# Patient Record
Sex: FEMALE | Race: WHITE | HISPANIC OR LATINO | Employment: OTHER | URBAN - METROPOLITAN AREA
[De-identification: names, ages, dates, MRNs, and addresses within clinical notes are randomized per-mention and may not be internally consistent; named-entity substitution may affect disease eponyms.]

---

## 2017-02-13 ENCOUNTER — OFFICE VISIT (OUTPATIENT)
Dept: PAIN MEDICINE | Facility: CLINIC | Age: 42
End: 2017-02-13
Attending: ANESTHESIOLOGY
Payer: COMMERCIAL

## 2017-02-13 VITALS
WEIGHT: 136.88 LBS | HEIGHT: 66 IN | SYSTOLIC BLOOD PRESSURE: 136 MMHG | TEMPERATURE: 99 F | DIASTOLIC BLOOD PRESSURE: 66 MMHG | BODY MASS INDEX: 22 KG/M2 | HEART RATE: 64 BPM

## 2017-02-13 DIAGNOSIS — M54.16 LEFT LUMBAR RADICULITIS: ICD-10-CM

## 2017-02-13 DIAGNOSIS — M79.18 MYOFASCIAL PAIN ON LEFT SIDE: Primary | ICD-10-CM

## 2017-02-13 DIAGNOSIS — M51.9 LUMBOSACRAL DISC DISEASE: ICD-10-CM

## 2017-02-13 DIAGNOSIS — M51.9 ANNULAR TEAR OF INTERVERTEBRAL DISC: ICD-10-CM

## 2017-02-13 DIAGNOSIS — R10.32 LEFT LOWER QUADRANT PAIN: ICD-10-CM

## 2017-02-13 PROCEDURE — 99214 OFFICE O/P EST MOD 30 MIN: CPT | Mod: S$GLB,,, | Performed by: ANESTHESIOLOGY

## 2017-02-13 PROCEDURE — 99999 PR PBB SHADOW E&M-EST. PATIENT-LVL III: CPT | Mod: PBBFAC,,, | Performed by: ANESTHESIOLOGY

## 2017-02-13 RX ORDER — IBUPROFEN 800 MG/1
800 TABLET ORAL 3 TIMES DAILY
Qty: 90 TABLET | Refills: 5 | Status: SHIPPED | OUTPATIENT
Start: 2017-02-13 | End: 2017-08-12

## 2017-02-13 RX ORDER — METHOCARBAMOL 500 MG/1
500 TABLET, FILM COATED ORAL 3 TIMES DAILY PRN
Qty: 90 TABLET | Refills: 5 | Status: SHIPPED | OUTPATIENT
Start: 2017-02-13 | End: 2017-08-12
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
## 2017-02-20 ENCOUNTER — PROCEDURE VISIT (OUTPATIENT)
Dept: OBSTETRICS AND GYNECOLOGY | Facility: CLINIC | Age: 42
End: 2017-02-20
Payer: COMMERCIAL

## 2017-02-20 ENCOUNTER — OFFICE VISIT (OUTPATIENT)
Dept: OBSTETRICS AND GYNECOLOGY | Facility: CLINIC | Age: 42
End: 2017-02-20
Payer: COMMERCIAL

## 2017-02-20 ENCOUNTER — HOSPITAL ENCOUNTER (OUTPATIENT)
Dept: RADIOLOGY | Facility: OTHER | Age: 42
Discharge: HOME | End: 2017-02-20
Attending: OBSTETRICS & GYNECOLOGY
Payer: COMMERCIAL

## 2017-02-20 VITALS
DIASTOLIC BLOOD PRESSURE: 70 MMHG | HEIGHT: 66 IN | SYSTOLIC BLOOD PRESSURE: 122 MMHG | WEIGHT: 139.75 LBS | BODY MASS INDEX: 22.46 KG/M2

## 2017-02-20 DIAGNOSIS — R10.2 PELVIC PAIN IN FEMALE: ICD-10-CM

## 2017-02-20 DIAGNOSIS — N64.3 GALACTORRHEA: ICD-10-CM

## 2017-02-20 DIAGNOSIS — R10.2 PELVIC PAIN IN FEMALE: Primary | ICD-10-CM

## 2017-02-20 DIAGNOSIS — Z30.431 IUD SURVEILLANCE: ICD-10-CM

## 2017-02-20 DIAGNOSIS — T83.32XA MALPOSITIONED INTRAUTERINE DEVICE (IUD), INITIAL ENCOUNTER: Primary | ICD-10-CM

## 2017-02-20 PROCEDURE — 76830 TRANSVAGINAL US NON-OB: CPT | Mod: 26,,, | Performed by: RADIOLOGY

## 2017-02-20 PROCEDURE — 99999 PR PBB SHADOW E&M-EST. PATIENT-LVL III: CPT | Mod: PBBFAC,,, | Performed by: OBSTETRICS & GYNECOLOGY

## 2017-02-20 PROCEDURE — 76856 US EXAM PELVIC COMPLETE: CPT | Mod: TC

## 2017-02-20 PROCEDURE — 58301 REMOVE INTRAUTERINE DEVICE: CPT | Mod: S$GLB,,, | Performed by: OBSTETRICS & GYNECOLOGY

## 2017-02-20 PROCEDURE — 99213 OFFICE O/P EST LOW 20 MIN: CPT | Mod: S$GLB,,, | Performed by: OBSTETRICS & GYNECOLOGY

## 2017-02-20 PROCEDURE — 76856 US EXAM PELVIC COMPLETE: CPT | Mod: 26,,, | Performed by: RADIOLOGY

## 2017-02-20 RX ORDER — LEVOFLOXACIN 750 MG/1
TABLET ORAL
Refills: 1 | COMMUNITY
Start: 2016-11-25 | End: 2017-02-20

## 2017-02-20 NOTE — PROCEDURES
Procedures     DATE: February 20th, 2017    TIME: 2:00 PM    PROCEDURE:  Latia removal    INDICATION: Mirtha Gary is a 41 y.o. female who presents for IUD removal secondary to ABNORMAL ULTRASOUND DONE EARLIER THIS MORNING SHOWED POSSIBLE ARM EMBEDDED IN MYOMETRIUM.      PRE-IUD REMOVAL COUNSELING:  The patient was advised of minimal risks of bleeding and pain and she agrees to proceed.    PROCEDURE:  TIME OUT PERFORMED.  IUD strings were  visualized at the os. IUD removed with gentle traction. IUD ARM NOT EMBEDDED IN UTERUS. The patient tolerated the procedure well.    COMPLICATIONS: None    PATIENT DISPOSITION: The patient tolerated the procedure well.    ASSESSMENT:  Contraceptive Management / Removal IUD. V25.0.    POST IUD REMOVAL COUNSELING:  Expect period-like flow to occur after Mirena IUD removal and periods to return to pre-IUD pattern.  Manage post IUD removal cramping with NSAIDs, Tylenol or Rx per [facility].    POST IUD REMOVAL CONTRACEPTION:  If planning pregnancy, patient instructed to begin prenatal vitamins.     Counseling lasted approximately 15 minutes and all her questions were answered.    FOLLOW-UP: With me for annual gyn exam or prn.    Katrin Garcia MD 02/20/2017 2:54 PM

## 2017-02-20 NOTE — MR AVS SNAPSHOT
[facility] [address]  [address]  [place] [address]  Phone: [phone]  Fax: [phone]                  Mirtha Gary   2017 8:45 AM   Office Visit    Descripción:  Female : 1975   Personal Médico:  Katrin Garcia MD   Departamento:  [facility] [address]           Razón de la kerry     Pelvic Pain                Lista de tareas           Citas próximas        Personal Médico Departamento Tfno del dpto    2017 2:15 PM Katrin Garcia MD [phone]      Metas (5 Years of Data)     Ninguna      Derricksjuan r en [place]lam[place]     [place] En [place] L[place]nea de Enfermeras - Asistencia   Enfermeras registradas de [facility] pueden ayudarle a reservar maurice kerry, proveer educación para la zechariah, asesoría clínica, y otros servicios de asesoramiento.   Llame para anuja servicio gratuito a [phone].             Medicamentos           Mensaje sobre Medicamentos     Verificar los cambios y / o adiciones a vega régimen de medicación son los mismos que discutir con vega médico. Si cualquiera de estos cambios o adiciones son incorrectos, por favor notifique a vega proveedor de atención médica.        DEJAR de colt estos medicamentos     zolpidem (AMBIEN) 10 mg Tab TK 1 T PO QHS PRN FOR INSOMNIA    levoFLOXacin (LEVAQUIN) 750 MG tablet TK 1 T PO QD           Verifique que la siguiente lista de medicamentos es maurice representación exacta de los medicamentos que está tomando actualmente. Si no hay ningunos reportados, la lista puede estar en berumen. Si no es correcta, por favor póngase en contacto con vega proveedor de atención médica. Lleve esta lista con usted en mitchell de emergencia.           Medicamentos Actuales     butalbital-acetaminophen-caff -40 mg Cap TK 1 C PO BID PRF SEVERE HA    ibuprofen (ADVIL,MOTRIN) 800 MG tablet Take 1 tablet (800 mg total) by mouth 3 (three) times daily.    methocarbamol (ROBAXIN) 500 MG Tab Take 1 tablet (500 mg  "total) by mouth 3 (three) times daily as needed (muscle spasms).    PROAIR HFA 90 mcg/actuation inhaler USE 2 PUFFS PO Q 6 H           Información de referencia clínica           Lashay signos vitales kathya     PS [place] BMI (IMC)          122/70 5' 6" (1.676 m) 63.4 kg (139 lb 12.4 oz) 22.56 kg/m2        Blood Pressure          Most Recent Value    BP  122/70      Alergias     A partir del:  2017        No Known Allergies      Vacunas     Administradas en la fecha de la visita:  2017        None      Language Assistance Services     ATTENTION: Language assistance services are available, free of charge. Please call [phone].      ATENCIÓN: Si habla español, tiene a vega disposición servicios gratuitos de asistencia lingüística. Llame al [phone].     CHÚ Ý: N?u b?n nói Ti?ng Vi?t, có các d?ch v? h? tr? ngôn ng? mi?n phí dành cho b?n. G?i s? [phone].         [facility] [address] cumple con las leyes federales aplicables de derechos civiles y no discrimina por motivos de dmitri, color, origen nacional, edad, discapacidad, o sexo.                 Mirtha Gary   2017 8:45 AM   Office Visit    Description:  Female : 1975   Provider:  Katrin Garcia MD   Department:  [facility] [address]           Reason for Visit     Pelvic Pain                To Do List           Future Appointments        Provider Department Dept Phone    2017 2:15 PM Katrin Garcia MD [phone]      Goals     None      [facility]     [facility] -  Assistance  Registered nurses in the [facility] provide clinical advisement, health education, appointment booking, and other advisory services.  Call for this free service at [phone].             Medications           Message regarding Medications     Verify the changes and/or additions to your medication regime listed below are the same as discussed with your " "clinician today.  If any of these changes or additions are incorrect, please notify your healthcare provider.        STOP taking these medications     zolpidem (AMBIEN) 10 mg Tab TK 1 T PO QHS PRN FOR INSOMNIA    levoFLOXacin (LEVAQUIN) 750 MG tablet TK 1 T PO QD           Verify that the below list of medications is an accurate representation of the medications you are currently taking.  If none reported, the list may be blank. If incorrect, please contact your healthcare provider. Carry this list with you in case of emergency.           Current Medications     butalbital-acetaminophen-caff -40 mg Cap TK 1 C PO BID PRF SEVERE HA    ibuprofen (ADVIL,MOTRIN) 800 MG tablet Take 1 tablet (800 mg total) by mouth 3 (three) times daily.    methocarbamol (ROBAXIN) 500 MG Tab Take 1 tablet (500 mg total) by mouth 3 (three) times daily as needed (muscle spasms).    PROAIR HFA 90 mcg/actuation inhaler USE 2 PUFFS PO Q 6 H           Clinical Reference Information           Your Vitals Were     BP Height Weight BMI          122/70 5' 6" (1.676 m) 63.4 kg (139 lb 12.4 oz) 22.56 kg/m2        Blood Pressure          Most Recent Value    BP  122/70      Allergies as of 2/20/2017     No Known Allergies      Immunizations Administered on Date of Encounter - 2/20/2017     None      Language Assistance Services     ATTENTION: Language assistance services are available, free of charge. Please call [phone].      ATENCIÓN: Si habla español, tiene a vega disposición servicios gratuitos de asistencia lingüística. Llame al [phone].     [facility] Ý: N?u b?n nói Ti?ng Vi?t, có các d?ch v? h? tr? ngôn ng? mi?n phí dành cho b?n. G?i s? [phone].         [facility] [address] complies with applicable Federal civil rights laws and does not discriminate on the basis of race, color, national origin, age, disability, or sex.          "

## 2017-02-20 NOTE — PROGRESS NOTES
HPI: Pt presents today complaining of extreme left sided pelvic pain that happens q 2 weeks. She currently has Latia IUD in place. She does have prior hx of endometriosis and reports that Mirena worked well for her pain. Pain is better now but she reports it comes q 2 weeks like clockwork.     ROS:  GENERAL: Feeling well overall. Denies fever or chills.   SKIN: Denies rash or lesions.   HEAD: Denies head injury or headache.   NODES: Denies enlarged lymph nodes.   CHEST: Denies chest pain or shortness of breath.   CARDIOVASCULAR: Denies palpitations or left sided chest pain.   ABDOMEN: No abdominal pain, constipation, diarrhea, nausea, vomiting or rectal bleeding.   URINARY: No dysuria, hematuria, or burning on urination.  REPRODUCTIVE: See HPI.   BREASTS: Denies pain, lumps, or nipple discharge.   HEMATOLOGIC: No easy bruisability or excessive bleeding.   MUSCULOSKELETAL: Denies joint pain or swelling.   NEUROLOGIC: Denies syncope or weakness.   PSYCHIATRIC: Denies depression, anxiety or mood swings.    PE:   APPEARANCE: Well nourished, well developed,  female in no acute distress.  ABDOMEN: Soft. No tenderness or masses. No distention. No hernias palpated. No CVA tenderness.  VULVA: No lesions. Normal external female genitalia.  URETHRAL MEATUS: Normal size and location, no lesions, no prolapse.  URETHRA: No masses, tenderness, or prolapse.  VAGINA: Moist. No lesions or lacerations noted. No abnormal discharge present. No odor present.   CERVIX: No lesions or discharge. No cervical motion tenderness. IUD strings seen (0.5 cm out - easily seen)  UTERUS: Normal size, regular shape, mobile, non-tender.  ADNEXA: No tenderness. No fullness or masses palpated in the adnexal regions.   ANUS PERINEUM: Normal.      Diagnosis:  1. Pelvic pain in female    2. IUD surveillance        Plan:     Orders Placed This Encounter    US Pelvis Comp with Transvag NON-OB (xpd     - If u/s normal will consider changing out Latia to  Mirena in hopes that higher progesterone dose will help with endometriosis if that is what is causing her pain.     Follow-up with me PRN results.

## 2017-05-08 DIAGNOSIS — M25.561 ACUTE PAIN OF RIGHT KNEE: Primary | ICD-10-CM

## 2017-05-08 DIAGNOSIS — S89.91XA RIGHT KNEE INJURY, INITIAL ENCOUNTER: ICD-10-CM

## 2017-05-10 ENCOUNTER — HOSPITAL ENCOUNTER (OUTPATIENT)
Dept: RADIOLOGY | Facility: OTHER | Age: 42
Discharge: HOME | End: 2017-05-10
Attending: ANESTHESIOLOGY
Payer: COMMERCIAL

## 2017-05-10 ENCOUNTER — OFFICE VISIT (OUTPATIENT)
Dept: SPORTS MEDICINE | Facility: CLINIC | Age: 42
End: 2017-05-10
Payer: COMMERCIAL

## 2017-05-10 ENCOUNTER — HOSPITAL ENCOUNTER (OUTPATIENT)
Dept: RADIOLOGY | Facility: HOSPITAL | Age: 42
Discharge: HOME | End: 2017-05-10
Attending: ORTHOPAEDIC SURGERY
Payer: COMMERCIAL

## 2017-05-10 VITALS
BODY MASS INDEX: 22.34 KG/M2 | HEART RATE: 67 BPM | HEIGHT: 66 IN | WEIGHT: 139 LBS | SYSTOLIC BLOOD PRESSURE: 99 MMHG | DIASTOLIC BLOOD PRESSURE: 70 MMHG

## 2017-05-10 DIAGNOSIS — M25.561 RIGHT KNEE PAIN, UNSPECIFIED CHRONICITY: Primary | ICD-10-CM

## 2017-05-10 DIAGNOSIS — M25.561 ACUTE PAIN OF RIGHT KNEE: ICD-10-CM

## 2017-05-10 DIAGNOSIS — M54.16 LEFT LUMBAR RADICULITIS: ICD-10-CM

## 2017-05-10 DIAGNOSIS — M25.561 RIGHT KNEE PAIN, UNSPECIFIED CHRONICITY: ICD-10-CM

## 2017-05-10 DIAGNOSIS — M51.9 LUMBOSACRAL DISC DISEASE: ICD-10-CM

## 2017-05-10 DIAGNOSIS — M22.41 CHONDROMALACIA OF RIGHT PATELLA: ICD-10-CM

## 2017-05-10 DIAGNOSIS — M54.17 LUMBOSACRAL RADICULOPATHY: ICD-10-CM

## 2017-05-10 DIAGNOSIS — M17.31 POST-TRAUMATIC OSTEOARTHRITIS OF RIGHT KNEE: ICD-10-CM

## 2017-05-10 DIAGNOSIS — S89.91XA RIGHT KNEE INJURY, INITIAL ENCOUNTER: ICD-10-CM

## 2017-05-10 PROCEDURE — 1160F RVW MEDS BY RX/DR IN RCRD: CPT | Mod: S$GLB,,, | Performed by: ORTHOPAEDIC SURGERY

## 2017-05-10 PROCEDURE — 73564 X-RAY EXAM KNEE 4 OR MORE: CPT | Mod: 26,50,, | Performed by: RADIOLOGY

## 2017-05-10 PROCEDURE — 73721 MRI JNT OF LWR EXTRE W/O DYE: CPT | Mod: TC,RT

## 2017-05-10 PROCEDURE — 99999 PR PBB SHADOW E&M-EST. PATIENT-LVL IV: CPT | Mod: PBBFAC,,, | Performed by: ORTHOPAEDIC SURGERY

## 2017-05-10 PROCEDURE — 99204 OFFICE O/P NEW MOD 45 MIN: CPT | Mod: S$GLB,,, | Performed by: ORTHOPAEDIC SURGERY

## 2017-05-10 PROCEDURE — 73721 MRI JNT OF LWR EXTRE W/O DYE: CPT | Mod: 26,RT,, | Performed by: RADIOLOGY

## 2017-05-10 PROCEDURE — 73564 X-RAY EXAM KNEE 4 OR MORE: CPT | Mod: TC,50,PO

## 2017-05-10 NOTE — MR AVS SNAPSHOT
[facility]  [address]  [place] [address]  Phone: [phone]                  Mirtha Gary   5/10/2017 3:00 PM   Office Visit    Descripción:  Female : 1975   Personal Médico:  Kevin Morris MD   Departamento:  [facility]           Razón de la kerry     Right Knee - Pain           Diagnósticos de Esta Visita        Comentarios    Right knee pain, unspecified chronicity    -  Primario     Lumbosacral disc disease         Left lumbar radiculitis         Lumbosacral radiculopathy         Chondromalacia of right patella         Post-traumatic osteoarthritis of right knee                Lista de tarbeckie           Metas (5 Years of Data)     Ninguna      Follow-Up and Disposition     Return RTC in 2 weeks with Mid-level provider Patient will not fill out IKDC, SF-12 and KOOS on return., for RTC in 2 weeks with Mid-level provider Patient will not fill out IKDC, SF-12 and KOOS on return..      [facility]     [facility] L[facility]nea de Enfermeras - Asistencia   Enfermeras registradas de [facility] pueden ayudarle a reservar maurice kerry, proveer educación para la zechariah, asesoría clínica, y otros servicios de asesoramiento.   Llame para anuja servicio gratuito a [phone].             Medicamentos           Mensaje sobre Medicamentos     Verificar los cambios y / o adiciones a vega régimen de medicación son los mismos que discutir con vega médico. Si cualquiera de estos cambios o adiciones son incorrectos, por favor notifique a vega proveedor de atención médica.             Verifique que la siguiente lista de medicamentos es maurice representación exacta de los medicamentos que está tomando actualmente. Si no hay ningunos reportados, la lista puede estar en berumen. Si no es correcta, por favor póngase en contacto con vega proveedor de atención médica. Lleve esta lista con usted en mitchell de emergencia.           Medicamentos Actuales      "butalbital-acetaminophen-caff -40 mg Cap TK 1 C PO BID PRF SEVERE HA    ibuprofen (ADVIL,MOTRIN) 800 MG tablet Take 1 tablet (800 mg total) by mouth 3 (three) times daily.    methocarbamol (ROBAXIN) 500 MG Tab Take 1 tablet (500 mg total) by mouth 3 (three) times daily as needed (muscle spasms).    PROAIR HFA 90 mcg/actuation inhaler USE 2 PUFFS PO Q 6 H           Información de referencia clínica           Lashay signos vitales kathya     PS Pulso [place] Peso Ultima menstruación BMI (IM)    99/70 67 5' 6" (1.676 m) 63 kg (139 lb) 04/13/2017 22.44 kg/m2      Blood Pressure          Most Recent Value    BP  99/70      Alergias     A partir del:  5/10/2017        No Known Allergies      Vacunas     Administradas en la fecha de la visita:  5/10/2017        None      Orders Placed During Today's Visit      Órdenes normales de esta visita    Ambulatory Referral to Orthopedics     Exámenes/Procedimientos futuros Se espera el Vence    X-ray Knee Ortho Bilateral with Flexion  5/10/2017 5/10/2018      Instrucciones        The knee is the most frequently injured joint in the body. Most injuries are due to the extreme stresses during twisting or turning activities or sports. The knee joint is made up of three bones, four major ligaments and two types of cartilage.    FEMUR (Thigh Bone)  The femoral condyles are the two rounded prominences at the end of the femur. The motion of the condyles include rocking, gliding and rotating. Any abnormal surface structure or cartilage damage can lead to cartilage breakdown and arthritis (loss of cartilage padding).    TIBIA (Shin Bone)  The Tibia meets the Femur at the knee in two areas on which the Femur rides. This area is called the Tibial Plateau.    PATELLA (Knee Cap)  The Patella is a bone that lies within the quadriceps tendon. It rides in the shallow groove over the front part if the Femur called the Trochlea. The Patella acts as a lever arm to help the quadriceps muscle extend " "the knee.    Articular Cartilage covers the ends of these bones at the knee joint. This glistening white substance has the consistency of firm rubber but is actually a mixture of collagen and special large sponge-like molecules all maintained by living cartilage cells (chondrocytes). With normal joint fluid for lubrication, the surface is more slippery than ice on ice and allows smooth and easy knee joint motion.      MENISCUS  The other type of knee cartilage is the Meniscal Cartilage (fibrocartilage). These C-shaped pads are found between the thigh bone and shin bone -- one on each side -- thus called the Medial Meniscus (inner thigh aspect) and Lateral Meniscus (outer aspect). The menisci are attached to the Tibial Plateaus, and serve to cushion and transfer joint force more evenly to the tibia. They accomplish this by distributing joint forces over a larger area of the joint -- transferring force from the curved femoral condylar margins to the flatter tibial plateaus. Damage to the meniscal cushion may result in increased stress on the articular cartilage of the tibia and femur and early arthritis.      The smooth, white shiny covering of the bones in the joint is known as Articular Cartilage, and is made of a material called "hyaline cartilage". Damage to this articular cartilage can occur from trauma (such as a fall or car accident), but more often, it is the result of repetitive injuries over a long period of time.    Articular cartilage injuries are common, occurring in 20- 70% of knee injuries. The function of articular cartilage is to optimize joint function by reducing friction and increasing shock absorption. Articular cartilage is similar to the "tread on a car tire".    Injuries to the articular cartilage have a low capacity for healing. Both superficial and full-thickness cartilage injuries may progress to the mechanical wear and breakdown of the cartilage matrix.  The breakdown of articular cartilage " will eventually cause osteoarthritis, which is a very serious painful condition. With a full-thickness cartilage injury, continued activity may cause the articular cartilage injury to progress rapidly to traumatic arthritis. The larger the initial cartilage injury, the faster the potential progression of arthritis. Articular cartilage injury or wear leads to joint pain.      Common symptoms include pain when the joint is moved or loaded (like walking or running). Catching, locking, or creaking (crepitation) may occur with joint motion or weight bearing (like twisting or standing  from a seated position). Articular cartilage damage may be superficial (partial), deep (complete full-thickness), or osteochondral (bone and cartilage).    Superficial cartilage injuries extend into the upper 50% of the depth of the cartilage. These injuries typically do not heal, but may not progress to arthritis unless they are large and located in a weight-bearing area of the knee.  Deep or full-thickness lesions extend down to the bone, but not through it. These injuries have very little healing potential and tend to progress to osteoarthritis if located in a weight-bearing area.    Osteochondral (bone plus cartilage) injuries extend down through the subchondral bone (deep to the cartilage). These injuries may heal by allowing blood vessel ingrowth with fibrous cells to produce a fibrous (scar-like) tissue repair.      Treatment Options For Smaller Defects  Most studies suggest the repair tissue formed from bone marrow stimulation techniques is fibrocartilage (scar tissue -not hyaline cartilage), which is less resistant to wear with the forces in the knee joint and often deteriorates over time Bone marrow stimulation techniques can be successful in eliminating symptoms in many patients, especially young patients with small lesions.   Without early intervention, cartilage degeneration may proceed, and prevent a chance for successful  "pain- free function.    Arthroscopic Debridement  This is an arthroscopic procedure, often known as a "knee scope". The surgeon uses minimally invasive techniques with a small fiberoptic light source attached to a video camera to locate damaged cartilage and trim the loose edges away to smooth the surface. The surgeon may trim meniscus tears and remove loose cartilage that causes catching or locking symptoms and attempts to prevent any further flaking off that can irritate the knee joint lining and cause swelling.  Arthroscopic debridement is most effective for small lesions, less than 1 cm2 (3/8 inch). It is not as effective for larger lesions because it does not fill the lesion with any repair tissue, leaving the edges exposed to high forces in the knee and continued wear. Despite relieving some symptoms from cartilage tears or loss, arthroscopic cleaning does not prevent the progression of arthritis, but may relieve mechanical symptoms.    Bone Marrow Stimulation Techniques  Three different techniques are available to create bleeding and clot formation at the cartilage defect. Blood vessels and fibrous cells migrate to the area to form a fibrous scar-tissue repair tissue to fill the hole in the articular cartilage. Drilling creates multiple small holes through the subchondral bone to allow bleeding into the defect.   Microfracture or "picking" creates small fractures in the subchondral bone to encourage bleeding into the defect.      Abrasion arthroplasty is a superficial shaving of the subchondral bone surface to create bleeding into the defect. Bone marrow stimulation techniques are successful in eliminating symptoms in many patients, especially younger patients with smaller lesions well contained lesions.       For patients with more extensive cartilage damage there are several techniques available    Osteochondral Autograft  This technique is analogous to a hair-plug transfer. The surgeon removes a small " section of the patient's own cartilage along with the underlying bone plug, hence the name osteochondral (bone and cartilage) graft.      Bone and cartilage cylinders are harvested from a minor weight bearing area of the knee joint and transplanted into prepared holes in the damage area. This process works much like a hair transplant. Unfortunately, a limited amount of normal osteochondral tissue is available for harvest. The typical size of defect treatable with this method is between 1 to 2 cm2.      Treatment Options For Larger Lesions    Autologous Chondrocyte Implantation (ACI) Carticel  For cartilage defects greater than 2 square centimeters or if there are multiple defects in the knee, one of the newer techniques for the cartilage regeneration, is ACI. ACI is FDA indicated for full-thickness cartilage or osteochondral lesions located in the knee.    ACI is performed in two stages. The first stage is performed when initially assessing the joint arthroscopically. A small amount of cartilage is harvested and sent to a laboratory for cell culture and growth.    The patient's own cartilage cells (chondrocytes) are grown in culture increasing the number of cells by 10 fold.    Twelve million chondrocytes are then re-implanted into the cartilage defect and covered with a ceiling of native tissue (periosteum).    The cells then grow to fill the defect and resurface areas of cartilage loss with hyaline-like cartilage.    The long-term outcomes (of 14 years) are encouraging for treating medium and large cartilage lesions. ACI is the only procedure with a formal patient outcomes registry.    Osteochondral Allograft     For larger defects that involve both cartilage and bone loss, surgeons may custom fit the defect with a cadaver implant of donated cartilage and bone. The transplanted tissue is matched to the patient based on size of the knee, but tissue typing (similar to organ transplantation) is not necessary.  Osteochondral transplantation may allow restoration of the joint surface. The long-term outcomes with fresh allograft (cadaver) tissue have been very encouraging.      RESTORING THE MENISCUS  Our goal is to maintain normal anatomy, if possible. In the case of meniscal tears, the first line of treatment is attempt at repair. Historically, in the days of open cartilage surgery, the entire meniscus was removed. Unfortunately, long term follow-up studies of these patients after removal of the meniscus have found that many go on to degenerative arthritis. Today, cartilage surgeons recognize the protective value of the meniscal cartilage and make every attempt to conserve this valuable tissue.    To maximally preserve function after a meniscus tear, surgeons may repair the meniscus using a variety of techniques. Options include using special sutures or absorbable implants to staple, destiny, or otherwise fix and secure the torn meniscal cushion.    Replacing The Meniscus  For patients who have had the meniscus removed, an innovative solution called a meniscal transplant may be an option. The indications for transplant need to be assessed by your cartilage surgeon. Unlike some other forms of tissue transplantation, this procedure does not require patients to be on medications to prevent organ rejection. Intermediate term outcome studies are encouraging.        RESTORING KNEE ALIGNMENT    Osteotomy  When the bones of the knee do not align properly, joint forces are not evenly distributed and may overload one side of the knee causing pain and cartilage degeneration. This overload problem is made worse when there has been a traumatic cartilage injury or the meniscus has been removed.    An osteotomy is designed to shift the stress from the damaged part of the knee to a more normal area in the knee. An osteotomy requires the surgeon to cut the bone in order to remove a wedge of bone in order to adjust the angle of the knee. For  individuals with medial compartment narrowing (the most common situation), there are three options for high tibial osteotomy (HTO).      One involves removing a wedge shape piece of bone from the lateral side of the tibia and then closing the remaining surfaces together and holding it with a plate and screws (Fig A).    The second technique involves making one cut partially across the top of the tibia and opening the bone to establish the correct alignment. This is held in place with a plate and screws and a bone graft. (Fig B)    The final technique is called medial hemicallotasis, which means stretching healing bone. This technique requires a single cut partially across the bone. The bone is then gradually opened on the medial side by an external fixation frame. This technique is attractive because it allows adjustments to be made in the angle of correction and the hardware is removed three months after the procedure (Fig C).      Each of these techniques require a period of non-weightbearing or partial weightbearing crutch ambulation. These techniques may be necessary at the same time or prior to other reconstructive procedures such as cartilage replacement or meniscus replacement surgery in order to remove excessive forces off the repair site.      OSTEOARTHRITIS    Partial (Unicompartmental) Joint Replacement  This procedure replaces only the damaged portion of the joint with metal and/or plastic, leaving the remaining portion of the joint intact. It is often known as a partial knee replacement. New techniques allow replacement of a portion of the knee joint through smaller incisions with fewer days of hospitalization required.    Total Joint Replacement  If there is extensive damage with bone-on-bone apposition, many of the above procedures are not indicated. In these cases of end-stage arthritis, the entire joint surface is replaced with artificial metal and plastic components, allowing most patients to  "return to pain-free activities.    Future Trends  Investigators are now examining the potential of using collagen or other biologic tissues to serve as a bridge or scaffold for the body's own healing/repair mechanism to use in reestablishing meniscal form and function. In the future, biological "healing glues" may become available to allow repair without sutures. Even with the newest techniques available, certain tears are not repairable and a portion of the meniscus is removed using the arthroscope (partial meniscectomy).    The term osteoarthritis indicates the degeneration and excessive wear of articular cartilage with gradual changes occurring in the underlying bone.    Initially the articular cartilage softens, the surface becomes uneven and the cartilage frays and develops cracks, which can extend down to bone.  In advanced osteoarthritis the cartilage is worn away to reveal the underlying bone and nerve endings causing pain.  The bone hardens, cysts begin to form, and new cartilage cells laid down around the worn cartilage ossify and form bony projections (bone spurs).  Untreated articular cartilage defects can progress to osteoarthritis with both mechanical and enzymatic breakdown resulting in permanent dysfunction of the joint. Our goal is to diagnose and prevent or halt the progression of arthritis      Many patients suffer with end-stage arthritis that limits even the simplest activities of daily living, significantly altering the patient's quality of fife. For these patient's, current cartilage surgical options DO NOT apply. There are no curative therapies for end-stage arthritis. A wide range of methods may be used to relieve pain and restore function. Conventional treatment methods include exercise, physical therapy and medications, such as anti-inflammatories. Recently, new biological compounds have been shown to be effective and safe for treating arthritis. These compounds include lubricants " (hyaluronic acid) and building blocks of cartilage (Chondroitin Sulfate and Glucosamine).    Medications  Oral medications such as non-steroidal anti-inflammatories (NSAID's) tend to have a beneficial effect on the degenerative conditions described above. Immediately following an injury, these medications help to decrease pain and swelling. On a more long term nature, these medications help to relieve the pain and swelling associated with arthritic joints. Newer specific anti-inflammatories medications have been developed to block the enzymes necessary for production of inflammation (cyclooxygenase-2 or VEGA-2). These medications, such as Ceibrex or Bextra tend to have less adverse effects on the stomach and gastrointestinal tract than older, more traditional NSAID's. These prescription-strength NSAID medications are taken by mouth once or twice per day. Other non-prescription over-the-counter NSAID's are available.    Cartilage Supplements  Other oral medications which can be purchased without a prescription include Glucosamine and Chondroitin Sulfate. These two compounds are normally found in the substance of articular cartilage. Several recent studies using Cosamin®DS have demonstrated a pain relieving effect with the combination of Glucosamine Hydrochloride, highly purified low molecular weight Chondroitin Sulfate and Manganese Ascorbate available only in this product. The National Institutes of Health ([facility]) has selected the exact same Glucosamine and Chondroitin Sulfate used in CosaminDS for a large multi-center clinical trial currently in progress.      Oral administration of these compounds does not have a cartilage building effect, but rather a cartilage sparing effect. Laboratory studies have confirmed a stimulatory action on cartilage cells as well as an inhibition of cartilage degeneration with CosaminDS, which can improve the health of existing cartilage. Few negative side-effects have been demonstrated  "in patients taking these compounds, and many patients with arthritis benefit from the addition of this supplement to their arthritis treatment regimen.    Cortisone (Steroid) Injections  Injection of steroids into joints have been performed for well over 100 years with good results. Typically, steroid injection is utilized in a patient with degenerative arthritic changes to treat acute inflammation.  Steroids are powerful anti-inflammatory substances. When injected into a joint, the steroid has primarily a local effect, not a whole-body or systemic effect. These medications tend to decrease pain and inflammation when used appropriately. If overused, local steroid injection can have a negative effect on the tissues, such as weakening of bone and tendons. These injections typically are not permanent in their beneficial effect.    Injectable Viscosupplementation  The space between the cartilage surfaces in the knee joint contains synovial fluid that acts as a lubricant for the joint. With the progression of arthritis, the synovial fluid becomes thinner and is ineffective as a shock absorber. As a result simple movements become painful. Hyaluronic acid injections supplement the existing synovial fluid and act as a shock absorber and lubricant for the knee.      Synvisc is a hyluronan based, naturally occurring lubricant with similar properties to synovial fluid found in healthy joints. Synvisc or Euflexxa are injected over a 3 week series to provide lubrication to the knee joint. For some patients, Synvisc One may be an option, this is a single-shot injection.    Braces  In some cases of arthritis, only a portion of the knee is degenerative and it may be advantageous to "unload" the affected area and force more weight towards the "good" part of the knee. Special braces called "unloaders" are used for this purpose. Typically the brace is worn for work or activity and tends to decrease the pain caused by single " "compartment arthritis.        Physical Therapy  Exercise is a vital component of the treatment of cartilage injury. If the knee becomes stiff after an injury or over the course of time, it may be difficult to regain the lost motion. In most cases, early range of motion exercises are instituted in order to prevent this problem. In some cases, a loss of strength in certain muscle groups can lead to increased stress on the already degenerative articular surfaces. If muscles are strong and working properly they will take up some of the stress and divert it away from the cartilage surfaces. As symptoms decrease exercise is increased, but always below the pain threshold. Muscle strength is never harmful to a joint. It is therefore common to have a doctor prescribe strengthening exercises as an integral part of the treatment program for arthritis.    TECHNIQUES  Biologic tissue engineering is continuing to bring exciting new approaches from the lab to the clinical arena. It may be possible to induce primitive cells from the bone marrow called pluripotential cells or mesenchymal stem cells to transform into hyaline articular cartilage with the use of a variety of growth factors or hormones. Genetic reprogram¬tangela and tissue engineering may eventually replace surgery - but not at this stage in the new millennium.  Other biological patches may act as a temporary home for chondrocytes or "prechondrocytes." This exciting field will offer many new surgical techniques, which will hopefully lead to opportunities to restore function with less invasive means.      Artroscopia de rodilla  Los problemas de rodilla a menudo pueden diagnosticarse y tratarse con maurice técnica llamada artroscopia. Se trata de un tipo de cirugía en el que se usa un instrumento llamado artroscopio y en el que solamente se necesitan incisiones pequeñas. El procedimiento sirve para diagnosticar un problema de rodilla y, en muchos casos, también permite jeremiah " tratamiento.     A través de pequeñas incisiones (danielle) se inyecta líquido y se inserta el artroscopio y los instrumentos.         La cámara acoplada al artroscopio permite a vega médico luisana la articulación de la rodilla en maurice pantalla.       El artroscopio  El artroscopio permite al médico observar directamente la articulación de la rodilla. El artroscopio contiene un canal para introducir y extraer líquidos, así erik fibras ópticas recubiertas que emiten un haz de brent intensa sobre la articulación de la rodilla, y maurice cámara en vega extremo que envía imágenes de la articulación. El médico observa estas imágenes en un monitor.  Preparativos para el procedimiento  · Hágase los análisis y pruebas que le indiquen.  · No coma ni astrid nada tony 10 horas antes del procedimiento.  · Antes de la cirugía le pondrán maurice sonda intravenosa en un brazo o en maurice mano para administrarle líquidos y medicamentos.  · También le administrarán anestesia para que no sienta dolor tony la operación. Podrían administrarle los siguientes tipos de anestesia:  ¨ Anestesia general. Anuja medicamento le inducirá un estado parecido al del sueño profundo tony la cirugía.  ¨ Anestesia regional para insensibilizar el cuerpo solamente de la cintura para abajo.  ¨ Anestesia local para insensibilizar solamente la rodilla.  Además de la anestesia regional o local, podrían administrarle sedantes para que esté relajado y ligeramente adormecido tony la cirugía.  El procedimiento  · Se hacen algunas incisiones pequeñas (danielle) en la rodilla.  · A continuación se introduce el artroscopio a través de jarad de estos danielle.  · Para facilitar la observación y la operación en la rodilla se introduce un líquido estéril en la articulación.  · A través del artroscopio, el médico confirma el tipo y el nivel de los daños en la rodilla. Si es posible, se da tratamiento en anuja momento utilizando instrumentos quirúrgicos a través de los otros  danielle.  · Maritza vez terminada la operación, se retiran todos los instrumentos y se cierran las incisiones con suturas, grapas, adhesivo quirúrgico, tiras o cinta quirúrgica.  Riesgos y complicaciones posibles de la artroscopia  Todas las cirugías conllevan ciertos riesgos. Algunos de los riesgos de la artroscopia son:  · Sangrado  · Infección  · Formación de coágulos de paty  · Hinchazón y rigidez en la rodilla  · Continuación de los problemas de rodilla   Date Last Reviewed: 9/20/2015  © 2279-8428 [employer]. [address] [place] [id number]. Todos los derechos reservados. Esta información no pretende sustituir la atención médica profesional. Sólo vega médico puede diagnosticar y tratar un problema de zechariah.        Artroscopia de la rodilla: Problemas tratados  La artroscopia puede detectar y tratar muchos tipos de problemas en la rodilla, erik por ejemplo roturas del cartílago meniscal o del ligamento natalie anterior (LCA), y la artritis.     Se extrae el cartílago meniscal dañado.   Roturas del cartílago meniscal  Hay varios tipos de roturas del cartílago meniscal; vega cirugía dependerá del tipo y gravedad de la lesión. El cartílago del menisco se fija en la tibia (espinilla) y actúa erik un amortiguador para la rodilla. El cirujano puede extraer el tejido dañado o reparar la rotura. El tratamiento debe aliviar el dolor y la hinchazón, así erik impedir que se trabe la articulación.     Para reemplazar el tejido LCA dañado, se injerta un tejido matthew y corazon.   Roturas del LCA  La rotura del LCA (ligamento natalie anterior) puede desestabilizar la rodilla, causando dolor, hinchazón y brandin de la articulación. Vega cirujano puede reparar el LCA mediante vega reconstrucción. En la reconstrucción del LCA, el tejido dañado se reemplaza por un injerto de tejido corazon procedente de un área cercana a la rodilla, o de un donante.     Para tratar la artritis, se alisan las áreas de cartílago  desgastado.   Artritis  Con el tiempo, el uso danish de la rodilla puede causar artritis. En esta enfermedad, el cartílago articular se desgasta y se vuelve áspero. El cartílago articular se fija al fémur (hueso del muslo) y ayuda en el suave movimiento de la articulación. También pueden desprenderse fragmentos de hueso o cartílago dentro de la articulación (cuerpos sueltos). Cualquiera de estos problemas puede limitar la movilidad y causar dolor. Vega cirujano usará maurice fresa o afeitadora para alisar la superficie articular y ayudar a sanarla. También extraerá los cuerpos sueltos. El tejido sinovial inflamado también puede eliminarse.  Date Last Reviewed: 2015  © 3535-3295 [employer]. [address] [place] [address]. Todos los derechos reservados. Esta información no pretende sustituir la atención médica profesional. Sólo vega médico puede diagnosticar y tratar un problema de zechariah.             Language Assistance Services     ATTENTION: Language assistance services are available, free of charge. Please call [phone].      ATENCIÓN: Si habla español, tiene a vega disposición servicios gratuitos de asistencia lingüística. Llame al [phone].     [facility] Ý: N?u b?n nói Ti?ng Vi?t, có các d?ch v? h? tr? ngôn ng? mi?n phí dành cho b?n. G?i s? [phone].         [facility] cumple con las leyes federales aplicables de derechos civiles y no discrimina por motivos de dmitri, color, origen nacional, edad, discapacidad, o sexo.                 Mirtha Gary   5/10/2017 3:00 PM   Office Visit    Description:  Female : 1975   Provider:  Kevin Morris MD   Department:  [facility]           Reason for Visit     Right Knee - Pain           Diagnoses this Visit        Comments    Right knee pain, unspecified chronicity    -  Primary     Lumbosacral disc disease         Left lumbar radiculitis         Lumbosacral radiculopathy          "Chondromalacia of right patella         Post-traumatic osteoarthritis of right knee                To Do List           Goals     None      Follow-Up and Disposition     Return RTC in 2 weeks with Mid-level provider Patient will not fill out IKDC, SF-12 and KOOS on return., for RTC in 2 weeks with Mid-level provider Patient will not fill out IKDC, SF-12 and KOOS on return..      [facility]     [facility] Call Nurse Care Line - 24/7 Assistance  Unless otherwise directed by your provider, please contact [facility], our nurse care line that is available for 24/7 assistance.     Registered nurses in the [facility] provide: appointment scheduling, clinical advisement, health education, and other advisory services.  Call: [phone] (toll free)               Medications           Message regarding Medications     Verify the changes and/or additions to your medication regime listed below are the same as discussed with your clinician today.  If any of these changes or additions are incorrect, please notify your healthcare provider.             Verify that the below list of medications is an accurate representation of the medications you are currently taking.  If none reported, the list may be blank. If incorrect, please contact your healthcare provider. Carry this list with you in case of emergency.           Current Medications     butalbital-acetaminophen-caff -40 mg Cap TK 1 C PO BID PRF SEVERE HA    ibuprofen (ADVIL,MOTRIN) 800 MG tablet Take 1 tablet (800 mg total) by mouth 3 (three) times daily.    methocarbamol (ROBAXIN) 500 MG Tab Take 1 tablet (500 mg total) by mouth 3 (three) times daily as needed (muscle spasms).    PROAIR HFA 90 mcg/actuation inhaler USE 2 PUFFS PO Q 6 H           Clinical Reference Information           Your Vitals Were     BP Pulse Height Weight Last Period BMI    99/70 67 5' 6" (1.676 m) 63 kg (139 lb) 04/13/2017 22.44 kg/m2      Blood Pressure          Most " Recent Value    BP  99/70      Allergies as of 5/10/2017     No Known Allergies      Immunizations Administered on Date of Encounter - 5/10/2017     None      Orders Placed During Today's Visit      Normal Orders This Visit    Ambulatory Referral to Orthopedics     Future Labs/Procedures Expected by Expires    X-ray Knee Ortho Bilateral with Flexion  5/10/2017 5/10/2018      Instructions        The knee is the most frequently injured joint in the body. Most injuries are due to the extreme stresses during twisting or turning activities or sports. The knee joint is made up of three bones, four major ligaments and two types of cartilage.    FEMUR (Thigh Bone)  The femoral condyles are the two rounded prominences at the end of the femur. The motion of the condyles include rocking, gliding and rotating. Any abnormal surface structure or cartilage damage can lead to cartilage breakdown and arthritis (loss of cartilage padding).    TIBIA (Shin Bone)  The Tibia meets the Femur at the knee in two areas on which the Femur rides. This area is called the Tibial Plateau.    PATELLA (Knee Cap)  The Patella is a bone that lies within the quadriceps tendon. It rides in the shallow groove over the front part if the Femur called the Trochlea. The Patella acts as a lever arm to help the quadriceps muscle extend the knee.    Articular Cartilage covers the ends of these bones at the knee joint. This glistening white substance has the consistency of firm rubber but is actually a mixture of collagen and special large sponge-like molecules all maintained by living cartilage cells (chondrocytes). With normal joint fluid for lubrication, the surface is more slippery than ice on ice and allows smooth and easy knee joint motion.      MENISCUS  The other type of knee cartilage is the Meniscal Cartilage (fibrocartilage). These C-shaped pads are found between the thigh bone and shin bone -- one on each side -- thus called the Medial Meniscus  "(inner thigh aspect) and Lateral Meniscus (outer aspect). The menisci are attached to the Tibial Plateaus, and serve to cushion and transfer joint force more evenly to the tibia. They accomplish this by distributing joint forces over a larger area of the joint -- transferring force from the curved femoral condylar margins to the flatter tibial plateaus. Damage to the meniscal cushion may result in increased stress on the articular cartilage of the tibia and femur and early arthritis.      The smooth, white shiny covering of the bones in the joint is known as Articular Cartilage, and is made of a material called "hyaline cartilage". Damage to this articular cartilage can occur from trauma (such as a fall or car accident), but more often, it is the result of repetitive injuries over a long period of time.    Articular cartilage injuries are common, occurring in 20- 70% of knee injuries. The function of articular cartilage is to optimize joint function by reducing friction and increasing shock absorption. Articular cartilage is similar to the "tread on a car tire".    Injuries to the articular cartilage have a low capacity for healing. Both superficial and full-thickness cartilage injuries may progress to the mechanical wear and breakdown of the cartilage matrix.  The breakdown of articular cartilage will eventually cause osteoarthritis, which is a very serious painful condition. With a full-thickness cartilage injury, continued activity may cause the articular cartilage injury to progress rapidly to traumatic arthritis. The larger the initial cartilage injury, the faster the potential progression of arthritis. Articular cartilage injury or wear leads to joint pain.      Common symptoms include pain when the joint is moved or loaded (like walking or running). Catching, locking, or creaking (crepitation) may occur with joint motion or weight bearing (like twisting or standing  from a seated position). Articular " "cartilage damage may be superficial (partial), deep (complete full-thickness), or osteochondral (bone and cartilage).    Superficial cartilage injuries extend into the upper 50% of the depth of the cartilage. These injuries typically do not heal, but may not progress to arthritis unless they are large and located in a weight-bearing area of the knee.  Deep or full-thickness lesions extend down to the bone, but not through it. These injuries have very little healing potential and tend to progress to osteoarthritis if located in a weight-bearing area.    Osteochondral (bone plus cartilage) injuries extend down through the subchondral bone (deep to the cartilage). These injuries may heal by allowing blood vessel ingrowth with fibrous cells to produce a fibrous (scar-like) tissue repair.      Treatment Options For Smaller Defects  Most studies suggest the repair tissue formed from bone marrow stimulation techniques is fibrocartilage (scar tissue -not hyaline cartilage), which is less resistant to wear with the forces in the knee joint and often deteriorates over time Bone marrow stimulation techniques can be successful in eliminating symptoms in many patients, especially young patients with small lesions.   Without early intervention, cartilage degeneration may proceed, and prevent a chance for successful pain- free function.    Arthroscopic Debridement  This is an arthroscopic procedure, often known as a "knee scope". The surgeon uses minimally invasive techniques with a small fiberoptic light source attached to a video camera to locate damaged cartilage and trim the loose edges away to smooth the surface. The surgeon may trim meniscus tears and remove loose cartilage that causes catching or locking symptoms and attempts to prevent any further flaking off that can irritate the knee joint lining and cause swelling.  Arthroscopic debridement is most effective for small lesions, less than 1 cm2 (3/8 inch). It is not as " "effective for larger lesions because it does not fill the lesion with any repair tissue, leaving the edges exposed to high forces in the knee and continued wear. Despite relieving some symptoms from cartilage tears or loss, arthroscopic cleaning does not prevent the progression of arthritis, but may relieve mechanical symptoms.    Bone Marrow Stimulation Techniques  Three different techniques are available to create bleeding and clot formation at the cartilage defect. Blood vessels and fibrous cells migrate to the area to form a fibrous scar-tissue repair tissue to fill the hole in the articular cartilage. Drilling creates multiple small holes through the subchondral bone to allow bleeding into the defect.   Microfracture or "picking" creates small fractures in the subchondral bone to encourage bleeding into the defect.      Abrasion arthroplasty is a superficial shaving of the subchondral bone surface to create bleeding into the defect. Bone marrow stimulation techniques are successful in eliminating symptoms in many patients, especially younger patients with smaller lesions well contained lesions.       For patients with more extensive cartilage damage there are several techniques available    Osteochondral Autograft  This technique is analogous to a hair-plug transfer. The surgeon removes a small section of the patient's own cartilage along with the underlying bone plug, hence the name osteochondral (bone and cartilage) graft.      Bone and cartilage cylinders are harvested from a minor weight bearing area of the knee joint and transplanted into prepared holes in the damage area. This process works much like a hair transplant. Unfortunately, a limited amount of normal osteochondral tissue is available for harvest. The typical size of defect treatable with this method is between 1 to 2 cm2.      Treatment Options For Larger Lesions    Autologous Chondrocyte Implantation (ACI) Carticel  For cartilage defects " greater than 2 square centimeters or if there are multiple defects in the knee, one of the newer techniques for the cartilage regeneration, is ACI. ACI is FDA indicated for full-thickness cartilage or osteochondral lesions located in the knee.    ACI is performed in two stages. The first stage is performed when initially assessing the joint arthroscopically. A small amount of cartilage is harvested and sent to a laboratory for cell culture and growth.    The patient's own cartilage cells (chondrocytes) are grown in culture increasing the number of cells by 10 fold.    Twelve million chondrocytes are then re-implanted into the cartilage defect and covered with a ceiling of native tissue (periosteum).    The cells then grow to fill the defect and resurface areas of cartilage loss with hyaline-like cartilage.    The long-term outcomes (of 14 years) are encouraging for treating medium and large cartilage lesions. ACI is the only procedure with a formal patient outcomes registry.    Osteochondral Allograft     For larger defects that involve both cartilage and bone loss, surgeons may custom fit the defect with a cadaver implant of donated cartilage and bone. The transplanted tissue is matched to the patient based on size of the knee, but tissue typing (similar to organ transplantation) is not necessary. Osteochondral transplantation may allow restoration of the joint surface. The long-term outcomes with fresh allograft (cadaver) tissue have been very encouraging.      RESTORING THE MENISCUS  Our goal is to maintain normal anatomy, if possible. In the case of meniscal tears, the first line of treatment is attempt at repair. Historically, in the days of open cartilage surgery, the entire meniscus was removed. Unfortunately, long term follow-up studies of these patients after removal of the meniscus have found that many go on to degenerative arthritis. Today, cartilage surgeons recognize the protective value of the meniscal  cartilage and make every attempt to conserve this valuable tissue.    To maximally preserve function after a meniscus tear, surgeons may repair the meniscus using a variety of techniques. Options include using special sutures or absorbable implants to staple, destiny, or otherwise fix and secure the torn meniscal cushion.    Replacing The Meniscus  For patients who have had the meniscus removed, an innovative solution called a meniscal transplant may be an option. The indications for transplant need to be assessed by your cartilage surgeon. Unlike some other forms of tissue transplantation, this procedure does not require patients to be on medications to prevent organ rejection. Intermediate term outcome studies are encouraging.        RESTORING KNEE ALIGNMENT    Osteotomy  When the bones of the knee do not align properly, joint forces are not evenly distributed and may overload one side of the knee causing pain and cartilage degeneration. This overload problem is made worse when there has been a traumatic cartilage injury or the meniscus has been removed.    An osteotomy is designed to shift the stress from the damaged part of the knee to a more normal area in the knee. An osteotomy requires the surgeon to cut the bone in order to remove a wedge of bone in order to adjust the angle of the knee. For individuals with medial compartment narrowing (the most common situation), there are three options for high tibial osteotomy (HTO).      One involves removing a wedge shape piece of bone from the lateral side of the tibia and then closing the remaining surfaces together and holding it with a plate and screws (Fig A).    The second technique involves making one cut partially across the top of the tibia and opening the bone to establish the correct alignment. This is held in place with a plate and screws and a bone graft. (Fig B)    The final technique is called medial hemicallotasis, which means stretching healing bone.  "This technique requires a single cut partially across the bone. The bone is then gradually opened on the medial side by an external fixation frame. This technique is attractive because it allows adjustments to be made in the angle of correction and the hardware is removed three months after the procedure (Fig C).      Each of these techniques require a period of non-weightbearing or partial weightbearing crutch ambulation. These techniques may be necessary at the same time or prior to other reconstructive procedures such as cartilage replacement or meniscus replacement surgery in order to remove excessive forces off the repair site.      OSTEOARTHRITIS    Partial (Unicompartmental) Joint Replacement  This procedure replaces only the damaged portion of the joint with metal and/or plastic, leaving the remaining portion of the joint intact. It is often known as a partial knee replacement. New techniques allow replacement of a portion of the knee joint through smaller incisions with fewer days of hospitalization required.    Total Joint Replacement  If there is extensive damage with bone-on-bone apposition, many of the above procedures are not indicated. In these cases of end-stage arthritis, the entire joint surface is replaced with artificial metal and plastic components, allowing most patients to return to pain-free activities.    Future Trends  Investigators are now examining the potential of using collagen or other biologic tissues to serve as a bridge or scaffold for the body's own healing/repair mechanism to use in reestablishing meniscal form and function. In the future, biological "healing glues" may become available to allow repair without sutures. Even with the newest techniques available, certain tears are not repairable and a portion of the meniscus is removed using the arthroscope (partial meniscectomy).    The term osteoarthritis indicates the degeneration and excessive wear of articular cartilage with " gradual changes occurring in the underlying bone.    Initially the articular cartilage softens, the surface becomes uneven and the cartilage frays and develops cracks, which can extend down to bone.  In advanced osteoarthritis the cartilage is worn away to reveal the underlying bone and nerve endings causing pain.  The bone hardens, cysts begin to form, and new cartilage cells laid down around the worn cartilage ossify and form bony projections (bone spurs).  Untreated articular cartilage defects can progress to osteoarthritis with both mechanical and enzymatic breakdown resulting in permanent dysfunction of the joint. Our goal is to diagnose and prevent or halt the progression of arthritis      Many patients suffer with end-stage arthritis that limits even the simplest activities of daily living, significantly altering the patient's quality of fife. For these patient's, current cartilage surgical options DO NOT apply. There are no curative therapies for end-stage arthritis. A wide range of methods may be used to relieve pain and restore function. Conventional treatment methods include exercise, physical therapy and medications, such as anti-inflammatories. Recently, new biological compounds have been shown to be effective and safe for treating arthritis. These compounds include lubricants (hyaluronic acid) and building blocks of cartilage (Chondroitin Sulfate and Glucosamine).    Medications  Oral medications such as non-steroidal anti-inflammatories (NSAID's) tend to have a beneficial effect on the degenerative conditions described above. Immediately following an injury, these medications help to decrease pain and swelling. On a more long term nature, these medications help to relieve the pain and swelling associated with arthritic joints. Newer specific anti-inflammatories medications have been developed to block the enzymes necessary for production of inflammation (cyclooxygenase-2 or VEGA-2). These medications,  such as Ceibrex or Bextra tend to have less adverse effects on the stomach and gastrointestinal tract than older, more traditional NSAID's. These prescription-strength NSAID medications are taken by mouth once or twice per day. Other non-prescription over-the-counter NSAID's are available.    Cartilage Supplements  Other oral medications which can be purchased without a prescription include Glucosamine and Chondroitin Sulfate. These two compounds are normally found in the substance of articular cartilage. Several recent studies using Cosamin®DS have demonstrated a pain relieving effect with the combination of Glucosamine Hydrochloride, highly purified low molecular weight Chondroitin Sulfate and Manganese Ascorbate available only in this product. The National Institutes of Health ([facility]) has selected the exact same Glucosamine and Chondroitin Sulfate used in CosaminDS for a large multi-center clinical trial currently in progress.      Oral administration of these compounds does not have a cartilage building effect, but rather a cartilage sparing effect. Laboratory studies have confirmed a stimulatory action on cartilage cells as well as an inhibition of cartilage degeneration with CosaminDS, which can improve the health of existing cartilage. Few negative side-effects have been demonstrated in patients taking these compounds, and many patients with arthritis benefit from the addition of this supplement to their arthritis treatment regimen.    Cortisone (Steroid) Injections  Injection of steroids into joints have been performed for well over 100 years with good results. Typically, steroid injection is utilized in a patient with degenerative arthritic changes to treat acute inflammation.  Steroids are powerful anti-inflammatory substances. When injected into a joint, the steroid has primarily a local effect, not a whole-body or systemic effect. These medications tend to decrease pain and inflammation when used  "appropriately. If overused, local steroid injection can have a negative effect on the tissues, such as weakening of bone and tendons. These injections typically are not permanent in their beneficial effect.    Injectable Viscosupplementation  The space between the cartilage surfaces in the knee joint contains synovial fluid that acts as a lubricant for the joint. With the progression of arthritis, the synovial fluid becomes thinner and is ineffective as a shock absorber. As a result simple movements become painful. Hyaluronic acid injections supplement the existing synovial fluid and act as a shock absorber and lubricant for the knee.      Synvisc is a hyluronan based, naturally occurring lubricant with similar properties to synovial fluid found in healthy joints. Synvisc or Euflexxa are injected over a 3 week series to provide lubrication to the knee joint. For some patients, Synvisc One may be an option, this is a single-shot injection.    Braces  In some cases of arthritis, only a portion of the knee is degenerative and it may be advantageous to "unload" the affected area and force more weight towards the "good" part of the knee. Special braces called "unloaders" are used for this purpose. Typically the brace is worn for work or activity and tends to decrease the pain caused by single compartment arthritis.        Physical Therapy  Exercise is a vital component of the treatment of cartilage injury. If the knee becomes stiff after an injury or over the course of time, it may be difficult to regain the lost motion. In most cases, early range of motion exercises are instituted in order to prevent this problem. In some cases, a loss of strength in certain muscle groups can lead to increased stress on the already degenerative articular surfaces. If muscles are strong and working properly they will take up some of the stress and divert it away from the cartilage surfaces. As symptoms decrease exercise is increased, but " "always below the pain threshold. Muscle strength is never harmful to a joint. It is therefore common to have a doctor prescribe strengthening exercises as an integral part of the treatment program for arthritis.    TECHNIQUES  Biologic tissue engineering is continuing to bring exciting new approaches from the lab to the clinical arena. It may be possible to induce primitive cells from the bone marrow called pluripotential cells or mesenchymal stem cells to transform into hyaline articular cartilage with the use of a variety of growth factors or hormones. Genetic reprogram¬tangela and tissue engineering may eventually replace surgery - but not at this stage in the new millennium.  Other biological patches may act as a temporary home for chondrocytes or "prechondrocytes." This exciting field will offer many new surgical techniques, which will hopefully lead to opportunities to restore function with less invasive means.      Artroscopia de rodilla  Los problemas de rodilla a menudo pueden diagnosticarse y tratarse con maurice técnica llamada artroscopia. Se trata de un tipo de cirugía en el que se usa un instrumento llamado artroscopio y en el que solamente se necesitan incisiones pequeñas. El procedimiento sirve para diagnosticar un problema de rodilla y, en muchos casos, también permite jeremiah tratamiento.     A través de pequeñas incisiones (danielle) se inyecta líquido y se inserta el artroscopio y los instrumentos.         La cámara acoplada al artroscopio permite a vega médico luisana la articulación de la rodilla en maurice pantalla.       El artroscopio  El artroscopio permite al médico observar directamente la articulación de la rodilla. El artroscopio contiene un canal para introducir y extraer líquidos, así erik fibras ópticas recubiertas que emiten un haz de brent intensa sobre la articulación de la rodilla, y maurice cámara en vega extremo que envía imágenes de la articulación. El médico observa estas imágenes en un " monitor.  Preparativos para el procedimiento  · Hágase los análisis y pruebas que le indiquen.  · No coma ni astrid nada tony 10 horas antes del procedimiento.  · Antes de la cirugía le pondrán maurice sonda intravenosa en un brazo o en maurice mano para administrarle líquidos y medicamentos.  · También le administrarán anestesia para que no sienta dolor tony la operación. Podrían administrarle los siguientes tipos de anestesia:  ¨ Anestesia general. Anuja medicamento le inducirá un estado parecido al del sueño profundo tony la cirugía.  ¨ Anestesia regional para insensibilizar el cuerpo solamente de la cintura para abajo.  ¨ Anestesia local para insensibilizar solamente la rodilla.  Además de la anestesia regional o local, podrían administrarle sedantes para que esté relajado y ligeramente adormecido tony la cirugía.  El procedimiento  · Se hacen algunas incisiones pequeñas (danielle) en la rodilla.  · A continuación se introduce el artroscopio a través de jarad de estos danielle.  · Para facilitar la observación y la operación en la rodilla se introduce un líquido estéril en la articulación.  · A través del artroscopio, el médico confirma el tipo y el nivel de los daños en la rodilla. Si es posible, se da tratamiento en anuja momento utilizando instrumentos quirúrgicos a través de los otros danielle.  · Maurice vez terminada la operación, se retiran todos los instrumentos y se cierran las incisiones con suturas, grapas, adhesivo quirúrgico, tiras o cinta quirúrgica.  Riesgos y complicaciones posibles de la artroscopia  Todas las cirugías conllevan ciertos riesgos. Algunos de los riesgos de la artroscopia son:  · Sangrado  · Infección  · Formación de coágulos de paty  · Hinchazón y rigidez en la rodilla  · Continuación de los problemas de rodilla   Date Last Reviewed: 9/20/2015  © 1384-2508 [employer], [employer]. [address] [place] [address], [place] [address]. Todos los derechos reservados. Esta información no pretende  sustituir la atención médica profesional. Sólo vega médico puede diagnosticar y tratar un problema de zechariah.        Artroscopia de la rodilla: Problemas tratados  La artroscopia puede detectar y tratar muchos tipos de problemas en la rodilla, erik por ejemplo roturas del cartílago meniscal o del ligamento natalie anterior (LCA), y la artritis.     Se extrae el cartílago meniscal dañado.   Roturas del cartílago meniscal  Hay varios tipos de roturas del cartílago meniscal; vega cirugía dependerá del tipo y gravedad de la lesión. El cartílago del menisco se fija en la tibia (espinilla) y actúa erik un amortiguador para la rodilla. El cirujano puede extraer el tejido dañado o reparar la rotura. El tratamiento debe aliviar el dolor y la hinchazón, así erik impedir que se trabe la articulación.     Para reemplazar el tejido LCA dañado, se injerta un tejido matthew y corazon.   Roturas del LCA  La rotura del LCA (ligamento natalie anterior) puede desestabilizar la rodilla, causando dolor, hinchazón y brandin de la articulación. Vega cirujano puede reparar el LCA mediante vega reconstrucción. En la reconstrucción del LCA, el tejido dañado se reemplaza por un injerto de tejido corazon procedente de un área cercana a la rodilla, o de un donante.     Para tratar la artritis, se alisan las áreas de cartílago desgastado.   Artritis  Con el tiempo, el uso danish de la rodilla puede causar artritis. En esta enfermedad, el cartílago articular se desgasta y se vuelve áspero. El cartílago articular se fija al fémur (hueso del muslo) y ayuda en el suave movimiento de la articulación. También pueden desprenderse fragmentos de hueso o cartílago dentro de la articulación (cuerpos sueltos). Cualquiera de estos problemas puede limitar la movilidad y causar dolor. Vega cirujano usará maurice fresa o afeitadora para alisar la superficie articular y ayudar a sanarla. También extraerá los cuerpos sueltos. El tejido sinovial inflamado también puede  eliminarse.  Date Last Reviewed: 8/31/2015  © 9059-7204 [employer], [employer]. [address] [place] [address], [place] [address]. Todos los derechos reservados. Esta información no pretende sustituir la atención médica profesional. Sólo vega médico puede diagnosticar y tratar un problema de zechariah.             Language Assistance Services     ATTENTION: Language assistance services are available, free of charge. Please call [phone].      ATENCIÓN: Si habla español, tiene a vega disposición servicios gratuitos de asistencia lingüística. Llame al [phone].     RIVAS Ý: N?u b?n nói Ti?ng Vi?t, có các d?ch v? h? tr? ngôn ng? mi?n phí dành cho b?n. G?i s? [phone].         [facility] complies with applicable Federal civil rights laws and does not discriminate on the basis of race, color, national origin, age, disability, or sex.

## 2017-05-10 NOTE — PATIENT INSTRUCTIONS
The knee is the most frequently injured joint in the body. Most injuries are due to the extreme stresses during twisting or turning activities or sports. The knee joint is made up of three bones, four major ligaments and two types of cartilage.    FEMUR (Thigh Bone)  The femoral condyles are the two rounded prominences at the end of the femur. The motion of the condyles include rocking, gliding and rotating. Any abnormal surface structure or cartilage damage can lead to cartilage breakdown and arthritis (loss of cartilage padding).    TIBIA (Shin Bone)  The Tibia meets the Femur at the knee in two areas on which the Femur rides. This area is called the Tibial Plateau.    PATELLA (Knee Cap)  The Patella is a bone that lies within the quadriceps tendon. It rides in the shallow groove over the front part if the Femur called the Trochlea. The Patella acts as a lever arm to help the quadriceps muscle extend the knee.    Articular Cartilage covers the ends of these bones at the knee joint. This glistening white substance has the consistency of firm rubber but is actually a mixture of collagen and special large sponge-like molecules all maintained by living cartilage cells (chondrocytes). With normal joint fluid for lubrication, the surface is more slippery than ice on ice and allows smooth and easy knee joint motion.      MENISCUS  The other type of knee cartilage is the Meniscal Cartilage (fibrocartilage). These C-shaped pads are found between the thigh bone and shin bone -- one on each side -- thus called the Medial Meniscus (inner thigh aspect) and Lateral Meniscus (outer aspect). The menisci are attached to the Tibial Plateaus, and serve to cushion and transfer joint force more evenly to the tibia. They accomplish this by distributing joint forces over a larger area of the joint -- transferring force from the curved femoral condylar margins to the flatter tibial plateaus. Damage to the meniscal cushion may result  "in increased stress on the articular cartilage of the tibia and femur and early arthritis.      The smooth, white shiny covering of the bones in the joint is known as Articular Cartilage, and is made of a material called "hyaline cartilage". Damage to this articular cartilage can occur from trauma (such as a fall or car accident), but more often, it is the result of repetitive injuries over a long period of time.    Articular cartilage injuries are common, occurring in 20- 70% of knee injuries. The function of articular cartilage is to optimize joint function by reducing friction and increasing shock absorption. Articular cartilage is similar to the "tread on a car tire".    Injuries to the articular cartilage have a low capacity for healing. Both superficial and full-thickness cartilage injuries may progress to the mechanical wear and breakdown of the cartilage matrix.  The breakdown of articular cartilage will eventually cause osteoarthritis, which is a very serious painful condition. With a full-thickness cartilage injury, continued activity may cause the articular cartilage injury to progress rapidly to traumatic arthritis. The larger the initial cartilage injury, the faster the potential progression of arthritis. Articular cartilage injury or wear leads to joint pain.      Common symptoms include pain when the joint is moved or loaded (like walking or running). Catching, locking, or creaking (crepitation) may occur with joint motion or weight bearing (like twisting or standing  from a seated position). Articular cartilage damage may be superficial (partial), deep (complete full-thickness), or osteochondral (bone and cartilage).    Superficial cartilage injuries extend into the upper 50% of the depth of the cartilage. These injuries typically do not heal, but may not progress to arthritis unless they are large and located in a weight-bearing area of the knee.  Deep or full-thickness lesions extend down to the " "bone, but not through it. These injuries have very little healing potential and tend to progress to osteoarthritis if located in a weight-bearing area.    Osteochondral (bone plus cartilage) injuries extend down through the subchondral bone (deep to the cartilage). These injuries may heal by allowing blood vessel ingrowth with fibrous cells to produce a fibrous (scar-like) tissue repair.      Treatment Options For Smaller Defects  Most studies suggest the repair tissue formed from bone marrow stimulation techniques is fibrocartilage (scar tissue -not hyaline cartilage), which is less resistant to wear with the forces in the knee joint and often deteriorates over time Bone marrow stimulation techniques can be successful in eliminating symptoms in many patients, especially young patients with small lesions.   Without early intervention, cartilage degeneration may proceed, and prevent a chance for successful pain- free function.    Arthroscopic Debridement  This is an arthroscopic procedure, often known as a "knee scope". The surgeon uses minimally invasive techniques with a small fiberoptic light source attached to a video camera to locate damaged cartilage and trim the loose edges away to smooth the surface. The surgeon may trim meniscus tears and remove loose cartilage that causes catching or locking symptoms and attempts to prevent any further flaking off that can irritate the knee joint lining and cause swelling.  Arthroscopic debridement is most effective for small lesions, less than 1 cm2 (3/8 inch). It is not as effective for larger lesions because it does not fill the lesion with any repair tissue, leaving the edges exposed to high forces in the knee and continued wear. Despite relieving some symptoms from cartilage tears or loss, arthroscopic cleaning does not prevent the progression of arthritis, but may relieve mechanical symptoms.    Bone Marrow Stimulation Techniques  Three different techniques are " "available to create bleeding and clot formation at the cartilage defect. Blood vessels and fibrous cells migrate to the area to form a fibrous scar-tissue repair tissue to fill the hole in the articular cartilage. Drilling creates multiple small holes through the subchondral bone to allow bleeding into the defect.   Microfracture or "picking" creates small fractures in the subchondral bone to encourage bleeding into the defect.      Abrasion arthroplasty is a superficial shaving of the subchondral bone surface to create bleeding into the defect. Bone marrow stimulation techniques are successful in eliminating symptoms in many patients, especially younger patients with smaller lesions well contained lesions.       For patients with more extensive cartilage damage there are several techniques available    Osteochondral Autograft  This technique is analogous to a hair-plug transfer. The surgeon removes a small section of the patient's own cartilage along with the underlying bone plug, hence the name osteochondral (bone and cartilage) graft.      Bone and cartilage cylinders are harvested from a minor weight bearing area of the knee joint and transplanted into prepared holes in the damage area. This process works much like a hair transplant. Unfortunately, a limited amount of normal osteochondral tissue is available for harvest. The typical size of defect treatable with this method is between 1 to 2 cm2.      Treatment Options For Larger Lesions    Autologous Chondrocyte Implantation (ACI) Carticel  For cartilage defects greater than 2 square centimeters or if there are multiple defects in the knee, one of the newer techniques for the cartilage regeneration, is ACI. ACI is FDA indicated for full-thickness cartilage or osteochondral lesions located in the knee.    ACI is performed in two stages. The first stage is performed when initially assessing the joint arthroscopically. A small amount of cartilage is harvested and " sent to a laboratory for cell culture and growth.    The patient's own cartilage cells (chondrocytes) are grown in culture increasing the number of cells by 10 fold.    Twelve million chondrocytes are then re-implanted into the cartilage defect and covered with a ceiling of native tissue (periosteum).    The cells then grow to fill the defect and resurface areas of cartilage loss with hyaline-like cartilage.    The long-term outcomes (of 14 years) are encouraging for treating medium and large cartilage lesions. ACI is the only procedure with a formal patient outcomes registry.    Osteochondral Allograft     For larger defects that involve both cartilage and bone loss, surgeons may custom fit the defect with a cadaver implant of donated cartilage and bone. The transplanted tissue is matched to the patient based on size of the knee, but tissue typing (similar to organ transplantation) is not necessary. Osteochondral transplantation may allow restoration of the joint surface. The long-term outcomes with fresh allograft (cadaver) tissue have been very encouraging.      RESTORING THE MENISCUS  Our goal is to maintain normal anatomy, if possible. In the case of meniscal tears, the first line of treatment is attempt at repair. Historically, in the days of open cartilage surgery, the entire meniscus was removed. Unfortunately, long term follow-up studies of these patients after removal of the meniscus have found that many go on to degenerative arthritis. Today, cartilage surgeons recognize the protective value of the meniscal cartilage and make every attempt to conserve this valuable tissue.    To maximally preserve function after a meniscus tear, surgeons may repair the meniscus using a variety of techniques. Options include using special sutures or absorbable implants to staple, destiny, or otherwise fix and secure the torn meniscal cushion.    Replacing The Meniscus  For patients who have had the meniscus removed, an  innovative solution called a meniscal transplant may be an option. The indications for transplant need to be assessed by your cartilage surgeon. Unlike some other forms of tissue transplantation, this procedure does not require patients to be on medications to prevent organ rejection. Intermediate term outcome studies are encouraging.        RESTORING KNEE ALIGNMENT    Osteotomy  When the bones of the knee do not align properly, joint forces are not evenly distributed and may overload one side of the knee causing pain and cartilage degeneration. This overload problem is made worse when there has been a traumatic cartilage injury or the meniscus has been removed.    An osteotomy is designed to shift the stress from the damaged part of the knee to a more normal area in the knee. An osteotomy requires the surgeon to cut the bone in order to remove a wedge of bone in order to adjust the angle of the knee. For individuals with medial compartment narrowing (the most common situation), there are three options for high tibial osteotomy (HTO).      One involves removing a wedge shape piece of bone from the lateral side of the tibia and then closing the remaining surfaces together and holding it with a plate and screws (Fig A).    The second technique involves making one cut partially across the top of the tibia and opening the bone to establish the correct alignment. This is held in place with a plate and screws and a bone graft. (Fig B)    The final technique is called medial hemicallotasis, which means stretching healing bone. This technique requires a single cut partially across the bone. The bone is then gradually opened on the medial side by an external fixation frame. This technique is attractive because it allows adjustments to be made in the angle of correction and the hardware is removed three months after the procedure (Fig C).      Each of these techniques require a period of non-weightbearing or partial  "weightbearing crutch ambulation. These techniques may be necessary at the same time or prior to other reconstructive procedures such as cartilage replacement or meniscus replacement surgery in order to remove excessive forces off the repair site.      OSTEOARTHRITIS    Partial (Unicompartmental) Joint Replacement  This procedure replaces only the damaged portion of the joint with metal and/or plastic, leaving the remaining portion of the joint intact. It is often known as a partial knee replacement. New techniques allow replacement of a portion of the knee joint through smaller incisions with fewer days of hospitalization required.    Total Joint Replacement  If there is extensive damage with bone-on-bone apposition, many of the above procedures are not indicated. In these cases of end-stage arthritis, the entire joint surface is replaced with artificial metal and plastic components, allowing most patients to return to pain-free activities.    Future Trends  Investigators are now examining the potential of using collagen or other biologic tissues to serve as a bridge or scaffold for the body's own healing/repair mechanism to use in reestablishing meniscal form and function. In the future, biological "healing glues" may become available to allow repair without sutures. Even with the newest techniques available, certain tears are not repairable and a portion of the meniscus is removed using the arthroscope (partial meniscectomy).    The term osteoarthritis indicates the degeneration and excessive wear of articular cartilage with gradual changes occurring in the underlying bone.    Initially the articular cartilage softens, the surface becomes uneven and the cartilage frays and develops cracks, which can extend down to bone.  In advanced osteoarthritis the cartilage is worn away to reveal the underlying bone and nerve endings causing pain.  The bone hardens, cysts begin to form, and new cartilage cells laid down around " the worn cartilage ossify and form bony projections (bone spurs).  Untreated articular cartilage defects can progress to osteoarthritis with both mechanical and enzymatic breakdown resulting in permanent dysfunction of the joint. Our goal is to diagnose and prevent or halt the progression of arthritis      Many patients suffer with end-stage arthritis that limits even the simplest activities of daily living, significantly altering the patient's quality of fife. For these patient's, current cartilage surgical options DO NOT apply. There are no curative therapies for end-stage arthritis. A wide range of methods may be used to relieve pain and restore function. Conventional treatment methods include exercise, physical therapy and medications, such as anti-inflammatories. Recently, new biological compounds have been shown to be effective and safe for treating arthritis. These compounds include lubricants (hyaluronic acid) and building blocks of cartilage (Chondroitin Sulfate and Glucosamine).    Medications  Oral medications such as non-steroidal anti-inflammatories (NSAID's) tend to have a beneficial effect on the degenerative conditions described above. Immediately following an injury, these medications help to decrease pain and swelling. On a more long term nature, these medications help to relieve the pain and swelling associated with arthritic joints. Newer specific anti-inflammatories medications have been developed to block the enzymes necessary for production of inflammation (cyclooxygenase-2 or VEGA-2). These medications, such as Ceibrex or Bextra tend to have less adverse effects on the stomach and gastrointestinal tract than older, more traditional NSAID's. These prescription-strength NSAID medications are taken by mouth once or twice per day. Other non-prescription over-the-counter NSAID's are available.    Cartilage Supplements  Other oral medications which can be purchased without a prescription include  Glucosamine and Chondroitin Sulfate. These two compounds are normally found in the substance of articular cartilage. Several recent studies using Cosamin®DS have demonstrated a pain relieving effect with the combination of Glucosamine Hydrochloride, highly purified low molecular weight Chondroitin Sulfate and Manganese Ascorbate available only in this product. The National Institutes of [employer] ([facility]) has selected the exact same Glucosamine and Chondroitin Sulfate used in CosaminDS for a large multi-center clinical trial currently in progress.      Oral administration of these compounds does not have a cartilage building effect, but rather a cartilage sparing effect. Laboratory studies have confirmed a stimulatory action on cartilage cells as well as an inhibition of cartilage degeneration with CosaminDS, which can improve the health of existing cartilage. Few negative side-effects have been demonstrated in patients taking these compounds, and many patients with arthritis benefit from the addition of this supplement to their arthritis treatment regimen.    Cortisone (Steroid) Injections  Injection of steroids into joints have been performed for well over 100 years with good results. Typically, steroid injection is utilized in a patient with degenerative arthritic changes to treat acute inflammation.  Steroids are powerful anti-inflammatory substances. When injected into a joint, the steroid has primarily a local effect, not a whole-body or systemic effect. These medications tend to decrease pain and inflammation when used appropriately. If overused, local steroid injection can have a negative effect on the tissues, such as weakening of bone and tendons. These injections typically are not permanent in their beneficial effect.    Injectable Viscosupplementation  The space between the cartilage surfaces in the knee joint contains synovial fluid that acts as a lubricant for the joint. With the progression of arthritis,  "the synovial fluid becomes thinner and is ineffective as a shock absorber. As a result simple movements become painful. Hyaluronic acid injections supplement the existing synovial fluid and act as a shock absorber and lubricant for the knee.      Synvisc is a hyluronan based, naturally occurring lubricant with similar properties to synovial fluid found in healthy joints. Synvisc or Euflexxa are injected over a 3 week series to provide lubrication to the knee joint. For some patients, Synvisc One may be an option, this is a single-shot injection.    Braces  In some cases of arthritis, only a portion of the knee is degenerative and it may be advantageous to "unload" the affected area and force more weight towards the "good" part of the knee. Special braces called "unloaders" are used for this purpose. Typically the brace is worn for work or activity and tends to decrease the pain caused by single compartment arthritis.        Physical Therapy  Exercise is a vital component of the treatment of cartilage injury. If the knee becomes stiff after an injury or over the course of time, it may be difficult to regain the lost motion. In most cases, early range of motion exercises are instituted in order to prevent this problem. In some cases, a loss of strength in certain muscle groups can lead to increased stress on the already degenerative articular surfaces. If muscles are strong and working properly they will take up some of the stress and divert it away from the cartilage surfaces. As symptoms decrease exercise is increased, but always below the pain threshold. Muscle strength is never harmful to a joint. It is therefore common to have a doctor prescribe strengthening exercises as an integral part of the treatment program for arthritis.    TECHNIQUES  Biologic tissue engineering is continuing to bring exciting new approaches from the lab to the clinical arena. It may be possible to induce primitive cells from the bone " "marrow called pluripotential cells or mesenchymal stem cells to transform into hyaline articular cartilage with the use of a variety of growth factors or hormones. Genetic reprogram¬tangela and tissue engineering may eventually replace surgery - but not at this stage in the new millennium.  Other biological patches may act as a temporary home for chondrocytes or "prechondrocytes." This exciting field will offer many new surgical techniques, which will hopefully lead to opportunities to restore function with less invasive means.      Artroscopia de rodilla  Los problemas de rodilla a menudo pueden diagnosticarse y tratarse con maurice técnica llamada artroscopia. Se trata de un tipo de cirugía en el que se usa un instrumento llamado artroscopio y en el que solamente se necesitan incisiones pequeñas. El procedimiento sirve para diagnosticar un problema de rodilla y, en muchos casos, también permite jeremiah tratamiento.     A través de pequeñas incisiones (danielle) se inyecta líquido y se inserta el artroscopio y los instrumentos.         La cámara acoplada al artroscopio permite a vega médico luisana la articulación de la rodilla en maurice pantalla.       El artroscopio  El artroscopio permite al médico observar directamente la articulación de la rodilla. El artroscopio contiene un canal para introducir y extraer líquidos, así erik fibras ópticas recubiertas que emiten un haz de brent intensa sobre la articulación de la rodilla, y maurice cámara en vega extremo que envía imágenes de la articulación. El médico observa estas imágenes en un monitor.  Preparativos para el procedimiento  · Hágase los análisis y pruebas que le indiquen.  · No coma ni astrid nada tony 10 horas antes del procedimiento.  · Antes de la cirugía le pondrán maurice sonda intravenosa en un brazo o en maurice mano para administrarle líquidos y medicamentos.  · También le administrarán anestesia para que no sienta dolor tony la operación. Podrían administrarle los siguientes tipos " de anestesia:  ¨ Anestesia general. Christelle medicamento le inducirá un estado parecido al del sueño profundo tony la cirugía.  ¨ Anestesia regional para insensibilizar el cuerpo solamente de la cintura para abajo.  ¨ Anestesia local para insensibilizar solamente la rodilla.  Además de la anestesia regional o local, podrían administrarle sedantes para que esté relajado y ligeramente adormecido tony la cirugía.  El procedimiento  · Se hacen algunas incisiones pequeñas (danielle) en la rodilla.  · A continuación se introduce el artroscopio a través de jarad de estos danielle.  · Para facilitar la observación y la operación en la rodilla se introduce un líquido estéril en la articulación.  · A través del artroscopio, el médico confirma el tipo y el nivel de los daños en la rodilla. Si es posible, se da tratamiento en christelle momento utilizando instrumentos quirúrgicos a través de los otros danielle.  · Maritza vez terminada la operación, se retiran todos los instrumentos y se cierran las incisiones con suturas, grapas, adhesivo quirúrgico, tiras o cinta quirúrgica.  Riesgos y complicaciones posibles de la artroscopia  Todas las cirugías conllevan ciertos riesgos. Algunos de los riesgos de la artroscopia son:  · Sangrado  · Infección  · Formación de coágulos de paty  · Hinchazón y rigidez en la rodilla  · Continuación de los problemas de rodilla   Date Last Reviewed: 9/20/2015 © 2000-2016 [employer] [address] [place] [id number]. Todos los derechos reservados. Esta información no pretende sustituir la atención médica profesional. Sólo vega médico puede diagnosticar y tratar un problema de zechariah.        Artroscopia de la rodilla: Problemas tratados  La artroscopia puede detectar y tratar muchos tipos de problemas en la rodilla, erik por ejemplo roturas del cartílago meniscal o del ligamento natalie anterior (LCA), y la artritis.     Se extrae el cartílago meniscal dañado.   Roturas del cartílago  meniscal  Hay varios tipos de roturas del cartílago meniscal; vega cirugía dependerá del tipo y gravedad de la lesión. El cartílago del menisco se fija en la tibia (espinilla) y actúa erik un amortiguador para la rodilla. El cirujano puede extraer el tejido dañado o reparar la rotura. El tratamiento debe aliviar el dolor y la hinchazón, así erik impedir que se trabe la articulación.     Para reemplazar el tejido LCA dañado, se injerta un tejido matthew y corazon.   Roturas del LCA  La rotura del LCA (ligamento natalie anterior) puede desestabilizar la rodilla, causando dolor, hinchazón y brandin de la articulación. Vega cirujano puede reparar el LCA mediante vega reconstrucción. En la reconstrucción del LCA, el tejido dañado se reemplaza por un injerto de tejido corazon procedente de un área cercana a la rodilla, o de un donante.     Para tratar la artritis, se alisan las áreas de cartílago desgastado.   Artritis  Con el tiempo, el uso danish de la rodilla puede causar artritis. En esta enfermedad, el cartílago articular se desgasta y se vuelve áspero. El cartílago articular se fija al fémur (hueso del muslo) y ayuda en el suave movimiento de la articulación. También pueden desprenderse fragmentos de hueso o cartílago dentro de la articulación (cuerpos sueltos). Cualquiera de estos problemas puede limitar la movilidad y causar dolor. Vega cirujano usará maurice fresa o afeitadora para alisar la superficie articular y ayudar a sanarla. También extraerá los cuerpos sueltos. El tejido sinovial inflamado también puede eliminarse.  Date Last Reviewed: 8/31/2015  © 4855-1614 [employer]. [address] [place] [address], [place] [address]. Todos los derechos reservados. Esta información no pretende sustituir la atención médica profesional. Sólo vega médico puede diagnosticar y tratar un problema de zechariah.

## 2017-05-10 NOTE — PROGRESS NOTES
Subjective:          Chief Complaint: Mirtha Gary is a 41 y.o. female who had concerns including Pain of the Right Knee.    HPI Comments: Mirtha Gary is a 41 y.o. female with right knee with 3 years of pain.     Dr. Cristina Trujillo pain management specialist    Pain   Pertinent negatives include no chest pain, fever, joint swelling, numbness or rash.       Review of Systems   Constitution: Negative for fever and night sweats.   HENT: Negative for hearing loss.    Eyes: Negative for blurred vision and visual disturbance.   Cardiovascular: Negative for chest pain and leg swelling.   Respiratory: Negative for shortness of breath.    Endocrine: Negative for polyuria.   Hematologic/Lymphatic: Negative for bleeding problem.   Skin: Negative for rash.   Musculoskeletal: Negative for back pain, joint pain, joint swelling, muscle cramps and muscle weakness.   Gastrointestinal: Negative for melena.   Genitourinary: Negative for hematuria.   Neurological: Negative for loss of balance, numbness and paresthesias.   Psychiatric/Behavioral: Negative for altered mental status.       Pain Related Questions  Over the past 3 days, what was your average pain during activity? (I.e. running, jogging, walking, climbing stairs, getting dressed, ect.): 7  Over the past 3 days, what was your highest pain level?: 7  Over the past 3 days, what was your lowest pain level? : 1    Other  How many nights a week are you awakened by your affected body part?: 0  Was the patient's HEIGHT measured or patient reported?: Patient Reported  Was the patient's WEIGHT measured or patient reported?: Patient Reported      Objective:        General: Mirtha is well-developed, well-nourished, appears stated age, in no acute distress, alert and oriented to time, place and person.     General    Vitals reviewed.  Constitutional: She is oriented to person, place, and time. She appears well-developed and well-nourished. No distress.   HENT:    Mouth/Throat: No oropharyngeal exudate.   Eyes: Right eye exhibits no discharge. Left eye exhibits no discharge.   Neck: Normal range of motion.   Pulmonary/Chest: Effort normal and breath sounds normal. No respiratory distress.   Neurological: She is alert and oriented to person, place, and time. She has normal reflexes. No cranial nerve deficit. Coordination normal.   Psychiatric: She has a normal mood and affect. Her behavior is normal. Judgment and thought content normal.     General Musculoskeletal Exam   Gait: normal       Right Knee Exam     Inspection   Erythema: absent  Scars: absent  Swelling: absent  Effusion: effusion  Deformity: deformity  Bruising: absent    Tenderness   The patient is tender to palpation of the patella (medial/lateral facets).    Range of Motion   Extension: 0   Flexion: 150     Tests   Meniscus   Dieudonne:  Medial - negative Lateral - negative  Ligament Examination Lachman: normal (-1 to 2mm) PCL-Posterior Drawer: normal (0 to 2mm)     MCL - Valgus: normal (0 to 2mm)  LCL - Varus: normalPivot Shift: normal (Equal)Reverse Pivot Shift: normal (Equal)Dial Test at 30 degrees: normal (< 5 degrees)Dial Test at 90 degrees: normal (< 5 degrees)  Posterior Sag Test: negative  Posterolateral Corner: unstable (>15 degrees difference)  Patella   Patellar Apprehension: negative  Passive Patellar Tilt: neutral  Patellar Tracking: normal  Patellar Glide (quadrants): Lateral - 2   Medial - 3  Q-Angle at 90 degrees: normal  Patellar Grind: negative  J-Sign: none    Other   Meniscal Cyst: absent  Popliteal (Baker's) Cyst: absent  Sensation: normal    Left Knee Exam     Inspection   Erythema: absent  Scars: absent  Swelling: absent  Effusion: absent  Deformity: deformity  Bruising: absent    Tenderness   The patient is experiencing no tenderness.         Range of Motion   Extension: 0   Flexion: 150     Tests   Meniscus   Dieudonne:  Medial - negative Lateral - negative  Stability Lachman: normal (-1  to 2mm) PCL-Posterior Drawer: normal (0 to 2mm)  MCL - Valgus: normal (0 to 2mm)  LCL - Varus: normal (0 to 2mm)Pivot Shift: normal (Equal)Reverse Pivot Shift: normal (Equal)Dial Test at 30 degrees: normal (< 5 degrees)Dial Test at 90 degrees: normal (< 5 degrees)  Posterior Sag Test: negative  Posterolateral Corner: unstable (>15 degrees difference)  Patella   Patellar Apprehension: negative  Passive Patellar Tilt: neutral  Patellar Tracking: normal  Patellar Glide (Quadrants): Lateral - 2 Medial - 3  Q-Angle at 90 degrees: normal  Patellar Grind: negative  J-Sign: J sign absent    Other   Meniscal Cyst: absent  Popliteal (Baker's) Cyst: absent  Sensation: normal    Right Hip Exam     Tests   Nadira: negative  Left Hip Exam     Tests   Nadira: negative          Muscle Strength   Right Lower Extremity   Hip Abduction: 5/5   Quadriceps:  5/5   Hamstrin/5   Left Lower Extremity   Hip Abduction: 5/5   Quadriceps:  5/5   Hamstrin/5     Reflexes     Left Side  Quadriceps:  2+  Achilles:  2+    Right Side   Quadriceps:  2+  Achilles:  2+    Vascular Exam     Right Pulses  Dorsalis Pedis:      2+  Posterior Tibial:      2+        Left Pulses  Dorsalis Pedis:      2+  Posterior Tibial:      2+          MRI:  HISTORY:  Right lower extremity pain    TECHNIQUE:  MRI of the right  knee without contrast.  The following sequences were obtained: Localizer; coronal PD FS and T2 FS; sagittal T1, PD FS, T2 FS; axial T2 FS.    COMPARISON:  None      FINDINGS:     Menisci:  There is no tear of the medial or lateral meniscus.     Ligaments:  ACL, PCL, MCL, and LCL complex are intact.    Tendons:  Extensor mechanism is maintained.    Cartilage  Patellofemoral: Focal chondral fissuring at the apex of the patella, with associated mild subchondral cyst formation and subchondral edema.  Medial tibiofemoral: Articular cartilage is maintained.  Lateral tibiofemoral: Articular cartilage is maintained.    Bone: No fracture or marrow  replacing process.      Miscellaneous: There is no joint effusion.   Impression       Focal chondral fissuring at the apex of the patella, with associated mild subchondral cyst formation and subchondral edema.                   Assessment:       Encounter Diagnoses   Name Primary?    Right knee pain, unspecified chronicity Yes    Lumbosacral disc disease     Left lumbar radiculitis     Lumbosacral radiculopathy     Chondromalacia of right patella     Post-traumatic osteoarthritis of right knee           Plan:       1. IKDC, SF-12 and KOOS was filled out today in clinic.     RTC in 2 weeks with Mid-level provider Patient will not fill out IKDC, SF-12 and KOOS on return.    2. We reviewed with Mirtha today, the pathology and natural history of her diagnosis. We have discussed a variety of treatment options including medications, physical therapy and other alternative treatments. I also explained the indications, risks and benefits of surgery. After discussion, Mirtha decided to proceed with surgery. The decision was made to go forward with    1. Right knee arthroscopic chondroplasty   2. Right knee limited synovectomy   3. Right knee possible cartilage biopsy    4. Right knee amniox arthrocentesis    The details of the surgical procedure were explained, including the location of probable incisions and a description of likely hardware and/or grafts to be used.  The patient understands the likely convalescence after surgery.  Also, we have thoroughly discussed the risks, benefits and alternatives to surgery, including, but not limited to, the risk of infection, joint stiffness, blood clot (including DVT and/or pulmonary embolus), neurologic and vascular injury.  It was explained that, if tissue has been repaired or reconstructed, there is a chance of failure, which may require further management.      All of the patient's questions were answered and informed consent was obtained. The patient will contact us if they  have any questions or concerns in the interim.      3. Referral to Dr. Anton for back pain                    [facility] patient questionnaires have been collected today.

## 2017-05-11 DIAGNOSIS — M22.41 CHONDROMALACIA OF PATELLA, RIGHT: Primary | ICD-10-CM

## 2017-06-08 ENCOUNTER — TELEPHONE (OUTPATIENT)
Dept: SPORTS MEDICINE | Facility: CLINIC | Age: 42
End: 2017-06-08

## 2017-06-08 NOTE — TELEPHONE ENCOUNTER
----- Message from Geovanny Morris sent at 6/8/2017  9:32 AM CDT -----  Contact: self  Pt is returning call to reschedule her sx per voicemail she received

## 2017-07-17 ENCOUNTER — TELEPHONE (OUTPATIENT)
Dept: SPORTS MEDICINE | Facility: CLINIC | Age: 42
End: 2017-07-17

## 2017-07-17 NOTE — TELEPHONE ENCOUNTER
Called patient to see if she was coming in for pre op appointment she states that she cancelled appointment and needed to wait for her  to reschedule...

## 2017-07-19 ENCOUNTER — TELEPHONE (OUTPATIENT)
Dept: SPORTS MEDICINE | Facility: CLINIC | Age: 42
End: 2017-07-19

## 2017-07-19 NOTE — TELEPHONE ENCOUNTER
----- Message from Alba Varela MA sent at 7/17/2017 12:53 PM CDT -----  Contact:       ----- Message -----  From: Cammy Cast  Sent: 7/17/2017  12:48 PM  To: Arturo MCMANUS Staff    Pt's  stated the pt had a family emergency and was not able to make to today and needs to reschedule the pt's surgery. Please call the pt's  to reschedule the pt's surgery date. [phone]

## 2017-08-17 ENCOUNTER — LAB VISIT (OUTPATIENT)
Dept: LAB | Facility: OTHER | Age: 42
End: 2017-08-17
Attending: PSYCHIATRY & NEUROLOGY
Payer: COMMERCIAL

## 2017-08-17 DIAGNOSIS — Z91.89 AT RISK OF UTI: ICD-10-CM

## 2017-08-17 DIAGNOSIS — Z91.89 AT RISK OF UTI: Primary | ICD-10-CM

## 2017-08-17 LAB
BILIRUB UR QL STRIP: NEGATIVE
CLARITY UR: ABNORMAL
COLOR UR: YELLOW
GLUCOSE UR QL STRIP: NEGATIVE
HGB UR QL STRIP: NEGATIVE
KETONES UR QL STRIP: NEGATIVE
LEUKOCYTE ESTERASE UR QL STRIP: NEGATIVE
NITRITE UR QL STRIP: NEGATIVE
PH UR STRIP: 6 [PH] (ref 5–8)
PROT UR QL STRIP: NEGATIVE
SP GR UR STRIP: 1.02 (ref 1–1.03)
URN SPEC COLLECT METH UR: ABNORMAL
UROBILINOGEN UR STRIP-ACNC: NEGATIVE EU/DL

## 2017-08-17 PROCEDURE — 87086 URINE CULTURE/COLONY COUNT: CPT

## 2017-08-17 PROCEDURE — 81003 URINALYSIS AUTO W/O SCOPE: CPT

## 2017-08-18 LAB — BACTERIA UR CULT: NO GROWTH

## 2017-11-21 ENCOUNTER — HOSPITAL ENCOUNTER (EMERGENCY)
Facility: HOSPITAL | Age: 42
Discharge: HOME | End: 2017-11-21
Attending: EMERGENCY MEDICINE
Payer: COMMERCIAL

## 2017-11-21 VITALS
WEIGHT: 130 LBS | DIASTOLIC BLOOD PRESSURE: 80 MMHG | HEART RATE: 74 BPM | HEIGHT: 66 IN | OXYGEN SATURATION: 100 % | RESPIRATION RATE: 18 BRPM | TEMPERATURE: 98 F | SYSTOLIC BLOOD PRESSURE: 125 MMHG | BODY MASS INDEX: 20.89 KG/M2
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
## 2017-11-21 DIAGNOSIS — M25.529 ELBOW PAIN: ICD-10-CM
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
## 2017-11-21 PROCEDURE — 25000003 PHARM REV CODE 250: Performed by: EMERGENCY MEDICINE

## 2017-11-21 PROCEDURE — 96372 THER/PROPH/DIAG INJ SC/IM: CPT

## 2017-11-21 PROCEDURE — 99284 EMERGENCY DEPT VISIT MOD MDM: CPT | Mod: ,,, | Performed by: EMERGENCY MEDICINE

## 2017-11-21 PROCEDURE — 63600175 PHARM REV CODE 636 W HCPCS: Performed by: EMERGENCY MEDICINE

## 2017-11-21 PROCEDURE — 99284 EMERGENCY DEPT VISIT MOD MDM: CPT | Mod: 25

## 2017-11-21 RX ORDER — IBUPROFEN 600 MG/1
600 TABLET ORAL
Status: COMPLETED | OUTPATIENT
Start: 2017-11-21 | End: 2017-11-21

## 2017-11-21 RX ORDER — DEXAMETHASONE SODIUM PHOSPHATE 4 MG/ML
12 INJECTION, SOLUTION INTRA-ARTICULAR; INTRALESIONAL; INTRAMUSCULAR; INTRAVENOUS; SOFT TISSUE
Status: COMPLETED | OUTPATIENT
Start: 2017-11-21 | End: 2017-11-21

## 2017-11-21 RX ADMIN — DEXAMETHASONE SODIUM PHOSPHATE 12 MG: 4 INJECTION, SOLUTION INTRAMUSCULAR; INTRAVENOUS at 12:11

## 2017-11-21 RX ADMIN — IBUPROFEN 600 MG: 600 TABLET, FILM COATED ORAL at 10:11
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
## 2018-01-24 DIAGNOSIS — M54.50 LUMBAR SPINE PAIN: Primary | ICD-10-CM

## 2018-02-02 DIAGNOSIS — M54.2 NECK PAIN: Primary | ICD-10-CM

## 2018-02-06 ENCOUNTER — HOSPITAL ENCOUNTER (OUTPATIENT)
Dept: RADIOLOGY | Facility: HOSPITAL | Age: 43
Discharge: HOME | End: 2018-02-06
Attending: PHYSICIAN ASSISTANT
Payer: COMMERCIAL

## 2018-02-06 ENCOUNTER — INITIAL CONSULT (OUTPATIENT)
Dept: ORTHOPEDICS | Facility: CLINIC | Age: 43
End: 2018-02-06
Payer: COMMERCIAL

## 2018-02-06 VITALS — BODY MASS INDEX: 22.43 KG/M2 | WEIGHT: 139.56 LBS | HEIGHT: 66 IN

## 2018-02-06 DIAGNOSIS — M54.16 LUMBAR RADICULOPATHY: Primary | ICD-10-CM

## 2018-02-06 DIAGNOSIS — M54.2 NECK PAIN: ICD-10-CM

## 2018-02-06 DIAGNOSIS — M54.50 LUMBAR SPINE PAIN: ICD-10-CM

## 2018-02-06 DIAGNOSIS — M54.12 CERVICAL RADICULOPATHY: ICD-10-CM

## 2018-02-06 PROCEDURE — 72120 X-RAY BEND ONLY L-S SPINE: CPT | Mod: 26,,, | Performed by: RADIOLOGY

## 2018-02-06 PROCEDURE — 72120 X-RAY BEND ONLY L-S SPINE: CPT | Mod: TC

## 2018-02-06 PROCEDURE — 3008F BODY MASS INDEX DOCD: CPT | Mod: S$GLB,,, | Performed by: PHYSICIAN ASSISTANT

## 2018-02-06 PROCEDURE — 99999 PR PBB SHADOW E&M-EST. PATIENT-LVL III: CPT | Mod: PBBFAC,,, | Performed by: PHYSICIAN ASSISTANT

## 2018-02-06 PROCEDURE — 72050 X-RAY EXAM NECK SPINE 4/5VWS: CPT | Mod: 26,,, | Performed by: RADIOLOGY

## 2018-02-06 PROCEDURE — 72050 X-RAY EXAM NECK SPINE 4/5VWS: CPT | Mod: TC

## 2018-02-06 PROCEDURE — 72100 X-RAY EXAM L-S SPINE 2/3 VWS: CPT | Mod: 26,,, | Performed by: RADIOLOGY

## 2018-02-06 PROCEDURE — 99204 OFFICE O/P NEW MOD 45 MIN: CPT | Mod: S$GLB,,, | Performed by: PHYSICIAN ASSISTANT

## 2018-02-06 RX ORDER — MELOXICAM 15 MG/1
15 TABLET ORAL DAILY
Qty: 30 TABLET | Refills: 0 | Status: SHIPPED | OUTPATIENT
Start: 2018-02-06 | End: 2018-02-21 | Stop reason: SDUPTHER

## 2018-02-06 RX ORDER — MELOXICAM 15 MG/1
15 TABLET ORAL DAILY
Qty: 30 TABLET | Refills: 0 | Status: SHIPPED | OUTPATIENT
Start: 2018-02-06 | End: 2018-02-06 | Stop reason: SDUPTHER

## 2018-02-06 NOTE — LETTER
February 6, 2018      Kevin Morris MD  [address]  [facility] [address]           [facility]  [address]  [place] [address]  Phone: [phone]          Patient: Mirtha Gary   MR Number: [id number]   YOB: 1975   Date of Visit: 2/6/2018       Dear Dr. Kevin Morris:    Thank you for referring Mirtha Gary to me for evaluation. Attached you will find relevant portions of my assessment and plan of care.    If you have questions, please do not hesitate to call me. I look forward to following Mirtha Gary along with you.    Sincerely,    Lizzy Early PA-C    Enclosure  CC:  No Recipients    If you would like to receive this communication electronically, please contact [email or web address] or [phone] to request more information on [email or web address] Link access.    For providers and/or their staff who would like to refer a patient to [facility], please contact us through our one-stop-shop provider referral line, [facility], at [phone].    If you feel you have received this communication in error or would no longer like to receive these types of communications, please e-mail [email or web address]
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
## 2018-02-08 ENCOUNTER — HOSPITAL ENCOUNTER (OUTPATIENT)
Dept: RADIOLOGY | Facility: HOSPITAL | Age: 43
Discharge: HOME | End: 2018-02-08
Attending: PHYSICIAN ASSISTANT
Payer: COMMERCIAL

## 2018-02-08 DIAGNOSIS — M54.12 CERVICAL RADICULOPATHY: ICD-10-CM

## 2018-02-08 DIAGNOSIS — M54.16 LUMBAR RADICULOPATHY: ICD-10-CM

## 2018-02-08 PROCEDURE — 72148 MRI LUMBAR SPINE W/O DYE: CPT | Mod: 26,,, | Performed by: RADIOLOGY

## 2018-02-08 PROCEDURE — 72148 MRI LUMBAR SPINE W/O DYE: CPT | Mod: TC

## 2018-02-08 PROCEDURE — 72141 MRI NECK SPINE W/O DYE: CPT | Mod: 26,,, | Performed by: RADIOLOGY

## 2018-02-08 PROCEDURE — 72141 MRI NECK SPINE W/O DYE: CPT | Mod: TC

## 2018-02-21 ENCOUNTER — OFFICE VISIT (OUTPATIENT)
Dept: ORTHOPEDICS | Facility: CLINIC | Age: 43
End: 2018-02-21
Payer: COMMERCIAL

## 2018-02-21 DIAGNOSIS — M54.12 CERVICAL RADICULOPATHY: ICD-10-CM

## 2018-02-21 DIAGNOSIS — M54.16 LUMBAR RADICULOPATHY: Primary | ICD-10-CM

## 2018-02-21 PROCEDURE — 3008F BODY MASS INDEX DOCD: CPT | Mod: S$GLB,,, | Performed by: PHYSICIAN ASSISTANT

## 2018-02-21 PROCEDURE — 99999 PR PBB SHADOW E&M-EST. PATIENT-LVL II: CPT | Mod: PBBFAC,,, | Performed by: PHYSICIAN ASSISTANT

## 2018-02-21 PROCEDURE — 99213 OFFICE O/P EST LOW 20 MIN: CPT | Mod: S$GLB,,, | Performed by: PHYSICIAN ASSISTANT

## 2018-02-21 RX ORDER — MELOXICAM 15 MG/1
15 TABLET ORAL DAILY
Qty: 30 TABLET | Refills: 0 | Status: SHIPPED | OUTPATIENT
Start: 2018-02-21 | End: 2018-03-23

## 2018-02-21 RX ORDER — CYCLOBENZAPRINE HCL 5 MG
5 TABLET ORAL 3 TIMES DAILY PRN
Qty: 30 TABLET | Refills: 0 | Status: SHIPPED | OUTPATIENT
Start: 2018-02-21 | End: 2018-03-03

## 2018-02-22 ENCOUNTER — TELEPHONE (OUTPATIENT)
Dept: PAIN MEDICINE | Facility: CLINIC | Age: 43
End: 2018-02-22

## 2018-02-22 NOTE — TELEPHONE ENCOUNTER
Left voice message asking for a return call to schedule for Left L5-S1 TF CHA with Dr. Biggs .  Referred by Dr. Anton.

## 2018-02-22 NOTE — PROGRESS NOTES
DATE: 2/21/2018  PATIENT: Mirtha Gary     Supervising Physician: Josh Anton M.D.    History:  Mirtha Gary returns today for MRI results.  She was last seen by me 2/6/18.  She is doing well.  She says the pain has improved slightly with meloxicam and flexeril.     The Patient denies myelopathic symptoms such as handwriting changes or difficulty with buttons/coins/keys. Denies perineal paresthesias, bowel/bladder dysfunction.    PMH/PSH/FamHx/SocHx:  Unchanged from prior visit      ROS:  REVIEW OF SYSTEMS:  Constitution: Negative. Negative for chills, fever and night sweats.   HENT: Negative for congestion and headaches.    Eyes: Negative for blurred vision, left vision loss and right vision loss.   Cardiovascular: Negative for chest pain and syncope.   Respiratory: Negative for cough and shortness of breath.    Endocrine: Negative for polydipsia, polyphagia and polyuria.   Hematologic/Lymphatic: Negative for bleeding problem. Does not bruise/bleed easily.   Skin: Negative for dry skin, itching and rash.   Musculoskeletal: Negative for falls and muscle weakness.   Gastrointestinal: Negative for abdominal pain and bowel incontinence.   Allergic/Immunologic: Negative for hives and persistent infections.  Genitourinary: Negative for urinary retention/incontinence and nocturia.   Neurological: Negative for disturbances in coordination, no myelopathic symptoms such as handwriting changes or difficulty with buttons, coins, keys or small objects. No loss of balance and seizures.   Psychiatric/Behavioral: Negative for depression. The patient does not have insomnia.   Denies perineal paresthesias, bowel or bladder incontinence    EXAM:  There were no vitals taken for this visit.    My physical examination was notable for the following findings:     Normal station and gait, no difficulty with toe or heel walk.   Dorsal lumbar skin negative for rashes, lesions, hairy patches and surgical scars. There is mild  cervical and lumbar tenderness to palpation.  Lumbar range of motion is acceptable.  Global saggital and coronal spinal balance acceptable, not significant for scoliosis and kyphosis.  No pain with the range of motion of the bilateral hips. No trochanteric tenderness to palpation.  Bilateral lower extremities warm and well perfused, dorsalis pedis pulses 2+ bilaterally.  Normal strength and tone in all major motor groups in the bilateral lower extremities. Normal sensation to light touch in the L2-S1 dermatomes bilaterally.  Deep tendon reflexes symmetric 2+ in the bilateral lower extremities.  Negative Babinski bilaterally. Straight leg raise negative bilaterally.  Negative Inverted Radial Reflex and Valencia's bilaterally.      IMAGING:  No new imaging today.    Today I personally re- reviewed AP, Lat and Flex/Ex  upright C-spine films that demonstrate grade I anterolisthesis of C3/4 and C4/5.  There is straightening of the normal cervical lordosis.     AP, Lat and Flex/Ex upright lumbar spine films demonstrate normal disc spacing and alignment.  No acute abnormalities.    MRI lumbar spine demonstrates small posterior disc bulge and annular fissure at L5/S1.  No significant spinal stenosis.    MRI cervical spine demonstrates no significant spinal canal or neural foraminal narrowing.      ASSESSMENT/PLAN:    Diagnoses and all orders for this visit:    Lumbar radiculopathy  -     Procedure Order to [facility]; Future  -     Ambulatory Referral to Physical/Occupational Therapy    Cervical radiculopathy  -     Procedure Order to [facility]; Future  -     Ambulatory Referral to Physical/Occupational Therapy    Other orders  -     cyclobenzaprine (FLEXERIL) 5 MG tablet; Take 1 tablet (5 mg total) by mouth 3 (three) times daily as needed for Muscle spasms.  -     meloxicam (MOBIC) 15 MG tablet; Take 1 tablet (15 mg total) by mouth once daily.    The patient had good relief with an CHA for over a  year.  She would like to try another injection.  Orders placed today.  Referral for [facility] at [facility] placed today.  Follow up as needed.       Follow-up if symptoms worsen or fail to improve.

## 2018-03-07 ENCOUNTER — TELEPHONE (OUTPATIENT)
Dept: OBSTETRICS AND GYNECOLOGY | Facility: CLINIC | Age: 43
End: 2018-03-07

## 2018-03-07 NOTE — TELEPHONE ENCOUNTER
Patient's  stopped by the clinic today requesting us to reach out to patient Mirtha Gary to set her up with an appointment for an annual visit with Dr. Garcia    Patient did not answer, left a voicemail to call back to schedule.

## 2018-03-29 ENCOUNTER — PATIENT MESSAGE (OUTPATIENT)
Dept: PAIN MEDICINE | Facility: CLINIC | Age: 43
End: 2018-03-29

## 2018-04-10 ENCOUNTER — OFFICE VISIT (OUTPATIENT)
Dept: OBSTETRICS AND GYNECOLOGY | Facility: CLINIC | Age: 43
End: 2018-04-10
Payer: COMMERCIAL

## 2018-04-10 VITALS
DIASTOLIC BLOOD PRESSURE: 62 MMHG | BODY MASS INDEX: 23.06 KG/M2 | SYSTOLIC BLOOD PRESSURE: 98 MMHG | HEIGHT: 66 IN | WEIGHT: 143.5 LBS

## 2018-04-10 DIAGNOSIS — Z12.4 PAP SMEAR FOR CERVICAL CANCER SCREENING: ICD-10-CM

## 2018-04-10 DIAGNOSIS — Z01.419 ENCOUNTER FOR GYNECOLOGICAL EXAMINATION: Primary | ICD-10-CM

## 2018-04-10 PROCEDURE — 99396 PREV VISIT EST AGE 40-64: CPT | Mod: S$GLB,,, | Performed by: OBSTETRICS & GYNECOLOGY

## 2018-04-10 PROCEDURE — 88175 CYTOPATH C/V AUTO FLUID REDO: CPT

## 2018-04-10 RX ORDER — NAPROXEN 500 MG/1
TABLET ORAL
Status: ON HOLD | COMMUNITY
Start: 2018-02-07 | End: 2018-11-28 | Stop reason: HOSPADM

## 2018-04-10 NOTE — PROGRESS NOTES
HPI: Pt is a 42 y.o.  female who presents for routine annual exam. She does not use contraception. She does not want STD screening. Her and her  are considering having another baby. They have many questions.       ROS:  GENERAL: Feeling well overall. Denies fever or chills.   SKIN: Denies rash or lesions.   HEAD: Denies head injury or headache.   NODES: Denies enlarged lymph nodes.   CHEST: Denies chest pain or shortness of breath.   CARDIOVASCULAR: Denies palpitations or left sided chest pain.   ABDOMEN: No abdominal pain, constipation, diarrhea, nausea, vomiting or rectal bleeding.   URINARY: No dysuria, hematuria, or burning on urination.  REPRODUCTIVE: See HPI.   BREASTS: Denies pain, lumps, or nipple discharge.   HEMATOLOGIC: No easy bruisability or excessive bleeding.   MUSCULOSKELETAL: Denies joint pain or swelling.   NEUROLOGIC: Denies syncope or weakness.   PSYCHIATRIC: Denies depression, anxiety or mood swings.    PE:   APPEARANCE: Well nourished, well developed, White female in no acute distress.  NODES: no cervical, supraclavicular, or inguinal lymphadenopathy  BREASTS: Implants presents. Symmetrical, no skin changes or visible lesions. No palpable masses, nipple discharge or adenopathy bilaterally.  ABDOMEN: Soft. No tenderness or masses. No distention. No hernias palpated. No CVA tenderness.  VULVA: No lesions. Normal external female genitalia.  URETHRAL MEATUS: Normal size and location, no lesions, no prolapse.  URETHRA: No masses, tenderness, or prolapse.  VAGINA: Moist. No lesions or lacerations noted. No abnormal discharge present. No odor present.   CERVIX: No lesions or discharge. No cervical motion tenderness.   UTERUS: Normal size, regular shape, mobile, non-tender.  ADNEXA: No tenderness. No fullness or masses palpated in the adnexal regions.   ANUS PERINEUM: Normal.      Diagnosis:  1. Encounter for gynecological examination    2. Pap smear for cervical cancer screening        Plan:      Orders Placed This Encounter    Liquid-based pap smear, screening     - All questions answered regarding pregnancy.     Patient was counseled today on the new ACS guidelines for cervical cytology screening as well as the current recommendations for breast cancer screening. She was counseled to follow up with her PCP for other routine health maintenance. Counseling session lasted approximately 10 minutes, and all her questions were answered.    Follow-up with me in 1 year for routine exam or sooner if gets pregnant.

## 2018-04-11 ENCOUNTER — CLINICAL SUPPORT (OUTPATIENT)
Dept: REHABILITATION | Facility: OTHER | Age: 43
End: 2018-04-11
Attending: PHYSICIAN ASSISTANT
Payer: COMMERCIAL

## 2018-04-11 DIAGNOSIS — M54.2 NECK PAIN: ICD-10-CM

## 2018-04-11 DIAGNOSIS — G89.29 CHRONIC BILATERAL LOW BACK PAIN WITH LEFT-SIDED SCIATICA: ICD-10-CM

## 2018-04-11 DIAGNOSIS — M54.42 CHRONIC BILATERAL LOW BACK PAIN WITH LEFT-SIDED SCIATICA: ICD-10-CM

## 2018-04-11 PROCEDURE — 97162 PT EVAL MOD COMPLEX 30 MIN: CPT

## 2018-04-11 PROCEDURE — 97110 THERAPEUTIC EXERCISES: CPT

## 2018-04-12 PROBLEM — G89.29 CHRONIC BILATERAL LOW BACK PAIN WITH SCIATICA: Status: ACTIVE | Noted: 2018-04-12

## 2018-04-12 PROBLEM — M54.2 NECK PAIN: Status: ACTIVE | Noted: 2018-04-12

## 2018-04-12 PROBLEM — M54.42 CHRONIC BILATERAL LOW BACK PAIN WITH LEFT-SIDED SCIATICA: Status: ACTIVE | Noted: 2018-04-12

## 2018-04-12 PROBLEM — M54.42 CHRONIC BILATERAL LOW BACK PAIN WITH SCIATICA: Status: ACTIVE | Noted: 2018-04-12

## 2018-04-12 PROBLEM — M54.41 CHRONIC BILATERAL LOW BACK PAIN WITH SCIATICA: Status: ACTIVE | Noted: 2018-04-12

## 2018-04-12 PROBLEM — M54.40 CHRONIC BILATERAL LOW BACK PAIN WITH SCIATICA: Status: ACTIVE | Noted: 2018-04-12
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
## 2018-04-13 ENCOUNTER — HOSPITAL ENCOUNTER (OUTPATIENT)
Facility: OTHER | Age: 43
Discharge: HOME | End: 2018-04-13
Attending: ANESTHESIOLOGY | Admitting: ANESTHESIOLOGY
Payer: COMMERCIAL

## 2018-04-13 ENCOUNTER — SURGERY (OUTPATIENT)
Age: 43
End: 2018-04-13

## 2018-04-13 VITALS
RESPIRATION RATE: 18 BRPM | DIASTOLIC BLOOD PRESSURE: 69 MMHG | BODY MASS INDEX: 22.34 KG/M2 | HEART RATE: 65 BPM | TEMPERATURE: 98 F | HEIGHT: 66 IN | OXYGEN SATURATION: 99 % | WEIGHT: 139 LBS | SYSTOLIC BLOOD PRESSURE: 138 MMHG

## 2018-04-13 DIAGNOSIS — M54.16 LEFT LUMBAR RADICULITIS: Primary | ICD-10-CM

## 2018-04-13 LAB
B-HCG UR QL: NEGATIVE
CTP QC/QA: YES

## 2018-04-13 PROCEDURE — 64484 NJX AA&/STRD TFRM EPI L/S EA: CPT | Mod: LT,,, | Performed by: ANESTHESIOLOGY

## 2018-04-13 PROCEDURE — 81025 URINE PREGNANCY TEST: CPT | Performed by: ANESTHESIOLOGY

## 2018-04-13 PROCEDURE — 25500020 PHARM REV CODE 255: Performed by: ANESTHESIOLOGY

## 2018-04-13 PROCEDURE — 64484 NJX AA&/STRD TFRM EPI L/S EA: CPT | Performed by: ANESTHESIOLOGY

## 2018-04-13 PROCEDURE — 63600175 PHARM REV CODE 636 W HCPCS: Performed by: ANESTHESIOLOGY

## 2018-04-13 PROCEDURE — 64483 NJX AA&/STRD TFRM EPI L/S 1: CPT | Mod: LT,,, | Performed by: ANESTHESIOLOGY

## 2018-04-13 PROCEDURE — 64483 NJX AA&/STRD TFRM EPI L/S 1: CPT | Performed by: ANESTHESIOLOGY

## 2018-04-13 PROCEDURE — 25000003 PHARM REV CODE 250: Performed by: ANESTHESIOLOGY

## 2018-04-13 RX ORDER — BUPIVACAINE HYDROCHLORIDE 2.5 MG/ML
INJECTION, SOLUTION EPIDURAL; INFILTRATION; INTRACAUDAL
Status: DISCONTINUED | OUTPATIENT
Start: 2018-04-13 | End: 2018-04-13 | Stop reason: HOSPADM

## 2018-04-13 RX ORDER — METHYLPREDNISOLONE ACETATE 80 MG/ML
INJECTION, SUSPENSION INTRA-ARTICULAR; INTRALESIONAL; INTRAMUSCULAR; SOFT TISSUE
Status: DISCONTINUED | OUTPATIENT
Start: 2018-04-13 | End: 2018-04-13 | Stop reason: HOSPADM

## 2018-04-13 RX ORDER — LIDOCAINE HYDROCHLORIDE 10 MG/ML
INJECTION, SOLUTION EPIDURAL; INFILTRATION; INTRACAUDAL; PERINEURAL
Status: DISCONTINUED | OUTPATIENT
Start: 2018-04-13 | End: 2018-04-13 | Stop reason: HOSPADM

## 2018-04-13 RX ADMIN — IOHEXOL 10 ML: 300 INJECTION, SOLUTION INTRAVENOUS at 08:04

## 2018-04-13 RX ADMIN — SODIUM BICARBONATE 4 MEQ: 0.2 INJECTION, SOLUTION INTRAVENOUS at 08:04

## 2018-04-13 RX ADMIN — BUPIVACAINE HYDROCHLORIDE 10 ML: 2.5 INJECTION, SOLUTION EPIDURAL; INFILTRATION; INTRACAUDAL; PERINEURAL at 08:04

## 2018-04-13 RX ADMIN — METHYLPREDNISOLONE ACETATE 80 MG: 80 INJECTION, SUSPENSION INTRA-ARTICULAR; INTRALESIONAL; INTRAMUSCULAR; SOFT TISSUE at 08:04

## 2018-04-13 RX ADMIN — LIDOCAINE HYDROCHLORIDE 5 ML: 10 INJECTION, SOLUTION EPIDURAL; INFILTRATION; INTRACAUDAL; PERINEURAL at 08:04

## 2018-04-13 NOTE — H&P
HPI  43 yo female with PMHx lumbar radiculopathy presents for left TFESI L5 + S1.        PMHx, PSHx, Allergies, Medications reviewed in epic    ROS negative except pain complaints in HPI    OBJECTIVE:    LMP 04/11/2018   Breastfeeding? No     PHYSICAL EXAMINATION:    GENERAL: Well appearing, in no acute distress, alert and oriented x3.  PSYCH:  Mood and affect appropriate.  SKIN: Skin color, texture, turgor normal, no rashes or lesions.  CV: RRR with palpation of the radial artery.  PULM: No evidence of respiratory difficulty, symmetric chest rise. Clear to auscultation.  NEURO: Cranial nerves grossly intact.    Plan:    Proceed with procedure as planned    Jamel Browning  04/13/2018

## 2018-04-13 NOTE — OP NOTE
Date: 04/13/2018    Procedure: Left L5 and S1 Transforaminal Epidural Steroid Injection    Referring Provider: Lizzy Early PA-C    Pre-op diagnosis: Left lumbar radiculopathy [M54.16]    Post-op diagnosis: Left lumbar radiculopathy [M54.16]     Physician: Dr. Kiera Biggs     Assistant: Dr. Browning    Anesthestia: local    All medications, allergies, and relevant histories were reviewed. No recent antibiotics or infections.  A time-out was taken to verify the correct patient, procedure, laterality, and appropriate medications/allergies.    Fluoroscopically-Guided, Contrast-Controlled Transforaminal Epidural Steroid Injection:     Following denial of allergy and review of potential side effects and complications including but not necessarily limited to infection, allergic reaction, local tissue breakdown, nerve injury, and paresis, the patient indicated they understood and agreed to proceed.     The patient was positioned prone and prepped and draped in the usual sterile fashion. The left L5 pedicle was identified fluoroscopically via an oblique view. The skin was anesthetized via 25-gauge 1 needle with approximately 3 cc of bicarbonated 1% lidocaine. At this point, a 22-gauge 3.5 spinal needle was atraumatically introduced and advanced under fluoroscopic guidance to the anterosuperior aspect of the L5 neural foramen. Depth was gauged on lateral view. Needle position at approximately the 6 oclock position relative to the pedicle was confirmed in the AP view. Following negative aspiration for blood and CSF and confirming the absence of paresthesias, injection of approximately 1cc of Omnipaque 300 demonstrated excellent spread along the nerve root into the epidural space.  At this point, 1.5 cc of a mixture of 2 cc of 0.25% bupivacaine with 80 mg of Depo-Medrol was injected without complication. The needle was flushed with 1% lidocaine withdrawn.     The procedure was then repeated in an identical manner at the  level of S1 on the left.    The patient was followed post procedure and discharged under their own power in excellent condition in the company of a responsible adult.       Future Management:   If helpful, can repeat as needed.    Follow up with my clinic in 3 weeks or sooner if needed    I certify that I provided the above services.  I was present for the entire procedure, which was performed by myself with the assistance of the resident physician.  There were no parts of the procedure that were performed not by myself or without my direct supervision.

## 2018-04-13 NOTE — DISCHARGE INSTRUCTIONS
Thank you for allowing us to care for you today. You may receive a survey about the care we provided. Your feedback is valuable and helps us provide excellent care throughout the community. Home Care Instructions Pain Management:    1. DIET:   You may resume your normal diet today.   2. BATHING:   You may shower with luke warm water.  3. DRESSING:   You may remove your bandage today.   4. ACTIVITY LEVEL:   You may resume your normal activities 24 hrs after your procedure.  5. MEDICATIONS:   You may resume your normal medications today.   6. SPECIAL INSTRUCTIONS:   No heat to the injection site for 24 hrs including, bath or shower, heating pad, moist heat, or hot tubs.    Use ice pack to injection site for any pain or discomfort.  Apply ice packs for 20 minute intervals as needed.   If you have received any sedatives by mouth today you may not drive for 12 hours.    If you have received any sedation through your IV, you may not drive for 24 hrs.     PLEASE CALL YOUR DOCTOR IF:  1. Redness or swelling around the injection site.  2. Fever of 101 degrees  3. Drainage (pus) from the injection site.  4. For any continuous bleeding (some dried blood over the incision is normal.)    FOR EMERGENCIES:   If any unusual problems or difficulties occur during clinic hours, call [phone] or [phone].

## 2018-05-14 ENCOUNTER — CLINICAL SUPPORT (OUTPATIENT)
Dept: REHABILITATION | Facility: OTHER | Age: 43
End: 2018-05-14
Attending: PHYSICIAN ASSISTANT
Payer: COMMERCIAL

## 2018-05-14 DIAGNOSIS — M54.42 CHRONIC BILATERAL LOW BACK PAIN WITH LEFT-SIDED SCIATICA: ICD-10-CM

## 2018-05-14 DIAGNOSIS — M54.2 NECK PAIN: ICD-10-CM

## 2018-05-14 DIAGNOSIS — G89.29 CHRONIC BILATERAL LOW BACK PAIN WITH LEFT-SIDED SCIATICA: ICD-10-CM

## 2018-05-14 PROCEDURE — 97110 THERAPEUTIC EXERCISES: CPT

## 2018-05-14 NOTE — PROGRESS NOTES
[facility]      Name: Mirtha Gary  Clinic Number: [id number]  Date of Treatment: 2018   Diagnosis:   Encounter Diagnoses   Name Primary?    Neck pain     Chronic bilateral low back pain with left-sided sciatica      Physician: Lizzy Early PA-C    Pain pattern determined:  1 PEP  Plan of care signed: 18   Time in: 9:00  Time Out: 10:00  Total Treatment time: 50  Precautions: Standard, neck pain, left ankle and knee pain, pt at beginning months of pregnancy.    Visit #: 2    POC due: not signes yet  Reassessment due:18 done 18  Next Reassessment: 18    Face to Face discussion of patient was done between PT and PTA.     Subjective   Mirtha reports no LB pain at this time.  She has not been to therapy due to pt has been out of town on vacation. She also learned that she is 6 weeks pregnant last week.     Patient reports their pain to be 0/10 on a 0-10 scale with 0 being no pain and 10 being the worst pain imaginable.    Pain Location:LB    Work:   (Works from home)   Leisure: Walk in park, exercise when she can, Member at [employer], [employer]                     Pts goals:  Reduce neck and back pain pain      Objective   Baseline IM Testing Results:   Date of testin2018  ROM 48-0 deg   Max Peak Torque 122    Min Peak Torque 35    Flex/Ext Ratio 3.4   % below normative data -28         FOTO: Focus on Therapeutic Outcomes   Category: lumbar   % Impaired: 46  Current Score  = CK = at least 40% but < 60% impaired, limited or restricted  Goal at Discharge Score = CJ = at least 20% but < 40% impaired, limited or restricted      Treatment    Pt was instructed in and performed the following:     Mirtha received therapeutic exercises to develop/improved posture, cardiovascular endurance, muscular endurance, lumbar/cervical ROM, strength and muscular endurance for 50   minutes including the following exercises:     [facility] 5/14/2018   Visit Number 2   VAS Pain Rating 0   Extension in Standing 10   Flexion in Lying 10   Lumbar Extension Seat Pad -   Femur Restraint -   Top Dead Center -   Counterweight -   Lumbar Flexion -   Lumbar Extension -   Lumbar Peak Torque -   Min Torque -   Percent From Norm -   Lumbar Weight 30   Repetitions 20   Rating of Perceived Exertion 3   Ice - Z Lie (in min.) 10   PPT 10x    Peripheral muscle strengthening which included 1 set of 15-20 repetitions at a slow, controlled 7 second per rep pace focused on strengthening supporting musculature for improved body mechanics and functional mobility.  Pt and therapist focused on proper form during treatment to ensure optimal strengthening of each targeted muscle group.  Machines were utilized including torso rotation, leg extension, leg curl, chest press, upright row. Tricep extension, bicep curl, leg press, and hip abduction added on third visit.       Mirtha received the following manual therapy techniques: n/a were applied to the: n/a for 0 minutes.       Home Exercise Program as follows:   Flexion in lying with theraball 10x, 3x/daily  Extension in lying 10x, 3x/daily  Supine pelvic tilts 5-10 s/h 5x, 2x daily  Z-lie 10-20 min, 2x daily     Handouts were given to the patient. Pt demo good understanding of the education provided. Mirtha demonstrated good return demonstration of activities.     Lumbar roll use compliance: Unknown  Additional exercises taught this treatment session: none    Assessment     Pt with no LBP with session. Reviewed HEP with mod vc. Pt understood. Pt demo well. Pt tolerated lumbar medx machine with starting weight more than 50% of pts max peak torque cipriano to max peak torque low with no c/o pain. Pt tolerated the medx machines well to the upright row with no c/o increased LB pain or any limb pain.      Patient is making good progress towards established goals.  Pt will continue to benefit from skilled outpatient physical therapy  to address the deficits stated in the impairment chart, provide pt/family education and to maximize pt's level of independence in the home and community environment.       Pt's spiritual, cultural and educational needs considered and pt agreeable to plan of care and goals as stated below:     Medical necessity is demonstrated by the following IMPAIRMENTS/PROBLEMS:    History  Co-morbidities and personal factors that may impact the plan of care Examination  Body Structures and Functions, activity limitations and participation restrictions that may impact the plan of care Clinical Presentation    Decision Making/ Complexity Score   Co-morbidities:   endometriosis           Personal Factors:   no deficits Body Regions:   neck  back  lower extremities  upper extremities     Body Systems:   gross symmetry  ROM  strength  transitions  motor control     Activity limitations:   Learning and applying knowledge  no deficits     General Tasks and Commands  no deficits     Communication  no deficits     Mobility  lifting and carrying objects     Self care  no deficits     Domestic Life  doing house work (cleaning house, washing dishes, laundry)     Interactions/Relationships  no deficits     Life Areas  no deficits     Community and Social Life  community life  recreation and leisure     Participation Restrictions:   None       evolving clinical presentation with changing clinical characteristics   Worsening    Moderate            GOALS: Pt is in agreement with the following goals.     Short term goals:  6 weeks or 10 visits   1.  Pt will demonstrate increased lumbar ROM by at least 3 degrees from the initial ROM value with improvements noted in functional ROM and ability to perform ADLs  2.  Pt will demonstrate increased maximum isometric torque value by 10% when compared to the initial value resulting in improved ability to perform bending, lifting, and carrying activities safely, confidently.  3.  Patient report a reduction  in worst pain score by 1-2 points for improved tolerance during work and recreational activities  4.  Pt able to perform HEP correctly with minimal cueing or supervision for therapist     Long term goals: 13 weeks or 20 visits   1. Pt will demonstrate increased lumbar ROM by at least 6 degrees from initial ROM value, resulting in improved ability to perform functional fwd bending while standing and sitting.   2. Pt will demonstrate increased maximum isometric torque value by 30% when compared to the initial value resulting in improved ability to perform bending, lifting, and carrying activities safely, confidently.  3. Pt to demonstrate ability to independently control and reduce their pain through posture positioning and mechanical movements throughout a typical day.  4.  Patient will demonstrate improved overall function per FOTO Survey to CJ = at least 20% but < 40% impaired, limited or restricted score or less.  5. Pt will report ability to sit on floor and play with son without significant increase in pain.   6. Pt will report ability to lift son without increased symptoms.         Plan   Outpatient physical therapy 2x week for 13 weeks or 20 visits to include the following:   - Patient education  - Therapeutic exercise  - Manual therapy  - Performance testing   - Neuromuscular Re-education  - Therapeutic activity   - Modalities         Plan   Continue with established Plan of Care towards established PT goals.

## 2018-05-16 ENCOUNTER — TELEPHONE (OUTPATIENT)
Dept: OBSTETRICS AND GYNECOLOGY | Facility: CLINIC | Age: 43
End: 2018-05-16

## 2018-05-16 ENCOUNTER — LAB VISIT (OUTPATIENT)
Dept: LAB | Facility: OTHER | Age: 43
End: 2018-05-16
Attending: OBSTETRICS & GYNECOLOGY
Payer: COMMERCIAL

## 2018-05-16 DIAGNOSIS — Z32.00 POSSIBLE PREGNANCY, NOT CONFIRMED: ICD-10-CM

## 2018-05-16 DIAGNOSIS — Z32.00 POSSIBLE PREGNANCY, NOT CONFIRMED: Primary | ICD-10-CM

## 2018-05-16 LAB
ABO + RH BLD: NORMAL
BLD GP AB SCN CELLS X3 SERPL QL: NORMAL
HCG INTACT+B SERPL-ACNC: NORMAL MIU/ML
PROGEST SERPL-MCNC: 12.2 NG/ML

## 2018-05-16 PROCEDURE — 84702 CHORIONIC GONADOTROPIN TEST: CPT

## 2018-05-16 PROCEDURE — 86850 RBC ANTIBODY SCREEN: CPT

## 2018-05-16 PROCEDURE — 36415 COLL VENOUS BLD VENIPUNCTURE: CPT

## 2018-05-16 PROCEDURE — 84144 ASSAY OF PROGESTERONE: CPT

## 2018-05-16 NOTE — TELEPHONE ENCOUNTER
Beta proges type today and Friday Beta            Pt booked today at 9am and Friday at 9am [facility]

## 2018-05-17 ENCOUNTER — PATIENT MESSAGE (OUTPATIENT)
Dept: OBSTETRICS AND GYNECOLOGY | Facility: CLINIC | Age: 43
End: 2018-05-17

## 2018-05-17 DIAGNOSIS — Z36.9 ANTENATAL SCREENING ENCOUNTER: Primary | ICD-10-CM

## 2018-05-18 ENCOUNTER — LAB VISIT (OUTPATIENT)
Dept: LAB | Facility: OTHER | Age: 43
End: 2018-05-18
Attending: OBSTETRICS & GYNECOLOGY
Payer: COMMERCIAL

## 2018-05-18 DIAGNOSIS — Z32.00 POSSIBLE PREGNANCY, NOT CONFIRMED: ICD-10-CM

## 2018-05-18 LAB — HCG INTACT+B SERPL-ACNC: NORMAL MIU/ML

## 2018-05-18 PROCEDURE — 36415 COLL VENOUS BLD VENIPUNCTURE: CPT

## 2018-05-18 PROCEDURE — 84702 CHORIONIC GONADOTROPIN TEST: CPT

## 2018-05-21 ENCOUNTER — CLINICAL SUPPORT (OUTPATIENT)
Dept: REHABILITATION | Facility: OTHER | Age: 43
End: 2018-05-21
Attending: PHYSICIAN ASSISTANT
Payer: COMMERCIAL

## 2018-05-21 DIAGNOSIS — M54.42 CHRONIC BILATERAL LOW BACK PAIN WITH LEFT-SIDED SCIATICA: ICD-10-CM

## 2018-05-21 DIAGNOSIS — M54.2 NECK PAIN: ICD-10-CM

## 2018-05-21 DIAGNOSIS — G89.29 CHRONIC BILATERAL LOW BACK PAIN WITH LEFT-SIDED SCIATICA: ICD-10-CM

## 2018-05-21 PROCEDURE — 97110 THERAPEUTIC EXERCISES: CPT

## 2018-05-21 NOTE — PROGRESS NOTES
[facility]      Name: Mirtha Gary  Clinic Number: [id number]  Date of Treatment: 2018   Diagnosis:   Encounter Diagnoses   Name Primary?    Neck pain     Chronic bilateral low back pain with left-sided sciatica      Physician: Lizzy Early PA-C    Pain pattern determined:  1 PEP  Plan of care signed: 18   Time in: 9:00  Time Out: 10:00  Total Treatment time: 50  Precautions: Standard, neck pain, left ankle and knee pain, pt at beginning months of pregnancy.    Visit #: 3    POC due: not signes yet  Reassessment done: 18  Next Reassessment: 18    Face to Face discussion of patient was done between PT and PTA.     Subjective   Mirtha reports no LB pain at this time.  She has been consistent with HEP. She did not note any pain or soreness after last session. She is 8 weeks pregnant with her 3rd child.     Patient reports their pain to be 0/10 on a 0-10 scale with 0 being no pain and 10 being the worst pain imaginable.    Pain Location:LB    Work:   (Works from home)   Leisure: Walk in park, exercise when she can, Member at [employer], [employer]                     Pts goals:  Reduce neck and back pain pain      Objective   Baseline IM Testing Results:   Date of testin2018  ROM 48-0 deg   Max Peak Torque 122    Min Peak Torque 35    Flex/Ext Ratio 3.4   % below normative data -28         FOTO: Focus on Therapeutic Outcomes   Category: lumbar   % Impaired: 46  Current Score  = CK = at least 40% but < 60% impaired, limited or restricted  Goal at Discharge Score = CJ = at least 20% but < 40% impaired, limited or restricted      Treatment    Pt was instructed in and performed the following:     Mirtha received therapeutic exercises to develop/improved posture, cardiovascular endurance, muscular endurance, lumbar/cervical ROM, strength and muscular endurance for 50   minutes including the following exercises:   [facility]  5/21/2018   Visit Number 3   VAS Pain Rating 0   Extension in Standing 10   Flexion in Lying 10   Lumbar Extension Seat Pad -   Femur Restraint -   Top Dead Center -   Counterweight -   Lumbar Flexion -   Lumbar Extension -   Lumbar Peak Torque -   Min Torque -   Percent From Norm -   Lumbar Weight 32   Repetitions 20   Rating of Perceived Exertion 3   Ice - Z Lie (in min.) 10       PPT 10x  Open book 10x     Peripheral muscle strengthening which included 1 set of 15-20 repetitions at a slow, controlled 7 second per rep pace focused on strengthening supporting musculature for improved body mechanics and functional mobility.  Pt and therapist focused on proper form during treatment to ensure optimal strengthening of each targeted muscle group.  Machines were utilized including torso rotation, leg extension, leg curl, chest press, upright row. Tricep extension, bicep curl, leg press, and hip abduction added on third visit.       Mirtha received the following manual therapy techniques: n/a were applied to the: n/a for 0 minutes.       Home Exercise Program as follows:   Flexion in lying with theraball 10x, 3x/daily  Extension in lying 10x, 3x/daily  Supine pelvic tilts 5-10 s/h 5x, 2x daily  Z-lie 10-20 min, 2x daily     Handouts were given to the patient. Pt demo good understanding of the education provided. Mirtha demonstrated good return demonstration of activities.     Lumbar roll use compliance: Unknown  Additional exercises taught this treatment session: none    Assessment     Pt with no LBP with session. Reviewed HEP with mild vc. Added thoracic stretch with positive response. Pt tolerated lumbar medx machine with 5% increase without reports of increased pain or discomfort. Pt tolerated the full Med X circuit with no c/o increased LB pain or any limb pain.     Patient is making good progress towards established goals.  Pt will continue to benefit from skilled outpatient physical therapy to address the deficits  stated in the impairment chart, provide pt/family education and to maximize pt's level of independence in the home and community environment.       Pt's spiritual, cultural and educational needs considered and pt agreeable to plan of care and goals as stated below:     Medical necessity is demonstrated by the following IMPAIRMENTS/PROBLEMS:    History  Co-morbidities and personal factors that may impact the plan of care Examination  Body Structures and Functions, activity limitations and participation restrictions that may impact the plan of care Clinical Presentation    Decision Making/ Complexity Score   Co-morbidities:   endometriosis           Personal Factors:   no deficits Body Regions:   neck  back  lower extremities  upper extremities     Body Systems:   gross symmetry  ROM  strength  transitions  motor control     Activity limitations:   Learning and applying knowledge  no deficits     General Tasks and Commands  no deficits     Communication  no deficits     Mobility  lifting and carrying objects     Self care  no deficits     Domestic Life  doing house work (cleaning house, washing dishes, laundry)     Interactions/Relationships  no deficits     Life Areas  no deficits     Community and Social Life  community life  recreation and leisure     Participation Restrictions:   None       evolving clinical presentation with changing clinical characteristics   Worsening    Moderate            GOALS: Pt is in agreement with the following goals.     Short term goals:  6 weeks or 10 visits   1.  Pt will demonstrate increased lumbar ROM by at least 3 degrees from the initial ROM value with improvements noted in functional ROM and ability to perform ADLs  2.  Pt will demonstrate increased maximum isometric torque value by 10% when compared to the initial value resulting in improved ability to perform bending, lifting, and carrying activities safely, confidently.  3.  Patient report a reduction in worst pain score by  1-2 points for improved tolerance during work and recreational activities  4.  Pt able to perform HEP correctly with minimal cueing or supervision for therapist     Long term goals: 13 weeks or 20 visits   1. Pt will demonstrate increased lumbar ROM by at least 6 degrees from initial ROM value, resulting in improved ability to perform functional fwd bending while standing and sitting.   2. Pt will demonstrate increased maximum isometric torque value by 30% when compared to the initial value resulting in improved ability to perform bending, lifting, and carrying activities safely, confidently.  3. Pt to demonstrate ability to independently control and reduce their pain through posture positioning and mechanical movements throughout a typical day.  4.  Patient will demonstrate improved overall function per FOTO Survey to CJ = at least 20% but < 40% impaired, limited or restricted score or less.  5. Pt will report ability to sit on floor and play with son without significant increase in pain.   6. Pt will report ability to lift son without increased symptoms.             Plan   Continue with established Plan of Care towards established PT goals.

## 2018-05-24 ENCOUNTER — OFFICE VISIT (OUTPATIENT)
Dept: MATERNAL FETAL MEDICINE | Facility: CLINIC | Age: 43
End: 2018-05-24
Payer: COMMERCIAL

## 2018-05-24 DIAGNOSIS — N92.6 MISSED MENSES: ICD-10-CM

## 2018-05-24 DIAGNOSIS — Z36.9 ANTENATAL SCREENING ENCOUNTER: ICD-10-CM

## 2018-05-24 DIAGNOSIS — O36.80X0 ENCOUNTER TO DETERMINE FETAL VIABILITY OF PREGNANCY, SINGLE OR UNSPECIFIED FETUS: ICD-10-CM

## 2018-05-24 DIAGNOSIS — Z36.89 ESTABLISH GESTATIONAL AGE, ULTRASOUND: ICD-10-CM

## 2018-05-24 PROCEDURE — 76801 OB US < 14 WKS SINGLE FETUS: CPT | Mod: S$GLB,,, | Performed by: OBSTETRICS & GYNECOLOGY

## 2018-05-24 PROCEDURE — 99499 UNLISTED E&M SERVICE: CPT | Mod: S$GLB,,, | Performed by: OBSTETRICS & GYNECOLOGY

## 2018-06-28 ENCOUNTER — ROUTINE PRENATAL (OUTPATIENT)
Dept: OBSTETRICS AND GYNECOLOGY | Facility: CLINIC | Age: 43
End: 2018-06-28
Payer: COMMERCIAL

## 2018-06-28 VITALS — DIASTOLIC BLOOD PRESSURE: 79 MMHG | SYSTOLIC BLOOD PRESSURE: 112 MMHG | WEIGHT: 146.81 LBS | BODY MASS INDEX: 23.7 KG/M2

## 2018-06-28 DIAGNOSIS — O02.1 MISSED ABORTION: Primary | ICD-10-CM

## 2018-06-28 PROCEDURE — 3008F BODY MASS INDEX DOCD: CPT | Mod: CPTII,S$GLB,, | Performed by: OBSTETRICS & GYNECOLOGY

## 2018-06-28 PROCEDURE — 99999 PR PBB SHADOW E&M-EST. PATIENT-LVL III: CPT | Mod: PBBFAC,,, | Performed by: OBSTETRICS & GYNECOLOGY

## 2018-06-28 PROCEDURE — 99214 OFFICE O/P EST MOD 30 MIN: CPT | Mod: S$GLB,,, | Performed by: OBSTETRICS & GYNECOLOGY

## 2018-06-28 RX ORDER — DOXYLAMINE SUCCINATE AND PYRIDOXINE HYDROCHLORIDE 10; 10 MG/1; MG/1
1 TABLET, DELAYED RELEASE ORAL 2 TIMES DAILY
Refills: 3 | COMMUNITY
Start: 2018-06-04 | End: 2018-07-25

## 2018-06-28 RX ORDER — PROMETHAZINE HYDROCHLORIDE 6.25 MG/5ML
SYRUP ORAL
Refills: 3 | COMMUNITY
Start: 2018-05-31 | End: 2019-10-19 | Stop reason: SDUPTHER

## 2018-06-28 RX ORDER — ONDANSETRON 4 MG/1
TABLET, ORALLY DISINTEGRATING ORAL
Refills: 4 | COMMUNITY
Start: 2018-05-31 | End: 2021-06-09 | Stop reason: ALTCHOICE

## 2018-06-28 NOTE — PROGRESS NOTES
HPI: Pt presents today for routine OB visit at 11w 6d. She has no complaints.     ROS:  GENERAL: Feeling well overall. Denies fever or chills.   SKIN: Denies rash or lesions.   HEAD: Denies head injury or headache.   NODES: Denies enlarged lymph nodes.   CHEST: Denies chest pain or shortness of breath.   CARDIOVASCULAR: Denies palpitations or left sided chest pain.   ABDOMEN: No abdominal pain, constipation, diarrhea, nausea, vomiting or rectal bleeding.   URINARY: No dysuria, hematuria, or burning on urination.  REPRODUCTIVE: See HPI.   BREASTS: Denies pain, lumps, or nipple discharge.   HEMATOLOGIC: No easy bruisability or excessive bleeding.   MUSCULOSKELETAL: Denies joint pain or swelling.   NEUROLOGIC: Denies syncope or weakness.   PSYCHIATRIC: Denies depression, anxiety or mood swings.    PE:   APPEARANCE: Well nourished, well developed, White female in no acute distress.  ABDOMEN: Soft. No tenderness or masses. No distention. No hernias palpated. No CVA tenderness.    NO FHT'S SEEN ON HANDHELD U/S. PT TAKEN TO ULTRASOUND ROOM AND BEDSIDE U/S WAS PERFORMED WHICH CONFIRMED NO FHT'S. CRL = 9W4D.    Diagnosis:  1. Supervision of other normal pregnancy, antepartum    2. AMA (advanced maternal age) multigravida 35+, first trimester    3.  screening encounter    4. Missed         Plan:     Discussed with the patient management options for missed , including expectant management, medical management with Cytotec, and surgical management with D+C. The risks and benefits of each option were discussed and all of her questions were answered. She has decided to proceed with Suction D+C      Orders Placed This Encounter    Case Request Operating Room: DILATION AND CURETTAGE, UTERUS, USING SUCTION     I have discussed the risks, benefits, indications, and alternatives of the procedure in detail with the patient.  She verbalizes her understanding.  All questions answered.  Surgical and blood consents  signed to be signed tomorrow prior to surgery.  The patient agrees to proceed with procedure as planned.    Total face to face time spent with the patient was 30 minutes and over half spent in counseling on the above related issues.         Follow-up with me after surgery.

## 2018-06-28 NOTE — PROGRESS NOTES
Doing well. No complaints. Baby ASA 81 mg recommended daily. Desires T21. Single marker AFP next visit. RTC 4 weeks.

## 2018-06-29 ENCOUNTER — ANESTHESIA (OUTPATIENT)
Dept: SURGERY | Facility: OTHER | Age: 43
End: 2018-06-29
Payer: COMMERCIAL

## 2018-06-29 ENCOUNTER — ANESTHESIA EVENT (OUTPATIENT)
Dept: SURGERY | Facility: OTHER | Age: 43
End: 2018-06-29
Payer: COMMERCIAL

## 2018-06-29 ENCOUNTER — HOSPITAL ENCOUNTER (OUTPATIENT)
Facility: OTHER | Age: 43
Discharge: HOME | End: 2018-06-29
Attending: OBSTETRICS & GYNECOLOGY | Admitting: OBSTETRICS & GYNECOLOGY
Payer: COMMERCIAL

## 2018-06-29 ENCOUNTER — SURGERY (OUTPATIENT)
Age: 43
End: 2018-06-29

## 2018-06-29 VITALS
WEIGHT: 145 LBS | TEMPERATURE: 98 F | RESPIRATION RATE: 16 BRPM | HEIGHT: 67 IN | SYSTOLIC BLOOD PRESSURE: 115 MMHG | OXYGEN SATURATION: 98 % | DIASTOLIC BLOOD PRESSURE: 72 MMHG | BODY MASS INDEX: 22.76 KG/M2 | HEART RATE: 77 BPM

## 2018-06-29 DIAGNOSIS — Z98.890 S/P DILATION AND CURETTAGE: Primary | ICD-10-CM

## 2018-06-29 DIAGNOSIS — O02.1 MISSED ABORTION: ICD-10-CM

## 2018-06-29 LAB
ABO + RH BLD: NORMAL
BLD GP AB SCN CELLS X3 SERPL QL: NORMAL

## 2018-06-29 PROCEDURE — 36000704 HC OR TIME LEV I 1ST 15 MIN: Performed by: OBSTETRICS & GYNECOLOGY

## 2018-06-29 PROCEDURE — 71000033 HC RECOVERY, INTIAL HOUR: Performed by: OBSTETRICS & GYNECOLOGY

## 2018-06-29 PROCEDURE — 88305 TISSUE EXAM BY PATHOLOGIST: CPT | Mod: 26,,, | Performed by: PATHOLOGY

## 2018-06-29 PROCEDURE — 25000003 PHARM REV CODE 250: Performed by: ANESTHESIOLOGY

## 2018-06-29 PROCEDURE — 71000016 HC POSTOP RECOV ADDL HR: Performed by: OBSTETRICS & GYNECOLOGY

## 2018-06-29 PROCEDURE — 25000003 PHARM REV CODE 250: Performed by: STUDENT IN AN ORGANIZED HEALTH CARE EDUCATION/TRAINING PROGRAM

## 2018-06-29 PROCEDURE — 37000008 HC ANESTHESIA 1ST 15 MINUTES: Performed by: OBSTETRICS & GYNECOLOGY

## 2018-06-29 PROCEDURE — 37000009 HC ANESTHESIA EA ADD 15 MINS: Performed by: OBSTETRICS & GYNECOLOGY

## 2018-06-29 PROCEDURE — 86901 BLOOD TYPING SEROLOGIC RH(D): CPT

## 2018-06-29 PROCEDURE — 36000705 HC OR TIME LEV I EA ADD 15 MIN: Performed by: OBSTETRICS & GYNECOLOGY

## 2018-06-29 PROCEDURE — 63600175 PHARM REV CODE 636 W HCPCS: Performed by: NURSE ANESTHETIST, CERTIFIED REGISTERED

## 2018-06-29 PROCEDURE — 59820 CARE OF MISCARRIAGE: CPT | Mod: ,,, | Performed by: OBSTETRICS & GYNECOLOGY

## 2018-06-29 PROCEDURE — 36415 COLL VENOUS BLD VENIPUNCTURE: CPT

## 2018-06-29 PROCEDURE — 25000003 PHARM REV CODE 250: Performed by: OBSTETRICS & GYNECOLOGY

## 2018-06-29 PROCEDURE — 88305 TISSUE EXAM BY PATHOLOGIST: CPT | Performed by: PATHOLOGY

## 2018-06-29 PROCEDURE — 71000015 HC POSTOP RECOV 1ST HR: Performed by: OBSTETRICS & GYNECOLOGY

## 2018-06-29 PROCEDURE — 25000003 PHARM REV CODE 250: Performed by: NURSE ANESTHETIST, CERTIFIED REGISTERED

## 2018-06-29 RX ORDER — DIPHENHYDRAMINE HCL 25 MG
25 CAPSULE ORAL EVERY 4 HOURS PRN
Status: DISCONTINUED | OUTPATIENT
Start: 2018-06-29 | End: 2018-06-29 | Stop reason: HOSPADM

## 2018-06-29 RX ORDER — ONDANSETRON 2 MG/ML
4 INJECTION INTRAMUSCULAR; INTRAVENOUS DAILY PRN
Status: DISCONTINUED | OUTPATIENT
Start: 2018-06-29 | End: 2018-06-29 | Stop reason: HOSPADM

## 2018-06-29 RX ORDER — ROCURONIUM BROMIDE 10 MG/ML
INJECTION, SOLUTION INTRAVENOUS
Status: DISCONTINUED | OUTPATIENT
Start: 2018-06-29 | End: 2018-06-29

## 2018-06-29 RX ORDER — KETOROLAC TROMETHAMINE 30 MG/ML
INJECTION, SOLUTION INTRAMUSCULAR; INTRAVENOUS
Status: DISCONTINUED | OUTPATIENT
Start: 2018-06-29 | End: 2018-06-29

## 2018-06-29 RX ORDER — DIPHENHYDRAMINE HYDROCHLORIDE 50 MG/ML
25 INJECTION INTRAMUSCULAR; INTRAVENOUS EVERY 4 HOURS PRN
Status: DISCONTINUED | OUTPATIENT
Start: 2018-06-29 | End: 2018-06-29 | Stop reason: HOSPADM

## 2018-06-29 RX ORDER — GLYCOPYRROLATE 0.2 MG/ML
INJECTION INTRAMUSCULAR; INTRAVENOUS
Status: DISCONTINUED | OUTPATIENT
Start: 2018-06-29 | End: 2018-06-29

## 2018-06-29 RX ORDER — FENTANYL CITRATE 50 UG/ML
25 INJECTION, SOLUTION INTRAMUSCULAR; INTRAVENOUS EVERY 5 MIN PRN
Status: DISCONTINUED | OUTPATIENT
Start: 2018-06-29 | End: 2018-06-29 | Stop reason: HOSPADM

## 2018-06-29 RX ORDER — HYDROCODONE BITARTRATE AND ACETAMINOPHEN 5; 325 MG/1; MG/1
1 TABLET ORAL EVERY 4 HOURS PRN
Status: DISCONTINUED | OUTPATIENT
Start: 2018-06-29 | End: 2018-06-29 | Stop reason: HOSPADM

## 2018-06-29 RX ORDER — ONDANSETRON 8 MG/1
8 TABLET, ORALLY DISINTEGRATING ORAL EVERY 8 HOURS PRN
Status: DISCONTINUED | OUTPATIENT
Start: 2018-06-29 | End: 2018-06-29 | Stop reason: HOSPADM

## 2018-06-29 RX ORDER — NEOSTIGMINE METHYLSULFATE 1 MG/ML
INJECTION, SOLUTION INTRAVENOUS
Status: DISCONTINUED | OUTPATIENT
Start: 2018-06-29 | End: 2018-06-29

## 2018-06-29 RX ORDER — LIDOCAINE HCL/PF 100 MG/5ML
SYRINGE (ML) INTRAVENOUS
Status: DISCONTINUED | OUTPATIENT
Start: 2018-06-29 | End: 2018-06-29

## 2018-06-29 RX ORDER — HYDROCODONE BITARTRATE AND ACETAMINOPHEN 5; 325 MG/1; MG/1
1 TABLET ORAL EVERY 4 HOURS PRN
Qty: 10 TABLET | Refills: 0 | Status: SHIPPED | OUTPATIENT
Start: 2018-06-29 | End: 2018-07-24 | Stop reason: SDUPTHER

## 2018-06-29 RX ORDER — HYDROMORPHONE HYDROCHLORIDE 2 MG/ML
0.4 INJECTION, SOLUTION INTRAMUSCULAR; INTRAVENOUS; SUBCUTANEOUS EVERY 5 MIN PRN
Status: DISCONTINUED | OUTPATIENT
Start: 2018-06-29 | End: 2018-06-29 | Stop reason: HOSPADM

## 2018-06-29 RX ORDER — SODIUM CHLORIDE, SODIUM LACTATE, POTASSIUM CHLORIDE, CALCIUM CHLORIDE 600; 310; 30; 20 MG/100ML; MG/100ML; MG/100ML; MG/100ML
INJECTION, SOLUTION INTRAVENOUS CONTINUOUS PRN
Status: DISCONTINUED | OUTPATIENT
Start: 2018-06-29 | End: 2018-06-29

## 2018-06-29 RX ORDER — FENTANYL CITRATE 50 UG/ML
INJECTION, SOLUTION INTRAMUSCULAR; INTRAVENOUS
Status: DISCONTINUED | OUTPATIENT
Start: 2018-06-29 | End: 2018-06-29

## 2018-06-29 RX ORDER — PROPOFOL 10 MG/ML
VIAL (ML) INTRAVENOUS
Status: DISCONTINUED | OUTPATIENT
Start: 2018-06-29 | End: 2018-06-29

## 2018-06-29 RX ORDER — IBUPROFEN 600 MG/1
600 TABLET ORAL EVERY 6 HOURS PRN
Status: DISCONTINUED | OUTPATIENT
Start: 2018-06-29 | End: 2018-06-29 | Stop reason: HOSPADM

## 2018-06-29 RX ORDER — MISOPROSTOL 200 UG/1
TABLET ORAL
Status: DISCONTINUED | OUTPATIENT
Start: 2018-06-29 | End: 2018-06-29 | Stop reason: HOSPADM

## 2018-06-29 RX ORDER — OXYCODONE HYDROCHLORIDE 5 MG/1
5 TABLET ORAL
Status: DISCONTINUED | OUTPATIENT
Start: 2018-06-29 | End: 2018-06-29 | Stop reason: HOSPADM

## 2018-06-29 RX ORDER — DOXYCYCLINE HYCLATE 100 MG
200 TABLET ORAL ONCE
Status: COMPLETED | OUTPATIENT
Start: 2018-06-29 | End: 2018-06-29

## 2018-06-29 RX ORDER — SODIUM CHLORIDE 0.9 % (FLUSH) 0.9 %
3 SYRINGE (ML) INJECTION
Status: DISCONTINUED | OUTPATIENT
Start: 2018-06-29 | End: 2018-06-29 | Stop reason: HOSPADM

## 2018-06-29 RX ORDER — IBUPROFEN 600 MG/1
600 TABLET ORAL EVERY 6 HOURS PRN
Qty: 30 TABLET | Refills: 1 | Status: ON HOLD | OUTPATIENT
Start: 2018-06-29 | End: 2018-11-28 | Stop reason: HOSPADM

## 2018-06-29 RX ORDER — ACETAMINOPHEN 10 MG/ML
INJECTION, SOLUTION INTRAVENOUS
Status: DISCONTINUED | OUTPATIENT
Start: 2018-06-29 | End: 2018-06-29

## 2018-06-29 RX ORDER — DEXAMETHASONE SODIUM PHOSPHATE 4 MG/ML
INJECTION, SOLUTION INTRA-ARTICULAR; INTRALESIONAL; INTRAMUSCULAR; INTRAVENOUS; SOFT TISSUE
Status: DISCONTINUED | OUTPATIENT
Start: 2018-06-29 | End: 2018-06-29

## 2018-06-29 RX ORDER — MIDAZOLAM HYDROCHLORIDE 1 MG/ML
INJECTION, SOLUTION INTRAMUSCULAR; INTRAVENOUS
Status: DISCONTINUED | OUTPATIENT
Start: 2018-06-29 | End: 2018-06-29

## 2018-06-29 RX ORDER — SUCCINYLCHOLINE CHLORIDE 20 MG/ML
INJECTION INTRAMUSCULAR; INTRAVENOUS
Status: DISCONTINUED | OUTPATIENT
Start: 2018-06-29 | End: 2018-06-29

## 2018-06-29 RX ORDER — ONDANSETRON 2 MG/ML
INJECTION INTRAMUSCULAR; INTRAVENOUS
Status: DISCONTINUED | OUTPATIENT
Start: 2018-06-29 | End: 2018-06-29

## 2018-06-29 RX ORDER — MEPERIDINE HYDROCHLORIDE 50 MG/ML
12.5 INJECTION INTRAMUSCULAR; INTRAVENOUS; SUBCUTANEOUS ONCE AS NEEDED
Status: DISCONTINUED | OUTPATIENT
Start: 2018-06-29 | End: 2018-06-29 | Stop reason: HOSPADM

## 2018-06-29 RX ADMIN — GLYCOPYRROLATE 0.4 MG: 0.2 INJECTION, SOLUTION INTRAMUSCULAR; INTRAVENOUS at 02:06

## 2018-06-29 RX ADMIN — PROPOFOL 150 MG: 10 INJECTION, EMULSION INTRAVENOUS at 01:06

## 2018-06-29 RX ADMIN — MIDAZOLAM 2 MG: 1 INJECTION INTRAMUSCULAR; INTRAVENOUS at 01:06

## 2018-06-29 RX ADMIN — SODIUM CHLORIDE, SODIUM LACTATE, POTASSIUM CHLORIDE, AND CALCIUM CHLORIDE: 600; 310; 30; 20 INJECTION, SOLUTION INTRAVENOUS at 01:06

## 2018-06-29 RX ADMIN — FENTANYL CITRATE 100 MCG: 50 INJECTION, SOLUTION INTRAMUSCULAR; INTRAVENOUS at 01:06

## 2018-06-29 RX ADMIN — DOXYCYCLINE HYCLATE 200 MG: 100 TABLET, COATED ORAL at 04:06

## 2018-06-29 RX ADMIN — LIDOCAINE HYDROCHLORIDE 100 MG: 20 INJECTION, SOLUTION INTRAVENOUS at 01:06

## 2018-06-29 RX ADMIN — KETOROLAC TROMETHAMINE 30 MG: 30 INJECTION, SOLUTION INTRAMUSCULAR; INTRAVENOUS at 02:06

## 2018-06-29 RX ADMIN — CARBOXYMETHYLCELLULOSE SODIUM 2 DROP: 2.5 SOLUTION/ DROPS OPHTHALMIC at 01:06

## 2018-06-29 RX ADMIN — DOXYCYCLINE 100 MG: 100 INJECTION, POWDER, LYOPHILIZED, FOR SOLUTION INTRAVENOUS at 02:06

## 2018-06-29 RX ADMIN — ROCURONIUM BROMIDE 20 MG: 10 INJECTION, SOLUTION INTRAVENOUS at 01:06

## 2018-06-29 RX ADMIN — DEXAMETHASONE SODIUM PHOSPHATE 4 MG: 4 INJECTION, SOLUTION INTRAMUSCULAR; INTRAVENOUS at 02:06

## 2018-06-29 RX ADMIN — ONDANSETRON 4 MG: 2 INJECTION INTRAMUSCULAR; INTRAVENOUS at 02:06

## 2018-06-29 RX ADMIN — NEOSTIGMINE METHYLSULFATE 3 MG: 1 INJECTION INTRAVENOUS at 02:06

## 2018-06-29 RX ADMIN — SUCCINYLCHOLINE CHLORIDE 120 MG: 20 INJECTION, SOLUTION INTRAMUSCULAR; INTRAVENOUS at 01:06

## 2018-06-29 RX ADMIN — ACETAMINOPHEN 1000 MG: 10 INJECTION, SOLUTION INTRAVENOUS at 02:06

## 2018-06-29 RX ADMIN — GLYCOPYRROLATE 0.2 MG: 0.2 INJECTION, SOLUTION INTRAMUSCULAR; INTRAVENOUS at 02:06

## 2018-06-29 RX ADMIN — MISOPROSTOL 800 MCG: 200 TABLET ORAL at 02:06

## 2018-06-29 NOTE — OP NOTE
Operative Report    Procedure: Suction Dilation and Curettage    Date of Surgery 2018    Surgeon: Dr. Katrin Garcia MD    Assistant: Obdulio Longoria MD PGY1    Pre-Op Diagnosis: missed     Post-op Diagnosis:  same    EBL: 650 cc     IVF: 700 cc    Urine Output: 200 cc     Specimen: products of conception    Findings:   1. #9 Suction tip used to remove products of conception  2. Silver nitrate used for hemostasis at tenaculum site  3. 800 mcg rectal cytotec given    Procedure in Detail:  After receiving Doxycycline 100mg PO once in the pre-operative area, the patient was taken to the OR where general anesthesia was administered and found to be adequate.  She was placed in the dorsal lithotomy position and prepped and draped in the normal sterile fashion.  The weighted speculum was inserted into the posterior vagina, and the anterior lip of the cervix was grasped with the single tooth tenaculum.  The cervix was dilated with the Hegar dilators until the 9 mm suction catheter was able to be inserted into the cervix without difficulty.  The uterine cavity was suctioned with multiple passes of the suction catheter, and products of conception were obtained.  Gentle curettage was then performed to remove any remaining products of conception until the four quadrants of the uterine cavity were left with a gritty texture.  One final pass of the suction curette was made to ensure that all products had been removed.  The single tooth tenaculum was removed from the anterior lip of the cervix. Silver nitrate was used at the puncture sites with good resulting hemostasis. The weighted speculum was removed from the vagina, and 800 mcg rectal cytotec was given to the patient.  Sponge, lap, and instrument count were correct times two.  The products of conception were sent to pathology for evaluation. The patient tolerated the procedure well.  She was transferred to the PACU in stable condition.      Obdulio Longoria MD  PGY1,  Obstetrics & Gynecology  [facility]

## 2018-06-29 NOTE — INTERVAL H&P NOTE
The patient has been examined and the H&P has been reviewed:    I concur with the findings and no changes have occurred since H&P was written.    Surgery risks, benefits and alternative options discussed and understood by patient/family.          Active Hospital Problems    Diagnosis  POA    Missed  [O02.1]  Yes      Resolved Hospital Problems    Diagnosis Date Resolved POA   No resolved problems to display.

## 2018-06-29 NOTE — DISCHARGE SUMMARY
[facility]  Brief Op Note/Discharge Note  Short Stay    Admit Date: 2018    Discharge Date: 2018    Attending Physician: Katrin Garcia MD     Surgery Date: 2018     Surgeon(s) and Role:     * Katrin Garcia MD - Primary    Assisting Surgeon: Obdulio Longoria MD PGY1    Pre-op Diagnosis:  Missed  [O02.1]    Post-op Diagnosis:  Post-Op Diagnosis Codes:     * Missed  [O02.1]    Procedure(s) (LRB):  DILATION AND CURETTAGE, UTERUS, USING SUCTION (N/A)    Anesthesia: General    Findings/Key Components:   1. #9 Suction tip used to remove products of conception  2. Silver nitrate used for hemostasis at tenaculum site    Estimated Blood Loss: 650 mL         Specimens:   Specimen (12h ago through future)    Start     Ordered    18 1410  Specimen to Pathology - Surgery  Once     Comments:  1.  Products of conception      18 1422          Discharge Provider: Obdulio Longoria    Diagnoses:  Active Hospital Problems    Diagnosis  POA    *Missed  [O02.1]  Yes    S/P dilation and curettage [Z98.890]  Not Applicable      Resolved Hospital Problems    Diagnosis Date Resolved POA   No resolved problems to display.       Discharged Condition: good    Hospital Course:   Patient was admitted for outpatient procedure as above, and tolerated the procedure well with no complications. Please see operative report for further details. Following the procedure, the patient was awakened from anesthesia and transferred to the recovery area in stable condition. She was discharged to home once ambulating, voiding, tolerating PO intake, and pain was well-controlled. Patient was given routine post-op instructions and prescriptions for pain medication to take as needed. Patient instructed to follow up with Dr. Garcia in 6 weeks.    Final Diagnoses: Same as principal problem.    Disposition: Home or Self Care    Follow up/Patient Instructions:    Medications:  Reconciled Home  Medications:      Medication List      START taking these medications    HYDROcodone-acetaminophen 5-325 mg per tablet  Commonly known as:  NORCO  Take 1 tablet by mouth every 4 (four) hours as needed for Pain.     ibuprofen 600 MG tablet  Commonly known as:  ADVIL,MOTRIN  Take 1 tablet (600 mg total) by mouth every 6 (six) hours as needed for Pain.        CONTINUE taking these medications    butalbital-acetaminophen-caff -40 mg Cap  TK 1 C PO BID PRF SEVERE HA     DICLEGIS 10-10 mg Tbec  Generic drug:  doxylamine-pyridoxine (vit B6)  Take 1 tablet by mouth 2 (two) times daily.     naproxen 500 MG tablet  Commonly known as:  NAPROSYN     ondansetron 4 MG Tbdl  Commonly known as:  ZOFRAN-ODT  DISSOLVE ONE TABLET BY MOUTH EVERY 6 HOURS AS NEEDED FOR NAUSEA AND VOMITTING     PROAIR HFA 90 mcg/actuation inhaler  Generic drug:  albuterol  USE 2 PUFFS PO Q 6 H     promethazine 6.25 mg/5 mL syrup  Commonly known as:  PHENERGAN  TAKE 5 ML BY MOUTH EVERY 6 HOURS AS NEEDED FOR NAUSEA AND VOMITTING            Discharge Procedure Orders  Diet general     Other restrictions (specify):   Order Comments: Nothing in the vagina until your postoperative visit     Call MD for:  temperature >100.4     Call MD for:  persistent nausea and vomiting     Call MD for:  severe uncontrolled pain     Call MD for:  difficulty breathing, headache or visual disturbances     Call MD for:  hives     Call MD for:  persistent dizziness or light-headedness     Call MD for:  extreme fatigue     Call MD for:   Order Comments: Heavy vaginal bleeding (> 1 pad/hr)     Activity as tolerated       Follow-up Information     Katrin Garcia MD. Schedule an appointment as soon as possible for a visit in 6 weeks.    Specialties:  Obstetrics and Gynecology, Obstetrics  Why:  Post Op Check  Contact information:  [address] [id number] [phone]                   Obdulio Longoria MD  PGY1, OBGYN  [facility]

## 2018-06-29 NOTE — ANESTHESIA POSTPROCEDURE EVALUATION
"Anesthesia Post Evaluation    Patient: Mirtha Gary    Procedure(s) Performed: Procedure(s) (LRB):  DILATION AND CURETTAGE, UTERUS, USING SUCTION (N/A)    Final Anesthesia Type: general  Patient location during evaluation: PACU  Patient participation: Yes- Able to Participate  Level of consciousness: awake and alert  Post-procedure vital signs: reviewed and stable  Pain management: adequate  Airway patency: patent  PONV status at discharge: No PONV  Anesthetic complications: no      Cardiovascular status: blood pressure returned to baseline  Respiratory status: unassisted and spontaneous ventilation  Hydration status: euvolemic  Follow-up not needed.        Visit Vitals  BP (!) 101/56   Pulse 79   Temp 36.8 °C (98.3 °F) (Oral)   Resp 16   Ht 5' 7" (1.702 m)   Wt 65.8 kg (145 lb)   LMP 04/06/2018   SpO2 97%   Breastfeeding? No   BMI 22.71 kg/m²       Pain/Nathen Score: Pain Assessment Performed: Yes (6/29/2018  3:10 PM)  Presence of Pain: denies (6/29/2018  3:10 PM)  Nathen Score: 10 (6/29/2018  3:10 PM)      "

## 2018-06-29 NOTE — PLAN OF CARE
Mirtha Gary has met all discharge criteria from Phase II. Vital Signs are stable, ambulating  without difficulty. Discharge instructions given, patient verbalized understanding. Discharged from facility via wheelchair in stable condition.

## 2018-06-29 NOTE — DISCHARGE INSTRUCTIONS
Home Care Instruction D&C Hysteroscopy             ACTIVITY LEVEL:  If you received sedation and/or an anesthetic, you may feel sleepy for several hours. Rest until you feel more  awake. Gradually resume your normal activities.    DIET:  At home, continue with liquids. If there is no nausea, you may eat a soft diet and gradually resume a regular diet.    BATHING:  You may shower  as desired in one day.  You should avoid tub baths, hot tubs and swimming pools until seen by your physician for a post-op follow up.    CARE:  You can expect watery or bloody vaginal discharge for several days. Do not use tampons, please only use pads. Sexual activity is restricted as advised by your doctor.    MEDICATIONS:  You will receive instructions for any pain and/or antibiotic prescriptions. Pain medication should be taken only if needed and as directed. Antibiotics, if ordered, should be taken as directed until the entire prescription is completed.    ADDITIONAL INFORMATION:  __________________________________________________________________________________________  WHEN TO CALL THE DOCTOR:   For any heavy vaginal bleeding   Fever over 101°F (38.4°C)   Any lower abdominal pain not relieved by the pain mediation  RETURN APPOINTMENT:  __________________________________________________________________________________________  FOR EMERGENCIES:  If any unusual problems or difficulties occur, contact Dr. Thakur_______________ or the resident at [phone] (page ) or the Clinic office, [phone].          Anesthesia: After Your Surgery  Youve just had surgery. During surgery, you received medication called anesthesia to keep you comfortable and pain-free. After surgery, you may experience some pain or nausea. This is common. Here are some tips for feeling better and recovering after surgery.    Going home  Your doctor or nurse will show you how to take care of yourself when you go home. He or she will also answer  your questions. Have an adult family member or friend drive you home. For the first 24 hours after your surgery:  · Do not drive or use heavy equipment.  · Do not make important decisions or sign legal documents.  · Avoid alcohol.  · Have someone stay with you, if needed. He or she can watch for problems and help keep you safe.  Be sure to keep all follow-up appointments with your doctor. And rest after your procedure for as long as your doctor tells you to.    Coping with pain  If you have pain after surgery, pain medication will help you feel better. Take it as directed, before pain becomes severe. Also, ask your doctor or pharmacist about other ways to control pain, such as with heat, ice, and relaxation. And follow any other instructions your surgeon or nurse gives you.    Tips for taking pain medication  To get the best relief possible, remember these points:  · Pain medications can upset your stomach. Taking them with a little food may help.  · Most pain relievers taken by mouth need at least 20 to 30 minutes to take effect.  · Taking medication on a schedule can help you remember to take it. Try to time your medication so that you can take it before beginning an activity, such as dressing, walking, or sitting down for dinner.  · Constipation is a common side effect of pain medications. Contact your doctor before taking any medications like laxatives or stool softeners to help relieve constipation. Also ask about any dietary restrictions, because drinking lots of fluids and eating foods like fruits and vegetables that are high in fiber can also help. Remember, dont take laxatives unless your surgeon has prescribed them.  · Mixing alcohol and pain medication can cause dizziness and slow your breathing. It can even be fatal. Dont drink alcohol while taking pain medication.  · Pain medication can slow your reflexes. Dont drive or operate machinery while taking pain medication.  If your health care  provider tells you to take acetaminophen to help relieve your pain, ask him or her how much you are supposed to take each day. (Acetaminophen is the generic name for Tylenol and other brand-name pain relievers.) Acetaminophen or other pain relievers may interact with your prescription medicines or other over-the-counter (OTC) drugs. Some prescription medications contain acetaminophen along with other active ingredients. Using both prescription and OTC acetaminophen for pain can cause you to overdose. The FDA recommends that you read the labels on your OTC medications carefully. This will help you to clearly understand the list of active ingredients, dosing instructions, and any warnings. It may also help you avoid taking too much acetaminophen. If you have questions or don't understand the information, ask your pharmacist or health care provider to explain it to you before you take the OTC medication.    Managing nausea  Some people have an upset stomach after surgery. This is often due to anesthesia, pain, pain medications, or the stress of surgery. The following tips will help you manage nausea and get good nutrition as you recover. If you were on a special diet before surgery, ask your doctor if you should follow it during recovery. These tips may help:  · Dont push yourself to eat. Your body will tell you when to eat and how much.  · Start off with clear liquids and soup. They are easier to digest.  · Progress to semi-solid foods (mashed potatoes, applesauce, and gelatin) as you feel ready.  · Slowly move to solid foods. Dont eat fatty, rich, or spicy foods at first.  · Dont force yourself to have three large meals a day. Instead, eat smaller amounts more often.  · Take pain medications with a small amount of solid food, such as crackers or toast to avoid nausea.      Call your surgeon if  · You still have pain an hour after taking medication (it may not be strong enough).  · You feel too sleepy, dizzy, or  groggy (medication may be too strong).  · You have side effects like nausea, vomiting, or skin changes (rash, itching, or hives).   © 3885-3879 [employer]. [address] [place] [address], [place] [address]. All rights reserved. This information is not intended as a substitute for professional medical care. Always follow your healthcare professional's instructions.                PLEASE FOLLOW ALL INSTRUCTIONS AS PER DR. MATHEW !!

## 2018-06-29 NOTE — ANESTHESIA PREPROCEDURE EVALUATION
06/29/2018  Mirtha Gary is a 42 y.o., female.    Anesthesia Evaluation    I have reviewed the Patient Summary Reports.    I have reviewed the Nursing Notes.   I have reviewed the Medications.     Review of Systems  Anesthesia Hx:  Denies Family Hx of Anesthesia complications.   Denies Personal Hx of Anesthesia complications.   Social:  Non-Smoker    Cardiovascular:  Cardiovascular Normal Exercise tolerance: good     Pulmonary:  Pulmonary Normal    Renal/:  Renal/ Normal     Hepatic/GI:  Hepatic/GI Normal    Neurological:   Headaches    Endocrine:  Endocrine Normal        Physical Exam  General:  Well nourished    Airway/Jaw/Neck:  Airway Findings: Mouth Opening: Normal Tongue: Normal  General Airway Assessment: Adult  Mallampati: II  TM Distance: Normal, at least 6 cm      Dental:  Dental Findings: In tact        Mental Status:  Mental Status Findings:  Alert and Oriented, Cooperative         Anesthesia Plan  Type of Anesthesia, risks & benefits discussed:  Anesthesia Type:  general  Patient's Preference:   Intra-op Monitoring Plan: standard ASA monitors  Intra-op Monitoring Plan Comments:   Post Op Pain Control Plan: multimodal analgesia  Post Op Pain Control Plan Comments:   Induction:   IV  Beta Blocker:         Informed Consent: Patient understands risks and agrees with Anesthesia plan.  Questions answered. Anesthesia consent signed with patient.  ASA Score: 2  emergent   Day of Surgery Review of History & Physical:    H&P update referred to the surgeon.         Ready For Surgery From Anesthesia Perspective.

## 2018-07-13 ENCOUNTER — PATIENT MESSAGE (OUTPATIENT)
Dept: OBSTETRICS AND GYNECOLOGY | Facility: CLINIC | Age: 43
End: 2018-07-13

## 2018-07-24 ENCOUNTER — LAB VISIT (OUTPATIENT)
Dept: LAB | Facility: HOSPITAL | Age: 43
End: 2018-07-24
Attending: OBSTETRICS & GYNECOLOGY
Payer: COMMERCIAL

## 2018-07-24 ENCOUNTER — PATIENT MESSAGE (OUTPATIENT)
Dept: OBSTETRICS AND GYNECOLOGY | Facility: CLINIC | Age: 43
End: 2018-07-24

## 2018-07-24 DIAGNOSIS — N30.01 ACUTE CYSTITIS WITH HEMATURIA: ICD-10-CM

## 2018-07-24 DIAGNOSIS — N30.01 ACUTE CYSTITIS WITH HEMATURIA: Primary | ICD-10-CM

## 2018-07-24 LAB
BASOPHILS # BLD AUTO: 0.03 K/UL
BASOPHILS NFR BLD: 0.2 %
DIFFERENTIAL METHOD: ABNORMAL
EOSINOPHIL # BLD AUTO: 0.1 K/UL
EOSINOPHIL NFR BLD: 0.4 %
ERYTHROCYTE [DISTWIDTH] IN BLOOD BY AUTOMATED COUNT: 12.1 %
HCT VFR BLD AUTO: 35.8 %
HGB BLD-MCNC: 12.2 G/DL
LYMPHOCYTES # BLD AUTO: 2.1 K/UL
LYMPHOCYTES NFR BLD: 15.4 %
MCH RBC QN AUTO: 31.4 PG
MCHC RBC AUTO-ENTMCNC: 34.1 G/DL
MCV RBC AUTO: 92 FL
MONOCYTES # BLD AUTO: 1 K/UL
MONOCYTES NFR BLD: 6.9 %
NEUTROPHILS # BLD AUTO: 10.6 K/UL
NEUTROPHILS NFR BLD: 76.8 %
NRBC BLD-RTO: 0 /100 WBC
PLATELET # BLD AUTO: 244 K/UL
PMV BLD AUTO: 11.4 FL
RBC # BLD AUTO: 3.89 M/UL
WBC # BLD AUTO: 13.83 K/UL

## 2018-07-24 PROCEDURE — 36415 COLL VENOUS BLD VENIPUNCTURE: CPT

## 2018-07-24 PROCEDURE — 85025 COMPLETE CBC W/AUTO DIFF WBC: CPT

## 2018-07-24 RX ORDER — PHENAZOPYRIDINE HYDROCHLORIDE 200 MG/1
200 TABLET, FILM COATED ORAL 3 TIMES DAILY PRN
Qty: 20 TABLET | Refills: 0 | Status: SHIPPED | OUTPATIENT
Start: 2018-07-24 | End: 2019-07-24

## 2018-07-24 RX ORDER — HYDROCODONE BITARTRATE AND ACETAMINOPHEN 5; 325 MG/1; MG/1
1 TABLET ORAL EVERY 6 HOURS PRN
Qty: 10 TABLET | Refills: 0 | Status: SHIPPED | OUTPATIENT
Start: 2018-07-24 | End: 2018-11-19

## 2018-07-25 DIAGNOSIS — N12 PYELONEPHRITIS: Primary | ICD-10-CM

## 2018-09-17 DIAGNOSIS — N12 RECURRENT PYELONEPHRITIS: Primary | ICD-10-CM

## 2018-09-21 ENCOUNTER — HOSPITAL ENCOUNTER (OUTPATIENT)
Dept: RADIOLOGY | Facility: OTHER | Age: 43
Discharge: HOME | End: 2018-09-21
Attending: OBSTETRICS & GYNECOLOGY
Payer: COMMERCIAL

## 2018-09-21 DIAGNOSIS — N12 RECURRENT PYELONEPHRITIS: ICD-10-CM

## 2018-09-21 PROCEDURE — 76770 US EXAM ABDO BACK WALL COMP: CPT | Mod: 26,,, | Performed by: INTERNAL MEDICINE

## 2018-09-21 PROCEDURE — 76770 US EXAM ABDO BACK WALL COMP: CPT | Mod: TC

## 2018-10-29 NOTE — TRANSFER OF CARE
"Anesthesia Transfer of Care Note    Patient: Mirtha Gary    Procedure(s) Performed: Procedure(s) (LRB):  DILATION AND CURETTAGE, UTERUS, USING SUCTION (N/A)    Patient location: PACU    Anesthesia Type: general    Transport from OR: Transported from OR on room air with adequate spontaneous ventilation    Post pain: adequate analgesia    Post assessment: no apparent anesthetic complications    Post vital signs: stable    Level of consciousness: awake    Nausea/Vomiting: no nausea/vomiting    Complications: none    Transfer of care protocol was followed      Last vitals:   Visit Vitals  /71 (BP Location: Left arm, Patient Position: Lying)   Pulse 105   Temp 36.8 °C (98.2 °F) (Oral)   Resp 16   Ht 5' 7" (1.702 m)   Wt 65.8 kg (145 lb)   LMP 04/06/2018   SpO2 100%   Breastfeeding? No   BMI 22.71 kg/m²     " [Nocturia] : nocturia [FreeTextEntry1] : 65 yo with elevated PSA and lower urinary tract symptoms - mostly nocturia\par patient had tried flomax in the past without improvement\par \par renal and bladder US 10/9/2018\par unremarkable renal and bladder ultrasound\par prostate 78 grams\par median lobe hypertrophy\par prevoid 527 with 45 cc\par \par 4k score of 8/10/2018\par PSA 2.45 ng/ml\par \par \par

## 2018-11-15 ENCOUNTER — TELEPHONE (OUTPATIENT)
Dept: ADMINISTRATIVE | Facility: CLINIC | Age: 43
End: 2018-11-15

## 2018-11-15 ENCOUNTER — DOCUMENTATION ONLY (OUTPATIENT)
Dept: ADMINISTRATIVE | Facility: CLINIC | Age: 43
End: 2018-11-15

## 2018-11-15 ENCOUNTER — HOSPITAL ENCOUNTER (OUTPATIENT)
Dept: RADIOLOGY | Facility: OTHER | Age: 43
Discharge: HOME | End: 2018-11-15
Attending: PSYCHIATRY & NEUROLOGY
Payer: COMMERCIAL

## 2018-11-15 DIAGNOSIS — S69.92XA HAND TRAUMA, LEFT, INITIAL ENCOUNTER: ICD-10-CM

## 2018-11-15 DIAGNOSIS — S69.92XA HAND TRAUMA, LEFT, INITIAL ENCOUNTER: Primary | ICD-10-CM

## 2018-11-15 PROCEDURE — 73130 X-RAY EXAM OF HAND: CPT | Mod: 26,LT,, | Performed by: RADIOLOGY

## 2018-11-15 PROCEDURE — 73130 X-RAY EXAM OF HAND: CPT | Mod: TC,FY,LT

## 2018-11-19 ENCOUNTER — OFFICE VISIT (OUTPATIENT)
Dept: ORTHOPEDICS | Facility: CLINIC | Age: 43
End: 2018-11-19
Payer: COMMERCIAL

## 2018-11-19 ENCOUNTER — LAB VISIT (OUTPATIENT)
Dept: LAB | Facility: OTHER | Age: 43
End: 2018-11-19
Payer: COMMERCIAL

## 2018-11-19 VITALS
DIASTOLIC BLOOD PRESSURE: 81 MMHG | SYSTOLIC BLOOD PRESSURE: 122 MMHG | HEIGHT: 67 IN | BODY MASS INDEX: 22.76 KG/M2 | WEIGHT: 145 LBS | HEART RATE: 75 BPM

## 2018-11-19 DIAGNOSIS — M79.642 PAIN OF LEFT HAND: Primary | ICD-10-CM

## 2018-11-19 DIAGNOSIS — M79.642 PAIN OF LEFT HAND: ICD-10-CM

## 2018-11-19 LAB
ALBUMIN SERPL BCP-MCNC: 4 G/DL
ALP SERPL-CCNC: 39 U/L
ALT SERPL W/O P-5'-P-CCNC: 12 U/L
ANION GAP SERPL CALC-SCNC: 10 MMOL/L
AST SERPL-CCNC: 14 U/L
BASOPHILS # BLD AUTO: 0.01 K/UL
BASOPHILS NFR BLD: 0.2 %
BILIRUB SERPL-MCNC: 0.5 MG/DL
BUN SERPL-MCNC: 12 MG/DL
CALCIUM SERPL-MCNC: 9.4 MG/DL
CHLORIDE SERPL-SCNC: 108 MMOL/L
CO2 SERPL-SCNC: 21 MMOL/L
CREAT SERPL-MCNC: 0.7 MG/DL
CRP SERPL-MCNC: 1 MG/L
DIFFERENTIAL METHOD: NORMAL
EOSINOPHIL # BLD AUTO: 0.1 K/UL
EOSINOPHIL NFR BLD: 1.2 %
ERYTHROCYTE [DISTWIDTH] IN BLOOD BY AUTOMATED COUNT: 13.1 %
ERYTHROCYTE [SEDIMENTATION RATE] IN BLOOD: 20 MM/HR
EST. GFR  (AFRICAN AMERICAN): >60 ML/MIN/1.73 M^2
EST. GFR  (NON AFRICAN AMERICAN): >60 ML/MIN/1.73 M^2
GLUCOSE SERPL-MCNC: 91 MG/DL
HCT VFR BLD AUTO: 40.8 %
HGB BLD-MCNC: 13.5 G/DL
LYMPHOCYTES # BLD AUTO: 1.6 K/UL
LYMPHOCYTES NFR BLD: 26.7 %
MCH RBC QN AUTO: 29.3 PG
MCHC RBC AUTO-ENTMCNC: 33.1 G/DL
MCV RBC AUTO: 89 FL
MONOCYTES # BLD AUTO: 0.4 K/UL
MONOCYTES NFR BLD: 6.4 %
NEUTROPHILS # BLD AUTO: 3.8 K/UL
NEUTROPHILS NFR BLD: 65.3 %
PLATELET # BLD AUTO: 251 K/UL
PMV BLD AUTO: 11 FL
POTASSIUM SERPL-SCNC: 3.9 MMOL/L
PROT SERPL-MCNC: 7.4 G/DL
RBC # BLD AUTO: 4.6 M/UL
SODIUM SERPL-SCNC: 139 MMOL/L
WBC # BLD AUTO: 5.8 K/UL

## 2018-11-19 PROCEDURE — 86140 C-REACTIVE PROTEIN: CPT

## 2018-11-19 PROCEDURE — 99999 PR PBB SHADOW E&M-EST. PATIENT-LVL III: CPT | Mod: PBBFAC,,, | Performed by: PHYSICIAN ASSISTANT

## 2018-11-19 PROCEDURE — 36415 COLL VENOUS BLD VENIPUNCTURE: CPT

## 2018-11-19 PROCEDURE — 3008F BODY MASS INDEX DOCD: CPT | Mod: CPTII,S$GLB,, | Performed by: PHYSICIAN ASSISTANT

## 2018-11-19 PROCEDURE — 99204 OFFICE O/P NEW MOD 45 MIN: CPT | Mod: S$GLB,,, | Performed by: PHYSICIAN ASSISTANT

## 2018-11-19 PROCEDURE — 85025 COMPLETE CBC W/AUTO DIFF WBC: CPT

## 2018-11-19 PROCEDURE — 85651 RBC SED RATE NONAUTOMATED: CPT

## 2018-11-19 PROCEDURE — 80053 COMPREHEN METABOLIC PANEL: CPT

## 2018-11-19 NOTE — H&P (VIEW-ONLY)
Subjective:      Patient ID: Mirtha Gary is a 43 y.o. female.    Chief Complaint: Pain of the Left Hand      HPI  Mirtha Gary is a 43 y.o. female presenting today for pain and swelling of the left hand. Symptoms began about four days ago. At onset, she noted a small nodule in the area of the left index A1 pulley. She later noted pain and edema in the area. She has limited full flexion of the index finger secondary to edema. Pt also notes decreased sensation of the finger. Her  is a psychiatrist here at [facility]. He was concerned for infection and placed pt on Clindamycin. Symptoms were not improving so they changed abx, she is currently on Doxycyline and Augmentin x 1-2 days. She states symptoms have remained about the same. She denies locking or triggering of the finger. Denies puncture wound or injury.       Review of patient's allergies indicates:  No Known Allergies      Current Outpatient Medications   Medication Sig Dispense Refill    butalbital-acetaminophen-caff -40 mg Cap TK 1 C PO BID PRF SEVERE HA  0    ibuprofen (ADVIL,MOTRIN) 600 MG tablet Take 1 tablet (600 mg total) by mouth every 6 (six) hours as needed for Pain. 30 tablet 1    naproxen (NAPROSYN) 500 MG tablet       ondansetron (ZOFRAN-ODT) 4 MG TbDL DISSOLVE ONE TABLET BY MOUTH EVERY 6 HOURS AS NEEDED FOR NAUSEA AND VOMITTING  4    phenazopyridine (PYRIDIUM) 200 MG tablet Take 1 tablet (200 mg total) by mouth 3 (three) times daily as needed for Pain. 20 tablet 0    promethazine (PHENERGAN) 6.25 mg/5 mL syrup TAKE 5 ML BY MOUTH EVERY 6 HOURS AS NEEDED FOR NAUSEA AND VOMITTING  3     No current facility-administered medications for this visit.        Past Medical History:   Diagnosis Date    Endometriosis        Past Surgical History:   Procedure Laterality Date    COSMETIC SURGERY      breast implants    DILATION AND CURETTAGE OF UTERUS USING SUCTION N/A 6/29/2018    Procedure: DILATION AND CURETTAGE, UTERUS, USING  "SUCTION;  Surgeon: Katrin Garcia MD;  Location: [facility] OR;  Service: OB/GYN;  Laterality: N/A;    DILATION AND CURETTAGE, UTERUS, USING SUCTION N/A 6/29/2018    Performed by Katrin Garcia MD at [facility] OR    ECTOPIC PREGNANCY SURGERY      INJECTION-STEROID-EPIDURAL-TRANSFORAMINAL Left 4/13/2018    Performed by Kiera Biggs MD at [facility] PAIN MGT    INJECTION-STEROID-EPIDURAL-TRANSFORAMINAL Left 12/6/2016    Performed by Kiera Biggs MD at [facility] PAIN MGT       Review of Systems:  Constitutional: Negative for chills and fever.   Respiratory: Negative for cough and shortness of breath.    Gastrointestinal: Negative for nausea and vomiting.   Skin: Negative for rash.   Neurological: Negative for dizziness and headaches.   Psychiatric/Behavioral: Negative for depression.   MSK as in HPI       OBJECTIVE:     PHYSICAL EXAM:  /81 (BP Location: Left arm, Patient Position: Sitting, BP Method: Small (Automatic))   Pulse 75   Ht 5' 7" (1.702 m)   Wt 65.8 kg (145 lb)   LMP 04/06/2018   BMI 22.71 kg/m²     GEN:  NAD, well-developed, well-groomed.  NEURO: Awake, alert, and oriented. Normal attention and concentration.    PSYCH: Normal mood and affect. Behavior is normal.  HEENT: No cervical lymphadenopathy noted.  CARDIOVASCULAR: Radial pulses 2+ bilaterally. No LE edema noted.  PULMONARY: Breath sounds normal. No respiratory distress.  SKIN: Intact, no rashes.      MSK:   LUE:  Good active ROM of the wrist and fingers, there is limited active flexion at the index MCP, good DIP and PIP motion. Fair passive ROM. She is very ttp over the A1 pulley of the index. There is edema present. There is no significant warmth or erythema noted. No triggering on exam. AIN/PIN/Radial/Median/Ulnar Nerves assessed in isolation without deficit. Radial & Ulnar arteries palpated 2+. Capillary Refill <3s.      RADIOGRAPHS:  Xray left hand 11/15/18  FINDINGS:  The bones appear intact.  There is no evidence for acute fracture " or bone destruction.  There is no evidence for dislocation.  No bony erosions are identified.  Soft tissues are unremarkable.  Comments: I have personally reviewed the imaging and I agree with the above radiologist's report.    ASSESSMENT/PLAN:       ICD-10-CM ICD-9-CM   1. Pain of left hand M79.642 729.5       Orders Placed This Encounter    MRI Hand Fingers Without Contrast Left    Sedimentation rate    C-reactive protein    Comprehensive metabolic panel    CBC auto differential     Discussed with pt and  likely slightly atypical presentation of acute onset trigger finger. However, we will obtain further imaging and labs to rule out infectious etiology. If negative, can discuss injection vs trigger finger release.      Plan:   -labs today   -MRI   -RTC after above          The patient indicates understanding of these issues and agrees to the plan.    Shaista Sweeney PA-C  [facility]   [facility] [place]

## 2018-11-19 NOTE — PROGRESS NOTES
Subjective:      Patient ID: Mirtha Gary is a 43 y.o. female.    Chief Complaint: Pain of the Left Hand      HPI  Mirtha Gary is a 43 y.o. female presenting today for pain and swelling of the left hand. Symptoms began about four days ago. At onset, she noted a small nodule in the area of the left index A1 pulley. She later noted pain and edema in the area. She has limited full flexion of the index finger secondary to edema. Pt also notes decreased sensation of the finger. Her  is a psychiatrist here at [facility]. He was concerned for infection and placed pt on Clindamycin. Symptoms were not improving so they changed abx, she is currently on Doxycyline and Augmentin x 1-2 days. She states symptoms have remained about the same. She denies locking or triggering of the finger. Denies puncture wound or injury.       Review of patient's allergies indicates:  No Known Allergies      Current Outpatient Medications   Medication Sig Dispense Refill    butalbital-acetaminophen-caff -40 mg Cap TK 1 C PO BID PRF SEVERE HA  0    ibuprofen (ADVIL,MOTRIN) 600 MG tablet Take 1 tablet (600 mg total) by mouth every 6 (six) hours as needed for Pain. 30 tablet 1    naproxen (NAPROSYN) 500 MG tablet       ondansetron (ZOFRAN-ODT) 4 MG TbDL DISSOLVE ONE TABLET BY MOUTH EVERY 6 HOURS AS NEEDED FOR NAUSEA AND VOMITTING  4    phenazopyridine (PYRIDIUM) 200 MG tablet Take 1 tablet (200 mg total) by mouth 3 (three) times daily as needed for Pain. 20 tablet 0    promethazine (PHENERGAN) 6.25 mg/5 mL syrup TAKE 5 ML BY MOUTH EVERY 6 HOURS AS NEEDED FOR NAUSEA AND VOMITTING  3     No current facility-administered medications for this visit.        Past Medical History:   Diagnosis Date    Endometriosis        Past Surgical History:   Procedure Laterality Date    COSMETIC SURGERY      breast implants    DILATION AND CURETTAGE OF UTERUS USING SUCTION N/A 6/29/2018    Procedure: DILATION AND CURETTAGE, UTERUS, USING  "SUCTION;  Surgeon: Katrin Garcia MD;  Location: [facility] OR;  Service: OB/GYN;  Laterality: N/A;    DILATION AND CURETTAGE, UTERUS, USING SUCTION N/A 6/29/2018    Performed by Katrin Garcia MD at [facility] OR    ECTOPIC PREGNANCY SURGERY      INJECTION-STEROID-EPIDURAL-TRANSFORAMINAL Left 4/13/2018    Performed by Kiera Biggs MD at [facility] PAIN MGT    INJECTION-STEROID-EPIDURAL-TRANSFORAMINAL Left 12/6/2016    Performed by Kiera Biggs MD at [facility] PAIN MGT       Review of Systems:  Constitutional: Negative for chills and fever.   Respiratory: Negative for cough and shortness of breath.    Gastrointestinal: Negative for nausea and vomiting.   Skin: Negative for rash.   Neurological: Negative for dizziness and headaches.   Psychiatric/Behavioral: Negative for depression.   MSK as in HPI       OBJECTIVE:     PHYSICAL EXAM:  /81 (BP Location: Left arm, Patient Position: Sitting, BP Method: Small (Automatic))   Pulse 75   Ht 5' 7" (1.702 m)   Wt 65.8 kg (145 lb)   LMP 04/06/2018   BMI 22.71 kg/m²     GEN:  NAD, well-developed, well-groomed.  NEURO: Awake, alert, and oriented. Normal attention and concentration.    PSYCH: Normal mood and affect. Behavior is normal.  HEENT: No cervical lymphadenopathy noted.  CARDIOVASCULAR: Radial pulses 2+ bilaterally. No LE edema noted.  PULMONARY: Breath sounds normal. No respiratory distress.  SKIN: Intact, no rashes.      MSK:   LUE:  Good active ROM of the wrist and fingers, there is limited active flexion at the index MCP, good DIP and PIP motion. Fair passive ROM. She is very ttp over the A1 pulley of the index. There is edema present. There is no significant warmth or erythema noted. No triggering on exam. AIN/PIN/Radial/Median/Ulnar Nerves assessed in isolation without deficit. Radial & Ulnar arteries palpated 2+. Capillary Refill <3s.      RADIOGRAPHS:  Xray left hand 11/15/18  FINDINGS:  The bones appear intact.  There is no evidence for acute fracture " or bone destruction.  There is no evidence for dislocation.  No bony erosions are identified.  Soft tissues are unremarkable.  Comments: I have personally reviewed the imaging and I agree with the above radiologist's report.    ASSESSMENT/PLAN:       ICD-10-CM ICD-9-CM   1. Pain of left hand M79.642 729.5       Orders Placed This Encounter    MRI Hand Fingers Without Contrast Left    Sedimentation rate    C-reactive protein    Comprehensive metabolic panel    CBC auto differential     Discussed with pt and  likely slightly atypical presentation of acute onset trigger finger. However, we will obtain further imaging and labs to rule out infectious etiology. If negative, can discuss injection vs trigger finger release.      Plan:   -labs today   -MRI   -RTC after above          The patient indicates understanding of these issues and agrees to the plan.    Shaista Sweeney PA-C  [facility]   [facility] [place]

## 2018-11-21 ENCOUNTER — HOSPITAL ENCOUNTER (OUTPATIENT)
Dept: RADIOLOGY | Facility: HOSPITAL | Age: 43
Discharge: HOME | End: 2018-11-21
Attending: PHYSICIAN ASSISTANT
Payer: COMMERCIAL

## 2018-11-21 DIAGNOSIS — M79.642 PAIN OF LEFT HAND: ICD-10-CM

## 2018-11-21 PROCEDURE — 73218 MRI UPPER EXTREMITY W/O DYE: CPT | Mod: TC,LT

## 2018-11-21 PROCEDURE — 73218 MRI UPPER EXTREMITY W/O DYE: CPT | Mod: 26,LT,, | Performed by: RADIOLOGY

## 2018-11-27 ENCOUNTER — TELEPHONE (OUTPATIENT)
Dept: ORTHOPEDICS | Facility: CLINIC | Age: 43
End: 2018-11-27

## 2018-11-27 ENCOUNTER — OFFICE VISIT (OUTPATIENT)
Dept: ORTHOPEDICS | Facility: CLINIC | Age: 43
End: 2018-11-27
Payer: COMMERCIAL

## 2018-11-27 ENCOUNTER — ANESTHESIA EVENT (OUTPATIENT)
Dept: SURGERY | Facility: HOSPITAL | Age: 43
End: 2018-11-27
Payer: COMMERCIAL

## 2018-11-27 VITALS
BODY MASS INDEX: 22.76 KG/M2 | DIASTOLIC BLOOD PRESSURE: 62 MMHG | HEART RATE: 90 BPM | SYSTOLIC BLOOD PRESSURE: 88 MMHG | HEIGHT: 67 IN | WEIGHT: 145 LBS

## 2018-11-27 DIAGNOSIS — L08.9 FINGER INFECTION: Primary | ICD-10-CM

## 2018-11-27 DIAGNOSIS — R22.32 FINGER MASS, LEFT: Primary | ICD-10-CM

## 2018-11-27 PROCEDURE — 99213 OFFICE O/P EST LOW 20 MIN: CPT | Mod: 57,S$GLB,, | Performed by: ORTHOPAEDIC SURGERY

## 2018-11-27 PROCEDURE — 3008F BODY MASS INDEX DOCD: CPT | Mod: CPTII,S$GLB,, | Performed by: ORTHOPAEDIC SURGERY

## 2018-11-27 PROCEDURE — 99999 PR PBB SHADOW E&M-EST. PATIENT-LVL III: CPT | Mod: PBBFAC,,, | Performed by: ORTHOPAEDIC SURGERY

## 2018-11-27 NOTE — ANESTHESIA PREPROCEDURE EVALUATION
11/27/2018  Mirtha Gary is a 43 y.o., female with a pre-operative diagnosis of Finger infection [L08.9] who is scheduled for Procedure(s) (LRB):  INCISION AND DRAINAGE, left index finger (Left).     Requested anesthesia type: Regional  Surgeon: Cristina Hays MD  Allergies: Review of patient's allergies indicates:  No Known Allergies  Vital Sign Range: Pulse:  [90] 90  BP: (88)/(62) 88/62  Chronic Medications:   No medications prior to admission.     Current Medications:   No current facility-administered medications for this encounter.      Current Outpatient Medications   Medication Sig Dispense Refill    butalbital-acetaminophen-caff -40 mg Cap TK 1 C PO BID PRF SEVERE HA  0    ibuprofen (ADVIL,MOTRIN) 600 MG tablet Take 1 tablet (600 mg total) by mouth every 6 (six) hours as needed for Pain. 30 tablet 1    naproxen (NAPROSYN) 500 MG tablet       ondansetron (ZOFRAN-ODT) 4 MG TbDL DISSOLVE ONE TABLET BY MOUTH EVERY 6 HOURS AS NEEDED FOR NAUSEA AND VOMITTING  4    phenazopyridine (PYRIDIUM) 200 MG tablet Take 1 tablet (200 mg total) by mouth 3 (three) times daily as needed for Pain. 20 tablet 0    promethazine (PHENERGAN) 6.25 mg/5 mL syrup TAKE 5 ML BY MOUTH EVERY 6 HOURS AS NEEDED FOR NAUSEA AND VOMITTING  3     Medical History:   Past Medical History:   Diagnosis Date    Endometriosis      .    Anesthesia Evaluation    I have reviewed the Patient Summary Reports.    I have reviewed the Nursing Notes.   I have reviewed the Medications.     Review of Systems  Anesthesia Hx:  No problems with previous Anesthesia  Denies Family Hx of Anesthesia complications.   Denies Personal Hx of Anesthesia complications.   Social:  Non-Smoker    Cardiovascular:  Cardiovascular Normal Exercise tolerance: good     Pulmonary:  Pulmonary Normal    Renal/:  Renal/ Normal     Hepatic/GI:  Hepatic/GI  Normal    Musculoskeletal:   Arthritis     Neurological:   Neuromuscular Disease, Headaches    Endocrine:  Endocrine Normal        Physical Exam  General:  Well nourished    Airway/Jaw/Neck:  Airway Findings: Mouth Opening: Normal Tongue: Normal  General Airway Assessment: Adult  Mallampati: II  TM Distance: Normal, at least 6 cm  Jaw/Neck Findings:     Neck ROM: Normal ROM      Dental:  Dental Findings: In tact   Chest/Lungs:  Chest/Lungs Findings: Clear to auscultation, Normal Respiratory Rate     Heart/Vascular:  Heart Findings: Rate: Normal  Rhythm: Regular Rhythm  Sounds: Normal     Abdomen:  Abdomen Findings:  Normal, Soft, Nontender       Mental Status:  Mental Status Findings:  Alert and Oriented, Cooperative         Anesthesia Plan  Type of Anesthesia, risks & benefits discussed:  Anesthesia Type:  regional, MAC, general  Patient's Preference: as indicated  Intra-op Monitoring Plan: standard ASA monitors  Intra-op Monitoring Plan Comments:   Post Op Pain Control Plan: peripheral nerve block  Post Op Pain Control Plan Comments:   Induction:   IV  Beta Blocker:  Patient is not currently on a Beta-Blocker (No further documentation required).       Informed Consent: Patient understands risks and agrees with Anesthesia plan.  Questions answered. Anesthesia consent signed with patient.  ASA Score: 1     Day of Surgery Review of History & Physical:  There are no significant changes.  H&P update referred to the provider.     Anesthesia Plan Notes: Risks of dental and eye injury reviewed with patient, agrees to proceed. Reassurance given.        Ready For Surgery From Anesthesia Perspective.

## 2018-11-27 NOTE — PROGRESS NOTES
Subjective:      Patient ID: Mirtha Gary is a 43 y.o. female.    Chief Complaint: Pain of the Left Hand      HPI  Mirtha Gary is a 43 y.o. female presenting today for MRI results, follow up pain and swelling of the left index finger. Symptoms began 11/15/18.     At onset, she noted a small nodule in the area of the left index A1 pulley. She later noted pain and edema in the area. At last visit she had decreased flexion of the finger secondary to edema. Her  is a psychiatrist at [facility] and was concerned for infection- he initially placed her on Clindamycin but after consulting with his infectious disease friend, abx were changed to Doxycycline and Augmentin which she has been taking since 11/17. Pt notes improvement in edema and motion of the finger but does continue to have a nodular area with associated pain. There is no locking or triggering.  Denies known puncture wound or injury however she does work in the yard a lot.       Review of patient's allergies indicates:  No Known Allergies      Current Outpatient Medications   Medication Sig Dispense Refill    butalbital-acetaminophen-caff -40 mg Cap TK 1 C PO BID PRF SEVERE HA  0    ibuprofen (ADVIL,MOTRIN) 600 MG tablet Take 1 tablet (600 mg total) by mouth every 6 (six) hours as needed for Pain. 30 tablet 1    naproxen (NAPROSYN) 500 MG tablet       ondansetron (ZOFRAN-ODT) 4 MG TbDL DISSOLVE ONE TABLET BY MOUTH EVERY 6 HOURS AS NEEDED FOR NAUSEA AND VOMITTING  4    phenazopyridine (PYRIDIUM) 200 MG tablet Take 1 tablet (200 mg total) by mouth 3 (three) times daily as needed for Pain. 20 tablet 0    promethazine (PHENERGAN) 6.25 mg/5 mL syrup TAKE 5 ML BY MOUTH EVERY 6 HOURS AS NEEDED FOR NAUSEA AND VOMITTING  3     No current facility-administered medications for this visit.        Past Medical History:   Diagnosis Date    Endometriosis        Past Surgical History:   Procedure Laterality Date    COSMETIC SURGERY      breast implants  "   DILATION AND CURETTAGE OF UTERUS USING SUCTION N/A 6/29/2018    Procedure: DILATION AND CURETTAGE, UTERUS, USING SUCTION;  Surgeon: Katrin Garcia MD;  Location: [facility], [place], operated by [facility] OR;  Service: OB/GYN;  Laterality: N/A;    DILATION AND CURETTAGE, UTERUS, USING SUCTION N/A 6/29/2018    Performed by Katrin Garcia MD at [facility], [place], operated by [facility] OR    ECTOPIC PREGNANCY SURGERY      INJECTION-STEROID-EPIDURAL-TRANSFORAMINAL Left 4/13/2018    Performed by Kiera Biggs MD at [facility], [place], operated by [facility] PAIN MGT    INJECTION-STEROID-EPIDURAL-TRANSFORAMINAL Left 12/6/2016    Performed by Kiera Biggs MD at [facility], [place], operated by [facility] PAIN MGT       Review of Systems:  Constitutional: Negative for chills and fever.   Respiratory: Negative for cough and shortness of breath.    Gastrointestinal: Negative for nausea and vomiting.   Skin: Negative for rash.   Neurological: Negative for dizziness and headaches.   Psychiatric/Behavioral: Negative for depression.   MSK as in HPI       OBJECTIVE:     PHYSICAL EXAM:  BP (!) 88/62   Pulse 90   Ht 5' 7" (1.702 m)   Wt 65.8 kg (145 lb)   LMP 04/06/2018   BMI 22.71 kg/m²     GEN:  NAD, well-developed, well-groomed.  NEURO: Awake, alert, and oriented. Normal attention and concentration.    PSYCH: Normal mood and affect. Behavior is normal.  HEENT: No cervical lymphadenopathy noted.  CARDIOVASCULAR: Radial pulses 2+ bilaterally. No LE edema noted.  PULMONARY: Breath sounds normal. No respiratory distress.  SKIN: Intact, no rashes.      MSK:   LUE:  Good active ROM of the wrist and fingers. She is ttp over the A1 pulley of the index, there is a small palpable mass over the proximal aspect of the finger. There is mild edema present- improved from prior. There is no significant warmth or erythema noted. No triggering on exam. AIN/PIN/Radial/Median/Ulnar Nerves assessed in isolation without deficit. Radial & Ulnar arteries palpated 2+. Capillary Refill <3s.      RADIOGRAPHS:  Xray left hand 11/15/18  FINDINGS:  The bones appear intact. "  There is no evidence for acute fracture or bone destruction.  There is no evidence for dislocation.  No bony erosions are identified.  Soft tissues are unremarkable.    MRI left index finger 11/21/18  Impression     Index finger flexor tenosynovitis as above.  No abnormal widening between the flexor tendon and proximal phalanx to suggest pulley injury.    Corresponding surrounding edema and soft tissue fullness near the base of the index finger proximal phalanx notably along the radial aspect with suspected nodular focus, overall nonspecific.  Differential to include foreign body reaction versus small collection.  If clinical concern for infection, further assessment with contrast enhanced study may be considered.  No evidence of osteomyelitis.   Comments: I have personally reviewed the imaging and I agree with the above radiologist's report.    ASSESSMENT/PLAN:       ICD-10-CM ICD-9-CM   1. Finger mass, left R22.32 782.2      Plan:   -treatment options discussed with pt. She wishes to proceed with mass excision/exploration of the left index finger. Consents reviewed and signed in clinic. All questions answered.   -RTC post op          The patient indicates understanding of these issues and agrees to the plan.    Shaista Sweeney PA-C  [facility]   [facility]  [place], [place]

## 2018-11-28 ENCOUNTER — HOSPITAL ENCOUNTER (OUTPATIENT)
Facility: HOSPITAL | Age: 43
Discharge: HOME | End: 2018-11-28
Attending: ORTHOPAEDIC SURGERY | Admitting: ORTHOPAEDIC SURGERY
Payer: COMMERCIAL

## 2018-11-28 ENCOUNTER — ANESTHESIA (OUTPATIENT)
Dept: SURGERY | Facility: HOSPITAL | Age: 43
End: 2018-11-28
Payer: COMMERCIAL

## 2018-11-28 VITALS
RESPIRATION RATE: 16 BRPM | WEIGHT: 145 LBS | HEART RATE: 84 BPM | BODY MASS INDEX: 22.76 KG/M2 | TEMPERATURE: 98 F | OXYGEN SATURATION: 100 % | DIASTOLIC BLOOD PRESSURE: 67 MMHG | HEIGHT: 67 IN | SYSTOLIC BLOOD PRESSURE: 99 MMHG

## 2018-11-28 DIAGNOSIS — L02.519 ABSCESS OF FINGER: Primary | ICD-10-CM

## 2018-11-28 LAB
B-HCG UR QL: NEGATIVE
CTP QC/QA: YES
GRAM STN SPEC: NORMAL
GRAM STN SPEC: NORMAL

## 2018-11-28 PROCEDURE — 87205 SMEAR GRAM STAIN: CPT

## 2018-11-28 PROCEDURE — 88305 TISSUE EXAM BY PATHOLOGIST: CPT | Mod: 26,,, | Performed by: PATHOLOGY

## 2018-11-28 PROCEDURE — D9220A PRA ANESTHESIA: Mod: CRNA,,, | Performed by: NURSE ANESTHETIST, CERTIFIED REGISTERED

## 2018-11-28 PROCEDURE — 25000003 PHARM REV CODE 250: Performed by: ORTHOPAEDIC SURGERY

## 2018-11-28 PROCEDURE — 37000009 HC ANESTHESIA EA ADD 15 MINS: Performed by: ORTHOPAEDIC SURGERY

## 2018-11-28 PROCEDURE — 25000003 PHARM REV CODE 250: Performed by: ANESTHESIOLOGY

## 2018-11-28 PROCEDURE — 87116 MYCOBACTERIA CULTURE: CPT

## 2018-11-28 PROCEDURE — 26055 INCISE FINGER TENDON SHEATH: CPT | Mod: F1,,, | Performed by: ORTHOPAEDIC SURGERY

## 2018-11-28 PROCEDURE — 88305 TISSUE EXAM BY PATHOLOGIST: CPT | Performed by: PATHOLOGY

## 2018-11-28 PROCEDURE — 64415 NJX AA&/STRD BRCH PLXS IMG: CPT | Mod: 59,LT,, | Performed by: ANESTHESIOLOGY

## 2018-11-28 PROCEDURE — 87075 CULTR BACTERIA EXCEPT BLOOD: CPT

## 2018-11-28 PROCEDURE — 87102 FUNGUS ISOLATION CULTURE: CPT

## 2018-11-28 PROCEDURE — 36000705 HC OR TIME LEV I EA ADD 15 MIN: Performed by: ORTHOPAEDIC SURGERY

## 2018-11-28 PROCEDURE — 25000003 PHARM REV CODE 250: Performed by: NURSE ANESTHETIST, CERTIFIED REGISTERED

## 2018-11-28 PROCEDURE — 11420 EXC H-F-NK-SP B9+MARG 0.5/<: CPT | Mod: 51,,, | Performed by: ORTHOPAEDIC SURGERY

## 2018-11-28 PROCEDURE — 63600175 PHARM REV CODE 636 W HCPCS: Performed by: ANESTHESIOLOGY

## 2018-11-28 PROCEDURE — 63600175 PHARM REV CODE 636 W HCPCS: Performed by: NURSE ANESTHETIST, CERTIFIED REGISTERED

## 2018-11-28 PROCEDURE — 37000008 HC ANESTHESIA 1ST 15 MINUTES: Performed by: ORTHOPAEDIC SURGERY

## 2018-11-28 PROCEDURE — 87206 SMEAR FLUORESCENT/ACID STAI: CPT

## 2018-11-28 PROCEDURE — 71000016 HC POSTOP RECOV ADDL HR: Performed by: ORTHOPAEDIC SURGERY

## 2018-11-28 PROCEDURE — 87070 CULTURE OTHR SPECIMN AEROBIC: CPT

## 2018-11-28 PROCEDURE — 71000044 HC DOSC ROUTINE RECOVERY FIRST HOUR: Performed by: ORTHOPAEDIC SURGERY

## 2018-11-28 PROCEDURE — D9220A PRA ANESTHESIA: Mod: ANES,,, | Performed by: ANESTHESIOLOGY

## 2018-11-28 PROCEDURE — 76942 ECHO GUIDE FOR BIOPSY: CPT | Performed by: ANESTHESIOLOGY

## 2018-11-28 PROCEDURE — 71000015 HC POSTOP RECOV 1ST HR: Performed by: ORTHOPAEDIC SURGERY

## 2018-11-28 PROCEDURE — 63600175 PHARM REV CODE 636 W HCPCS: Performed by: STUDENT IN AN ORGANIZED HEALTH CARE EDUCATION/TRAINING PROGRAM

## 2018-11-28 PROCEDURE — 81025 URINE PREGNANCY TEST: CPT | Performed by: ANESTHESIOLOGY

## 2018-11-28 PROCEDURE — 36000704 HC OR TIME LEV I 1ST 15 MIN: Performed by: ORTHOPAEDIC SURGERY

## 2018-11-28 RX ORDER — LIDOCAINE HCL/PF 100 MG/5ML
SYRINGE (ML) INTRAVENOUS
Status: DISCONTINUED | OUTPATIENT
Start: 2018-11-28 | End: 2018-11-28

## 2018-11-28 RX ORDER — BUPIVACAINE HYDROCHLORIDE 5 MG/ML
INJECTION, SOLUTION EPIDURAL; INTRACAUDAL
Status: DISCONTINUED
Start: 2018-11-28 | End: 2018-11-28 | Stop reason: HOSPADM

## 2018-11-28 RX ORDER — LIDOCAINE HYDROCHLORIDE 10 MG/ML
1 INJECTION, SOLUTION EPIDURAL; INFILTRATION; INTRACAUDAL; PERINEURAL ONCE
Status: DISCONTINUED | OUTPATIENT
Start: 2018-11-28 | End: 2018-11-28 | Stop reason: HOSPADM

## 2018-11-28 RX ORDER — PROPOFOL 10 MG/ML
VIAL (ML) INTRAVENOUS
Status: DISCONTINUED | OUTPATIENT
Start: 2018-11-28 | End: 2018-11-28

## 2018-11-28 RX ORDER — BACITRACIN ZINC 500 UNIT/G
OINTMENT (GRAM) TOPICAL
Status: DISCONTINUED | OUTPATIENT
Start: 2018-11-28 | End: 2018-11-28 | Stop reason: HOSPADM

## 2018-11-28 RX ORDER — SODIUM CHLORIDE 9 MG/ML
INJECTION, SOLUTION INTRAVENOUS CONTINUOUS PRN
Status: DISCONTINUED | OUTPATIENT
Start: 2018-11-28 | End: 2018-11-28

## 2018-11-28 RX ORDER — LORAZEPAM 2 MG/ML
0.25 INJECTION INTRAMUSCULAR ONCE AS NEEDED
Status: DISCONTINUED | OUTPATIENT
Start: 2018-11-28 | End: 2018-11-28 | Stop reason: HOSPADM

## 2018-11-28 RX ORDER — SODIUM CHLORIDE 9 MG/ML
INJECTION, SOLUTION INTRAVENOUS CONTINUOUS
Status: DISCONTINUED | OUTPATIENT
Start: 2018-11-28 | End: 2018-11-28 | Stop reason: HOSPADM

## 2018-11-28 RX ORDER — METOCLOPRAMIDE HYDROCHLORIDE 5 MG/ML
10 INJECTION INTRAMUSCULAR; INTRAVENOUS EVERY 10 MIN PRN
Status: DISCONTINUED | OUTPATIENT
Start: 2018-11-28 | End: 2018-11-28 | Stop reason: HOSPADM

## 2018-11-28 RX ORDER — MIDAZOLAM HYDROCHLORIDE 1 MG/ML
0.5 INJECTION INTRAMUSCULAR; INTRAVENOUS
Status: DISCONTINUED | OUTPATIENT
Start: 2018-11-28 | End: 2018-11-28 | Stop reason: HOSPADM

## 2018-11-28 RX ORDER — FENTANYL CITRATE 50 UG/ML
25 INJECTION, SOLUTION INTRAMUSCULAR; INTRAVENOUS EVERY 5 MIN PRN
Status: DISCONTINUED | OUTPATIENT
Start: 2018-11-28 | End: 2018-11-28 | Stop reason: HOSPADM

## 2018-11-28 RX ORDER — SUMATRIPTAN SUCCINATE 100 MG/1
100 TABLET ORAL
COMMUNITY
End: 2021-11-05 | Stop reason: SINTOL

## 2018-11-28 RX ORDER — SULFAMETHOXAZOLE AND TRIMETHOPRIM 800; 160 MG/1; MG/1
1 TABLET ORAL 2 TIMES DAILY
Qty: 28 TABLET | Refills: 0 | Status: SHIPPED | OUTPATIENT
Start: 2018-11-28 | End: 2018-12-12

## 2018-11-28 RX ORDER — CEFAZOLIN SODIUM 1 G/3ML
2 INJECTION, POWDER, FOR SOLUTION INTRAMUSCULAR; INTRAVENOUS
Status: COMPLETED | OUTPATIENT
Start: 2018-11-28 | End: 2018-11-28

## 2018-11-28 RX ORDER — ACETAMINOPHEN AND CODEINE PHOSPHATE 300; 30 MG/1; MG/1
1 TABLET ORAL EVERY 6 HOURS PRN
Qty: 20 TABLET | Refills: 0 | Status: SHIPPED | OUTPATIENT
Start: 2018-11-28 | End: 2018-12-08

## 2018-11-28 RX ORDER — ONDANSETRON HYDROCHLORIDE 8 MG/1
8 TABLET, FILM COATED ORAL EVERY 8 HOURS PRN
Qty: 60 TABLET | Refills: 0 | Status: SHIPPED | OUTPATIENT
Start: 2018-11-28 | End: 2021-06-09 | Stop reason: ALTCHOICE

## 2018-11-28 RX ORDER — ONDANSETRON 2 MG/ML
4 INJECTION INTRAMUSCULAR; INTRAVENOUS DAILY PRN
Status: DISCONTINUED | OUTPATIENT
Start: 2018-11-28 | End: 2018-11-28 | Stop reason: HOSPADM

## 2018-11-28 RX ORDER — PHENYLEPHRINE HYDROCHLORIDE 10 MG/ML
INJECTION INTRAVENOUS
Status: DISCONTINUED | OUTPATIENT
Start: 2018-11-28 | End: 2018-11-28

## 2018-11-28 RX ORDER — PROPOFOL 10 MG/ML
VIAL (ML) INTRAVENOUS CONTINUOUS PRN
Status: DISCONTINUED | OUTPATIENT
Start: 2018-11-28 | End: 2018-11-28

## 2018-11-28 RX ORDER — ASPIRIN 81 MG
100 TABLET, DELAYED RELEASE (ENTERIC COATED) ORAL 2 TIMES DAILY
Qty: 60 TABLET | Refills: 0 | Status: ON HOLD | OUTPATIENT
Start: 2018-11-28 | End: 2021-06-22 | Stop reason: CLARIF

## 2018-11-28 RX ORDER — BACITRACIN 500 [USP'U]/G
OINTMENT TOPICAL
Status: DISCONTINUED
Start: 2018-11-28 | End: 2018-11-28 | Stop reason: HOSPADM

## 2018-11-28 RX ADMIN — PHENYLEPHRINE HYDROCHLORIDE 100 MCG: 10 INJECTION INTRAVENOUS at 09:11

## 2018-11-28 RX ADMIN — SODIUM CHLORIDE 1000 ML: 0.9 INJECTION, SOLUTION INTRAVENOUS at 07:11

## 2018-11-28 RX ADMIN — CEFAZOLIN 2 G: 330 INJECTION, POWDER, FOR SOLUTION INTRAMUSCULAR; INTRAVENOUS at 09:11

## 2018-11-28 RX ADMIN — MEPIVACAINE HYDROCHLORIDE 30 ML: 20 INJECTION, SOLUTION EPIDURAL; INFILTRATION at 08:11

## 2018-11-28 RX ADMIN — PROPOFOL 50 MG: 10 INJECTION, EMULSION INTRAVENOUS at 08:11

## 2018-11-28 RX ADMIN — PROPOFOL 150 MCG/KG/MIN: 10 INJECTION, EMULSION INTRAVENOUS at 08:11

## 2018-11-28 RX ADMIN — LIDOCAINE HYDROCHLORIDE 50 MG: 20 INJECTION, SOLUTION INTRAVENOUS at 08:11

## 2018-11-28 RX ADMIN — FENTANYL CITRATE 100 MCG: 50 INJECTION INTRAMUSCULAR; INTRAVENOUS at 08:11

## 2018-11-28 RX ADMIN — MIDAZOLAM HYDROCHLORIDE 2 MG: 1 INJECTION, SOLUTION INTRAMUSCULAR; INTRAVENOUS at 08:11

## 2018-11-28 RX ADMIN — SODIUM CHLORIDE: 9 INJECTION, SOLUTION INTRAVENOUS at 08:11

## 2018-11-28 NOTE — BRIEF OP NOTE
[facility]  Brief Operative Note     SUMMARY     Surgery Date: 11/28/2018     Surgeon(s) and Role:     * Cristina Hays MD - Primary     * Nate Sanz MD - Resident - Assisting        Pre-op Diagnosis:  Finger infection [L08.9]    Post-op Diagnosis:  Post-Op Diagnosis Codes:     * Finger infection [L08.9]    Procedure(s) (LRB):  INCISION AND DRAINAGE, left index finger (Left)    Anesthesia: Regional    Description of the findings of the procedure: as above    Findings/Key Components: as above    Estimated Blood Loss: * No values recorded between 11/28/2018  9:09 AM and 11/28/2018  9:36 AM *         Specimens:   Specimen (12h ago, onward)    Start     Ordered    11/28/18 0920  Specimen to Pathology - Surgery  Once     Comments:  1.) mass left index finger- permanent     Start Status   11/28/18 0920 Collected (11/28/18 0921)       11/28/18 0920          Discharge Note    SUMMARY     Admit Date: 11/28/2018    Discharge Date and Time:  11/28/2018 9:41 AM    Hospital Course (synopsis of major diagnoses, care, treatment, and services provided during the course of the hospital stay): Pt admitted for outpatient procedure, tolerated well.  Recovered in PACU and was discharged home on day of surgery.       Final Diagnosis: Post-Op Diagnosis Codes:     * Finger infection [L08.9]    Disposition: Home or Self Care    Follow Up/Patient Instructions:     Medications:  Reconciled Home Medications:      Medication List      START taking these medications    acetaminophen-codeine 300-30mg 300-30 mg Tab  Commonly known as:  TYLENOL #3  Take 1 tablet by mouth every 6 (six) hours as needed.     docusate sodium 100 mg capsule  Take 100 mg by mouth 2 (two) times daily.     ondansetron 8 MG tablet  Commonly known as:  ZOFRAN  Take 1 tablet (8 mg total) by mouth every 8 (eight) hours as needed for Nausea.     sulfamethoxazole-trimethoprim 800-160mg 800-160 mg Tab  Commonly known as:  BACTRIM DS  Take 1  tablet by mouth 2 (two) times daily. for 14 days        CONTINUE taking these medications    butalbital-acetaminophen-caff -40 mg Cap  TK 1 C PO BID PRF SEVERE HA     ondansetron 4 MG Tbdl  Commonly known as:  ZOFRAN-ODT  DISSOLVE ONE TABLET BY MOUTH EVERY 6 HOURS AS NEEDED FOR NAUSEA AND VOMITTING     phenazopyridine 200 MG tablet  Commonly known as:  PYRIDIUM  Take 1 tablet (200 mg total) by mouth 3 (three) times daily as needed for Pain.     promethazine 6.25 mg/5 mL syrup  Commonly known as:  PHENERGAN  TAKE 5 ML BY MOUTH EVERY 6 HOURS AS NEEDED FOR NAUSEA AND VOMITTING     sumatriptan 100 MG tablet  Commonly known as:  IMITREX  Take 100 mg by mouth every 2 (two) hours as needed for Migraine.        STOP taking these medications    ibuprofen 600 MG tablet  Commonly known as:  ADVIL,MOTRIN     naproxen 500 MG tablet  Commonly known as:  NAPROSYN          Discharge Procedure Orders   Call MD for:  temperature >100.4     Call MD for:  persistent nausea and vomiting or diarrhea     Call MD for:  severe uncontrolled pain     Call MD for:  redness, tenderness, or signs of infection (pain, swelling, redness, odor or green/yellow discharge around incision site)     Call MD for:  difficulty breathing or increased cough     Call MD for:  severe persistent headache     Call MD for:  worsening rash     Call MD for:  persistent dizziness, light-headedness, or visual disturbances     Call MD for:  increased confusion or weakness     Leave dressing on - Keep it clean, dry, and intact until clinic visit

## 2018-11-28 NOTE — ANESTHESIA PROCEDURE NOTES
Infraclavicular Single Injection Nerve Block    Patient location during procedure: pre-op   Block not for primary anesthetic.  Reason for block: at surgeon's request and post-op pain management   Post-op Pain Location: L hand pain  Start time: 11/28/2018 8:32 AM  Timeout: 11/28/2018 8:32 AM   End time: 11/28/2018 8:38 AM  Staffing  Anesthesiologist: Bor Nunn MD  Other anesthesia staff: Fabian Rowan MD  Performed: other anesthesia staff   Preanesthetic Checklist  Completed: patient identified, site marked, surgical consent, pre-op evaluation, timeout performed, IV checked, risks and benefits discussed and monitors and equipment checked  Peripheral Block  Patient position: sitting  Prep: ChloraPrep  Patient monitoring: heart rate, cardiac monitor, continuous pulse ox, continuous capnometry and frequent blood pressure checks  Block type: infraclavicular  Laterality: left  Injection technique: single shot  Needle  Needle type: Stimuplex   Needle gauge: 21 G  Needle length: 4 in  Needle localization: ultrasound guidance and anatomical landmarks   -ultrasound image captured on disc.  Assessment  Injection assessment: negative aspiration and negative parasthesia  Paresthesia pain: none  Heart rate change: no  Slow fractionated injection: yes  Additional Notes  VSS.  [facility] RN monitoring vitals throughout procedure.  Patient tolerated procedure well.

## 2018-11-28 NOTE — ANESTHESIA RELEASE NOTE
"Anesthesia Release from PACU Note    Patient: Mirtha Gary    Procedure(s) Performed: Procedure(s) (LRB):  INCISION AND DRAINAGE, left index finger (Left)    Anesthesia type: GEN    Post pain: Adequate analgesia reported    Post assessment: no apparent anesthetic complications, tolerated procedure well and no evidence of recall    Post vital signs: BP (!) 98/58 (BP Location: Right arm, Patient Position: Lying)   Pulse 78   Temp 36.4 °C (97.5 °F)   Resp 16   Ht 5' 7" (1.702 m)   Wt 65.8 kg (145 lb)   LMP 11/15/2018 (Exact Date)   SpO2 100%   Breastfeeding? Unknown   BMI 22.71 kg/m²     Level of consciousness: awake, alert and oriented    Nausea/Vomiting: no nausea/no vomiting    Complications: none    Airway Patency: patent    Respiratory: unassisted, spontaneous ventilation, room air    Cardiovascular: stable and blood pressure at baseline    Hydration: euvolemic    "

## 2018-11-28 NOTE — ANESTHESIA POSTPROCEDURE EVALUATION
"Anesthesia Post Evaluation    Patient: Mirtha Gary    Procedure(s) Performed: Procedure(s) (LRB):  INCISION AND DRAINAGE, left index finger (Left)    Final Anesthesia Type: general  Patient location during evaluation: PACU  Patient participation: Yes- Able to Participate  Level of consciousness: awake and alert and oriented  Post-procedure vital signs: reviewed and stable  Pain management: adequate  Airway patency: patent  PONV status at discharge: No PONV  Anesthetic complications: no      Cardiovascular status: stable  Respiratory status: unassisted, spontaneous ventilation and room air  Hydration status: euvolemic  Follow-up not needed.        Visit Vitals  BP (!) 98/58 (BP Location: Right arm, Patient Position: Lying)   Pulse 78   Temp 36.4 °C (97.5 °F)   Resp 16   Ht 5' 7" (1.702 m)   Wt 65.8 kg (145 lb)   LMP 11/15/2018 (Exact Date)   SpO2 100%   Breastfeeding? Unknown   BMI 22.71 kg/m²       Pain/Nathen Score: Pain Assessment Performed: Yes (11/28/2018  9:48 AM)  Presence of Pain: denies (11/28/2018  9:48 AM)  Pain Rating Prior to Med Admin: 3 (11/28/2018  8:35 AM)  Nathen Score: 10 (11/28/2018  9:48 AM)        "

## 2018-11-28 NOTE — TRANSFER OF CARE
"Anesthesia Transfer of Care Note    Patient: Mirtha Gary    Procedure(s) Performed: Procedure(s) (LRB):  INCISION AND DRAINAGE, left index finger (Left)    Patient location: PACU    Anesthesia Type: general    Transport from OR: Transported from OR on room air with adequate spontaneous ventilation    Post pain: adequate analgesia    Post assessment: no apparent anesthetic complications and tolerated procedure well    Post vital signs: stable    Level of consciousness: awake, alert and oriented    Nausea/Vomiting: no nausea/vomiting    Complications: none    Transfer of care protocol was followed      Last vitals:   Visit Vitals  /61 (BP Location: Right arm, Patient Position: Lying)   Pulse 66   Temp 36.9 °C (98.5 °F) (Oral)   Resp 16   Ht 5' 7" (1.702 m)   Wt 65.8 kg (145 lb)   LMP 11/15/2018 (Exact Date)   SpO2 100%   Breastfeeding? Unknown   BMI 22.71 kg/m²     "

## 2018-11-29 NOTE — OP NOTE
DATE OF CONSULTATION:  11/28/2018    SERVICE:  Orthopedics.    ATTENDING SURGEON:  Cristina Hays M.D.    RESIDENT SURGEON:  Dr. Nate Sanz.    PREOPERATIVE DIAGNOSIS:  Left index finger, possible infection and mass.    POSTOPERATIVE DIAGNOSIS:  Left index finger mass.    PROCEDURES:  1.  Left index finger mass excision, volar aspect of the hand at MCP joint.  2.  A1 pulley release.  3.  Irrigation and debridement down to tendon, left index finger.  4.  Splint application.    ANESTHESIA:  Regional MAC.    FLUIDS:  Lactated Ringer's.    BLOOD LOSS:  None.  No blood was given.    PACKS AND DRAINS:  None.    IMPLANTS:  None.    SPECIMENS:  Mass from left index finger.    COMPLICATIONS:  None.    INDICATIONS FOR PROCEDURE:  Ms. Gary is a 43-year-old female who has had   increasing swelling and pain in the left index finger close to the A1 pulley.    Her  is a psychiatrist and he treated her with oral antibiotics.  She   states that it has gotten better, but she still has pain on range of motion.  On   examination, she was nontender to palpation along the flexor sheath; however,   she did have a small palpable mass that was painful to touch on exam.    X-rays were negative.  After she had a course of antibiotics provided to the   patient by her  and continued to have pain and swelling, we elected   surgical intervention, specifically since we could feel that mass.  Risks and   benefits were to the patient in the clinic.  Consents were performed in clinic.    PROCEDURE IN DETAIL:  After the correct site was marked with the patient's   participation in the holding area, the patient was brought to the operating   room, placed in supine position, underwent MAC anesthesia.  A well-padded   nonsterile tourniquet was placed on the left upper extremity.  She had undergone   regional anesthesia while in the holding area.  An incision was marked out over   the volar aspect of the index finger directly  over the MCP joint and A1 pulley.    The arm was elevated, not exsanguinated at the area of possible infection.    Once that was completed, the incision was made.  Careful dissection down to this   mass was obtained.  The mass almost appeared to be hemosiderin filled.  It was   close to the MCP joint.  It was completely encapsulated, therefore was dissected   out.  It was passed off to the back table.  It was measured to be less than 5   cm and given to Pathology.  The A1 pulley was then opened.  Normal fluid was   expressed.  This area was cultured and the culture was then passed off to the   back table.  The tendon was taken out of sheath to reexamine and it was in good   condition.  Again, there was not significant fluid that was abnormal in   appearance.  Everything appeared normal and in good condition.  The area was   irrigated with copious amounts of normal saline.  Nylon closed the skin.    Sterile dressing was applied.  Tourniquet was deflated.  Brisk capillary refill   ensued.  The patient was placed in a well-padded splint, tolerated the procedure   well and was brought to the recovery area in stable condition.    POSTOPERATIVE PLAN:  The patient is to keep the dressing clean, dry and intact.    We will see her prasad at 2 weeks' time.  Sutures are to be removed.  Pathology   report to be given to the patient.      LES/HN  dd: 11/29/2018 08:58:40 (CST)  td: 11/29/2018 13:51:24 (CST)  Doc ID   #[id number]  Job ID #[id number]    CC:

## 2018-11-29 NOTE — PROGRESS NOTES
I have personally taken the history and examined the patient. I agree with the Hand Surgery PA's note. The plan will be surgical excision of mass, I and D of tendon sheath possibly.  I have explained the risks, benefits, and alternatives of the procedure to the patient in great detail. The patient voices understanding and all questions have been answered. The patient agrees with to proceed as planned. Consents were performed in clinic.  Pt's  is a psychiatrist and placed pt on different abx- augmentin, clinda, doxycyline. Pt has decreased swelling and pain but still painful to touch.  Mass palpated, NTTP over tendon sheath, no erythema, minimal swelling.   I have explained the risks, benefits, and alternatives of the procedure to the patient in great detail. The patient voices understanding and all questions have been answered. The patient agrees with to proceed as planned. Consents were performed in clinic.

## 2018-12-01 LAB — BACTERIA SPEC AEROBE CULT: NO GROWTH

## 2018-12-05 LAB — BACTERIA SPEC ANAEROBE CULT: NORMAL

## 2018-12-11 ENCOUNTER — OFFICE VISIT (OUTPATIENT)
Dept: ORTHOPEDICS | Facility: CLINIC | Age: 43
End: 2018-12-11
Payer: COMMERCIAL

## 2018-12-11 VITALS
BODY MASS INDEX: 22.76 KG/M2 | DIASTOLIC BLOOD PRESSURE: 76 MMHG | HEIGHT: 67 IN | HEART RATE: 99 BPM | WEIGHT: 145 LBS | SYSTOLIC BLOOD PRESSURE: 107 MMHG

## 2018-12-11 DIAGNOSIS — M79.641 PAIN OF RIGHT HAND: Primary | ICD-10-CM

## 2018-12-11 PROCEDURE — 99999 PR PBB SHADOW E&M-EST. PATIENT-LVL III: CPT | Mod: PBBFAC,,, | Performed by: ORTHOPAEDIC SURGERY

## 2018-12-11 PROCEDURE — 99024 POSTOP FOLLOW-UP VISIT: CPT | Mod: S$GLB,,, | Performed by: ORTHOPAEDIC SURGERY

## 2018-12-11 NOTE — PROGRESS NOTES
"Ms. Gary is here today for a post-operative visit.  She is 13 days status post Left index finger mass excision, volar aspect of the hand at MCP joint, A1 pulley release, and Irrigation and debridement down to tendon, left index finger by Dr. Calderon on 11/28. She reports that she is feeling better overall.  Pain is present.  She is not taking pain medication with exception of PRN PTC medications.  She denies fever, chills, and sweats since the time of the surgery.       Final Pathology: Scar Tissue    Physical exam:    Vitals:    12/11/18 0812   BP: 107/76   Pulse: 99   Weight: 65.8 kg (145 lb)   Height: 5' 7" (1.702 m)   PainSc:   2     Vital signs are stable, patient is afebrile.  Patient is well dressed and well groomed, no acute distress.  Alert and oriented to person, place, and time.  Post op dressing taken down.  Incision is clean, dry and intact.  There is no erythema or exudate.  There is no sign of any infection. She is NVI. Sutures removed without difficulty.      Operative Digit:    MCPJ Flexion 30`  PIPJ Flexion 45`  DIPJ flexion 45`    Assessment: 14 days status post Small finger I&D with A1 pulley release    Plan:  There are no diagnoses linked to this encounter.    - PO instruction reviewed and provided to patient  - Begin Hand therapy    "

## 2018-12-11 NOTE — PROGRESS NOTES
I have personally taken the history and examined this patient. I agree with the resident's note as stated above. Pt does have stiffness in index finger- plan for OT . Path result shows scar tissue. Note sent to pathologist

## 2018-12-14 ENCOUNTER — DOCUMENTATION ONLY (OUTPATIENT)
Dept: REHABILITATION | Facility: OTHER | Age: 43
End: 2018-12-14

## 2018-12-14 DIAGNOSIS — G89.29 CHRONIC BILATERAL LOW BACK PAIN WITH LEFT-SIDED SCIATICA: ICD-10-CM

## 2018-12-14 DIAGNOSIS — M54.42 CHRONIC BILATERAL LOW BACK PAIN WITH LEFT-SIDED SCIATICA: ICD-10-CM

## 2018-12-14 DIAGNOSIS — M54.2 NECK PAIN: ICD-10-CM

## 2018-12-14 NOTE — PROGRESS NOTES
Outpatient Physical Therapy Discharge Summary    Date: 12/14/2018      Date of PT eval: 04/11/18  Number of PT visits: 3  Date of last visit: 05/21/18    Assessment:  Unable to assess if goals met secondary to lack of attendance. Per chart review, pt cancelled remaining sessions secondary to personal and medical complications.     Short term goals met: 0/4  Long term goals met: 0/6    Plan: Discharge from outpatient physical therapy due to personal and medical reasons.

## 2018-12-18 ENCOUNTER — CLINICAL SUPPORT (OUTPATIENT)
Dept: REHABILITATION | Facility: HOSPITAL | Age: 43
End: 2018-12-18
Payer: COMMERCIAL

## 2018-12-18 DIAGNOSIS — M25.60 RANGE OF MOTION DEFICIT: ICD-10-CM

## 2018-12-18 DIAGNOSIS — M79.642 HAND PAIN, LEFT: ICD-10-CM

## 2018-12-18 DIAGNOSIS — L02.512 ABSCESS OF FINGER OF LEFT HAND: Primary | ICD-10-CM

## 2018-12-18 PROCEDURE — 97018 PARAFFIN BATH THERAPY: CPT | Mod: 59

## 2018-12-18 PROCEDURE — 97165 OT EVAL LOW COMPLEX 30 MIN: CPT

## 2018-12-18 NOTE — PROGRESS NOTES
"Occupational Therapy -Hand / Wrist  Evaluation    Patient: Mirtha Gary  Date of Evaluation: 2018  Referring Physician:  Dr. JAH Lambert  Diagnosis: L index finger mass excision and A1 pulley release   1. Abscess of finger of left hand     2. Range of motion deficit     3. Hand pain, left       MRN: [id number]    Referral Orders:   Eval and treat     Start Time: 1025  End Time: 111  Total Time: 45     Hand dominance: Right    Occupation:     Working presently:  yes  Workmen's Compensation:  no    Date of onset: 2018  Involved area:  Left index   Mechanism of Injury:   Trauma and Repetitive strain, trigger finger, mass   Past Medical History/Physical Systems Review: lumbar radiculopathy, OA R knee    Living status: with family     Environmental Concerns/ Fall Risk:  None  Barriers to Learning: None   Cultural/Spiritual : None   Developmental/Education: None   Abuse/ Neglect: none   Nutritional Deficit: None   Language: None   Hearing/Vision Deficit: None   Other:     Subjective:  Pt reports Its sensitive, and numb in the fingertip, I'm going out of country in 2 weeks"     Pain   At rest: 0  out of 10  With work/ Activity: 3-4  out of 10  Sleepin out of 10  Location of Pain : stiff, swollen     Patients goals for therapy are:  more motion , less pain     Objective:   Observation  :Swelling, Redness, Healing scar, hard / callous scar    Sensation: numbness in digital nerve of fingertip left index per report   Scar / Wound: 1.5 cm volar A1 pulley region L index, hard, callous, moderate scar adhesions.   Edema:  17.6  Cm MP's         Range of Motion:     Index     MP 0/77    PIP 0/80     DIP 0/30     Generalized stiffness of other digits.                                               Manual Muscle Test:  FDP, FDS 3-/5   EI 3-/5   DAB/PAD 3/5                                        Strength: (KEYONA Dynamometer in lbs.), Average 3 trials, Position II    NT     Pinch Strength: (Pinch Gauge " in psis), Average 3 trials       2 AND 3PT PINCH TBA     Functional Limitations:   Self Care / ADL: Limited use of left hand  for ADLs, grooming, hygiene  Work/Activities: Limited use of Left hand or gripping, opening things, cooking, cleaning,   Leisure: Limited use of Left hand  For driving, gym workouts      Treatment Included:   OT evaluation and instruction in written HEP including  Blocking FDP, FDS, isolated FDS glide, intrinsic +/-, flat, full fist, digit extension and ab/ad x 10 reps, 3-5 x daily.  Performed paraffin bath x 10 min to increase blood flow, circulation and tissue elasticity prior to therex.  Instructed in modality use for home for pain, stiffness, swelling.  Instructed and performed scar massage with mini vibrator to decrease adhesions and improve tensile glide.  Encouraged scar massage 2 x daily. Patient reported good understanding of same.     Patient demonstrates good understanding of HEP/modality use for pain management.    Assessment:   Problem List : left hand      Decreased ROM   Decreased pinch strength -- TBA   Decreased muscle strength   Decreased functional hand use   Increased pain   Edema   Scar Adhesions       Profile and History Assessment of Occupational Performance Level of Clinical Decision Making Complexity Score   Occupational Profile:   Mirtha Gary is a 43 y.o. female who lives with their family and is currently home-maker. Mirtha Gary has difficulty with  grooming and dressing  driving/transportation management, shopping, phone/computer use, housework/household chores and typing, cooking, cleaning.   affecting his/her daily functional abilities. His/her main goal for therapy is more motion and less pain .     Comorbidities:   Lumbar radiculopathy and OA R knee    Medical and Therapy History Review:   Brief               Performance Deficits    Physical:  Joint Mobility  Muscle Power/Strength  Muscle Endurance  Skin Integrity/Scar Formation  Edema  Pinch  Strength  Fine Motor Coordination  Proprioception Functions  Pain    Cognitive:  No Deficits    Psychosocial:    Habits  Routines     Clinical Decision Making:  low    Assessment Process:  Problem-Focused Assessments    Modification/Need for Assistance:  Not Necessary    Intervention Selection:  Limited Treatment Options       low  Based on PMHX, co morbidities , data from assessments and functional level of assistance required with task and clinical presentation directly impacting function.       Goals: ( 6 weeks)   1)  Patient to be IND with HEP and modalities for pain management  2)  Increase ROM 3-5 degrees to increase functional hand use for ADLs/work/leisure activities  3)   Decrease edema .2-.3 mm to increase joint mobility /flexibility for functional hand use.   4)  Assess 2 and 3 pt pinch and set goals accordingly     Plan:   Patient to be treated by Occupational Therapy    1-2    times per week for   6-8                   weeks  during the certification period from   12/18/2018     to  2/18/2019    to achieve the established goals.  Treatment to include :  paraffin, fluidotherapy, manual therapy/joint mobilizations,  modalities for pain management, US 3mhz, therapeutic exercises/activities,        strengthening,    scar and edema management, as well as any other treatments deemed necessary based on the patient's needs or progress.               I certify the need for these services furnished under this plan of treatment and while under my care    ____________________________________                         __________________  Physician/Referring Practitioner                                               Date of Signature

## 2018-12-18 NOTE — PATIENT INSTRUCTIONS
"[facility] - OCCUPATIONAL THERAPY  HOME EXERCISE PROGRAM     Complete the following massages for 5 minutes each, 2x/day.                       Complete the following exercises with 10 repetitions each, 3-4x/day.     AROM: DIP Flexion / Extension  Pinch middle knuckle to prevent bending. Bend end knuckle until stretch is felt.   Hold 3 seconds. Relax. Straighten finger as far as possible.    AROM: PIP Flexion / Extension  Pinch bottom knuckle  to prevent bending. Actively bend middle knuckle until stretch is felt.   Hold 3 seconds. Relax. Straighten finger as far as possible.    AROM: Isolated PIP Flexion  Bend only middle joint of your finger, keeping other fingers straight with other hand.    AROM: Isolated MCP Flexion / Extension ("Wave")   Bend only your large, bottom knuckles. Hold 3 seconds. Keep the tips of your fingers straight. Straighten fingers.    AROM: Isolated IPJ Flexion / Extension ("Hook")  Bend only your middle and end knuckles. Hold 3 seconds.   Straighten your fingers.       AROM: Composite Flexion / Extension ("Full Fist")  Bend every joint in your hand into a fist. Hold 3 seconds. Straighten your fingers.     AROM: Composte Extension ("Finger Lifts")  Lift your finger off of the table one at a time. Hold 3 seconds. Relax your finger.    AROM: Abduction / Adduction  With hand flat on table, spread all fingers apart, then bring them together as close as possible.    Copyright © I. All rights reserved.     Therapist: DAY Weathers CHT    "

## 2018-12-26 ENCOUNTER — CLINICAL SUPPORT (OUTPATIENT)
Dept: REHABILITATION | Facility: HOSPITAL | Age: 43
End: 2018-12-26
Payer: COMMERCIAL

## 2018-12-26 DIAGNOSIS — M79.642 HAND PAIN, LEFT: ICD-10-CM

## 2018-12-26 DIAGNOSIS — M25.60 RANGE OF MOTION DEFICIT: ICD-10-CM

## 2018-12-26 DIAGNOSIS — L02.512 ABSCESS OF FINGER OF LEFT HAND: ICD-10-CM

## 2018-12-26 LAB — FUNGUS SPEC CULT: NORMAL

## 2018-12-26 PROCEDURE — 97140 MANUAL THERAPY 1/> REGIONS: CPT

## 2018-12-26 PROCEDURE — 97110 THERAPEUTIC EXERCISES: CPT

## 2018-12-26 PROCEDURE — 97035 APP MDLTY 1+ULTRASOUND EA 15: CPT

## 2018-12-26 NOTE — PROGRESS NOTES
"OT Daily Progress Note    Patient:  Mirtha Gary  Clinic #:  [id number]   Date of Note: 12/26/2018   Referring Physician:  Min Michelle MD; Dr. JAH Lambert   Diagnosis:  L index finger mass excision and A1 pulley release   Encounter Diagnoses   Name Primary?    Range of motion deficit     Hand pain, left     Abscess of finger of left hand         Visit 2 of 12, expires 12/31/2018   NO FOTO    Start Time: 1015  End Time: 11  Total Time: 45 min  Group Time: 0      Subjective:   "My  (a doctor) helped debride the scar, its so much better."  Patient to leave to go out of country x 2 weeks, she will call for followup if needed upon return.   Pain:0-3 out of 10     Objective:   Patient seen by OT this session. Treatment  consist of the following: Performed paraffin bath x 10 min to increase blood flow, circulation and tissue elasticity prior to therex.  Performed US 3 mhz 0.5 w/cm2 x 8 min x 100% to A1 pulley region to  increase blood flow, circulation, tissue elasticity and for wound/scar management.   Performed scar massage with mini vibrator  And massage stick to decrease adhesions and improve tensile glide.      Performed Blocking FDP, FDS, isolated FDS glide, intrinsic +/-, flat, full fist, digit extension  With "place and hold" and ab/ad x 10 reps.  Performed ice massage x 5 min for pain/swelling/inflammation. Provided mepitac scar tape for scar management.       Treatment: Paraffin x 10 min, Ultrasound x 8 min, Therapeutic exercises x 12 min and Manual therapy x 10 min, 5 min ice massage      Assessment:  ROM improved, full fist, small scab remains on A1 pulley region, scar adhesions diminishing.  Will progress as tolerated. Pt will continue to benefit from skilled OT intervention.   Patient is making good progress toward established goals.   Patient continues to demonstrate limitation  with  ROM, Joint mobility, Stiffness, Decreased fine motor coordination, Decreased functional use, Decreased " strength and Continued pain     Goals: ( 6 weeks)   1)  Patient to be IND with HEP and modalities for pain management  2)  Increase ROM 3-5 degrees to increase functional hand use for ADLs/work/leisure activities  3)   Decrease edema .2-.3 mm to increase joint mobility /flexibility for functional hand use.   4)  Assess 2 and 3 pt pinch and set goals accordingly      Plan:  Patient to call upon return from 2 week vacation if followup is needed.   Patient to be treated by Occupational Therapy    1-2    times per week for   6-8                   weeks  during the certification period from   12/18/2018     to  2/18/2019    to achieve the established goals.

## 2019-01-23 ENCOUNTER — PES CALL (OUTPATIENT)
Dept: ADMINISTRATIVE | Facility: CLINIC | Age: 44
End: 2019-01-23

## 2019-01-30 LAB
ACID FAST MOD KINY STN SPEC: NORMAL
MYCOBACTERIUM SPEC QL CULT: NORMAL

## 2019-07-23 ENCOUNTER — OFFICE VISIT (OUTPATIENT)
Dept: ORTHOPEDICS | Facility: CLINIC | Age: 44
End: 2019-07-23
Payer: COMMERCIAL

## 2019-07-23 ENCOUNTER — HOSPITAL ENCOUNTER (OUTPATIENT)
Dept: RADIOLOGY | Facility: HOSPITAL | Age: 44
Discharge: HOME | End: 2019-07-23
Attending: PHYSICIAN ASSISTANT
Payer: COMMERCIAL

## 2019-07-23 ENCOUNTER — TELEPHONE (OUTPATIENT)
Dept: ORTHOPEDICS | Facility: CLINIC | Age: 44
End: 2019-07-23

## 2019-07-23 VITALS — BODY MASS INDEX: 22.77 KG/M2 | HEIGHT: 67 IN | WEIGHT: 145.06 LBS

## 2019-07-23 DIAGNOSIS — M54.16 LEFT LUMBAR RADICULITIS: ICD-10-CM

## 2019-07-23 DIAGNOSIS — M51.36 DDD (DEGENERATIVE DISC DISEASE), LUMBAR: ICD-10-CM

## 2019-07-23 DIAGNOSIS — M51.36 DDD (DEGENERATIVE DISC DISEASE), LUMBAR: Primary | ICD-10-CM

## 2019-07-23 DIAGNOSIS — M54.16 LUMBAR RADICULOPATHY: Primary | ICD-10-CM

## 2019-07-23 PROCEDURE — 72100 XR LUMBAR SPINE AP AND LAT WITH FLEX/EXT: ICD-10-PCS | Mod: 26,,, | Performed by: RADIOLOGY

## 2019-07-23 PROCEDURE — 72120 X-RAY BEND ONLY L-S SPINE: CPT | Mod: TC

## 2019-07-23 PROCEDURE — 72120 X-RAY BEND ONLY L-S SPINE: CPT | Mod: 26,,, | Performed by: RADIOLOGY

## 2019-07-23 PROCEDURE — 99999 PR PBB SHADOW E&M-EST. PATIENT-LVL III: ICD-10-PCS | Mod: PBBFAC,,, | Performed by: PHYSICIAN ASSISTANT

## 2019-07-23 PROCEDURE — 72100 X-RAY EXAM L-S SPINE 2/3 VWS: CPT | Mod: 26,,, | Performed by: RADIOLOGY

## 2019-07-23 PROCEDURE — 3008F BODY MASS INDEX DOCD: CPT | Mod: CPTII,S$GLB,, | Performed by: PHYSICIAN ASSISTANT

## 2019-07-23 PROCEDURE — 3008F PR BODY MASS INDEX (BMI) DOCUMENTED: ICD-10-PCS | Mod: CPTII,S$GLB,, | Performed by: PHYSICIAN ASSISTANT

## 2019-07-23 PROCEDURE — 72120 XR LUMBAR SPINE AP AND LAT WITH FLEX/EXT: ICD-10-PCS | Mod: 26,,, | Performed by: RADIOLOGY

## 2019-07-23 PROCEDURE — 99999 PR PBB SHADOW E&M-EST. PATIENT-LVL III: CPT | Mod: PBBFAC,,, | Performed by: PHYSICIAN ASSISTANT

## 2019-07-23 PROCEDURE — 99214 OFFICE O/P EST MOD 30 MIN: CPT | Mod: S$GLB,,, | Performed by: PHYSICIAN ASSISTANT

## 2019-07-23 PROCEDURE — 99214 PR OFFICE/OUTPT VISIT, EST, LEVL IV, 30-39 MIN: ICD-10-PCS | Mod: S$GLB,,, | Performed by: PHYSICIAN ASSISTANT

## 2019-07-23 RX ORDER — METHOCARBAMOL 750 MG/1
750 TABLET, FILM COATED ORAL 3 TIMES DAILY
Qty: 60 TABLET | Refills: 0 | Status: SHIPPED | OUTPATIENT
Start: 2019-07-23 | End: 2019-08-12

## 2019-07-23 RX ORDER — METHYLPREDNISOLONE 4 MG/1
TABLET ORAL
Qty: 1 PACKAGE | Refills: 0 | Status: SHIPPED | OUTPATIENT
Start: 2019-07-23 | End: 2021-02-03 | Stop reason: ALTCHOICE

## 2019-07-23 NOTE — PROGRESS NOTES
DATE: 7/23/2019  PATIENT: Mirtha Gary    Attending Physician: Josh Anton M.D.    HISTORY:  Mirtha Gary is a 44 y.o. female who returns to me today for follow up of low back and left leg pain.  She was last seen by me 2/21/2018.  Today she is doing well but notes she was lifting a desk on Sunday and felt a sharp pain in her low back and left posterior leg to the mid thigh.  She was unable to walk afterwards.  She has been taking mobic, ibuprofen, flexeril, norco and tylenol and can now walk but is limping.  She had to cancel a trip to [place] because of the pain.  She is miserable.  She is scheduled for an MRI 8/1 and has a follow up appointment with Dr. Biggs after the MRI to discuss repeat injections.  There is no numbness or tingling.  There is no weakness. The pain is improved by lying down.      The Patient denies myelopathic symptoms such as handwriting changes or difficulty with buttons/coins/keys. Denies perineal paresthesias, bowel/bladder dysfunction.    PMH/PSH/FamHx/SocHx:  Unchanged from prior visit    ROS:  REVIEW OF SYSTEMS:  Constitution: Negative. Negative for chills, fever and night sweats.   HENT: Negative for congestion and headaches.    Eyes: Negative for blurred vision, left vision loss and right vision loss.   Cardiovascular: Negative for chest pain and syncope.   Respiratory: Negative for cough and shortness of breath.    Endocrine: Negative for polydipsia, polyphagia and polyuria.   Hematologic/Lymphatic: Negative for bleeding problem. Does not bruise/bleed easily.   Skin: Negative for dry skin, itching and rash.   Musculoskeletal: Negative for falls and muscle weakness.   Gastrointestinal: Negative for abdominal pain and bowel incontinence.   Allergic/Immunologic: Negative for hives and persistent infections.  Genitourinary: Negative for urinary retention/incontinence and nocturia.   Neurological: Negative for disturbances in coordination, no myelopathic symptoms such as  "handwriting changes or difficulty with buttons, coins, keys or small objects. No loss of balance and seizures.   Psychiatric/Behavioral: Negative for depression. The patient does not have insomnia.   Denies perineal paresthesias, bowel or bladder incontinence    EXAM:  Ht 5' 7" (1.702 m)   Wt 65.8 kg (145 lb 1 oz)   BMI 22.72 kg/m²     My physical examination was notable for the following findings:     Antalgic station and gait, mild difficulty with toe or heel walk.   Dorsal lumbar skin negative for rashes, lesions, hairy patches and surgical scars. There is mild left sided lumbar tenderness to palpation.  Lumbar range of motion is acceptable.  Global saggital and coronal spinal balance acceptable, not significant for scoliosis and kyphosis.  No pain with the range of motion of the bilateral hips. No trochanteric tenderness to palpation.  Bilateral lower extremities warm and well perfused, dorsalis pedis pulses 2+ bilaterally.  Normal strength and tone in all major motor groups in the bilateral lower extremities. Normal sensation to light touch in the L2-S1 dermatomes bilaterally.  Deep tendon reflexes symmetric 2+ in the bilateral lower extremities.  Negative Babinski bilaterally. Straight leg raise negative bilaterally.      IMAGING:    Today I personally reviewed AP, Lat and Flex/Ex  upright L-spine that demonstrate normal disc spacing and alignment.  No acute abnormalities.      Body mass index is 22.72 kg/m².    No results found for: HGBA1C      ASSESSMENT/PLAN:    Mirtha was seen today for low-back pain.    Diagnoses and all orders for this visit:    Lumbar radiculopathy    Other orders  -     methylPREDNISolone (MEDROL DOSEPACK) 4 mg tablet; use as directed  -     methocarbamol (ROBAXIN) 750 MG Tab; Take 1 tablet (750 mg total) by mouth 3 (three) times daily. As needed for muscle spasms for 60 doses        Medrol dose pack given today.  Follow up after MRI.        Follow up if symptoms worsen or fail to " improve.

## 2019-07-23 NOTE — LETTER
July 23, 2019                   [facility]  [address]  [place] [address]  Phone: [phone]   Patient: Mirtha Gary   MR Number: [id number]   YOB: 1975   Date of Visit: 7/23/2019     To whom it may concern,    Mirtha Gary was seen in the Orthopedic Spine clinic 7/23/2019.  Due to medical reasons, she is unable to travel at this time.    If you have any questions or concerns, please contact the office at [phone].      Sincerely,        Lizzy Early PA-C

## 2019-08-01 ENCOUNTER — HOSPITAL ENCOUNTER (OUTPATIENT)
Dept: RADIOLOGY | Facility: OTHER | Age: 44
Discharge: HOME | End: 2019-08-01
Attending: ANESTHESIOLOGY
Payer: COMMERCIAL

## 2019-08-01 DIAGNOSIS — M51.36 DDD (DEGENERATIVE DISC DISEASE), LUMBAR: ICD-10-CM

## 2019-08-01 DIAGNOSIS — M54.16 LEFT LUMBAR RADICULITIS: ICD-10-CM

## 2019-08-01 PROCEDURE — 72148 MRI LUMBAR SPINE WITHOUT CONTRAST: ICD-10-PCS | Mod: 26,,, | Performed by: RADIOLOGY

## 2019-08-01 PROCEDURE — 72148 MRI LUMBAR SPINE W/O DYE: CPT | Mod: TC

## 2019-08-01 PROCEDURE — 72148 MRI LUMBAR SPINE W/O DYE: CPT | Mod: 26,,, | Performed by: RADIOLOGY

## 2019-08-02 ENCOUNTER — HOSPITAL ENCOUNTER (OUTPATIENT)
Facility: OTHER | Age: 44
Discharge: HOME | End: 2019-08-02
Attending: ANESTHESIOLOGY | Admitting: ANESTHESIOLOGY
Payer: COMMERCIAL

## 2019-08-02 VITALS
WEIGHT: 143 LBS | DIASTOLIC BLOOD PRESSURE: 70 MMHG | OXYGEN SATURATION: 99 % | RESPIRATION RATE: 18 BRPM | SYSTOLIC BLOOD PRESSURE: 106 MMHG | HEIGHT: 66 IN | HEART RATE: 69 BPM | TEMPERATURE: 98 F | BODY MASS INDEX: 22.98 KG/M2

## 2019-08-02 DIAGNOSIS — M51.37 DDD (DEGENERATIVE DISC DISEASE), LUMBOSACRAL: Primary | ICD-10-CM

## 2019-08-02 DIAGNOSIS — M54.10 RADICULOPATHY, UNSPECIFIED SPINAL REGION: ICD-10-CM

## 2019-08-02 DIAGNOSIS — M54.10 RADICULOPATHY: ICD-10-CM

## 2019-08-02 DIAGNOSIS — M54.16 LEFT LUMBAR RADICULITIS: ICD-10-CM

## 2019-08-02 LAB
B-HCG UR QL: NEGATIVE
CTP QC/QA: YES

## 2019-08-02 PROCEDURE — 81025 URINE PREGNANCY TEST: CPT | Performed by: ANESTHESIOLOGY

## 2019-08-02 PROCEDURE — 64484 NJX AA&/STRD TFRM EPI L/S EA: CPT | Performed by: ANESTHESIOLOGY

## 2019-08-02 PROCEDURE — 63600175 PHARM REV CODE 636 W HCPCS: Performed by: ANESTHESIOLOGY

## 2019-08-02 PROCEDURE — 25000003 PHARM REV CODE 250: Performed by: ANESTHESIOLOGY

## 2019-08-02 PROCEDURE — 64483 NJX AA&/STRD TFRM EPI L/S 1: CPT | Mod: LT,,, | Performed by: ANESTHESIOLOGY

## 2019-08-02 PROCEDURE — 64483 PR EPIDURAL INJ, ANES/STEROID, TRANSFORAMINAL, LUMB/SACR, SNGL LEVL: ICD-10-PCS | Mod: LT,,, | Performed by: ANESTHESIOLOGY

## 2019-08-02 PROCEDURE — 64483 NJX AA&/STRD TFRM EPI L/S 1: CPT | Performed by: ANESTHESIOLOGY

## 2019-08-02 PROCEDURE — 64484 NJX AA&/STRD TFRM EPI L/S EA: CPT | Mod: LT,,, | Performed by: ANESTHESIOLOGY

## 2019-08-02 PROCEDURE — 64484 PRA INJECT ANES/STEROID FORAMEN LUMBAR/SACRAL W IMG GUIDE ,EA ADD LEVEL: ICD-10-PCS | Mod: LT,,, | Performed by: ANESTHESIOLOGY

## 2019-08-02 RX ORDER — BUPIVACAINE HYDROCHLORIDE 2.5 MG/ML
INJECTION, SOLUTION EPIDURAL; INFILTRATION; INTRACAUDAL
Status: DISCONTINUED | OUTPATIENT
Start: 2019-08-02 | End: 2019-08-02 | Stop reason: HOSPADM

## 2019-08-02 RX ORDER — ALPRAZOLAM 0.5 MG/1
0.5 TABLET ORAL
Status: DISCONTINUED | OUTPATIENT
Start: 2019-08-02 | End: 2019-08-02 | Stop reason: HOSPADM

## 2019-08-02 RX ORDER — METHYLPREDNISOLONE ACETATE 80 MG/ML
INJECTION, SUSPENSION INTRA-ARTICULAR; INTRALESIONAL; INTRAMUSCULAR; SOFT TISSUE
Status: DISCONTINUED | OUTPATIENT
Start: 2019-08-02 | End: 2019-08-02 | Stop reason: HOSPADM

## 2019-08-02 RX ORDER — LIDOCAINE HYDROCHLORIDE 10 MG/ML
INJECTION INFILTRATION; PERINEURAL
Status: DISCONTINUED | OUTPATIENT
Start: 2019-08-02 | End: 2019-08-02 | Stop reason: HOSPADM

## 2019-08-02 RX ORDER — ALPRAZOLAM 0.5 MG/1
1 TABLET ORAL ONCE
Status: COMPLETED | OUTPATIENT
Start: 2019-08-02 | End: 2019-08-02

## 2019-08-02 RX ADMIN — ALPRAZOLAM 1 MG: 0.5 TABLET ORAL at 11:08

## 2019-08-02 NOTE — OP NOTE
Date: 08/02/2019    Procedure: Left L5 and S1 Transforaminal Epidural Steroid Injection    Referring Provider: None    Pre-op diagnosis: Lumbar radiculopathy [M54.16]    Post-op diagnosis: Lumbar radiculopathy [M54.16]     Physician: Dr. Kiera Biggs     Assistant: Dr. Jett    Anesthestia: local/Xanax    All medications, allergies, and relevant histories were reviewed. No recent antibiotics or infections.  A time-out was taken to verify the correct patient, procedure, laterality, and appropriate medications/allergies.    Fluoroscopically-Guided, Contrast-Controlled Transforaminal Epidural Steroid Injection:     Following denial of allergy and review of potential side effects and complications including but not necessarily limited to infection, allergic reaction, local tissue breakdown, nerve injury, and paresis, the patient indicated they understood and agreed to proceed.     The patient was positioned prone and prepped and draped in the usual sterile fashion. The left L5 pedicle was identified fluoroscopically via an oblique view. The skin was anesthetized via 25-gauge 1 needle with approximately 3 cc of bicarbonated 1% lidocaine. At this point, a 22-gauge 3.5 spinal needle was atraumatically introduced and advanced under fluoroscopic guidance to the anterosuperior aspect of the L5 neural foramen. Depth was gauged on lateral view. Needle position at approximately the 6 oclock position relative to the pedicle was confirmed in the AP view. Following negative aspiration for blood and CSF and confirming the absence of paresthesias, injection of approximately 1cc of Omnipaque 300 demonstrated excellent spread along the nerve root into the epidural space.  At this point, 1.5 cc of a mixture of 2 cc of 0.25% bupivacaine with 80 mg of Depo-Medrol was injected without complication. The needle was flushed with 1% lidocaine withdrawn.     The procedure was then repeated in an identical manner at the level of S1 on the  left.    The patient was followed post procedure and discharged under their own power in excellent condition in the company of a responsible adult.       Future Management:   If helpful, can repeat as needed.    Follow up with my clinic in 3 weeks or sooner if needed    I certify that I provided the above services.  I was present for the entire procedure, which was performed by myself with the assistance of the resident physician.  There were no parts of the procedure that were performed not by myself or without my direct supervision.

## 2019-08-02 NOTE — DISCHARGE INSTRUCTIONS
Thank you for allowing us to care for you today. You may receive a survey about the care we provided. Your feedback is valuable and helps us provide excellent care throughout the community.     Home Care Instructions for Pain Management:    1. DIET:   You may resume your normal diet today.   2. BATHING:   You may shower with luke warm water. No tub baths or anything that will soak injection sites under water for the next 24 hours.  3. DRESSING:   You may remove your bandage today.   4. ACTIVITY LEVEL:   You may resume your normal activities 24 hrs after your procedure. Nothing strenuous today.  5. MEDICATIONS:   You may resume your normal medications today. To restart blood thinners, ask your doctor.  6. DRIVING    If you have received any sedatives by mouth today, you may not drive for 12 hours.    If you have received any sedation through your IV, you may not drive for 24 hrs.   7. SPECIAL INSTRUCTIONS:   No heat to the injection site for 24 hrs including, hot bath or shower, heating pad, moist heat, or hot tubs.    Use ice pack to injection site for any pain or discomfort.  Apply ice packs for 20 minute intervals as needed.    IF you have diabetes, be sure to monitor your blood sugar more closely. IF your injection contained steroids your blood sugar levels may become higher than normal.    If you are still having pain upon discharge:  Your pain may improve over the next 48 hours. The anesthetic (numbing medication) works immediately to 48 hours. IF your injection contained a steroid (anti-inflammatory medication), it takes approximately 3 days to start feeling relief and 7-10 days to see your greatest results from the medication. It is possible you may need subsequent injections. This would be discussed at your follow up appointment with pain management or your referring doctor.      PLEASE CALL YOUR DOCTOR IF:  1. Redness or swelling around the injection site.  2. Fever of 101 degrees or more  3. Drainage  (pus) from the injection site.  4. For any continuous bleeding (some dried blood over the incision is normal.)    FOR EMERGENCIES:   If any unusual problems or difficulties occur during clinic hours, call [phone] or [phone].

## 2019-08-02 NOTE — H&P
"HPI  Patient presenting for Procedure(s) (LRB):  INJECTION, STEROID, EPIDURAL, TRANSFORAMINAL APPROACH (Left)     Patient on Anti-coagulation No    No health changes since previous encounter    Past Medical History:   Diagnosis Date    Endometriosis     Migraine      Past Surgical History:   Procedure Laterality Date    COSMETIC SURGERY      breast implants    DILATION AND CURETTAGE, UTERUS, USING SUCTION N/A 6/29/2018    Per[facility] by [facility]rin [facility] at [facility] OR    ECTOPIC PREGNANCY SURGERY      INCISION AND DRAINAGE, left index finger Left 11/28/2018    Performed by [facility] at [facility] OR 1ST FLR    INJECTION-STEROID-EPIDURAL-TRANSFORAMINAL Left 4/13/2018    Performed by Kiera Biggs MD at [facility] PAIN MGT    INJECTION-STEROID-EPIDURAL-TRANSFORAMINAL Left 12/6/2016    Performed by Kiera Biggs MD at [facility] PAIN MGT     Review of patient's allergies indicates:  No Known Allergies   Current Facility-Administered Medications   Medication    ALPRAZolam tablet 0.5 mg       PMHx, PSHx, Allergies, Medications reviewed in epic    ROS negative except pain complaints in HPI    OBJECTIVE:    /61 (Patient Position: Sitting)   Pulse 74   Temp 97.9 °F (36.6 °C) (Oral)   Ht 5' 6" (1.676 m)   Wt 64.9 kg (143 lb)   SpO2 99%   BMI 23.08 kg/m²     PHYSICAL EXAMINATION:    GENERAL: Well appearing, in no acute distress, alert and oriented x3.  PSYCH:  Mood and affect appropriate.  SKIN: Skin color, texture, turgor normal, no rashes or lesions which will impact the procedure.  CV: RRR with palpation of the radial artery.  PULM: No evidence of respiratory difficulty, symmetric chest rise. Clear to auscultation.  NEURO: Cranial nerves grossly intact.    Plan:    Proceed with procedure as planned Procedure(s) (LRB):  INJECTION, STEROID, EPIDURAL, TRANSFORAMINAL APPROACH (Left)    Magdaleno Jett  08/02/2019      I have seen the patient with the resident physician.  We have come up with the above " plan.  The patient is in agreement with our plan. I agree with the above note which I have edited where appropriate

## 2019-08-21 ENCOUNTER — OFFICE VISIT (OUTPATIENT)
Dept: PAIN MEDICINE | Facility: CLINIC | Age: 44
End: 2019-08-21
Attending: ANESTHESIOLOGY
Payer: COMMERCIAL

## 2019-08-21 VITALS
HEART RATE: 67 BPM | RESPIRATION RATE: 18 BRPM | HEIGHT: 66 IN | BODY MASS INDEX: 22.92 KG/M2 | TEMPERATURE: 99 F | DIASTOLIC BLOOD PRESSURE: 79 MMHG | WEIGHT: 142.63 LBS | SYSTOLIC BLOOD PRESSURE: 113 MMHG

## 2019-08-21 DIAGNOSIS — M54.16 LEFT LUMBAR RADICULITIS: ICD-10-CM

## 2019-08-21 DIAGNOSIS — M51.37 DDD (DEGENERATIVE DISC DISEASE), LUMBOSACRAL: ICD-10-CM

## 2019-08-21 DIAGNOSIS — G89.4 CHRONIC PAIN DISORDER: Primary | ICD-10-CM

## 2019-08-21 PROCEDURE — 99213 OFFICE O/P EST LOW 20 MIN: CPT | Mod: S$GLB,,, | Performed by: ANESTHESIOLOGY

## 2019-08-21 PROCEDURE — 99213 PR OFFICE/OUTPT VISIT, EST, LEVL III, 20-29 MIN: ICD-10-PCS | Mod: S$GLB,,, | Performed by: ANESTHESIOLOGY

## 2019-08-21 PROCEDURE — 99999 PR PBB SHADOW E&M-EST. PATIENT-LVL III: ICD-10-PCS | Mod: PBBFAC,,, | Performed by: ANESTHESIOLOGY

## 2019-08-21 PROCEDURE — 3008F PR BODY MASS INDEX (BMI) DOCUMENTED: ICD-10-PCS | Mod: CPTII,S$GLB,, | Performed by: ANESTHESIOLOGY

## 2019-08-21 PROCEDURE — 99999 PR PBB SHADOW E&M-EST. PATIENT-LVL III: CPT | Mod: PBBFAC,,, | Performed by: ANESTHESIOLOGY

## 2019-08-21 PROCEDURE — 3008F BODY MASS INDEX DOCD: CPT | Mod: CPTII,S$GLB,, | Performed by: ANESTHESIOLOGY

## 2019-08-21 RX ORDER — KETOROLAC TROMETHAMINE 5 MG/ML
SOLUTION OPHTHALMIC
Refills: 0 | Status: ON HOLD | COMMUNITY
Start: 2019-05-29 | End: 2021-06-22 | Stop reason: CLARIF

## 2019-08-21 RX ORDER — ERYTHROMYCIN 5 MG/G
OINTMENT OPHTHALMIC
COMMUNITY
Start: 2019-05-29 | End: 2021-06-09 | Stop reason: ALTCHOICE
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
## 2019-09-14 ENCOUNTER — LAB VISIT (OUTPATIENT)
Dept: LAB | Facility: HOSPITAL | Age: 44
End: 2019-09-14
Payer: COMMERCIAL

## 2019-09-14 DIAGNOSIS — Z34.90: ICD-10-CM

## 2019-09-14 DIAGNOSIS — Z34.90: Primary | ICD-10-CM

## 2019-09-14 LAB — HCG INTACT+B SERPL-ACNC: 60 MIU/ML

## 2019-09-14 PROCEDURE — 84702 CHORIONIC GONADOTROPIN TEST: CPT

## 2019-09-14 PROCEDURE — 36415 COLL VENOUS BLD VENIPUNCTURE: CPT

## 2019-09-16 ENCOUNTER — LAB VISIT (OUTPATIENT)
Dept: LAB | Facility: OTHER | Age: 44
End: 2019-09-16
Attending: OBSTETRICS & GYNECOLOGY
Payer: COMMERCIAL

## 2019-09-16 ENCOUNTER — TELEPHONE (OUTPATIENT)
Dept: OBSTETRICS AND GYNECOLOGY | Facility: CLINIC | Age: 44
End: 2019-09-16

## 2019-09-16 DIAGNOSIS — Z32.00 POSSIBLE PREGNANCY, NOT CONFIRMED: Primary | ICD-10-CM

## 2019-09-16 DIAGNOSIS — N91.2 AMENORRHEA: ICD-10-CM

## 2019-09-16 DIAGNOSIS — N91.2 AMENORRHEA: Primary | ICD-10-CM

## 2019-09-16 LAB
HCG INTACT+B SERPL-ACNC: 135 MIU/ML
PROGEST SERPL-MCNC: 15.5 NG/ML

## 2019-09-16 PROCEDURE — 84144 ASSAY OF PROGESTERONE: CPT

## 2019-09-16 PROCEDURE — 84702 CHORIONIC GONADOTROPIN TEST: CPT

## 2019-09-16 PROCEDURE — 36415 COLL VENOUS BLD VENIPUNCTURE: CPT

## 2019-09-20 ENCOUNTER — HOSPITAL ENCOUNTER (OUTPATIENT)
Dept: RADIOLOGY | Facility: OTHER | Age: 44
Discharge: HOME | End: 2019-09-20
Attending: OBSTETRICS & GYNECOLOGY
Payer: COMMERCIAL

## 2019-09-20 DIAGNOSIS — Z32.00 POSSIBLE PREGNANCY, NOT CONFIRMED: ICD-10-CM

## 2019-09-20 PROCEDURE — 76801 OB US < 14 WKS SINGLE FETUS: CPT | Mod: 26,,, | Performed by: INTERNAL MEDICINE

## 2019-09-20 PROCEDURE — 76817 US OB LESS THAN 14 WKS WITH TRANSVAGINAL (XPD): ICD-10-PCS | Mod: 26,,, | Performed by: INTERNAL MEDICINE

## 2019-09-20 PROCEDURE — 76801 OB US < 14 WKS SINGLE FETUS: CPT | Mod: TC

## 2019-09-20 PROCEDURE — 76817 TRANSVAGINAL US OBSTETRIC: CPT | Mod: 26,,, | Performed by: INTERNAL MEDICINE

## 2019-09-20 PROCEDURE — 76801 US OB LESS THAN 14 WKS WITH TRANSVAGINAL (XPD): ICD-10-PCS | Mod: 26,,, | Performed by: INTERNAL MEDICINE

## 2019-10-03 ENCOUNTER — PATIENT MESSAGE (OUTPATIENT)
Dept: OBSTETRICS AND GYNECOLOGY | Facility: CLINIC | Age: 44
End: 2019-10-03

## 2019-10-04 ENCOUNTER — TELEPHONE (OUTPATIENT)
Dept: OBSTETRICS AND GYNECOLOGY | Facility: CLINIC | Age: 44
End: 2019-10-04

## 2019-10-04 DIAGNOSIS — Z32.00 POSSIBLE PREGNANCY, NOT CONFIRMED: Primary | ICD-10-CM

## 2019-10-15 ENCOUNTER — PROCEDURE VISIT (OUTPATIENT)
Dept: OBSTETRICS AND GYNECOLOGY | Facility: CLINIC | Age: 44
End: 2019-10-15
Payer: COMMERCIAL

## 2019-10-15 ENCOUNTER — OFFICE VISIT (OUTPATIENT)
Dept: OBSTETRICS AND GYNECOLOGY | Facility: CLINIC | Age: 44
End: 2019-10-15
Payer: COMMERCIAL

## 2019-10-15 VITALS
BODY MASS INDEX: 22.57 KG/M2 | WEIGHT: 140.44 LBS | DIASTOLIC BLOOD PRESSURE: 74 MMHG | HEIGHT: 66 IN | SYSTOLIC BLOOD PRESSURE: 108 MMHG

## 2019-10-15 DIAGNOSIS — O99.891 BACK PAIN AFFECTING PREGNANCY, ANTEPARTUM: ICD-10-CM

## 2019-10-15 DIAGNOSIS — Z36.89 ESTABLISH GESTATIONAL AGE, ULTRASOUND: ICD-10-CM

## 2019-10-15 DIAGNOSIS — O09.521 MULTIGRAVIDA OF ADVANCED MATERNAL AGE IN FIRST TRIMESTER: ICD-10-CM

## 2019-10-15 DIAGNOSIS — Z32.00 POSSIBLE PREGNANCY, NOT CONFIRMED: ICD-10-CM

## 2019-10-15 DIAGNOSIS — Z87.59 HISTORY OF SPONTANEOUS ABORTION: ICD-10-CM

## 2019-10-15 DIAGNOSIS — M54.9 BACK PAIN AFFECTING PREGNANCY, ANTEPARTUM: ICD-10-CM

## 2019-10-15 DIAGNOSIS — O21.9 NAUSEA/VOMITING IN PREGNANCY: ICD-10-CM

## 2019-10-15 DIAGNOSIS — Z34.80 SUPERVISION OF OTHER NORMAL PREGNANCY, ANTEPARTUM: Primary | ICD-10-CM

## 2019-10-15 LAB
B-HCG UR QL: POSITIVE
C TRACH DNA SPEC QL NAA+PROBE: NOT DETECTED
CTP QC/QA: YES
N GONORRHOEA DNA SPEC QL NAA+PROBE: NOT DETECTED

## 2019-10-15 PROCEDURE — 81025 URINE PREGNANCY TEST: CPT | Mod: S$GLB,,, | Performed by: NURSE PRACTITIONER

## 2019-10-15 PROCEDURE — 76801 PR US, OB <14WKS, TRANSABD, SINGLE GESTATION: ICD-10-PCS | Mod: S$GLB,,, | Performed by: OBSTETRICS & GYNECOLOGY

## 2019-10-15 PROCEDURE — 99999 PR PBB SHADOW E&M-EST. PATIENT-LVL III: CPT | Mod: PBBFAC,,, | Performed by: NURSE PRACTITIONER

## 2019-10-15 PROCEDURE — 99213 OFFICE O/P EST LOW 20 MIN: CPT | Mod: S$GLB,,, | Performed by: NURSE PRACTITIONER

## 2019-10-15 PROCEDURE — 87491 CHLMYD TRACH DNA AMP PROBE: CPT

## 2019-10-15 PROCEDURE — 99999 PR PBB SHADOW E&M-EST. PATIENT-LVL III: ICD-10-PCS | Mod: PBBFAC,,, | Performed by: NURSE PRACTITIONER

## 2019-10-15 PROCEDURE — 81025 POCT URINE PREGNANCY: ICD-10-PCS | Mod: S$GLB,,, | Performed by: NURSE PRACTITIONER

## 2019-10-15 PROCEDURE — 87086 URINE CULTURE/COLONY COUNT: CPT

## 2019-10-15 PROCEDURE — 99213 PR OFFICE/OUTPT VISIT, EST, LEVL III, 20-29 MIN: ICD-10-PCS | Mod: S$GLB,,, | Performed by: NURSE PRACTITIONER

## 2019-10-15 PROCEDURE — 76801 OB US < 14 WKS SINGLE FETUS: CPT | Mod: S$GLB,,, | Performed by: OBSTETRICS & GYNECOLOGY

## 2019-10-15 RX ORDER — ONDANSETRON 8 MG/1
8 TABLET, ORALLY DISINTEGRATING ORAL EVERY 12 HOURS PRN
Qty: 20 TABLET | Refills: 0 | Status: SHIPPED | OUTPATIENT
Start: 2019-10-15 | End: 2021-09-25

## 2019-10-15 RX ORDER — DOXYLAMINE SUCCINATE AND PYRIDOXINE HYDROCHLORIDE, DELAYED RELEASE TABLETS 10 MG/10 MG 10; 10 MG/1; MG/1
2 TABLET, DELAYED RELEASE ORAL NIGHTLY
Qty: 100 TABLET | Refills: 0 | Status: ON HOLD | OUTPATIENT
Start: 2019-10-15 | End: 2021-06-22 | Stop reason: CLARIF
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
## 2019-10-16 LAB — BACTERIA UR CULT: NO GROWTH

## 2019-10-17 ENCOUNTER — TELEPHONE (OUTPATIENT)
Dept: OBSTETRICS AND GYNECOLOGY | Facility: CLINIC | Age: 44
End: 2019-10-17

## 2019-10-18 ENCOUNTER — PATIENT MESSAGE (OUTPATIENT)
Dept: OBSTETRICS AND GYNECOLOGY | Facility: CLINIC | Age: 44
End: 2019-10-18

## 2019-10-18 DIAGNOSIS — O09.90 SUPERVISION OF HIGH RISK PREGNANCY, ANTEPARTUM: Primary | ICD-10-CM

## 2019-10-18 NOTE — TELEPHONE ENCOUNTER
Pt  told by doctor salinas that she is moving back to 11/14 and that t21 does not require an appointment with us to have labs drawn

## 2019-10-19 RX ORDER — PROMETHAZINE HYDROCHLORIDE 6.25 MG/5ML
SYRUP ORAL
Qty: 240 ML | Refills: 1 | Status: SHIPPED | OUTPATIENT
Start: 2019-10-19 | End: 2021-09-25

## 2019-10-28 ENCOUNTER — PATIENT MESSAGE (OUTPATIENT)
Dept: OBSTETRICS AND GYNECOLOGY | Facility: CLINIC | Age: 44
End: 2019-10-28

## 2019-11-04 ENCOUNTER — HOSPITAL ENCOUNTER (OUTPATIENT)
Dept: RADIOLOGY | Facility: OTHER | Age: 44
Discharge: HOME | End: 2019-11-04
Attending: OBSTETRICS & GYNECOLOGY
Payer: COMMERCIAL

## 2019-11-04 ENCOUNTER — PATIENT MESSAGE (OUTPATIENT)
Dept: OBSTETRICS AND GYNECOLOGY | Facility: CLINIC | Age: 44
End: 2019-11-04

## 2019-11-04 DIAGNOSIS — O20.9 VAGINAL BLEEDING IN PREGNANCY, FIRST TRIMESTER: Primary | ICD-10-CM

## 2019-11-04 DIAGNOSIS — O20.9 VAGINAL BLEEDING IN PREGNANCY, FIRST TRIMESTER: ICD-10-CM

## 2019-11-04 DIAGNOSIS — O02.1 MISSED ABORTION: Primary | ICD-10-CM

## 2019-11-04 PROCEDURE — 76801 US OB LESS THAN 14 WKS WITH TRANSVAGINAL (XPD): ICD-10-PCS | Mod: 26,,, | Performed by: RADIOLOGY

## 2019-11-04 PROCEDURE — 76817 TRANSVAGINAL US OBSTETRIC: CPT | Mod: 26,,, | Performed by: RADIOLOGY

## 2019-11-04 PROCEDURE — 76801 OB US < 14 WKS SINGLE FETUS: CPT | Mod: 26,,, | Performed by: RADIOLOGY

## 2019-11-04 PROCEDURE — 76817 US OB LESS THAN 14 WKS WITH TRANSVAGINAL (XPD): ICD-10-PCS | Mod: 26,,, | Performed by: RADIOLOGY

## 2019-11-04 PROCEDURE — 76801 OB US < 14 WKS SINGLE FETUS: CPT | Mod: TC

## 2019-11-04 RX ORDER — IBUPROFEN 800 MG/1
800 TABLET ORAL 3 TIMES DAILY
Qty: 30 TABLET | Refills: 0 | Status: SHIPPED | OUTPATIENT
Start: 2019-11-04 | End: 2021-09-25

## 2019-11-04 RX ORDER — OXYCODONE AND ACETAMINOPHEN 5; 325 MG/1; MG/1
1 TABLET ORAL EVERY 4 HOURS PRN
Qty: 20 TABLET | Refills: 0 | Status: SHIPPED | OUTPATIENT
Start: 2019-11-04 | End: 2021-06-09

## 2019-11-04 RX ORDER — MISOPROSTOL 200 UG/1
800 TABLET ORAL EVERY 12 HOURS
Qty: 8 TABLET | Refills: 0 | Status: SHIPPED | OUTPATIENT
Start: 2019-11-04 | End: 2019-11-06 | Stop reason: SDUPTHER

## 2019-11-05 ENCOUNTER — TELEPHONE (OUTPATIENT)
Dept: OBSTETRICS AND GYNECOLOGY | Facility: CLINIC | Age: 44
End: 2019-11-05

## 2019-11-05 DIAGNOSIS — O03.9 SAB (SPONTANEOUS ABORTION): Primary | ICD-10-CM

## 2019-11-05 NOTE — PROGRESS NOTES
Called patient and her  regarding ultrasound results of non-viable IUP. I have reviewed images and agree with findings. They would like to proceed with cytotec tonight. They were offered D+C tomorrow but wanted to get the process started tonight. Cytotec 800 mcg sent to pharmacy with Ibuprofen and Percocet for pain.     Bleeding precautions given. They will follow up with me in AM via phone to let me know of progression. If no progression overnight will likely proceed with D+C tomorrow in PM.     Katrin Garcia

## 2019-11-06 ENCOUNTER — LAB VISIT (OUTPATIENT)
Dept: LAB | Facility: OTHER | Age: 44
End: 2019-11-06
Attending: OBSTETRICS & GYNECOLOGY
Payer: COMMERCIAL

## 2019-11-06 ENCOUNTER — PROCEDURE VISIT (OUTPATIENT)
Dept: OBSTETRICS AND GYNECOLOGY | Facility: CLINIC | Age: 44
End: 2019-11-06
Payer: COMMERCIAL

## 2019-11-06 DIAGNOSIS — O03.4 INCOMPLETE ABORTION: ICD-10-CM

## 2019-11-06 DIAGNOSIS — O03.9 SAB (SPONTANEOUS ABORTION): Primary | ICD-10-CM

## 2019-11-06 DIAGNOSIS — O03.9 SAB (SPONTANEOUS ABORTION): ICD-10-CM

## 2019-11-06 DIAGNOSIS — O02.1 MISSED ABORTION: ICD-10-CM

## 2019-11-06 LAB — HCG INTACT+B SERPL-ACNC: 800 MIU/ML

## 2019-11-06 PROCEDURE — 76817 PR US, OB, TRANSVAG APPROACH: ICD-10-PCS | Mod: S$GLB,,, | Performed by: OBSTETRICS & GYNECOLOGY

## 2019-11-06 PROCEDURE — 36415 COLL VENOUS BLD VENIPUNCTURE: CPT

## 2019-11-06 PROCEDURE — 76817 TRANSVAGINAL US OBSTETRIC: CPT | Mod: S$GLB,,, | Performed by: OBSTETRICS & GYNECOLOGY

## 2019-11-06 PROCEDURE — 84702 CHORIONIC GONADOTROPIN TEST: CPT

## 2019-11-07 RX ORDER — MISOPROSTOL 200 UG/1
200 TABLET ORAL EVERY 6 HOURS
Qty: 8 TABLET | Refills: 0 | Status: SHIPPED | OUTPATIENT
Start: 2019-11-07 | End: 2021-06-09

## 2019-11-08 ENCOUNTER — LAB VISIT (OUTPATIENT)
Dept: LAB | Facility: OTHER | Age: 44
End: 2019-11-08
Attending: OBSTETRICS & GYNECOLOGY
Payer: COMMERCIAL

## 2019-11-08 DIAGNOSIS — O03.9 SAB (SPONTANEOUS ABORTION): ICD-10-CM

## 2019-11-08 LAB — HCG INTACT+B SERPL-ACNC: 332 MIU/ML

## 2019-11-08 PROCEDURE — 36415 COLL VENOUS BLD VENIPUNCTURE: CPT

## 2019-11-08 PROCEDURE — 84702 CHORIONIC GONADOTROPIN TEST: CPT

## 2019-11-18 ENCOUNTER — LAB VISIT (OUTPATIENT)
Dept: LAB | Facility: OTHER | Age: 44
End: 2019-11-18
Attending: OBSTETRICS & GYNECOLOGY
Payer: COMMERCIAL

## 2019-11-18 DIAGNOSIS — O03.9 SAB (SPONTANEOUS ABORTION): ICD-10-CM

## 2019-11-18 LAB — HCG INTACT+B SERPL-ACNC: 18 MIU/ML

## 2019-11-18 PROCEDURE — 84702 CHORIONIC GONADOTROPIN TEST: CPT

## 2019-11-18 PROCEDURE — 36415 COLL VENOUS BLD VENIPUNCTURE: CPT

## 2019-11-20 ENCOUNTER — CLINICAL SUPPORT (OUTPATIENT)
Dept: REHABILITATION | Facility: OTHER | Age: 44
End: 2019-11-20
Payer: COMMERCIAL

## 2019-11-20 DIAGNOSIS — M62.81 MUSCLE WEAKNESS: ICD-10-CM

## 2019-11-20 DIAGNOSIS — G89.29 CHRONIC BILATERAL LOW BACK PAIN WITHOUT SCIATICA: Primary | ICD-10-CM

## 2019-11-20 DIAGNOSIS — R27.8 ABNORMAL COORDINATION: ICD-10-CM

## 2019-11-20 DIAGNOSIS — M54.50 CHRONIC BILATERAL LOW BACK PAIN WITHOUT SCIATICA: Primary | ICD-10-CM

## 2019-11-20 PROCEDURE — 97140 MANUAL THERAPY 1/> REGIONS: CPT | Mod: PN

## 2019-11-20 PROCEDURE — 97112 NEUROMUSCULAR REEDUCATION: CPT | Mod: PN

## 2019-11-20 PROCEDURE — 97161 PT EVAL LOW COMPLEX 20 MIN: CPT | Mod: PN

## 2019-11-20 NOTE — PATIENT INSTRUCTIONS
"Diaphragmatic breathing (or "belly breathing") - this is best practiced laying down, place one hand on your stomach and one on your ribs. Think about breathing in deeply so that your stomach and ribs expand, moving your hands outward. Exhaling should be passive, you shouldn't have to force air out. As this gets more comfortable/easy, you can practice without your hands on your stomach/ribs.   Perform approx 5 min, once per day.     ________________________    Isometric Hip ABD/ADD (towel press/pillow squeeze)   - 5" hold, 2x10 each, once per day.   "
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
## 2019-11-26 ENCOUNTER — CLINICAL SUPPORT (OUTPATIENT)
Dept: REHABILITATION | Facility: OTHER | Age: 44
End: 2019-11-26
Payer: COMMERCIAL

## 2019-11-26 DIAGNOSIS — G89.29 CHRONIC BILATERAL LOW BACK PAIN WITHOUT SCIATICA: ICD-10-CM

## 2019-11-26 DIAGNOSIS — M54.50 CHRONIC BILATERAL LOW BACK PAIN WITHOUT SCIATICA: ICD-10-CM

## 2019-11-26 DIAGNOSIS — M62.81 MUSCLE WEAKNESS: ICD-10-CM

## 2019-11-26 DIAGNOSIS — R27.8 ABNORMAL COORDINATION: ICD-10-CM

## 2019-11-26 PROCEDURE — 97112 NEUROMUSCULAR REEDUCATION: CPT | Mod: PN

## 2019-11-26 PROCEDURE — 97140 MANUAL THERAPY 1/> REGIONS: CPT | Mod: PN

## 2019-11-26 NOTE — PROGRESS NOTES
"  Outpatient Pelvic Health Physical Therapy Daily Note       Patient Name: Mirtha Gary  Clinic Number: [id number]    Therapy Diagnosis:   Encounter Diagnoses   Name Primary?    Muscle weakness     Abnormal coordination     Chronic bilateral low back pain without sciatica      Physician: Audrey Thakkar,*    Visit Date: 11/26/2019    Physician Orders: PT Eval and Treat   Medical Diagnosis from Referral: O99.89,M54.9 (ICD-10-CM) - Back pain affecting pregnancy, antepartum  Evaluation Date: 11/20/2019  Authorization Period Expiration: 12/31/2019  Plan of Care Expiration: 1/15/2020  Visit # / Visits authorized: 2/30     Time In: 10:00 am        Time Out: 10:50 am   Total Billable Time: 46 minutes    Precautions: Standard    SUBJECTIVE   Pt reports: "it depends" - today her back feels ok, but 2 days ago it was "terrible" when she was moving some [employer] decorations. She noted specifically on the (R) side of her neck and the (L) side of her lower back.    Pain: 4/10 on VAS scale at rest in (L) lower back; with (R) cervical rotation 6/10   Pain location: (L) lower back; (R) cervical spine.     OBJECTIVE       Neuromuscular Re-education to develop multiple selection list (balance, coordination) for 23 minutes including:  - isometric hip abd/add 5" holds 2x10 against PT resistance   - SL clamshells 2x10 (B) for HEP     Manual Therapy to address joint and soft tissue mobilization for 23 minutes including:  - MET to address anterior (L) innominate rotation  - FDN: (L) glute med/min, (L) lateral HS (no pistoning, needles left 5 min each area) - set up and assessment, not billed treatment.     Patient was educated on benefits/risks/expectations with dry needling treatment. Patient gave informed consent to procedure. No adverse reaction occurred during dry needling session.       Education: Discussed progression of plan of care with patient; educated pt in activity modification; reviewed HEP with pt. Pt demonstrated " and verbalized understanding of all instruction (see Patient Instructions).      ASSESSMENT      Pt tolerated entire treatment well with no visible adverse effects. (L) innominate found to be anteriorly rotated, addressed with MET, and further corrected following FDN to address tissue restriction of (L) glute and HS Mm; pt demo soreness/deep ache with needle application but tolerated needle placement for 5 min each area. Decreased pain and pressure with sitting noted post-treatment. Added SL clamshells to HEP; pt demo good form following VC.   Also instructed patient on log-rolling and proper SL>sit transfer at EOB for decreased LBP with bed mobility with improvement noted.   Patient reported feeling well upon departure.     Will the patient continue to benefit from skilled PT intervention? Yes  Medical necessity demonstrated by: persistent LBP and SI joint dysfunction  Progress towards goals:  good  New/Revised goals: NA    Short Term Goals: 3 weeks   - Pt will demonstrate excellent knowledge and adherence to HEP to facilitate optimal recovery.  - Pt will demonstrate maintenance of neutral pelvic alignment for improved tolerance with load transfer and decreased pain with mobility      Long Term Goals: 8 weeks   - Pt will demonstrate proper breathing pattern and abdominal recruitment for improved pressure management and decreased pain with movement  - Pt will demonstrate (B) hip abduction and ER strength of 5/5 MMT for increased stability needed for functional activity   - Pt will demonstrate full, pain-free lumbar AROM for increased mobility tolerance   - Pt will report ability to resume general exercise program without pain for improved health and wellness.       PLAN     Continue with established Plan of Care, working toward established PT goals.     Radha Lazo, PT, DPT

## 2019-11-26 NOTE — PATIENT INSTRUCTIONS
"NEW addition 11/2/6:   - SL clamshells 2x10 (B) daily     Previous HEP (continue with):     Diaphragmatic breathing (or "belly breathing") - this is best practiced laying down, place one hand on your stomach and one on your ribs. Think about breathing in deeply so that your stomach and ribs expand, moving your hands outward. Exhaling should be passive, you shouldn't have to force air out. As this gets more comfortable/easy, you can practice without your hands on your stomach/ribs.   Perform approx 5 min, once per day.      ________________________     Isometric Hip ABD/ADD (towel press/pillow squeeze)   - 5" hold, 2x10 each, once per day.   "

## 2019-12-03 ENCOUNTER — CLINICAL SUPPORT (OUTPATIENT)
Dept: REHABILITATION | Facility: OTHER | Age: 44
End: 2019-12-03
Payer: COMMERCIAL

## 2019-12-03 DIAGNOSIS — G89.29 CHRONIC BILATERAL LOW BACK PAIN WITHOUT SCIATICA: ICD-10-CM

## 2019-12-03 DIAGNOSIS — M54.50 CHRONIC BILATERAL LOW BACK PAIN WITHOUT SCIATICA: ICD-10-CM

## 2019-12-03 DIAGNOSIS — M62.81 MUSCLE WEAKNESS: ICD-10-CM

## 2019-12-03 DIAGNOSIS — R27.8 ABNORMAL COORDINATION: ICD-10-CM

## 2019-12-03 PROCEDURE — 97112 NEUROMUSCULAR REEDUCATION: CPT | Mod: PN

## 2019-12-03 PROCEDURE — 97140 MANUAL THERAPY 1/> REGIONS: CPT | Mod: PN

## 2019-12-03 NOTE — PROGRESS NOTES
"  Outpatient Pelvic Health Physical Therapy Daily Note       Patient Name: Mirtha Gary  Clinic Number: [id number]    Therapy Diagnosis:   Encounter Diagnoses   Name Primary?    Muscle weakness     Abnormal coordination     Chronic bilateral low back pain without sciatica      Physician: Audrey Thakkar,*    Visit Date: 12/3/2019    Physician Orders: PT Eval and Treat   Medical Diagnosis from Referral: O99.89,M54.9 (ICD-10-CM) - Back pain affecting pregnancy, antepartum  Evaluation Date: 11/20/2019  Authorization Period Expiration: 12/31/2019  Plan of Care Expiration: 1/15/2020  Visit # / Visits authorized: 3/30     Time In: 10:05 am        Time Out: 11:00 am   Total Billable Time: 55 minutes    Precautions: Standard    SUBJECTIVE   Pt reports: this week has been better; lateral L thigh is improving (she can touch it without pain); she also had a massage last week that helped. She's been doing 15 min on bike (2x week) without any pain, she plans on increasing.   Her back feels "the same - it depends" - she has been having increased pain due to decorating for [place] (her  is helping to lift the boxes).   Her neck is "always in pain" - worse in morning and night.   She's been feeling very tired - she's going to ask her OB to check her hormone levels to ensure everything is normal following her miscarriage.       Pain: 1/10 on VAS scale at rest in (R) neck, 0/10 in back (would increase if she sat on a hard surface).   Pain location: (L) lower back; (R) cervical spine.     OBJECTIVE       Neuromuscular Re-education to develop multiple selection list (balance, coordination) for 23 minutes including:  - isometric hip abd/add 5" holds 2x10 against PT resistance   - SL clamshells 3x10 (B) for HEP - added BTB  - supine bridging with BTB and abdominal brace 3x10 for HEP     Manual Therapy to address joint and soft tissue mobilization for 23 minutes including:  - MET to address anterior (L) innominate " rotation  - FDN: (L) multifidi: S1, L5, L4 - set up and assessment, not billed treatment.     Patient was educated on benefits/risks/expectations with dry needling treatment. Patient gave informed consent to procedure. No adverse reaction occurred during dry needling session.       Education: Discussed progression of plan of care with patient; educated pt in activity modification; reviewed HEP with pt. Pt demonstrated and verbalized understanding of all instruction (see Patient Instructions).      ASSESSMENT      Pt tolerated entire treatment well with no visible adverse effects. (L) innominate found to be slightly anteriorly rotated, decreased compared to previous session, addressed with MET. Initiated FDN for lumbosacral multifidus with needles left 5 min and mild ache reported with needle application. Progressed stabilization to include SL clamshells and supine bridging with BTB resistance; good tolerance noted with min VC required for proper form. Added bridges and increased SL clamshells to HEP; pt demo good form following VC.   Patient reported feeling well upon departure.     Will the patient continue to benefit from skilled PT intervention? Yes  Medical necessity demonstrated by: persistent LBP and SI joint dysfunction  Progress towards goals:  good  New/Revised goals: NA    Short Term Goals: 3 weeks   - Pt will demonstrate excellent knowledge and adherence to HEP to facilitate optimal recovery. (PROGRESSING)   - Pt will demonstrate maintenance of neutral pelvic alignment for improved tolerance with load transfer and decreased pain with mobility (PROGRESSING)      Long Term Goals: 8 weeks   - Pt will demonstrate proper breathing pattern and abdominal recruitment for improved pressure management and decreased pain with movement (PROGRESSING)   - Pt will demonstrate (B) hip abduction and ER strength of 5/5 MMT for increased stability needed for functional activity (PROGRESSING)   - Pt will demonstrate full,  pain-free lumbar AROM for increased mobility tolerance (PROGRESSING)   - Pt will report ability to resume general exercise program without pain for improved health and wellness. (PROGRESSING)       PLAN     Continue with established Plan of Care, working toward established PT goals.     Radha Lazo, PT, DPT

## 2019-12-10 ENCOUNTER — CLINICAL SUPPORT (OUTPATIENT)
Dept: REHABILITATION | Facility: OTHER | Age: 44
End: 2019-12-10
Payer: COMMERCIAL

## 2019-12-10 DIAGNOSIS — M62.81 MUSCLE WEAKNESS: ICD-10-CM

## 2019-12-10 DIAGNOSIS — M54.50 CHRONIC BILATERAL LOW BACK PAIN WITHOUT SCIATICA: ICD-10-CM

## 2019-12-10 DIAGNOSIS — R27.8 ABNORMAL COORDINATION: ICD-10-CM

## 2019-12-10 DIAGNOSIS — G89.29 CHRONIC BILATERAL LOW BACK PAIN WITHOUT SCIATICA: ICD-10-CM

## 2019-12-10 PROCEDURE — 97112 NEUROMUSCULAR REEDUCATION: CPT | Mod: PN

## 2019-12-10 PROCEDURE — 97140 MANUAL THERAPY 1/> REGIONS: CPT | Mod: PN

## 2019-12-10 NOTE — PROGRESS NOTES
"  Outpatient Pelvic Health Physical Therapy Daily Note       Patient Name: Mirtha Gary  Clinic Number: [id number]    Therapy Diagnosis:   Encounter Diagnoses   Name Primary?    Muscle weakness     Abnormal coordination     Chronic bilateral low back pain without sciatica      Physician: Audrey Thakkar,*    Visit Date: 12/10/2019    Physician Orders: PT Eval and Treat   Medical Diagnosis from Referral: O99.89,M54.9 (ICD-10-CM) - Back pain affecting pregnancy, antepartum  Evaluation Date: 11/20/2019  Authorization Period Expiration: 12/31/2019  Plan of Care Expiration: 1/15/2020  Visit # / Visits authorized: 4/30     Time In: 10:05 am        Time Out: 11:00 am   Total Billable Time: 55 minutes    Precautions: Standard    SUBJECTIVE   Pt reports: she's had a migraine for the last 2 days; she's taken some medicine that makes her feel a little dizzy but it's the only thing that can get her headache go to away.   Her back is feeling better this week. She's been doing her HEP 1-2x/day. She can touch her lateral L tight without pain (some discomfort with heavy pressure). She has not started the bike this week due to being busy.       Pain: 4/10 on VAS scale at rest in (R) neck, 0/10 in back   Pain location: (L) lower back; (R) cervical spine.     OBJECTIVE       Neuromuscular Re-education to develop multiple selection list (balance, coordination) for 30 minutes including:  - prone scapular retraction 3" hold/2x10   - supine bridging with BTB and abdominal brace 5" hold/3x15 for HEP   - SL clamshells with BTB 5" hold/3x15 (B) for HEP BTB     Next session : add standing retractions/multifidus anti-rotations      Not today:   - isometric hip abd/add 5" holds 2x10 against PT resistance         Manual Therapy to address joint and soft tissue mobilization for 23 minutes including:  - FDN: (R) UT (ant and post approach) - set up and assessment, not billed treatment.     Not today:   - MET to address anterior (L) " innominate rotation  - FDN: (L) multifidi: S1, L5, L4 - set up and assessment, not billed treatment.     Patient was educated on benefits/risks/expectations with dry needling treatment. Patient gave informed consent to procedure. No adverse reaction occurred during dry needling session.       Education: Discussed progression of plan of care with patient; educated pt in activity modification; reviewed HEP with pt. Pt demonstrated and verbalized understanding of all instruction (see Patient Instructions).      ASSESSMENT      Pt tolerated entire treatment well with no visible adverse effects. Pelvis found to be in neutral alignment. Initiated FDN for (R) UT (anterior and posterior fibers) with deep ahce reported in both regions, strong twitch elicited in anterior fibers. Progressed repetitions and hold time with stabilization exercises; min VC required for proper form. Attempted progression to SL bridge but pt reported SI pain following 3 reps. Increased repetitions for ex's with HEP.   Patient reported feeling well upon departure.     Will the patient continue to benefit from skilled PT intervention? Yes  Medical necessity demonstrated by: persistent LBP and SI joint dysfunction  Progress towards goals:  good  New/Revised goals: NA    Short Term Goals: 3 weeks   - Pt will demonstrate excellent knowledge and adherence to HEP to facilitate optimal recovery. (PROGRESSING)   - Pt will demonstrate maintenance of neutral pelvic alignment for improved tolerance with load transfer and decreased pain with mobility (PROGRESSING)      Long Term Goals: 8 weeks   - Pt will demonstrate proper breathing pattern and abdominal recruitment for improved pressure management and decreased pain with movement (PROGRESSING)   - Pt will demonstrate (B) hip abduction and ER strength of 5/5 MMT for increased stability needed for functional activity (PROGRESSING)   - Pt will demonstrate full, pain-free lumbar AROM for increased mobility  tolerance (PROGRESSING)   - Pt will report ability to resume general exercise program without pain for improved health and wellness. (PROGRESSING)       PLAN     Continue with established Plan of Care, working toward established PT goals.     Radha Lazo, PT, DPT

## 2019-12-26 ENCOUNTER — CLINICAL SUPPORT (OUTPATIENT)
Dept: REHABILITATION | Facility: OTHER | Age: 44
End: 2019-12-26
Payer: COMMERCIAL

## 2019-12-26 DIAGNOSIS — R27.8 ABNORMAL COORDINATION: ICD-10-CM

## 2019-12-26 DIAGNOSIS — G89.29 CHRONIC BILATERAL LOW BACK PAIN WITHOUT SCIATICA: ICD-10-CM

## 2019-12-26 DIAGNOSIS — M54.50 CHRONIC BILATERAL LOW BACK PAIN WITHOUT SCIATICA: ICD-10-CM

## 2019-12-26 DIAGNOSIS — M62.81 MUSCLE WEAKNESS: ICD-10-CM

## 2019-12-26 PROCEDURE — 97140 MANUAL THERAPY 1/> REGIONS: CPT | Mod: PN

## 2019-12-26 PROCEDURE — 97112 NEUROMUSCULAR REEDUCATION: CPT | Mod: PN

## 2019-12-26 NOTE — PROGRESS NOTES
"  Outpatient Pelvic Health Physical Therapy Daily Note       Patient Name: Mirtha Gary  Clinic Number: [id number]    Therapy Diagnosis:   Encounter Diagnoses   Name Primary?    Muscle weakness     Abnormal coordination     Chronic bilateral low back pain without sciatica      Physician: Audrey Thakkar,*    Visit Date: 12/26/2019    Physician Orders: PT Eval and Treat   Medical Diagnosis from Referral: O99.89,M54.9 (ICD-10-CM) - Back pain affecting pregnancy, antepartum  Evaluation Date: 11/20/2019  Authorization Period Expiration: 12/31/2019  Plan of Care Expiration: 1/15/2020  Visit # / Visits authorized: 5/30     Time In: 9:03 am        Time Out: 9:50 am   Total Billable Time: 46 minutes    Precautions: Standard    SUBJECTIVE   Pt reports: she had a virus last week that caused her to miss last session. She's been doing her HEP and using her stationary bike for 30 min/day. She's been carrying her son a lot lately but he's getting heavier so it's hard for her to carry him without back pain.   She is able to sit without back/leg pain. She cannot carry heavy objects or her son without pain.   She felt benefit from dry needling to UT after last session.       Pain: 7/10 on VAS scale with movement (SB or rotation to right) in (R) neck, 0/10 in back   Pain location: (L) lower back; (R) cervical spine.     OBJECTIVE       Neuromuscular Re-education to develop improved core stabilization for 23 minutes including:  - GTB multifidus anti-rotation walk-outs and press-outs with VC for exhale (walk-outs: 2x10 B; press-outs: 10x B).     Not today:   - prone scapular retraction 3" hold/2x10   - supine bridging with BTB and abdominal brace 5" hold/3x15 for HEP   - SL clamshells with BTB 5" hold/3x15 (B) for HEP BTB         Manual Therapy to address joint and soft tissue mobilization for 23 minutes including:  - FDN: (R) UT (ant and post approach) - set up and assessment, not billed treatment.     Not today:   - MET to " address anterior (L) innominate rotation  - FDN: (L) multifidi: S1, L5, L4 - set up and assessment, not billed treatment.     Patient was educated on benefits/risks/expectations with dry needling treatment. Patient gave informed consent to procedure. No adverse reaction occurred during dry needling session.       Education: Discussed progression of plan of care with patient; educated pt in activity modification; reviewed HEP with pt. Pt demonstrated and verbalized understanding of all instruction (see Patient Instructions).      ASSESSMENT      Session focused on reducing soft tissue restriction preventing normal cervical movement. Progressed stabilization ex's to include multifidus anti-rotation walk-outs and press-outs for improved global spine stabilization with no increased pain reported, slight tightness in UT that stopped after ex. Added anti-rotations to HEP. Patient reported feeling well upon departure.       Pt tolerated entire treatment well with no visible adverse effects. Pelvis found to be in neutral alignment. Initiated FDN for (R) UT (anterior and posterior fibers) with deep ahce reported in both regions, strong twitch elicited in anterior fibers. Progressed repetitions and hold time with stabilization exercises; min VC required for proper form. Attempted progression to SL bridge but pt reported SI pain following 3 reps. Increased repetitions for ex's with HEP.   Patient reported feeling well upon departure.     Will the patient continue to benefit from skilled PT intervention? Yes  Medical necessity demonstrated by: persistent LBP and SI joint dysfunction  Progress towards goals:  good  New/Revised goals: NA    Short Term Goals: 3 weeks   - Pt will demonstrate excellent knowledge and adherence to HEP to facilitate optimal recovery. (PROGRESSING)   - Pt will demonstrate maintenance of neutral pelvic alignment for improved tolerance with load transfer and decreased pain with mobility (PROGRESSING)       Long Term Goals: 8 weeks   - Pt will demonstrate proper breathing pattern and abdominal recruitment for improved pressure management and decreased pain with movement (PROGRESSING)   - Pt will demonstrate (B) hip abduction and ER strength of 5/5 MMT for increased stability needed for functional activity (PROGRESSING)   - Pt will demonstrate full, pain-free lumbar AROM for increased mobility tolerance (PROGRESSING)   - Pt will report ability to resume general exercise program without pain for improved health and wellness. (PROGRESSING)       PLAN     Continue with established Plan of Care, working toward established PT goals.     Radha Lazo, PT, DPT

## 2019-12-31 ENCOUNTER — CLINICAL SUPPORT (OUTPATIENT)
Dept: REHABILITATION | Facility: OTHER | Age: 44
End: 2019-12-31
Payer: COMMERCIAL

## 2019-12-31 DIAGNOSIS — M62.81 MUSCLE WEAKNESS: ICD-10-CM

## 2019-12-31 DIAGNOSIS — R27.8 ABNORMAL COORDINATION: ICD-10-CM

## 2019-12-31 DIAGNOSIS — M54.50 CHRONIC BILATERAL LOW BACK PAIN WITHOUT SCIATICA: ICD-10-CM

## 2019-12-31 DIAGNOSIS — G89.29 CHRONIC BILATERAL LOW BACK PAIN WITHOUT SCIATICA: ICD-10-CM

## 2019-12-31 PROCEDURE — 97112 NEUROMUSCULAR REEDUCATION: CPT | Mod: PN

## 2019-12-31 PROCEDURE — 97140 MANUAL THERAPY 1/> REGIONS: CPT | Mod: PN

## 2019-12-31 NOTE — PROGRESS NOTES
"  Outpatient Pelvic Health Physical Therapy Daily Note       Patient Name: Mirtha Gary  Clinic Number: [id number]    Therapy Diagnosis:   Encounter Diagnoses   Name Primary?    Muscle weakness     Abnormal coordination     Chronic bilateral low back pain without sciatica      Physician: Audrey Thakkar,*    Visit Date: 12/31/2019    Physician Orders: PT Eval and Treat   Medical Diagnosis from Referral: O99.89,M54.9 (ICD-10-CM) - Back pain affecting pregnancy, antepartum  Evaluation Date: 11/20/2019  Authorization Period Expiration: 12/31/2019  Plan of Care Expiration: 1/15/2020  Visit # / Visits authorized: 6/30     Time In: 10:03 am        Time Out: 10:50 am   Total Billable Time: 46 minutes    Precautions: Standard    SUBJECTIVE   Pt reports: this week has been better, but she is still having "terrible" pain on the (R) side of her neck. Her mom did acupunture but she did not have any relief. No alleviation with dry needling, acupuncture, heat packs. Pain does not radiate into her arm.   She felt fine with new HEP updates from last session, and she is still using her stationary bike for 30min as well as swimming 30min/day at the gym.       Pain: 7/10 on VAS scale with movement (SB or rotation to right) in (R) neck, 0/10 in back   Pain location: (L) lower back; (R) cervical spine.     OBJECTIVE       Neuromuscular Re-education to develop improved core stabilization for 23 minutes including:  - supine chin/tuck 3x10/5" hold     Not today:   - supine bridging with BTB and abdominal brace 5" hold/3x15 for HEP   - SL clamshells with BTB 5" hold/3x15 (B) for HEP BTB   - GTB multifidus anti-rotation walk-outs and press-outs with VC for exhale (walk-outs: 2x10 B; press-outs: 10x B).         Manual Therapy to address joint and soft tissue mobilization for 23 minutes including:  - MET for opening (R) C4/5, 5/6 facet   - STM: cervical PVM and scaleni     Not today:   - MET to address anterior (L) innominate " rotation  - FDN: (L) multifidi: S1, L5, L4 - set up and assessment, not billed treatment.   - FDN: (R) UT (ant and post approach) - set up and assessment, not billed treatment.    Patient was educated on benefits/risks/expectations with dry needling treatment. Patient gave informed consent to procedure. No adverse reaction occurred during dry needling session.       Education: Discussed progression of plan of care with patient; educated pt in activity modification; reviewed HEP with pt. Pt demonstrated and verbalized understanding of all instruction (see Patient Instructions).      ASSESSMENT      Session focused on addressing joint and soft tissue restriction of cervical region leading to persistent pain and limited ROM. Pt presented with decreased mobility of (R) cervical spine, addressed with STM and MET. Pt demo significant improved ROM and reported decreased pain following treatment. Initiated supine chin tucks for improved cervical stabilization to HEP. Patient reported feeling well upon departure.     Will the patient continue to benefit from skilled PT intervention? Yes  Medical necessity demonstrated by: persistent muscle and joint dysfunction  Progress towards goals:  good  New/Revised goals: NA    Short Term Goals: 3 weeks   - Pt will demonstrate excellent knowledge and adherence to HEP to facilitate optimal recovery. (ACHEIVED)   - Pt will demonstrate maintenance of neutral pelvic alignment for improved tolerance with load transfer and decreased pain with mobility (PROGRESSING)      Long Term Goals: 8 weeks   - Pt will demonstrate proper breathing pattern and abdominal recruitment for improved pressure management and decreased pain with movement (PROGRESSING)   - Pt will demonstrate (B) hip abduction and ER strength of 5/5 MMT for increased stability needed for functional activity (PROGRESSING)   - Pt will demonstrate full, pain-free lumbar AROM for increased mobility tolerance (PROGRESSING)   - Pt will  report ability to resume general exercise program without pain for improved health and wellness. (PROGRESSING)       PLAN     Continue with established Plan of Care, working toward established PT goals.     Radha Lazo, PT, DPT

## 2020-01-14 ENCOUNTER — PATIENT MESSAGE (OUTPATIENT)
Dept: REHABILITATION | Facility: OTHER | Age: 45
End: 2020-01-14

## 2020-01-22 ENCOUNTER — CLINICAL SUPPORT (OUTPATIENT)
Dept: REHABILITATION | Facility: OTHER | Age: 45
End: 2020-01-22
Payer: COMMERCIAL

## 2020-01-22 DIAGNOSIS — G89.29 CHRONIC BILATERAL LOW BACK PAIN WITHOUT SCIATICA: ICD-10-CM

## 2020-01-22 DIAGNOSIS — M54.50 CHRONIC BILATERAL LOW BACK PAIN WITHOUT SCIATICA: ICD-10-CM

## 2020-01-22 DIAGNOSIS — M62.81 MUSCLE WEAKNESS: ICD-10-CM

## 2020-01-22 DIAGNOSIS — R27.8 ABNORMAL COORDINATION: ICD-10-CM

## 2020-01-22 PROCEDURE — 97112 NEUROMUSCULAR REEDUCATION: CPT | Mod: PN

## 2020-01-22 PROCEDURE — 97140 MANUAL THERAPY 1/> REGIONS: CPT | Mod: PN

## 2020-01-22 NOTE — PROGRESS NOTES
"  Outpatient Pelvic Health Physical Therapy Daily Note       Patient Name: Mirtha Gary  Clinic Number: [id number]    Therapy Diagnosis:   Encounter Diagnoses   Name Primary?    Muscle weakness     Abnormal coordination     Chronic bilateral low back pain without sciatica      Physician: Audrey Thakkar,*    Visit Date: 1/22/2020    Physician Orders: PT Eval and Treat   Medical Diagnosis from Referral: O99.89,M54.9 (ICD-10-CM) - Back pain affecting pregnancy, antepartum  Evaluation Date: 11/20/2019  Authorization Period Expiration: 12/31/2019  Plan of Care Expiration: 1/15/2020  Visit # / Visits authorized: 6/30     Time In: 10:05 am        Time Out: 10:50 am   Total Billable Time: 46 minutes    Precautions: Standard    SUBJECTIVE   Pt reports: her neck has been hurting for about a month, she's getting worried about it. She has been doing her exercises and she has been doing beginner yoga classes and acupuncture. She has been able to exercise and her back is getting better.       Pain: unable to verbalize number  Pain location: (R) cervical spine.     OBJECTIVE       Neuromuscular Re-education to develop improved core stabilization for 23 minutes including:  - OTB no moneys 3x15/5" hold   - GTB low V's 3x15/5" hold    Not today:   - supine bridging with BTB and abdominal brace 5" hold/3x15 for HEP   - SL clamshells with BTB 5" hold/3x15 (B) for HEP BTB   - GTB multifidus anti-rotation walk-outs and press-outs with VC for exhale (walk-outs: 2x10 B; press-outs: 10x B).   - supine chin/tuck 3x10/5" hold         Manual Therapy to address joint and soft tissue mobilization for 23 minutes including:  - FDN: (R) UT (post approach); (R) threading of UT/LS; (R) multifidus C4, C6, T1; (R) pec major/minor - set up and assessment, not billed treatment.    Not today:   - MET to address anterior (L) innominate rotation  - FDN: (L) multifidi: S1, L5, L4 - set up and assessment, not billed treatment.   - MET for opening " "(R) C4/5, 5/6 facet   - STM: cervical PVM and scaleni     Patient was educated on benefits/risks/expectations with dry needling treatment. Patient gave informed consent to procedure. No adverse reaction occurred during dry needling session.       Education: Discussed progression of plan of care with patient; educated pt in activity modification; reviewed HEP with pt. Pt demonstrated and verbalized understanding of all instruction (see Patient Instructions).      ASSESSMENT      Session focused on addressing joint and soft tissue restriction of cervical region leading to persistent pain and limited ROM. Pt presented with decreased mobility of (R) cervical spine, addressed with FDN producing deep ache and twitch responses. Initiated "no moneys" and low Vs for improved scapular stabilization. Patient reported feeling better upon departure.     Due to pt's lower back pain being mostly resolved and cervical pain becoming main issue limiting function, PT discussed with patient getting a referral specifically for cervical dysfunction so she can be transferred to orthopedic PT, patient agreed with this plan.     Will the patient continue to benefit from skilled PT intervention? Yes  Medical necessity demonstrated by: persistent muscle and joint dysfunction  Progress towards goals:  good  New/Revised goals: NA    Short Term Goals: 3 weeks   - Pt will demonstrate excellent knowledge and adherence to HEP to facilitate optimal recovery. (ACHEIVED)   - Pt will demonstrate maintenance of neutral pelvic alignment for improved tolerance with load transfer and decreased pain with mobility (ACHIEVED)      Long Term Goals: 8 weeks   - Pt will demonstrate proper breathing pattern and abdominal recruitment for improved pressure management and decreased pain with movement (ACHIEVED)   - Pt will demonstrate (B) hip abduction and ER strength of 5/5 MMT for increased stability needed for functional activity (PROGRESSING)   - Pt will " demonstrate full, pain-free lumbar AROM for increased mobility tolerance (ACHIEVED)   - Pt will report ability to resume general exercise program without pain for improved health and wellness. (PROGRESSING)       PLAN     Continue with established Plan of Care, working toward established PT goals.     Radha Lazo, PT, DPT

## 2020-01-24 ENCOUNTER — TELEPHONE (OUTPATIENT)
Dept: OBSTETRICS AND GYNECOLOGY | Facility: CLINIC | Age: 45
End: 2020-01-24

## 2020-01-24 DIAGNOSIS — Z30.430 ENCOUNTER FOR IUD INSERTION: Primary | ICD-10-CM

## 2020-01-29 ENCOUNTER — LAB VISIT (OUTPATIENT)
Dept: LAB | Facility: OTHER | Age: 45
End: 2020-01-29
Attending: PSYCHIATRY & NEUROLOGY
Payer: COMMERCIAL

## 2020-01-29 DIAGNOSIS — R51.9 HEADACHE AROUND THE EYES: ICD-10-CM

## 2020-01-29 DIAGNOSIS — R53.81 MALAISE AND FATIGUE: Primary | ICD-10-CM

## 2020-01-29 DIAGNOSIS — M54.2 NECK ACHE: ICD-10-CM

## 2020-01-29 DIAGNOSIS — R53.83 MALAISE AND FATIGUE: ICD-10-CM

## 2020-01-29 DIAGNOSIS — R53.81 MALAISE AND FATIGUE: ICD-10-CM

## 2020-01-29 DIAGNOSIS — R53.83 MALAISE AND FATIGUE: Primary | ICD-10-CM

## 2020-01-29 LAB
ALBUMIN SERPL BCP-MCNC: 4 G/DL (ref 3.5–5.2)
ALP SERPL-CCNC: 39 U/L (ref 55–135)
ALT SERPL W/O P-5'-P-CCNC: 13 U/L (ref 10–44)
ANION GAP SERPL CALC-SCNC: 8 MMOL/L (ref 8–16)
AST SERPL-CCNC: 13 U/L (ref 10–40)
BASOPHILS # BLD AUTO: 0.02 K/UL (ref 0–0.2)
BASOPHILS NFR BLD: 0.3 % (ref 0–1.9)
BILIRUB SERPL-MCNC: 0.3 MG/DL (ref 0.1–1)
BUN SERPL-MCNC: 15 MG/DL (ref 6–20)
CALCIUM SERPL-MCNC: 9.5 MG/DL (ref 8.7–10.5)
CHLORIDE SERPL-SCNC: 107 MMOL/L (ref 95–110)
CO2 SERPL-SCNC: 27 MMOL/L (ref 23–29)
CREAT SERPL-MCNC: 0.7 MG/DL (ref 0.5–1.4)
CRP SERPL-MCNC: 0.7 MG/L (ref 0–8.2)
DIFFERENTIAL METHOD: NORMAL
EOSINOPHIL # BLD AUTO: 0.1 K/UL (ref 0–0.5)
EOSINOPHIL NFR BLD: 1.3 % (ref 0–8)
ERYTHROCYTE [DISTWIDTH] IN BLOOD BY AUTOMATED COUNT: 12.1 % (ref 11.5–14.5)
ERYTHROCYTE [SEDIMENTATION RATE] IN BLOOD: 10 MM/HR (ref 0–20)
EST. GFR  (AFRICAN AMERICAN): >60 ML/MIN/1.73 M^2
EST. GFR  (NON AFRICAN AMERICAN): >60 ML/MIN/1.73 M^2
GLUCOSE SERPL-MCNC: 85 MG/DL (ref 70–110)
HCT VFR BLD AUTO: 39.4 % (ref 37–48.5)
HGB BLD-MCNC: 12.9 G/DL (ref 12–16)
IMM GRANULOCYTES # BLD AUTO: 0.01 K/UL (ref 0–0.04)
IMM GRANULOCYTES NFR BLD AUTO: 0.2 % (ref 0–0.5)
LYMPHOCYTES # BLD AUTO: 1.8 K/UL (ref 1–4.8)
LYMPHOCYTES NFR BLD: 29.5 % (ref 18–48)
MCH RBC QN AUTO: 29.9 PG (ref 27–31)
MCHC RBC AUTO-ENTMCNC: 32.7 G/DL (ref 32–36)
MCV RBC AUTO: 91 FL (ref 82–98)
MONOCYTES # BLD AUTO: 0.4 K/UL (ref 0.3–1)
MONOCYTES NFR BLD: 6.2 % (ref 4–15)
NEUTROPHILS # BLD AUTO: 3.9 K/UL (ref 1.8–7.7)
NEUTROPHILS NFR BLD: 62.5 % (ref 38–73)
NRBC BLD-RTO: 0 /100 WBC
PLATELET # BLD AUTO: 260 K/UL (ref 150–350)
PMV BLD AUTO: 10.7 FL (ref 9.2–12.9)
POTASSIUM SERPL-SCNC: 4.2 MMOL/L (ref 3.5–5.1)
PROT SERPL-MCNC: 7.2 G/DL (ref 6–8.4)
RBC # BLD AUTO: 4.31 M/UL (ref 4–5.4)
SODIUM SERPL-SCNC: 142 MMOL/L (ref 136–145)
T3FREE SERPL-MCNC: 2.5 PG/ML (ref 2.3–4.2)
T4 FREE SERPL-MCNC: 0.86 NG/DL (ref 0.71–1.51)
T4 SERPL-MCNC: 6.8 UG/DL (ref 4.5–11.5)
TSH SERPL DL<=0.005 MIU/L-ACNC: 1.3 UIU/ML (ref 0.4–4)
WBC # BLD AUTO: 6.17 K/UL (ref 3.9–12.7)

## 2020-01-29 PROCEDURE — 36415 COLL VENOUS BLD VENIPUNCTURE: CPT

## 2020-01-29 PROCEDURE — 85305 CLOT INHIBIT PROT S TOTAL: CPT

## 2020-01-29 PROCEDURE — 84439 ASSAY OF FREE THYROXINE: CPT

## 2020-01-29 PROCEDURE — 85303 CLOT INHIBIT PROT C ACTIVITY: CPT

## 2020-01-29 PROCEDURE — 84481 FREE ASSAY (FT-3): CPT

## 2020-01-29 PROCEDURE — 85025 COMPLETE CBC W/AUTO DIFF WBC: CPT

## 2020-01-29 PROCEDURE — 84443 ASSAY THYROID STIM HORMONE: CPT

## 2020-01-29 PROCEDURE — 84436 ASSAY OF TOTAL THYROXINE: CPT

## 2020-01-29 PROCEDURE — 85651 RBC SED RATE NONAUTOMATED: CPT

## 2020-01-29 PROCEDURE — 86140 C-REACTIVE PROTEIN: CPT

## 2020-01-29 PROCEDURE — 80053 COMPREHEN METABOLIC PANEL: CPT

## 2020-01-29 PROCEDURE — 84480 ASSAY TRIIODOTHYRONINE (T3): CPT

## 2020-01-29 PROCEDURE — 84479 ASSAY OF THYROID (T3 OR T4): CPT

## 2020-01-30 ENCOUNTER — OFFICE VISIT (OUTPATIENT)
Dept: PAIN MEDICINE | Facility: CLINIC | Age: 45
End: 2020-01-30
Attending: ANESTHESIOLOGY
Payer: COMMERCIAL

## 2020-01-30 ENCOUNTER — PROCEDURE VISIT (OUTPATIENT)
Dept: OBSTETRICS AND GYNECOLOGY | Facility: CLINIC | Age: 45
End: 2020-01-30
Payer: COMMERCIAL

## 2020-01-30 VITALS
HEART RATE: 76 BPM | RESPIRATION RATE: 18 BRPM | OXYGEN SATURATION: 100 % | BODY MASS INDEX: 22.99 KG/M2 | WEIGHT: 143.06 LBS | HEIGHT: 66 IN | DIASTOLIC BLOOD PRESSURE: 74 MMHG | TEMPERATURE: 98 F | SYSTOLIC BLOOD PRESSURE: 105 MMHG

## 2020-01-30 VITALS
BODY MASS INDEX: 23.03 KG/M2 | HEIGHT: 66 IN | SYSTOLIC BLOOD PRESSURE: 104 MMHG | DIASTOLIC BLOOD PRESSURE: 60 MMHG | WEIGHT: 143.31 LBS

## 2020-01-30 DIAGNOSIS — Z12.31 VISIT FOR SCREENING MAMMOGRAM: ICD-10-CM

## 2020-01-30 DIAGNOSIS — G89.4 CHRONIC PAIN DISORDER: Primary | ICD-10-CM

## 2020-01-30 DIAGNOSIS — G43.909 MIGRAINE WITHOUT STATUS MIGRAINOSUS, NOT INTRACTABLE, UNSPECIFIED MIGRAINE TYPE: ICD-10-CM

## 2020-01-30 DIAGNOSIS — M79.18 MYOFASCIAL PAIN: ICD-10-CM

## 2020-01-30 DIAGNOSIS — Z30.430 ENCOUNTER FOR INSERTION OF INTRAUTERINE CONTRACEPTIVE DEVICE: Primary | ICD-10-CM

## 2020-01-30 DIAGNOSIS — M47.812 CERVICAL SPONDYLOSIS: ICD-10-CM

## 2020-01-30 LAB — T3 SERPL-MCNC: 80 NG/DL (ref 60–180)

## 2020-01-30 PROCEDURE — 58300 INSERTION OF IUD: ICD-10-PCS | Mod: S$GLB,,, | Performed by: OBSTETRICS & GYNECOLOGY

## 2020-01-30 PROCEDURE — 3008F PR BODY MASS INDEX (BMI) DOCUMENTED: ICD-10-PCS | Mod: CPTII,S$GLB,, | Performed by: ANESTHESIOLOGY

## 2020-01-30 PROCEDURE — 99214 PR OFFICE/OUTPT VISIT, EST, LEVL IV, 30-39 MIN: ICD-10-PCS | Mod: 25,S$GLB,, | Performed by: ANESTHESIOLOGY

## 2020-01-30 PROCEDURE — 20553 PR INJECT TRIGGER POINTS, > 3: ICD-10-PCS | Mod: S$GLB,,, | Performed by: ANESTHESIOLOGY

## 2020-01-30 PROCEDURE — 99999 PR PBB SHADOW E&M-EST. PATIENT-LVL IV: ICD-10-PCS | Mod: PBBFAC,,, | Performed by: ANESTHESIOLOGY

## 2020-01-30 PROCEDURE — 99999 PR PBB SHADOW E&M-EST. PATIENT-LVL IV: CPT | Mod: PBBFAC,,, | Performed by: ANESTHESIOLOGY

## 2020-01-30 PROCEDURE — 20553 NJX 1/MLT TRIGGER POINTS 3/>: CPT | Mod: S$GLB,,, | Performed by: ANESTHESIOLOGY

## 2020-01-30 PROCEDURE — 3008F BODY MASS INDEX DOCD: CPT | Mod: CPTII,S$GLB,, | Performed by: ANESTHESIOLOGY

## 2020-01-30 PROCEDURE — 58300 INSERT INTRAUTERINE DEVICE: CPT | Mod: S$GLB,,, | Performed by: OBSTETRICS & GYNECOLOGY

## 2020-01-30 PROCEDURE — 99214 OFFICE O/P EST MOD 30 MIN: CPT | Mod: 25,S$GLB,, | Performed by: ANESTHESIOLOGY

## 2020-01-30 RX ORDER — BETAMETHASONE SODIUM PHOSPHATE AND BETAMETHASONE ACETATE 3; 3 MG/ML; MG/ML
6 INJECTION, SUSPENSION INTRA-ARTICULAR; INTRALESIONAL; INTRAMUSCULAR; SOFT TISSUE
Status: COMPLETED | OUTPATIENT
Start: 2020-01-30 | End: 2020-01-30

## 2020-01-30 RX ORDER — DICLOFENAC SODIUM 50 MG/1
50 TABLET, DELAYED RELEASE ORAL 2 TIMES DAILY PRN
Qty: 30 TABLET | Refills: 3 | Status: SHIPPED | OUTPATIENT
Start: 2020-01-30 | End: 2020-03-30

## 2020-01-30 RX ORDER — BUPIVACAINE HYDROCHLORIDE 5 MG/ML
9 INJECTION, SOLUTION EPIDURAL; INTRACAUDAL
Status: COMPLETED | OUTPATIENT
Start: 2020-01-30 | End: 2020-01-30

## 2020-01-30 RX ADMIN — BUPIVACAINE HYDROCHLORIDE 45 MG: 5 INJECTION, SOLUTION EPIDURAL; INTRACAUDAL at 12:01

## 2020-01-30 RX ADMIN — BETAMETHASONE SODIUM PHOSPHATE AND BETAMETHASONE ACETATE 6 MG: 3; 3 INJECTION, SUSPENSION INTRA-ARTICULAR; INTRALESIONAL; INTRAMUSCULAR; SOFT TISSUE at 12:01

## 2020-01-30 NOTE — PATIENT INSTRUCTIONS
Recommend starting diclofenac 50 mg up to twice daily as needed for pain.  Stay at most comfortable dose.  Take medication with meals.  If you experience any upset stomach, nausea, or vomiting, stop taking this medication. Do not take this with any other medications in the NSAID family, such as ibuprofen, aspirin, and naproxen.
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
## 2020-01-30 NOTE — PROCEDURES
Insertion of IUD  Date/Time: 1/30/2020 1:00 PM  Performed by: Katrin Garcia MD  Authorized by: Katrin Garcia MD     Consent:     Consent obtained:  Verbal    Consent given by:  Patient    Procedure risks and benefits discussed: yes      Patient questions answered: yes      Patient agrees, verbalizes understanding, and wants to proceed: yes      Educational handouts given: yes      Instructions and paperwork completed: yes    Procedure:     Pelvic exam performed: yes      Negative urine pregnancy test: yes      Cervix cleaned and prepped: yes      Speculum placed in vagina: yes      Tenaculum applied to cervix: yes      Uterus sounded: yes      Uterus sound depth (cm):  8    IUD inserted with no complications: yes      IUD type:  Kyleena    Strings trimmed: yes    Post-procedure:     Patient tolerated procedure well: yes      Patient will follow up after next period: yes

## 2020-01-31 LAB
APTT PROTEIN C ACTIVATOR+FV DP/APTT PPP: 127 % (ref 70–140)
PROT S ACT/NOR PPP: 80 % (ref 70–140)

## 2020-02-01 LAB — T3RU NFR SERPL: 33 % (ref 28–41)

## 2020-02-05 ENCOUNTER — CLINICAL SUPPORT (OUTPATIENT)
Dept: REHABILITATION | Facility: OTHER | Age: 45
End: 2020-02-05
Payer: COMMERCIAL

## 2020-02-05 DIAGNOSIS — G89.29 CHRONIC BILATERAL LOW BACK PAIN WITHOUT SCIATICA: ICD-10-CM

## 2020-02-05 DIAGNOSIS — R27.8 ABNORMAL COORDINATION: ICD-10-CM

## 2020-02-05 DIAGNOSIS — M54.50 CHRONIC BILATERAL LOW BACK PAIN WITHOUT SCIATICA: ICD-10-CM

## 2020-02-05 DIAGNOSIS — M62.81 MUSCLE WEAKNESS: ICD-10-CM

## 2020-02-05 PROCEDURE — 97140 MANUAL THERAPY 1/> REGIONS: CPT | Mod: PN

## 2020-02-05 NOTE — PROGRESS NOTES
Outpatient Pelvic Health Physical Therapy Daily Note       Patient Name: Mirtha Gary  Clinic Number: [id number]    Therapy Diagnosis:   Encounter Diagnoses   Name Primary?    Muscle weakness     Abnormal coordination     Chronic bilateral low back pain without sciatica      Physician: Audrey Thakkar,*    Visit Date: 2/5/2020    Physician Orders: PT Eval and Treat   Medical Diagnosis from Referral: O99.89,M54.9 (ICD-10-CM) - Back pain affecting pregnancy, antepartum  Evaluation Date: 11/20/2019  Authorization Period Expiration: 12/31/2019  Plan of Care Expiration: 1/15/2020  Visit # / Visits authorized: 8/30     Time In: 9:05 am        Time Out: 10:00 am   Total Billable Time: 55 minutes    Precautions: Standard    SUBJECTIVE   Pt reports: she had to cancel last week because she had a migraine for 7 days. She went to her pain management doctor and got multiple injections. She has an MRI scheduled for next week. She has a family hx of aneurism so her doctor wants to rule that out. She forgot to ask for a referral to ortho PT for her neck.   She is feeling better this week due to the injections. She has been able to do her exercises this week and hasn't had any issues.   She doesn't really remember how she felt after last session because she's been focused on the migraine.       Pain: unable to verbalize number  Pain location: (R) cervical spine.     OBJECTIVE       Manual Therapy to address joint and soft tissue mobilization for 55 minutes including:  - STM: (R) pec major/minor, UT, infraspinatus       Education: Discussed progression of plan of care with patient; educated pt in activity modification; reviewed HEP with pt. Pt demonstrated and verbalized understanding of all instruction (see Patient Instructions).      ASSESSMENT      Session focused on addressing joint and soft tissue restriction of (R) shoulder/cervical region leading to persistent pain and limited ROM. No manual intervention for neck  or dry needling performed due to recent injections and migraine. Patient reported feeling improvement after treatment. Reviewed postural exercises and rationale.     Patient will continue with independent HEP at this time and complete MRI and any other diagnostic tests ordered by her physician. If appropriate, pt will obtain referral for orthopedic PT to continue working on cervical dysfunction; pt agrees with this plan.     Will the patient continue to benefit from skilled PT intervention? Yes  Medical necessity demonstrated by: persistent muscle and joint dysfunction  Progress towards goals:  good  New/Revised goals: NA    Short Term Goals: 3 weeks   - Pt will demonstrate excellent knowledge and adherence to HEP to facilitate optimal recovery. (ACHEIVED)   - Pt will demonstrate maintenance of neutral pelvic alignment for improved tolerance with load transfer and decreased pain with mobility (ACHIEVED)      Long Term Goals: 8 weeks   - Pt will demonstrate proper breathing pattern and abdominal recruitment for improved pressure management and decreased pain with movement (ACHIEVED)   - Pt will demonstrate (B) hip abduction and ER strength of 5/5 MMT for increased stability needed for functional activity (PROGRESSING)   - Pt will demonstrate full, pain-free lumbar AROM for increased mobility tolerance (ACHIEVED)   - Pt will report ability to resume general exercise program without pain for improved health and wellness. (PROGRESSING)       PLAN     Patient is appropriate to be discharged and continue with independent HEP at this time. Patient instructed to contact PT with any questions or concerns following discharge.      Radha Lazo, PT, DPT

## 2020-02-10 ENCOUNTER — HOSPITAL ENCOUNTER (OUTPATIENT)
Dept: RADIOLOGY | Facility: OTHER | Age: 45
Discharge: HOME | End: 2020-02-10
Attending: ANESTHESIOLOGY
Payer: COMMERCIAL

## 2020-02-10 DIAGNOSIS — M47.812 CERVICAL SPONDYLOSIS: ICD-10-CM

## 2020-02-10 DIAGNOSIS — G43.909 MIGRAINE WITHOUT STATUS MIGRAINOSUS, NOT INTRACTABLE, UNSPECIFIED MIGRAINE TYPE: ICD-10-CM

## 2020-02-10 PROCEDURE — 72050 X-RAY EXAM NECK SPINE 4/5VWS: CPT | Mod: TC,FY

## 2020-02-10 PROCEDURE — 70553 MRI BRAIN STEM W/O & W/DYE: CPT | Mod: 26,,, | Performed by: RADIOLOGY

## 2020-02-10 PROCEDURE — 70553 MRI BRAIN W WO CONTRAST: ICD-10-PCS | Mod: 26,,, | Performed by: RADIOLOGY

## 2020-02-10 PROCEDURE — 72050 X-RAY EXAM NECK SPINE 4/5VWS: CPT | Mod: 26,,, | Performed by: RADIOLOGY

## 2020-02-10 PROCEDURE — A9585 GADOBUTROL INJECTION: HCPCS | Performed by: ANESTHESIOLOGY

## 2020-02-10 PROCEDURE — 70553 MRI BRAIN STEM W/O & W/DYE: CPT | Mod: TC

## 2020-02-10 PROCEDURE — 72050 XR CERVICAL SPINE COMPLETE 5 VIEW: ICD-10-PCS | Mod: 26,,, | Performed by: RADIOLOGY

## 2020-02-10 PROCEDURE — 25500020 PHARM REV CODE 255: Performed by: ANESTHESIOLOGY

## 2020-02-10 RX ORDER — GADOBUTROL 604.72 MG/ML
6.5 INJECTION INTRAVENOUS
Status: COMPLETED | OUTPATIENT
Start: 2020-02-10 | End: 2020-02-10

## 2020-02-10 RX ADMIN — GADOBUTROL 6.5 ML: 604.72 INJECTION INTRAVENOUS at 12:02

## 2020-02-20 ENCOUNTER — OFFICE VISIT (OUTPATIENT)
Dept: PAIN MEDICINE | Facility: CLINIC | Age: 45
End: 2020-02-20
Attending: ANESTHESIOLOGY
Payer: COMMERCIAL

## 2020-02-20 VITALS
WEIGHT: 145.06 LBS | DIASTOLIC BLOOD PRESSURE: 77 MMHG | HEART RATE: 62 BPM | SYSTOLIC BLOOD PRESSURE: 109 MMHG | TEMPERATURE: 97 F | BODY MASS INDEX: 23.31 KG/M2 | OXYGEN SATURATION: 100 % | HEIGHT: 66 IN | RESPIRATION RATE: 18 BRPM

## 2020-02-20 DIAGNOSIS — G43.909 MIGRAINE WITHOUT STATUS MIGRAINOSUS, NOT INTRACTABLE, UNSPECIFIED MIGRAINE TYPE: ICD-10-CM

## 2020-02-20 DIAGNOSIS — M79.18 MYOFASCIAL PAIN: ICD-10-CM

## 2020-02-20 DIAGNOSIS — M47.812 CERVICAL SPONDYLOSIS: ICD-10-CM

## 2020-02-20 DIAGNOSIS — G89.4 CHRONIC PAIN DISORDER: Primary | ICD-10-CM

## 2020-02-20 PROCEDURE — 99214 PR OFFICE/OUTPT VISIT, EST, LEVL IV, 30-39 MIN: ICD-10-PCS | Mod: S$GLB,,, | Performed by: ANESTHESIOLOGY

## 2020-02-20 PROCEDURE — 99999 PR PBB SHADOW E&M-EST. PATIENT-LVL IV: CPT | Mod: PBBFAC,,, | Performed by: ANESTHESIOLOGY

## 2020-02-20 PROCEDURE — 99999 PR PBB SHADOW E&M-EST. PATIENT-LVL IV: ICD-10-PCS | Mod: PBBFAC,,, | Performed by: ANESTHESIOLOGY

## 2020-02-20 PROCEDURE — 3008F BODY MASS INDEX DOCD: CPT | Mod: CPTII,S$GLB,, | Performed by: ANESTHESIOLOGY

## 2020-02-20 PROCEDURE — 99214 OFFICE O/P EST MOD 30 MIN: CPT | Mod: S$GLB,,, | Performed by: ANESTHESIOLOGY

## 2020-02-20 PROCEDURE — 3008F PR BODY MASS INDEX (BMI) DOCUMENTED: ICD-10-PCS | Mod: CPTII,S$GLB,, | Performed by: ANESTHESIOLOGY
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
## 2020-02-20 NOTE — PATIENT INSTRUCTIONS
Recommend trying turmeric 500-1500 mg to see if this can be more helpful than the Arnica.  Recommend getting a formulation that is 95% curcuminoids.  Stay at most comfortable dose.  Take medication with meals.  If you experience any upset stomach, nausea, or vomiting, stop taking this medication.

## 2020-03-02 ENCOUNTER — HOSPITAL ENCOUNTER (OUTPATIENT)
Dept: RADIOLOGY | Facility: OTHER | Age: 45
Discharge: HOME | End: 2020-03-02
Attending: OBSTETRICS & GYNECOLOGY
Payer: COMMERCIAL

## 2020-03-02 DIAGNOSIS — Z12.31 VISIT FOR SCREENING MAMMOGRAM: ICD-10-CM

## 2020-03-02 PROCEDURE — 77067 MAMMO DIGITAL SCREENING BILAT WITH TOMOSYNTHESIS_CAD: ICD-10-PCS | Mod: 26,,, | Performed by: RADIOLOGY

## 2020-03-02 PROCEDURE — 77063 MAMMO DIGITAL SCREENING BILAT WITH TOMOSYNTHESIS_CAD: ICD-10-PCS | Mod: 26,,, | Performed by: RADIOLOGY

## 2020-03-02 PROCEDURE — 77067 SCR MAMMO BI INCL CAD: CPT | Mod: 26,,, | Performed by: RADIOLOGY

## 2020-03-02 PROCEDURE — 77067 SCR MAMMO BI INCL CAD: CPT | Mod: TC

## 2020-03-02 PROCEDURE — 77063 BREAST TOMOSYNTHESIS BI: CPT | Mod: 26,,, | Performed by: RADIOLOGY

## 2020-03-10 DIAGNOSIS — M47.812 CERVICAL SPONDYLOSIS: ICD-10-CM

## 2020-03-10 DIAGNOSIS — M79.18 MYOFASCIAL PAIN: ICD-10-CM

## 2020-03-10 DIAGNOSIS — G89.4 CHRONIC PAIN DISORDER: Primary | ICD-10-CM

## 2020-03-10 DIAGNOSIS — G43.909 MIGRAINE WITHOUT STATUS MIGRAINOSUS, NOT INTRACTABLE, UNSPECIFIED MIGRAINE TYPE: ICD-10-CM

## 2020-03-18 ENCOUNTER — OFFICE VISIT (OUTPATIENT)
Dept: NEUROLOGY | Facility: CLINIC | Age: 45
End: 2020-03-18
Attending: ANESTHESIOLOGY
Payer: COMMERCIAL

## 2020-03-18 VITALS
BODY MASS INDEX: 23.57 KG/M2 | HEART RATE: 76 BPM | SYSTOLIC BLOOD PRESSURE: 98 MMHG | DIASTOLIC BLOOD PRESSURE: 73 MMHG | HEIGHT: 66 IN | WEIGHT: 146.63 LBS

## 2020-03-18 DIAGNOSIS — G43.009 MIGRAINE WITHOUT AURA AND WITHOUT STATUS MIGRAINOSUS, NOT INTRACTABLE: Primary | ICD-10-CM

## 2020-03-18 PROCEDURE — 99203 PR OFFICE/OUTPT VISIT, NEW, LEVL III, 30-44 MIN: ICD-10-PCS | Mod: S$GLB,,, | Performed by: NURSE PRACTITIONER

## 2020-03-18 PROCEDURE — 3008F PR BODY MASS INDEX (BMI) DOCUMENTED: ICD-10-PCS | Mod: CPTII,S$GLB,, | Performed by: NURSE PRACTITIONER

## 2020-03-18 PROCEDURE — 99999 PR PBB SHADOW E&M-EST. PATIENT-LVL IV: ICD-10-PCS | Mod: PBBFAC,,, | Performed by: NURSE PRACTITIONER

## 2020-03-18 PROCEDURE — 3008F BODY MASS INDEX DOCD: CPT | Mod: CPTII,S$GLB,, | Performed by: NURSE PRACTITIONER

## 2020-03-18 PROCEDURE — 99999 PR PBB SHADOW E&M-EST. PATIENT-LVL IV: CPT | Mod: PBBFAC,,, | Performed by: NURSE PRACTITIONER

## 2020-03-18 PROCEDURE — 99203 OFFICE O/P NEW LOW 30 MIN: CPT | Mod: S$GLB,,, | Performed by: NURSE PRACTITIONER

## 2020-03-18 RX ORDER — NARATRIPTAN 2.5 MG/1
TABLET ORAL
Qty: 9 TABLET | Refills: 11 | Status: SHIPPED | OUTPATIENT
Start: 2020-03-18 | End: 2021-10-11 | Stop reason: SDUPTHER

## 2020-03-18 NOTE — LETTER
March 18, 2020      Kiera Biggs MD  [address]  [address]  [place] [address]           [facility] [address]  [address] [facility] [address]  Phone: [phone]  Fax: [phone]          Patient: Mirtha Gary   MR Number: [id number]   YOB: 1975   Date of Visit: 3/18/2020       Dear Dr. Kiera Biggs:    Thank you for referring Mirtha Gary to me for evaluation. Attached you will find relevant portions of my assessment and plan of care.    If you have questions, please do not hesitate to call me. I look forward to following Mirtha Gary along with you.    Sincerely,    Susy Marti, NP    Enclosure  CC:  No Recipients    If you would like to receive this communication electronically, please contact [email or web address] or [phone] to request more information on [facility] Link access.    For providers and/or their staff who would like to refer a patient to [facility], please contact us through our one-stop-shop provider referral line, [facility], at [phone].    If you feel you have received this communication in error or would no longer like to receive these types of communications, please e-mail [email or web address]

## 2020-03-18 NOTE — PROGRESS NOTES
"REFERRING PROVIDER: Dr. Kiera Biggs    REASON FOR CONSULT: Headaches     44/F with headaches since ~ 2-3 yrs ago. Not sure if from accident or due to 2 subsequent miscarriages 2018 and 2019. Sometimes they last up to week. They occur close to her period. Occurring 3-7days /month having headache.  Headache describes as R temporal or retro-orbital (most often R , occasionally left) (rates 8-9/10 on VAS), constant pain,  with associated perio-oral or R facial numbness, dizziness,  nausea, vomiting, blurred vision, neck tightness,  photo/phonophobia. She denies acute neurological or unilateral autonomic deficit, or prodromal symptoms. Infrequent visual aura.  Prefers to lie down dark room. 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   Having more frequent menses/IUD placed recently    Tried: Imitrex (often may have to repeat dose 4h, feels heavy or down after taking med),ibuprofen, diclofenac na  PT neck pain  MRI brain w and w/o normal 2/10/2020    FH: daughter cerebral aneurysm   SH:  ( [facility]), 5yo and 17 yo.    HIT-6 score=65    ROS: Denies double vision. Otherwise a balance review of 10-systems is negative.       PHYSICAL EXAM:   General: W/D, W/N well groomed  BP 98/73 (BP Location: Left arm, Patient Position: Sitting, BP Method: Medium (Automatic))   Pulse 76   Ht 5' 6" (1.676 m)   Wt 66.5 kg (146 lb 9.7 oz)   LMP 02/23/2020   BMI 23.66 kg/m²   Neurological: Awake, alert, oriented x 3. Speech fluent, no dysarthria. Good attention span. Good fund of knowledge.   No ptosis, nystagmus, EOM palsy.  No facial asymmetry. Good hearing bilaterally. Good shoulder shrug  Motor: 5/5 strength throughout  Gait: Normal     IMPRESSION:   Chronic migraines with and w/o aura  Chronic pain disorder  Cervical spondylosis    PLAN   ajovy 225mg SC q28d, discussed purpose and s/e (inject to abd)  Stop imitrex  Trial amerge 2.5mg q2hr +- 2 200mg Ibuprofen (treat early) and/or mini-menses prophylaxis take 1/2 tab bid " 2d prior and 1d menses (consider cambia)  Ubrlevy 50mg PO q2hprn (savings card)  zofran ODT 8mg prn      RTC 3- month, sooner if needed. Instructed how to continue HA diaries for accurate monitoring

## 2020-03-23 ENCOUNTER — TELEPHONE (OUTPATIENT)
Dept: PHARMACY | Facility: CLINIC | Age: 45
End: 2020-03-23

## 2020-03-23 ENCOUNTER — TELEPHONE (OUTPATIENT)
Dept: PAIN MEDICINE | Facility: CLINIC | Age: 45
End: 2020-03-23

## 2020-03-23 NOTE — TELEPHONE ENCOUNTER
Staff contacted the patient regarding her 04/02/20 9:15 am appointment. Staff offered the patient either a video visit through the [employer] chart to or telephone call visit.     Patient choose to have a telephone call and declined the video visit for her upcoming appointment on 4/1/20 9:15 am- 10:15 am.    Patient verbalized understanding and expressed thanks.

## 2020-03-23 NOTE — TELEPHONE ENCOUNTER
LVM for callback to inform patient that [facility] received prescription for Ajovy and prior authorization is required.  [facility] will be back in touch once insurance determination is received.

## 2020-03-25 NOTE — TELEPHONE ENCOUNTER
DOCUMENTATION ONLY:  Prior authorization for Ajovy approved from 2/24/2020 to 3/25/2021.    Case ID# [id number]    Co-pay: $19.97 with Mike simental    Patient Assistance IS NOT required.    Forward to clinical pharmacist for consult & shipment.-HBR

## 2020-03-26 ENCOUNTER — TELEPHONE (OUTPATIENT)
Dept: PHARMACY | Facility: CLINIC | Age: 45
End: 2020-03-26

## 2020-03-26 NOTE — TELEPHONE ENCOUNTER
Initial Ajovy  Shipment set up on 3/26. Ajovy 225 mg  will be shipped on 3/30  to arrive at patient's home on 3/31 via [employer]. $ 19.97 copay and permission to charge CC on file  Address confirmed. Confirmed 2 patient identifiers - name and . Therapy Appropriate.Patient states her  is a Dr. No questions or concerns at this time. Name and  verified.

## 2020-04-02 ENCOUNTER — TELEPHONE (OUTPATIENT)
Dept: PAIN MEDICINE | Facility: CLINIC | Age: 45
End: 2020-04-02

## 2020-04-22 ENCOUNTER — TELEPHONE (OUTPATIENT)
Dept: PHARMACY | Facility: CLINIC | Age: 45
End: 2020-04-22

## 2020-04-22 NOTE — TELEPHONE ENCOUNTER
Patient confirms that she had an Ajovy injection last month and will inject today with her on hand supply.  [facility] will be back in touch in 3 weeks for refill.

## 2020-04-22 NOTE — TELEPHONE ENCOUNTER
refill attempt for ajovy, pt states not ready at this time. pt has 1 dose on hand from last month. pt states that she was on another medicine at the time, and request a call back on next month. [facility] will follow up on 5/13 to schedule. have pend follow up with RP, RPH was in meeting.

## 2020-05-08 ENCOUNTER — TELEPHONE (OUTPATIENT)
Dept: SLEEP MEDICINE | Facility: CLINIC | Age: 45
End: 2020-05-08

## 2020-05-13 ENCOUNTER — TELEPHONE (OUTPATIENT)
Dept: PHARMACY | Facility: CLINIC | Age: 45
End: 2020-05-13

## 2020-05-19 ENCOUNTER — TELEPHONE (OUTPATIENT)
Dept: PHARMACY | Facility: CLINIC | Age: 45
End: 2020-05-19

## 2020-05-20 ENCOUNTER — TELEPHONE (OUTPATIENT)
Dept: SLEEP MEDICINE | Facility: CLINIC | Age: 45
End: 2020-05-20

## 2020-05-20 ENCOUNTER — PATIENT MESSAGE (OUTPATIENT)
Dept: SLEEP MEDICINE | Facility: CLINIC | Age: 45
End: 2020-05-20

## 2020-06-09 ENCOUNTER — OFFICE VISIT (OUTPATIENT)
Dept: SLEEP MEDICINE | Facility: CLINIC | Age: 45
End: 2020-06-09
Payer: COMMERCIAL

## 2020-06-09 DIAGNOSIS — R10.32 LEFT LOWER QUADRANT ABDOMINAL PAIN: ICD-10-CM

## 2020-06-09 DIAGNOSIS — G43.009 MIGRAINE WITHOUT AURA AND WITHOUT STATUS MIGRAINOSUS, NOT INTRACTABLE: Primary | ICD-10-CM

## 2020-06-09 PROCEDURE — 99213 PR OFFICE/OUTPT VISIT, EST, LEVL III, 20-29 MIN: ICD-10-PCS | Mod: 95,CR,, | Performed by: NURSE PRACTITIONER

## 2020-06-09 PROCEDURE — 99213 OFFICE O/P EST LOW 20 MIN: CPT | Mod: 95,CR,, | Performed by: NURSE PRACTITIONER

## 2020-06-09 NOTE — PROGRESS NOTES
The patient location is: home  The chief complaint leading to consultation is: Headache mt  Visit type: TELE AUDIOVISUAL:10739      Each patient to whom he or she provides medical services by telemedicine is:  (1) informed of the relationship between the physician and patient and the respective role of any other health care provider with respect to management of the patient; and (2) notified that he or she may decline to receive medical services by telemedicine and may withdraw from such care at any time.    Notes: since seen she has taken 1 aJOVY adm to R abd and reports having left side abd pain, almost seen in ER. Wants GI referral. Some mildheadaches but less frequent migraines since injecting. Had episode 5/3rd lasting up to 3d despite amerge/ubrlevy prn. Has nausea and photophobia, may need to take her prn med earlier. She is happy overall about  Less headaches. HA same location/nature as below.      HX VB  3/2020  44/F with headaches since ~ 2-3 yrs ago. Not sure if from accident or due to 2 subsequent miscarriages 2018 and 2019. Sometimes they last up to week. They occur close to her period. Occurring 3-7days /month having headache.  Headache describes as R temporal or retro-orbital (most often R , occasionally left) (rates 8-9/10 on VAS), constant pain,  with associated perio-oral or R facial numbness, dizziness,  nausea, vomiting, blurred vision, neck tightness,  photo/phonophobia. She denies acute neurological or unilateral autonomic deficit, or prodromal symptoms. Infrequent visual aura.  Prefers to lie down dark room. 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   Having more frequent menses/IUD placed recently. HIT-6= 65    Tried: Imitrex (often may have to repeat dose 4h, feels heavy or down after taking med),ibuprofen, diclofenac na  PT neck pain  MRI brain w and w/o normal 2/10/2020    FH: daughter cerebral aneurysm   SH:  ( [facility]), 3yo and 19 yo.      ROS: In addition to above, GI  symptoms, abd pain. Otherwise a balance review of 10-systems is negative.       PHYSICAL EXAM:   General: W/D, W/N well groomed  There were no vitals taken for this visit.  Neurological: Awake, alert, oriented x 3. Speech fluent, no dysarthria. Good attention span. Good fund of knowledge.   IMPRESSION:   Chronic migraines with and w/o aura  Chronic pain disorder  Cervical spondylosis    PLAN   ajovy 225mg SC q28d continue (notify if want quarterly dosing future)    Continue amerge 2.5mg q2hr +- 2 200mg Ibuprofen (treat early)   Continue Ubrlevy 50mg PO q2hprn +- diclofenac Na  Continue zofran ODT 8mg prn      RTC 6- month, sooner if needed  REFER to GI (prefer Dr. Marie)

## 2020-06-10 ENCOUNTER — TELEPHONE (OUTPATIENT)
Dept: SLEEP MEDICINE | Facility: CLINIC | Age: 45
End: 2020-06-10

## 2020-06-12 ENCOUNTER — TELEPHONE (OUTPATIENT)
Dept: PHARMACY | Facility: CLINIC | Age: 45
End: 2020-06-12

## 2020-06-12 NOTE — TELEPHONE ENCOUNTER
RX call attempt 1 regarding Ajovy refill from [facility]. Patient was not reached, left voicemail. Copay $4.98.

## 2020-06-16 ENCOUNTER — TELEPHONE (OUTPATIENT)
Dept: PHARMACY | Facility: CLINIC | Age: 45
End: 2020-06-16

## 2020-06-16 ENCOUNTER — OFFICE VISIT (OUTPATIENT)
Dept: GASTROENTEROLOGY | Facility: CLINIC | Age: 45
End: 2020-06-16
Payer: COMMERCIAL

## 2020-06-16 VITALS
BODY MASS INDEX: 22.66 KG/M2 | SYSTOLIC BLOOD PRESSURE: 100 MMHG | WEIGHT: 144.38 LBS | HEIGHT: 67 IN | DIASTOLIC BLOOD PRESSURE: 78 MMHG

## 2020-06-16 DIAGNOSIS — K52.9 GASTROENTERITIS: ICD-10-CM

## 2020-06-16 DIAGNOSIS — R10.9 LEFT SIDED ABDOMINAL PAIN: Primary | ICD-10-CM

## 2020-06-16 PROCEDURE — 3008F BODY MASS INDEX DOCD: CPT | Mod: CPTII,S$GLB,, | Performed by: INTERNAL MEDICINE

## 2020-06-16 PROCEDURE — 99203 OFFICE O/P NEW LOW 30 MIN: CPT | Mod: S$GLB,,, | Performed by: INTERNAL MEDICINE

## 2020-06-16 PROCEDURE — 3008F PR BODY MASS INDEX (BMI) DOCUMENTED: ICD-10-PCS | Mod: CPTII,S$GLB,, | Performed by: INTERNAL MEDICINE

## 2020-06-16 PROCEDURE — 99999 PR PBB SHADOW E&M-EST. PATIENT-LVL V: ICD-10-PCS | Mod: PBBFAC,,, | Performed by: INTERNAL MEDICINE

## 2020-06-16 PROCEDURE — 99203 PR OFFICE/OUTPT VISIT, NEW, LEVL III, 30-44 MIN: ICD-10-PCS | Mod: S$GLB,,, | Performed by: INTERNAL MEDICINE

## 2020-06-16 PROCEDURE — 99999 PR PBB SHADOW E&M-EST. PATIENT-LVL V: CPT | Mod: PBBFAC,,, | Performed by: INTERNAL MEDICINE

## 2020-06-16 NOTE — LETTER
June 25, 2020      Susy Marti, NP  [address]  [facility] [address]  [facility] [address]           [place] [facility]  [address] [facility]  [place] [address]  Phone: [phone]          Patient: Mirtha Gary   MR Number: [id number]   YOB: 1975   Date of Visit: 6/16/2020       Dear Susy Marti:    Thank you for referring Mirtha Gary to me for evaluation. Attached you will find relevant portions of my assessment and plan of care.    If you have questions, please do not hesitate to call me. I look forward to following Mirtha Gary along with you.    Sincerely,    Mich Painting MD    Enclosure  CC:  No Recipients    If you would like to receive this communication electronically, please contact [email or web address] or [phone] to request more information on [employer] access.    For providers and/or their staff who would like to refer a patient to [facility], please contact us through our one-stop-shop provider referral line, [facility] , at [phone].    If you feel you have received this communication in error or would no longer like to receive these types of communications, please e-mail [email or web address]

## 2020-06-16 NOTE — PATIENT INSTRUCTIONS
Continue taking your probiotic for a total of one month.    You can try an over-the-counter peppermint oil supplement such as IBgard or Justina's Tummy Tamers to help with the discomfort.    Call or send me a message if symptoms return or worsen as I expect that you will slowly continue to improve.

## 2020-06-16 NOTE — PROGRESS NOTES
[facility] Consultation Note    Reason for Consult:    Chief Complaint   Patient presents with    Abdominal Pain       PCP:   Sophia Johnson    Referring MD:  Susy Marti, Np  [address] [facility] [address]  [place] [address]    HPI:  Mirtha Gary is a 44 y.o. female here for evaluation of left lower quadrant abdominal pain.  She is new to our clinic.  She had a severe headache about 3 weeks ago related to history of migraines.  She took naproxen to treat the headache following which she noticed left-sided and lower abdominal pain in the middle to lower part of her abdomen.  This was severe in nature and lasts about 5 days.  One day after the abdominal pain started, she had diarrhea and vomiting that lasted for 5 days.  She cannot eat anything during those days.  Eating caused pain.  She felt feverish but did not measure her temperature.  Her entire abdomen felt inflamed.  Since then, she has had a mild, intermittent cramping sensation that occurs 1-2 days out of the week.  Anderson in and acidic foods bother her.  She received a course of ciprofloxacin during the episode.  No else in her family was sick at the time.  She had eaten fish from a restaurant prior to symptoms occurring.  No else had eaten that fish with her.  Her bowel movements are now back to normal.  She is able to eat now.  She denies blood in the stool.  She has been on a probiotic for 2 weeks.  She does not take any regular stomach acid medications.  She denies heartburn.          ROS:  Constitutional: No fevers, chills, No weight loss, normal appetite  ENT: No congestion, rhinorrhea, or chronic sinus problems  CV: No chest pain or palpitations  Pulm: No cough, No shortness of breath  Ophtho: No vision changes or pain  GI: see HPI  Derm: No rash or lesions currently, but she did have a rash on her arms and face that looked like acne and has resolved  Heme: No lymphadenopathy, No bruising  MSK: No arthritis or  joint swelling  : No dysuria, No frequent urination        Medical History:  has a past medical history of Endometriosis and Migraine.    Surgical History:  has a past surgical history that includes Ectopic pregnancy surgery; Cosmetic surgery; Dilation and curettage of uterus using suction (N/A, 6/29/2018); Transforaminal epidural injection of steroid (Left, 8/2/2019); and Augmentation of breast.    Family History: family history is not on file.    Social History:  reports that she has never smoked. She does not have any smokeless tobacco history on file. She reports that she does not drink alcohol.    Review of patient's allergies indicates:  No Known Allergies    Prior to Admission medications    Medication Sig Start Date End Date Taking? Authorizing Provider   butalbital-acetaminophen-caff -40 mg Cap TK 1 C PO BID PRF SEVERE HA 6/8/16  Yes Historical Provider, MD   erythromycin (ROMYCIN) ophthalmic ointment  5/29/19  Yes Historical Provider, MD   fremanezumab-vfrm (AJOVY) 225 mg/1.5 mL injection Inject 1.5 mLs (225 mg total) into the skin every 28 days. 3/18/20  Yes Susy Marti NP   ibuprofen (ADVIL,MOTRIN) 800 MG tablet Take 1 tablet (800 mg total) by mouth 3 (three) times daily. 11/4/19  Yes Katrin Garcia MD   ketorolac 0.5% (ACULAR) 0.5 % Drop INSTILL 1 DROP INTO AFFECTED EYE(S) FOUR TIMES DAILY AS NEEDED 5/29/19  Yes Historical Provider, MD   ondansetron (ZOFRAN) 8 MG tablet Take 1 tablet (8 mg total) by mouth every 8 (eight) hours as needed for Nausea. 11/28/18  Yes Nate Sanz MD   ondansetron (ZOFRAN-ODT) 4 MG TbDL DISSOLVE ONE TABLET BY MOUTH EVERY 6 HOURS AS NEEDED FOR NAUSEA AND VOMITTING 5/31/18  Yes Historical Provider, MD   sumatriptan (IMITREX) 100 MG tablet Take 100 mg by mouth every 2 (two) hours as needed for Migraine.   Yes Historical Provider, MD   ubrogepant (UBRELVY)tablet 50 mg Take 1 tablet (50 mg total) by mouth every 2 (two) hours as needed for Migraine.  "3/18/20  Yes Susy Marti NP   docusate sodium 100 mg capsule Take 100 mg by mouth 2 (two) times daily.  Patient not taking: Reported on 6/16/2020 11/28/18   Nate Sanz MD   doxylamine-pyridoxine, vit B6, (DICLEGIS) 10-10 mg TbEC Take 2 tablets by mouth every evening.  Patient not taking: Reported on 6/16/2020 10/15/19   Audrey Thakkar NP   methylPREDNISolone (MEDROL DOSEPACK) 4 mg tablet use as directed  Patient not taking: Reported on 6/16/2020 7/23/19   Lizzy Early PA-C   miSOPROStol (CYTOTEC) 200 MCG Tab Take 1 tablet (200 mcg total) by mouth every 6 (six) hours. for 2 days  Patient not taking: Reported on 3/18/2020 11/7/19 11/9/19  Katrin Garcia MD   naratriptan (AMERGE) 2.5 MG tablet 2.5 mg at onset of headache, may repeat in 4 hours if needed 3/18/20 4/17/20  Susy Marti NP   ondansetron (ZOFRAN-ODT) 8 MG TbDL Take 1 tablet (8 mg total) by mouth every 12 (twelve) hours as needed.  Patient not taking: Reported on 6/16/2020 10/15/19   Audrey Thakkar NP   oxyCODONE-acetaminophen (PERCOCET) 5-325 mg per tablet Take 1 tablet by mouth every 4 (four) hours as needed for Pain.  Patient not taking: Reported on 6/16/2020 11/4/19   Katrin Garcia MD   promethazine (PHENERGAN) 6.25 mg/5 mL syrup TAKE 5 ML BY MOUTH EVERY 6 HOURS AS NEEDED FOR NAUSEA AND VOMITTING 10/19/19   Katrin Garcia MD       Objective Findings:  Vital Signs:  /78   Ht 5' 7" (1.702 m)   Wt 65.5 kg (144 lb 6.4 oz)   BMI 22.62 kg/m²   Body mass index is 22.62 kg/m².    Physical Exam:  General Appearance:  Well appearing in no acute distress, appears stated age  Head:  Normocephalic, atraumatic  Eyes:  No scleral icterus or pallor, EOMI  ENT:  Neck supple, moist mucosa; OP clear  Lungs:  CTA bilaterally in anterior and posterior fields, no wheezes, no crackles; no dullness  Heart:  Regular rate and rhythm, S1, S2 normal, no murmurs heard  Abdomen:  Soft, non tender, non distended " with positive bowel sounds in all four quadrants. No hepatosplenomegaly, ascites, or mass  Extremities:  No clubbing, cyanosis, or edema        Labs:  Lab Results   Component Value Date    WBC 6.17 01/29/2020    HGB 12.9 01/29/2020    HCT 39.4 01/29/2020    MCV 91 01/29/2020    RDW 12.1 01/29/2020     01/29/2020    GRAN 3.9 01/29/2020    GRAN 62.5 01/29/2020    LYMPH 1.8 01/29/2020    LYMPH 29.5 01/29/2020    MONO 0.4 01/29/2020    MONO 6.2 01/29/2020    EOS 0.1 01/29/2020    BASO 0.02 01/29/2020     Lab Results   Component Value Date     01/29/2020    K 4.2 01/29/2020     01/29/2020    CO2 27 01/29/2020    GLU 85 01/29/2020    BUN 15 01/29/2020    CREATININE 0.7 01/29/2020    CALCIUM 9.5 01/29/2020    PROT 7.2 01/29/2020    ALBUMIN 4.0 01/29/2020    BILITOT 0.3 01/29/2020    ALKPHOS 39 (L) 01/29/2020    AST 13 01/29/2020    ALT 13 01/29/2020                   Assessment:  Mirtha Gary is a 44 y.o. female with:  1. Left sided abdominal pain    2. Gastroenteritis      Recent episode of abdominal pain and what appears to be a gastroenteritis.  Very well may have been a bacterial infection due to food poisoning.  Symptoms have improved for the most part, but she has some lingering symptoms.  These lingering abdominal symptoms are not unexpected after severe infection and may last for several weeks to months.  She is taking an over-the-counter probiotic.        Recommendations/Plan:  1.  She may continue with her current probiotic  2.  She may try peppermint oil supplement to help with lingering symptoms  3.  I will check her for H pylori using a stool antigen test  4.  Avoid NSAIDs for now    Follow-up with me as needed and make all with problems or if symptoms recur          Thank you so much for allowing me to participate in the care of Mirtha Gary    Mich Painting MD

## 2020-07-10 ENCOUNTER — LAB VISIT (OUTPATIENT)
Dept: LAB | Facility: HOSPITAL | Age: 45
End: 2020-07-10
Attending: INTERNAL MEDICINE
Payer: COMMERCIAL

## 2020-07-10 DIAGNOSIS — R10.9 LEFT SIDED ABDOMINAL PAIN: ICD-10-CM

## 2020-07-10 PROCEDURE — 87338 HPYLORI STOOL AG IA: CPT

## 2020-07-16 ENCOUNTER — TELEPHONE (OUTPATIENT)
Dept: PHARMACY | Facility: CLINIC | Age: 45
End: 2020-07-16

## 2020-07-16 LAB — H PYLORI AG STL QL IA: NOT DETECTED

## 2020-08-14 ENCOUNTER — TELEPHONE (OUTPATIENT)
Dept: PHARMACY | Facility: CLINIC | Age: 45
End: 2020-08-14

## 2020-09-10 ENCOUNTER — TELEPHONE (OUTPATIENT)
Dept: PHARMACY | Facility: CLINIC | Age: 45
End: 2020-09-10

## 2020-09-10 NOTE — TELEPHONE ENCOUNTER
Call attempt 1 for Ajovy refill and intervention. No answer; No voicemail. Sent If You Can message. $4.98 copay at 004.    Boyd Gurrola, PharmD  Clinical Pharmacist  [facility]  P: [phone]

## 2020-10-14 ENCOUNTER — TELEPHONE (OUTPATIENT)
Dept: PHARMACY | Facility: CLINIC | Age: 45
End: 2020-10-14

## 2020-10-30 ENCOUNTER — TELEPHONE (OUTPATIENT)
Dept: OBSTETRICS AND GYNECOLOGY | Facility: CLINIC | Age: 45
End: 2020-10-30

## 2020-10-30 DIAGNOSIS — T83.84XA PAIN DUE TO INTRAUTERINE CONTRACEPTIVE DEVICE (IUD), INITIAL ENCOUNTER: Primary | ICD-10-CM

## 2020-10-31 ENCOUNTER — HOSPITAL ENCOUNTER (OUTPATIENT)
Dept: RADIOLOGY | Facility: OTHER | Age: 45
Discharge: HOME | End: 2020-10-31
Attending: OBSTETRICS & GYNECOLOGY
Payer: COMMERCIAL

## 2020-10-31 DIAGNOSIS — T83.84XA PAIN DUE TO INTRAUTERINE CONTRACEPTIVE DEVICE (IUD), INITIAL ENCOUNTER: ICD-10-CM

## 2020-10-31 PROCEDURE — 76856 US PELVIS COMP WITH TRANSVAG NON-OB (XPD): ICD-10-PCS | Mod: 26,,, | Performed by: RADIOLOGY

## 2020-10-31 PROCEDURE — 76856 US EXAM PELVIC COMPLETE: CPT | Mod: 26,,, | Performed by: RADIOLOGY

## 2020-10-31 PROCEDURE — 76830 US PELVIS COMP WITH TRANSVAG NON-OB (XPD): ICD-10-PCS | Mod: 26,,, | Performed by: RADIOLOGY

## 2020-10-31 PROCEDURE — 76830 TRANSVAGINAL US NON-OB: CPT | Mod: 26,,, | Performed by: RADIOLOGY

## 2020-10-31 PROCEDURE — 76830 TRANSVAGINAL US NON-OB: CPT | Mod: TC

## 2020-12-16 ENCOUNTER — SPECIALTY PHARMACY (OUTPATIENT)
Dept: PHARMACY | Facility: CLINIC | Age: 45
End: 2020-12-16

## 2020-12-16 NOTE — TELEPHONE ENCOUNTER
Specialty Pharmacy - Refill Coordination    Specialty Medication Orders Linked to Encounter      Most Recent Value   Medication #1  fremanezumab-vfrm (AJOVY) 225 mg/1.5 mL injection (Order#[id number], Rx#[id number])          Refill Questions - Documented Responses      Most Recent Value   Relationship to patient of person spoken to?  Self   HIPAA/medical authority confirmed?  Yes   How will the patient receive the medication?  Mail   When does the patient need to receive the medication?  12/23/20   Shipping Address  Home   Address in [facility] confirmed and updated if neccessary?  Yes   Expected Copay ($)  4.98   Is the patient able to afford the medication copay?  Yes   Payment Method  CC on file   Days supply of Refill  28   Would patient like to speak to a pharmacist?  No   Do you want to trigger an intervention?  No   Do you want to trigger an additional referral task?  No   Refill activity completed?  Yes   Refill activity plan  Refill scheduled   Shipment/Pickup Date:  12/21/20          Current Outpatient Medications   Medication Sig    butalbital-acetaminophen-caff -40 mg Cap TK 1 C PO BID PRF SEVERE HA    docusate sodium 100 mg capsule Take 100 mg by mouth 2 (two) times daily. (Patient not taking: Reported on 6/16/2020)    doxylamine-pyridoxine, vit B6, (DICLEGIS) 10-10 mg TbEC Take 2 tablets by mouth every evening. (Patient not taking: Reported on 6/16/2020)    erythromycin (ROMYCIN) ophthalmic ointment     fremanezumab-vfrm (AJOVY) 225 mg/1.5 mL injection Inject 1.5 mLs (225 mg total) into the skin every 28 days.    ibuprofen (ADVIL,MOTRIN) 800 MG tablet Take 1 tablet (800 mg total) by mouth 3 (three) times daily.    ketorolac 0.5% (ACULAR) 0.5 % Drop INSTILL 1 DROP INTO AFFECTED EYE(S) FOUR TIMES DAILY AS NEEDED    methylPREDNISolone (MEDROL DOSEPACK) 4 mg tablet use as directed (Patient not taking: Reported on 6/16/2020)    miSOPROStol (CYTOTEC) 200 MCG Tab Take 1 tablet (200 mcg  total) by mouth every 6 (six) hours. for 2 days (Patient not taking: Reported on 3/18/2020)    naratriptan (AMERGE) 2.5 MG tablet 2.5 mg at onset of headache, may repeat in 4 hours if needed    ondansetron (ZOFRAN) 8 MG tablet Take 1 tablet (8 mg total) by mouth every 8 (eight) hours as needed for Nausea.    ondansetron (ZOFRAN-ODT) 4 MG TbDL DISSOLVE ONE TABLET BY MOUTH EVERY 6 HOURS AS NEEDED FOR NAUSEA AND VOMITTING    ondansetron (ZOFRAN-ODT) 8 MG TbDL Take 1 tablet (8 mg total) by mouth every 12 (twelve) hours as needed. (Patient not taking: Reported on 6/16/2020)    oxyCODONE-acetaminophen (PERCOCET) 5-325 mg per tablet Take 1 tablet by mouth every 4 (four) hours as needed for Pain. (Patient not taking: Reported on 6/16/2020)    promethazine (PHENERGAN) 6.25 mg/5 mL syrup TAKE 5 ML BY MOUTH EVERY 6 HOURS AS NEEDED FOR NAUSEA AND VOMITTING    sumatriptan (IMITREX) 100 MG tablet Take 100 mg by mouth every 2 (two) hours as needed for Migraine.    ubrogepant (UBRELVY)tablet 50 mg TAKE 1 TABLET (50 MG TOTAL) BY MOUTH EVERY 2 (TWO) HOURS AS NEEDED FOR MIGRAINE.   Last reviewed on 6/16/2020  9:55 AM by Mich Painting MD    Review of patient's allergies indicates:  No Known Allergies Last reviewed on  6/16/2020 9:55 AM by Mich Painting      Tasks added this encounter   1/13/2021 - Refill Call (Auto Added)   Tasks due within next 3 months   No tasks due.     [facility]  [address] [place] [id number]  Phone: [phone]  Fax: [phone]

## 2020-12-23 ENCOUNTER — LAB VISIT (OUTPATIENT)
Dept: LAB | Facility: HOSPITAL | Age: 45
End: 2020-12-23
Attending: PSYCHIATRY & NEUROLOGY
Payer: COMMERCIAL

## 2020-12-23 ENCOUNTER — OFFICE VISIT (OUTPATIENT)
Dept: PRIMARY CARE CLINIC | Facility: CLINIC | Age: 45
End: 2020-12-23
Payer: COMMERCIAL

## 2020-12-23 DIAGNOSIS — Z87.42 HISTORY OF ENDOMETRIOSIS: ICD-10-CM

## 2020-12-23 DIAGNOSIS — L98.9 SCALP LESION: ICD-10-CM

## 2020-12-23 DIAGNOSIS — Q62.5: ICD-10-CM

## 2020-12-23 DIAGNOSIS — Z86.69 HISTORY OF MIGRAINE HEADACHES: ICD-10-CM

## 2020-12-23 DIAGNOSIS — L98.9 SKIN LESION: ICD-10-CM

## 2020-12-23 DIAGNOSIS — N83.209 CYST OF OVARY, UNSPECIFIED LATERALITY: ICD-10-CM

## 2020-12-23 DIAGNOSIS — Z87.448 HISTORY OF PYELONEPHRITIS: ICD-10-CM

## 2020-12-23 DIAGNOSIS — N30.00 ACUTE CYSTITIS WITHOUT HEMATURIA: Primary | ICD-10-CM

## 2020-12-23 DIAGNOSIS — Z87.59 HISTORY OF ECTOPIC PREGNANCY: ICD-10-CM

## 2020-12-23 DIAGNOSIS — N30.00 ACUTE CYSTITIS WITHOUT HEMATURIA: ICD-10-CM

## 2020-12-23 DIAGNOSIS — N39.0 RECURRENT UTI: ICD-10-CM

## 2020-12-23 LAB
BILIRUB UR QL STRIP: NEGATIVE
CLARITY UR REFRACT.AUTO: CLEAR
COLOR UR AUTO: YELLOW
GLUCOSE UR QL STRIP: NEGATIVE
HGB UR QL STRIP: NEGATIVE
KETONES UR QL STRIP: NEGATIVE
LEUKOCYTE ESTERASE UR QL STRIP: ABNORMAL
MICROSCOPIC COMMENT: NORMAL
NITRITE UR QL STRIP: NEGATIVE
PH UR STRIP: 8 [PH] (ref 5–8)
PROT UR QL STRIP: NEGATIVE
SP GR UR STRIP: 1.01 (ref 1–1.03)
SQUAMOUS #/AREA URNS AUTO: 2 /HPF
URN SPEC COLLECT METH UR: ABNORMAL
WBC #/AREA URNS AUTO: 1 /HPF (ref 0–5)

## 2020-12-23 PROCEDURE — 81001 URINALYSIS AUTO W/SCOPE: CPT

## 2020-12-23 PROCEDURE — 99214 OFFICE O/P EST MOD 30 MIN: CPT | Mod: 95,,, | Performed by: FAMILY MEDICINE

## 2020-12-23 PROCEDURE — 99214 PR OFFICE/OUTPT VISIT, EST, LEVL IV, 30-39 MIN: ICD-10-PCS | Mod: 95,,, | Performed by: FAMILY MEDICINE

## 2020-12-23 PROCEDURE — 87086 URINE CULTURE/COLONY COUNT: CPT

## 2020-12-23 RX ORDER — PHENAZOPYRIDINE HYDROCHLORIDE 200 MG/1
200 TABLET, FILM COATED ORAL 3 TIMES DAILY PRN
Qty: 15 TABLET | Refills: 0 | Status: SHIPPED | OUTPATIENT
Start: 2020-12-23 | End: 2021-01-02

## 2020-12-23 RX ORDER — BETAMETHASONE VALERATE 1.2 MG/G
CREAM TOPICAL 2 TIMES DAILY
Qty: 60 G | Refills: 2 | Status: SHIPPED | OUTPATIENT
Start: 2020-12-23

## 2020-12-23 RX ORDER — NITROFURANTOIN 25; 75 MG/1; MG/1
100 CAPSULE ORAL 2 TIMES DAILY
Qty: 14 CAPSULE | Refills: 0 | Status: SHIPPED | OUTPATIENT
Start: 2020-12-23 | End: 2021-04-29 | Stop reason: SDUPTHER

## 2020-12-24 LAB — BACTERIA UR CULT: NO GROWTH

## 2020-12-30 ENCOUNTER — OFFICE VISIT (OUTPATIENT)
Dept: DERMATOLOGY | Facility: CLINIC | Age: 45
End: 2020-12-30
Payer: COMMERCIAL

## 2020-12-30 DIAGNOSIS — L98.9 SKIN LESION: ICD-10-CM

## 2020-12-30 DIAGNOSIS — L27.1 FIXED DRUG ERUPTION: ICD-10-CM

## 2020-12-30 DIAGNOSIS — L40.8 SEBOPSORIASIS: Primary | ICD-10-CM

## 2020-12-30 PROCEDURE — 99202 OFFICE O/P NEW SF 15 MIN: CPT | Mod: S$GLB,,, | Performed by: DERMATOLOGY

## 2020-12-30 PROCEDURE — 99202 PR OFFICE/OUTPT VISIT, NEW, LEVL II, 15-29 MIN: ICD-10-PCS | Mod: S$GLB,,, | Performed by: DERMATOLOGY

## 2020-12-30 PROCEDURE — 99999 PR PBB SHADOW E&M-EST. PATIENT-LVL IV: ICD-10-PCS | Mod: PBBFAC,,,

## 2020-12-30 PROCEDURE — 99999 PR PBB SHADOW E&M-EST. PATIENT-LVL IV: CPT | Mod: PBBFAC,,,

## 2020-12-30 RX ORDER — CALCIPOTRIENE AND BETAMETHASONE DIPROPIONATE 50; .5 UG/G; MG/G
AEROSOL, FOAM TOPICAL
Qty: 60 G | Refills: 4 | Status: SHIPPED | OUTPATIENT
Start: 2020-12-30 | End: 2023-04-28 | Stop reason: SDUPTHER

## 2020-12-30 RX ORDER — KETOCONAZOLE 20 MG/ML
SHAMPOO, SUSPENSION TOPICAL
Qty: 120 ML | Refills: 3 | Status: SHIPPED | OUTPATIENT
Start: 2020-12-30 | End: 2023-04-28 | Stop reason: SDUPTHER

## 2020-12-30 NOTE — PROGRESS NOTES
Subjective:       Patient ID:  Mirtha Gary is a 45 y.o. female who presents for   Chief Complaint   Patient presents with    Psoriasis     HPI  Patient presents as a new patient at the request of Dr. Alfred Ramos for evaluation of a rash in her scalp, ears, neck, forehead. This has been coming and going for many years. She has tried over the counter shampoo and creams, one for psoriasis works the best. The rash is itchy, burning and can bleed when she scratches. Chlorine makes the rash worse.     Patient also complains of a red itchy, burning rash in her right axilla that started 3-4 hours after taking ciprofloxacin for UTI. She had a similar rash previously while taking cipro. She stopped the antibiotic and the rash improved. It is somewhat dark today but not red or bothersome.     Patient's father has psoriasis, sister with likely psoriasis. Occasional joint stiffness in the morning.     Review of Systems   Constitutional: Negative for fever and chills.   Cardiovascular: Negative for chest pain.   Musculoskeletal: Negative for joint swelling and arthralgias.   Skin: Positive for itching, rash and dry skin.        Objective:    Physical Exam   Constitutional: She appears well-developed and well-nourished.   Neurological: She is alert and oriented to person, place, and time.   Psychiatric: She has a normal mood and affect.   Skin:   Areas Examined (abnormalities noted in diagram):   Scalp / Hair Palpated and Inspected  Head / Face Inspection Performed  Neck Inspection Performed  Chest / Axilla Inspection Performed  Abdomen Inspection Performed  Back Inspection Performed  RUE Inspected  LUE Inspection Performed  Nails and Digits Inspection Performed                  Diagram Legend     Erythematous scaling macule/papule c/w actinic keratosis       Vascular papule c/w angioma      Pigmented verrucoid papule/plaque c/w seborrheic keratosis      Yellow umbilicated papule c/w sebaceous hyperplasia       Irregularly shaped tan macule c/w lentigo     1-2 mm smooth white papules consistent with Milia      Movable subcutaneous cyst with punctum c/w epidermal inclusion cyst      Subcutaneous movable cyst c/w pilar cyst      Firm pink to brown papule c/w dermatofibroma      Pedunculated fleshy papule(s) c/w skin tag(s)      Evenly pigmented macule c/w junctional nevus     Mildly variegated pigmented, slightly irregular-bordered macule c/w mildly atypical nevus      Flesh colored to evenly pigmented papule c/w intradermal nevus       Pink pearly papule/plaque c/w basal cell carcinoma      Erythematous hyperkeratotic cursted plaque c/w SCC      Surgical scar with no sign of skin cancer recurrence      Open and closed comedones      Inflammatory papules and pustules      Verrucoid papule consistent consistent with wart     Erythematous eczematous patches and plaques     Dystrophic onycholytic nail with subungual debris c/w onychomycosis     Umbilicated papule    Erythematous-base heme-crusted tan verrucoid plaque consistent with inflamed seborrheic keratosis     Erythematous Silvery Scaling Plaque c/w Psoriasis     See annotation  Assessment / Plan:      Sebopsoriasis  -     calcipotriene-betamethasone (ENSTILAR) 0.005-0.064 % Foam; Apply to scalp once or twice daily. Then wear shower cap overnight  Dispense: 60 g; Refill: 4  -     ketoconazole (NIZORAL) 2 % shampoo; Apply to scalp, let sit for 5-10 minutes then rinse. Use 3-5 times weekly  Dispense: 120 mL; Refill: 3  - Discussed chronic remitting and relapsing nature of condition. Informational brochure provided   - Start Enstilar foam once or twice daily to scalp. Then apply coconut oil (or other non-organic oil) and massage into scalp. Wear shower cap for 2-3 hours or overnight then rinse  - Start ketoconazole shampoo. Proper use discussed   - Also recommended T/Sal shampoo, patient will try both and choose her preference     Fixed drug eruption  - Per history, likely  due to ciprofloxacin. Although not the most common culprit drug, there are cases in the literature. Recommend patient avoid this medication in the future as it has caused a rash twice in the past. Patient understands.            Follow up if symptoms worsen or fail to improve.

## 2020-12-30 NOTE — PATIENT INSTRUCTIONS
Scalp Treatment:  Apply foam (or liquid) to scalp first. Then apply coconut oil to scalp and rub in. Then apply a shower cap and let sit for 2-3 hours or overnight. Then rinse.    Shampoo:  Ketoconazole shampoo (prescription)- let sit 5-10 minutes then rinse   Over the counter- T-Sal shampoo

## 2021-01-11 ENCOUNTER — TELEPHONE (OUTPATIENT)
Dept: ALLERGY | Facility: CLINIC | Age: 46
End: 2021-01-11

## 2021-02-03 ENCOUNTER — OFFICE VISIT (OUTPATIENT)
Dept: ALLERGY | Facility: CLINIC | Age: 46
End: 2021-02-03
Payer: COMMERCIAL

## 2021-02-03 VITALS — BODY MASS INDEX: 23.18 KG/M2 | HEIGHT: 67 IN | WEIGHT: 147.69 LBS

## 2021-02-03 DIAGNOSIS — L27.1 FIXED DRUG ERUPTION: ICD-10-CM

## 2021-02-03 DIAGNOSIS — L98.9 SKIN LESION: ICD-10-CM
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
## 2021-02-03 DIAGNOSIS — J31.0 CHRONIC RHINITIS: Primary | ICD-10-CM

## 2021-02-03 PROCEDURE — 99204 OFFICE O/P NEW MOD 45 MIN: CPT | Mod: 25,S$GLB,, | Performed by: STUDENT IN AN ORGANIZED HEALTH CARE EDUCATION/TRAINING PROGRAM

## 2021-02-03 PROCEDURE — 99999 PR PBB SHADOW E&M-EST. PATIENT-LVL IV: CPT | Mod: PBBFAC,,, | Performed by: STUDENT IN AN ORGANIZED HEALTH CARE EDUCATION/TRAINING PROGRAM

## 2021-02-03 PROCEDURE — 95004 PERQ TESTS W/ALRGNC XTRCS: CPT | Mod: S$GLB,,, | Performed by: STUDENT IN AN ORGANIZED HEALTH CARE EDUCATION/TRAINING PROGRAM

## 2021-02-03 PROCEDURE — 99999 PR PBB SHADOW E&M-EST. PATIENT-LVL IV: ICD-10-PCS | Mod: PBBFAC,,, | Performed by: STUDENT IN AN ORGANIZED HEALTH CARE EDUCATION/TRAINING PROGRAM

## 2021-02-03 PROCEDURE — 99204 PR OFFICE/OUTPT VISIT, NEW, LEVL IV, 45-59 MIN: ICD-10-PCS | Mod: 25,S$GLB,, | Performed by: STUDENT IN AN ORGANIZED HEALTH CARE EDUCATION/TRAINING PROGRAM

## 2021-02-03 PROCEDURE — 95004 PR ALLERGY SKIN TESTS,ALLERGENS: ICD-10-PCS | Mod: S$GLB,,, | Performed by: STUDENT IN AN ORGANIZED HEALTH CARE EDUCATION/TRAINING PROGRAM

## 2021-02-03 RX ORDER — EPINEPHRINE 0.3 MG/.3ML
INJECTION SUBCUTANEOUS
COMMUNITY
Start: 2020-12-29

## 2021-02-09 DIAGNOSIS — G43.009 MIGRAINE WITHOUT AURA AND WITHOUT STATUS MIGRAINOSUS, NOT INTRACTABLE: ICD-10-CM

## 2021-02-23 ENCOUNTER — TELEPHONE (OUTPATIENT)
Dept: OBSTETRICS AND GYNECOLOGY | Facility: CLINIC | Age: 46
End: 2021-02-23

## 2021-02-23 ENCOUNTER — PATIENT MESSAGE (OUTPATIENT)
Dept: OBSTETRICS AND GYNECOLOGY | Facility: CLINIC | Age: 46
End: 2021-02-23

## 2021-03-16 ENCOUNTER — PATIENT MESSAGE (OUTPATIENT)
Dept: OBSTETRICS AND GYNECOLOGY | Facility: CLINIC | Age: 46
End: 2021-03-16

## 2021-03-16 DIAGNOSIS — Z12.31 VISIT FOR SCREENING MAMMOGRAM: Primary | ICD-10-CM

## 2021-03-16 DIAGNOSIS — T83.84XA PAIN DUE TO INTRAUTERINE CONTRACEPTIVE DEVICE (IUD), INITIAL ENCOUNTER: Primary | ICD-10-CM

## 2021-03-18 ENCOUNTER — PATIENT MESSAGE (OUTPATIENT)
Dept: OBSTETRICS AND GYNECOLOGY | Facility: CLINIC | Age: 46
End: 2021-03-18

## 2021-03-22 ENCOUNTER — HOSPITAL ENCOUNTER (OUTPATIENT)
Dept: RADIOLOGY | Facility: OTHER | Age: 46
Discharge: HOME | End: 2021-03-22
Attending: OBSTETRICS & GYNECOLOGY
Payer: COMMERCIAL

## 2021-03-22 ENCOUNTER — PATIENT MESSAGE (OUTPATIENT)
Dept: OBSTETRICS AND GYNECOLOGY | Facility: CLINIC | Age: 46
End: 2021-03-22

## 2021-03-22 DIAGNOSIS — T83.84XA PAIN DUE TO INTRAUTERINE CONTRACEPTIVE DEVICE (IUD), INITIAL ENCOUNTER: ICD-10-CM

## 2021-03-22 PROCEDURE — 76830 US PELVIS COMP WITH TRANSVAG NON-OB (XPD): ICD-10-PCS | Mod: 26,,, | Performed by: RADIOLOGY

## 2021-03-22 PROCEDURE — 76856 US EXAM PELVIC COMPLETE: CPT | Mod: TC

## 2021-03-22 PROCEDURE — 76856 US EXAM PELVIC COMPLETE: CPT | Mod: 26,,, | Performed by: RADIOLOGY

## 2021-03-22 PROCEDURE — 76830 TRANSVAGINAL US NON-OB: CPT | Mod: 26,,, | Performed by: RADIOLOGY

## 2021-03-22 PROCEDURE — 76856 US PELVIS COMP WITH TRANSVAG NON-OB (XPD): ICD-10-PCS | Mod: 26,,, | Performed by: RADIOLOGY

## 2021-03-23 ENCOUNTER — TELEPHONE (OUTPATIENT)
Dept: OBSTETRICS AND GYNECOLOGY | Facility: CLINIC | Age: 46
End: 2021-03-23

## 2021-03-23 DIAGNOSIS — N63.0 BREAST LUMP: Primary | ICD-10-CM

## 2021-03-26 ENCOUNTER — HOSPITAL ENCOUNTER (OUTPATIENT)
Dept: RADIOLOGY | Facility: HOSPITAL | Age: 46
Discharge: HOME | End: 2021-03-26
Attending: OBSTETRICS & GYNECOLOGY
Payer: COMMERCIAL

## 2021-03-26 DIAGNOSIS — N63.0 BREAST LUMP: ICD-10-CM

## 2021-03-26 PROCEDURE — 77062 BREAST TOMOSYNTHESIS BI: CPT | Mod: 26,,, | Performed by: RADIOLOGY

## 2021-03-26 PROCEDURE — 76642 ULTRASOUND BREAST LIMITED: CPT | Mod: 26,LT,, | Performed by: RADIOLOGY

## 2021-03-26 PROCEDURE — 77066 MAMMO DIGITAL DIAGNOSTIC BILAT WITH TOMO: ICD-10-PCS | Mod: 26,,, | Performed by: RADIOLOGY

## 2021-03-26 PROCEDURE — 77066 DX MAMMO INCL CAD BI: CPT | Mod: TC

## 2021-03-26 PROCEDURE — 77062 MAMMO DIGITAL DIAGNOSTIC BILAT WITH TOMO: ICD-10-PCS | Mod: 26,,, | Performed by: RADIOLOGY

## 2021-03-26 PROCEDURE — 77066 DX MAMMO INCL CAD BI: CPT | Mod: 26,,, | Performed by: RADIOLOGY

## 2021-03-26 PROCEDURE — 76642 ULTRASOUND BREAST LIMITED: CPT | Mod: TC,LT

## 2021-03-26 PROCEDURE — 76642 US BREAST LEFT LIMITED: ICD-10-PCS | Mod: 26,LT,, | Performed by: RADIOLOGY

## 2021-04-26 ENCOUNTER — PATIENT MESSAGE (OUTPATIENT)
Dept: RESEARCH | Facility: HOSPITAL | Age: 46
End: 2021-04-26

## 2021-04-28 ENCOUNTER — OFFICE VISIT (OUTPATIENT)
Dept: PRIMARY CARE CLINIC | Facility: CLINIC | Age: 46
End: 2021-04-28
Payer: COMMERCIAL

## 2021-04-28 DIAGNOSIS — Z87.42 HISTORY OF ENDOMETRIOSIS: ICD-10-CM

## 2021-04-28 DIAGNOSIS — Q62.5: ICD-10-CM

## 2021-04-28 DIAGNOSIS — N30.01 ACUTE CYSTITIS WITH HEMATURIA: Primary | ICD-10-CM

## 2021-04-28 PROCEDURE — 99213 OFFICE O/P EST LOW 20 MIN: CPT | Mod: 95,,, | Performed by: FAMILY MEDICINE

## 2021-04-28 PROCEDURE — 99213 PR OFFICE/OUTPT VISIT, EST, LEVL III, 20-29 MIN: ICD-10-PCS | Mod: 95,,, | Performed by: FAMILY MEDICINE

## 2021-04-29 ENCOUNTER — LAB VISIT (OUTPATIENT)
Dept: LAB | Facility: OTHER | Age: 46
End: 2021-04-29
Payer: COMMERCIAL

## 2021-04-29 DIAGNOSIS — N30.00 ACUTE CYSTITIS WITHOUT HEMATURIA: Primary | ICD-10-CM

## 2021-04-29 DIAGNOSIS — N30.00 ACUTE CYSTITIS WITHOUT HEMATURIA: ICD-10-CM

## 2021-04-29 LAB
BILIRUB UR QL STRIP: NEGATIVE
CLARITY UR: CLEAR
COLOR UR: YELLOW
GLUCOSE UR QL STRIP: NEGATIVE
HGB UR QL STRIP: NEGATIVE
KETONES UR QL STRIP: NEGATIVE
LEUKOCYTE ESTERASE UR QL STRIP: NEGATIVE
NITRITE UR QL STRIP: NEGATIVE
PH UR STRIP: 6 [PH] (ref 5–8)
PROT UR QL STRIP: NEGATIVE
SP GR UR STRIP: 1.02 (ref 1–1.03)
URN SPEC COLLECT METH UR: NORMAL
UROBILINOGEN UR STRIP-ACNC: 1 EU/DL

## 2021-04-29 PROCEDURE — 81003 URINALYSIS AUTO W/O SCOPE: CPT | Performed by: PSYCHIATRY & NEUROLOGY

## 2021-04-29 PROCEDURE — 87086 URINE CULTURE/COLONY COUNT: CPT | Performed by: PSYCHIATRY & NEUROLOGY

## 2021-04-30 ENCOUNTER — PATIENT MESSAGE (OUTPATIENT)
Dept: PRIMARY CARE CLINIC | Facility: CLINIC | Age: 46
End: 2021-04-30

## 2021-04-30 LAB — BACTERIA UR CULT: NO GROWTH

## 2021-05-02 RX ORDER — NITROFURANTOIN 25; 75 MG/1; MG/1
100 CAPSULE ORAL 2 TIMES DAILY
Qty: 14 CAPSULE | Refills: 0 | Status: SHIPPED | OUTPATIENT
Start: 2021-05-02 | End: 2021-06-09 | Stop reason: ALTCHOICE

## 2021-05-13 ENCOUNTER — OFFICE VISIT (OUTPATIENT)
Dept: OBSTETRICS AND GYNECOLOGY | Facility: CLINIC | Age: 46
End: 2021-05-13
Payer: COMMERCIAL

## 2021-05-13 ENCOUNTER — LAB VISIT (OUTPATIENT)
Dept: LAB | Facility: OTHER | Age: 46
End: 2021-05-13
Attending: OBSTETRICS & GYNECOLOGY
Payer: COMMERCIAL

## 2021-05-13 VITALS
HEIGHT: 67 IN | BODY MASS INDEX: 23.01 KG/M2 | DIASTOLIC BLOOD PRESSURE: 60 MMHG | WEIGHT: 146.63 LBS | SYSTOLIC BLOOD PRESSURE: 114 MMHG

## 2021-05-13 DIAGNOSIS — Z12.4 PAP SMEAR FOR CERVICAL CANCER SCREENING: ICD-10-CM

## 2021-05-13 DIAGNOSIS — Z01.419 ENCOUNTER FOR GYNECOLOGICAL EXAMINATION: Primary | ICD-10-CM

## 2021-05-13 DIAGNOSIS — M47.812 CERVICAL SPONDYLOSIS: ICD-10-CM

## 2021-05-13 DIAGNOSIS — G89.4 CHRONIC PAIN DISORDER: ICD-10-CM

## 2021-05-13 DIAGNOSIS — Z11.51 ENCOUNTER FOR SCREENING FOR HUMAN PAPILLOMAVIRUS (HPV): ICD-10-CM

## 2021-05-13 DIAGNOSIS — R30.0 DYSURIA: ICD-10-CM

## 2021-05-13 LAB
ALBUMIN SERPL BCP-MCNC: 4.3 G/DL (ref 3.5–5.2)
ALP SERPL-CCNC: 43 U/L (ref 55–135)
ALT SERPL W/O P-5'-P-CCNC: 20 U/L (ref 10–44)
ANION GAP SERPL CALC-SCNC: 9 MMOL/L (ref 8–16)
AST SERPL-CCNC: 16 U/L (ref 10–40)
BASOPHILS # BLD AUTO: 0.02 K/UL (ref 0–0.2)
BASOPHILS NFR BLD: 0.3 % (ref 0–1.9)
BILIRUB SERPL-MCNC: 0.7 MG/DL (ref 0.1–1)
BILIRUB UR QL STRIP: NEGATIVE
BUN SERPL-MCNC: 13 MG/DL (ref 6–20)
CALCIUM SERPL-MCNC: 9.9 MG/DL (ref 8.7–10.5)
CHLORIDE SERPL-SCNC: 106 MMOL/L (ref 95–110)
CLARITY UR REFRACT.AUTO: CLEAR
CO2 SERPL-SCNC: 25 MMOL/L (ref 23–29)
COLOR UR AUTO: NORMAL
CREAT SERPL-MCNC: 0.7 MG/DL (ref 0.5–1.4)
CRP SERPL-MCNC: 0.6 MG/L (ref 0–8.2)
DIFFERENTIAL METHOD: NORMAL
EOSINOPHIL # BLD AUTO: 0 K/UL (ref 0–0.5)
EOSINOPHIL NFR BLD: 0.5 % (ref 0–8)
ERYTHROCYTE [DISTWIDTH] IN BLOOD BY AUTOMATED COUNT: 12.4 % (ref 11.5–14.5)
EST. GFR  (AFRICAN AMERICAN): >60 ML/MIN/1.73 M^2
EST. GFR  (NON AFRICAN AMERICAN): >60 ML/MIN/1.73 M^2
GLUCOSE SERPL-MCNC: 89 MG/DL (ref 70–110)
GLUCOSE UR QL STRIP: NEGATIVE
HCT VFR BLD AUTO: 43 % (ref 37–48.5)
HGB BLD-MCNC: 13.9 G/DL (ref 12–16)
HGB UR QL STRIP: NEGATIVE
IMM GRANULOCYTES # BLD AUTO: 0.03 K/UL (ref 0–0.04)
IMM GRANULOCYTES NFR BLD AUTO: 0.5 % (ref 0–0.5)
KETONES UR QL STRIP: NEGATIVE
LEUKOCYTE ESTERASE UR QL STRIP: NEGATIVE
LYMPHOCYTES # BLD AUTO: 1.9 K/UL (ref 1–4.8)
LYMPHOCYTES NFR BLD: 28.4 % (ref 18–48)
MAGNESIUM SERPL-MCNC: 2.1 MG/DL (ref 1.6–2.6)
MCH RBC QN AUTO: 30.4 PG (ref 27–31)
MCHC RBC AUTO-ENTMCNC: 32.3 G/DL (ref 32–36)
MCV RBC AUTO: 94 FL (ref 82–98)
MONOCYTES # BLD AUTO: 0.5 K/UL (ref 0.3–1)
MONOCYTES NFR BLD: 7.7 % (ref 4–15)
NEUTROPHILS # BLD AUTO: 4.1 K/UL (ref 1.8–7.7)
NEUTROPHILS NFR BLD: 62.6 % (ref 38–73)
NITRITE UR QL STRIP: NEGATIVE
NRBC BLD-RTO: 0 /100 WBC
PH UR STRIP: 7 [PH] (ref 5–8)
PHOSPHATE SERPL-MCNC: 3.3 MG/DL (ref 2.7–4.5)
PLATELET # BLD AUTO: 263 K/UL (ref 150–450)
PMV BLD AUTO: 10.7 FL (ref 9.2–12.9)
POTASSIUM SERPL-SCNC: 4.9 MMOL/L (ref 3.5–5.1)
PROT SERPL-MCNC: 7.8 G/DL (ref 6–8.4)
PROT UR QL STRIP: NEGATIVE
RBC # BLD AUTO: 4.57 M/UL (ref 4–5.4)
SODIUM SERPL-SCNC: 140 MMOL/L (ref 136–145)
SP GR UR STRIP: 1.01 (ref 1–1.03)
URN SPEC COLLECT METH UR: NORMAL
WBC # BLD AUTO: 6.52 K/UL (ref 3.9–12.7)

## 2021-05-13 PROCEDURE — 80053 COMPREHEN METABOLIC PANEL: CPT | Performed by: OBSTETRICS & GYNECOLOGY

## 2021-05-13 PROCEDURE — 99396 PR PREVENTIVE VISIT,EST,40-64: ICD-10-PCS | Mod: S$GLB,,, | Performed by: OBSTETRICS & GYNECOLOGY

## 2021-05-13 PROCEDURE — 86140 C-REACTIVE PROTEIN: CPT | Performed by: OBSTETRICS & GYNECOLOGY

## 2021-05-13 PROCEDURE — 88175 CYTOPATH C/V AUTO FLUID REDO: CPT | Performed by: OBSTETRICS & GYNECOLOGY

## 2021-05-13 PROCEDURE — 1125F PR PAIN SEVERITY QUANTIFIED, PAIN PRESENT: ICD-10-PCS | Mod: S$GLB,,, | Performed by: OBSTETRICS & GYNECOLOGY

## 2021-05-13 PROCEDURE — 83735 ASSAY OF MAGNESIUM: CPT | Performed by: OBSTETRICS & GYNECOLOGY

## 2021-05-13 PROCEDURE — 36415 COLL VENOUS BLD VENIPUNCTURE: CPT | Performed by: OBSTETRICS & GYNECOLOGY

## 2021-05-13 PROCEDURE — 1125F AMNT PAIN NOTED PAIN PRSNT: CPT | Mod: S$GLB,,, | Performed by: OBSTETRICS & GYNECOLOGY

## 2021-05-13 PROCEDURE — 99999 PR PBB SHADOW E&M-EST. PATIENT-LVL IV: ICD-10-PCS | Mod: PBBFAC,,, | Performed by: OBSTETRICS & GYNECOLOGY

## 2021-05-13 PROCEDURE — 99999 PR PBB SHADOW E&M-EST. PATIENT-LVL IV: CPT | Mod: PBBFAC,,, | Performed by: OBSTETRICS & GYNECOLOGY

## 2021-05-13 PROCEDURE — 99396 PREV VISIT EST AGE 40-64: CPT | Mod: S$GLB,,, | Performed by: OBSTETRICS & GYNECOLOGY

## 2021-05-13 PROCEDURE — 3008F PR BODY MASS INDEX (BMI) DOCUMENTED: ICD-10-PCS | Mod: CPTII,S$GLB,, | Performed by: OBSTETRICS & GYNECOLOGY

## 2021-05-13 PROCEDURE — 87086 URINE CULTURE/COLONY COUNT: CPT | Performed by: OBSTETRICS & GYNECOLOGY

## 2021-05-13 PROCEDURE — 3008F BODY MASS INDEX DOCD: CPT | Mod: CPTII,S$GLB,, | Performed by: OBSTETRICS & GYNECOLOGY

## 2021-05-13 PROCEDURE — 87624 HPV HI-RISK TYP POOLED RSLT: CPT | Performed by: OBSTETRICS & GYNECOLOGY

## 2021-05-13 PROCEDURE — 81003 URINALYSIS AUTO W/O SCOPE: CPT | Performed by: OBSTETRICS & GYNECOLOGY

## 2021-05-13 PROCEDURE — 85025 COMPLETE CBC W/AUTO DIFF WBC: CPT | Performed by: OBSTETRICS & GYNECOLOGY

## 2021-05-13 PROCEDURE — 84100 ASSAY OF PHOSPHORUS: CPT | Performed by: OBSTETRICS & GYNECOLOGY

## 2021-05-13 RX ORDER — SULFAMETHOXAZOLE AND TRIMETHOPRIM 800; 160 MG/1; MG/1
1 TABLET ORAL 2 TIMES DAILY
COMMUNITY
Start: 2021-04-25 | End: 2021-06-09 | Stop reason: ALTCHOICE

## 2021-05-15 LAB — BACTERIA UR CULT: NO GROWTH

## 2021-05-18 LAB
FINAL PATHOLOGIC DIAGNOSIS: NORMAL
Lab: NORMAL

## 2021-05-19 LAB
HPV HR 12 DNA SPEC QL NAA+PROBE: NEGATIVE
HPV16 AG SPEC QL: NEGATIVE
HPV18 DNA SPEC QL NAA+PROBE: NEGATIVE

## 2021-06-09 ENCOUNTER — LAB VISIT (OUTPATIENT)
Dept: LAB | Facility: HOSPITAL | Age: 46
End: 2021-06-09
Attending: UROLOGY
Payer: COMMERCIAL

## 2021-06-09 ENCOUNTER — OFFICE VISIT (OUTPATIENT)
Dept: UROLOGY | Facility: CLINIC | Age: 46
End: 2021-06-09
Payer: COMMERCIAL

## 2021-06-09 ENCOUNTER — PATIENT MESSAGE (OUTPATIENT)
Dept: UROLOGY | Facility: CLINIC | Age: 46
End: 2021-06-09

## 2021-06-09 VITALS
HEIGHT: 67 IN | WEIGHT: 146.38 LBS | SYSTOLIC BLOOD PRESSURE: 104 MMHG | DIASTOLIC BLOOD PRESSURE: 72 MMHG | BODY MASS INDEX: 22.98 KG/M2 | HEART RATE: 83 BPM

## 2021-06-09 DIAGNOSIS — Q62.5 DUPLICATED URETER, LEFT: ICD-10-CM

## 2021-06-09 DIAGNOSIS — Z87.448 HISTORY OF PYELONEPHRITIS: ICD-10-CM

## 2021-06-09 DIAGNOSIS — N39.0 RECURRENT UTI: ICD-10-CM

## 2021-06-09 DIAGNOSIS — N39.0 RECURRENT UTI: Primary | ICD-10-CM

## 2021-06-09 LAB
ANION GAP SERPL CALC-SCNC: 11 MMOL/L (ref 8–16)
BUN SERPL-MCNC: 12 MG/DL (ref 6–20)
CALCIUM SERPL-MCNC: 9.9 MG/DL (ref 8.7–10.5)
CHLORIDE SERPL-SCNC: 106 MMOL/L (ref 95–110)
CO2 SERPL-SCNC: 24 MMOL/L (ref 23–29)
CREAT SERPL-MCNC: 0.7 MG/DL (ref 0.5–1.4)
EST. GFR  (AFRICAN AMERICAN): >60 ML/MIN/1.73 M^2
EST. GFR  (NON AFRICAN AMERICAN): >60 ML/MIN/1.73 M^2
GLUCOSE SERPL-MCNC: 82 MG/DL (ref 70–110)
POTASSIUM SERPL-SCNC: 3.9 MMOL/L (ref 3.5–5.1)
SODIUM SERPL-SCNC: 141 MMOL/L (ref 136–145)

## 2021-06-09 PROCEDURE — 80048 BASIC METABOLIC PNL TOTAL CA: CPT | Performed by: UROLOGY

## 2021-06-09 PROCEDURE — 99204 PR OFFICE/OUTPT VISIT, NEW, LEVL IV, 45-59 MIN: ICD-10-PCS | Mod: 25,S$GLB,, | Performed by: UROLOGY

## 2021-06-09 PROCEDURE — 51701 PR INSERTION OF NON-INDWELLING BLADDER CATHETERIZATION FOR RESIDUAL UR: ICD-10-PCS | Mod: S$GLB,,, | Performed by: UROLOGY

## 2021-06-09 PROCEDURE — 1126F AMNT PAIN NOTED NONE PRSNT: CPT | Mod: S$GLB,,, | Performed by: UROLOGY

## 2021-06-09 PROCEDURE — 1126F PR PAIN SEVERITY QUANTIFIED, NO PAIN PRESENT: ICD-10-PCS | Mod: S$GLB,,, | Performed by: UROLOGY

## 2021-06-09 PROCEDURE — 99204 OFFICE O/P NEW MOD 45 MIN: CPT | Mod: 25,S$GLB,, | Performed by: UROLOGY

## 2021-06-09 PROCEDURE — 99999 PR PBB SHADOW E&M-EST. PATIENT-LVL III: CPT | Mod: PBBFAC,,, | Performed by: UROLOGY

## 2021-06-09 PROCEDURE — 51701 INSERT BLADDER CATHETER: CPT | Mod: S$GLB,,, | Performed by: UROLOGY

## 2021-06-09 PROCEDURE — 3008F PR BODY MASS INDEX (BMI) DOCUMENTED: ICD-10-PCS | Mod: CPTII,S$GLB,, | Performed by: UROLOGY

## 2021-06-09 PROCEDURE — 99999 PR PBB SHADOW E&M-EST. PATIENT-LVL III: ICD-10-PCS | Mod: PBBFAC,,, | Performed by: UROLOGY

## 2021-06-09 PROCEDURE — 36415 COLL VENOUS BLD VENIPUNCTURE: CPT | Performed by: UROLOGY

## 2021-06-09 PROCEDURE — 3008F BODY MASS INDEX DOCD: CPT | Mod: CPTII,S$GLB,, | Performed by: UROLOGY

## 2021-06-10 ENCOUNTER — TELEPHONE (OUTPATIENT)
Dept: UROLOGY | Facility: CLINIC | Age: 46
End: 2021-06-10

## 2021-06-10 DIAGNOSIS — R39.89 BLADDER PAIN: ICD-10-CM

## 2021-06-10 DIAGNOSIS — N39.0 RECURRENT UTI: Primary | ICD-10-CM

## 2021-06-14 ENCOUNTER — TELEPHONE (OUTPATIENT)
Dept: UROLOGY | Facility: CLINIC | Age: 46
End: 2021-06-14

## 2021-06-14 ENCOUNTER — HOSPITAL ENCOUNTER (OUTPATIENT)
Dept: RADIOLOGY | Facility: HOSPITAL | Age: 46
Discharge: HOME | End: 2021-06-14
Attending: UROLOGY
Payer: COMMERCIAL

## 2021-06-14 DIAGNOSIS — Q62.5 DUPLICATED URETER, LEFT: ICD-10-CM

## 2021-06-14 DIAGNOSIS — N39.0 RECURRENT UTI: ICD-10-CM

## 2021-06-14 PROCEDURE — 74178 CT ABD&PLV WO CNTR FLWD CNTR: CPT | Mod: 26,,, | Performed by: RADIOLOGY

## 2021-06-14 PROCEDURE — 74178 CT ABD&PLV WO CNTR FLWD CNTR: CPT | Mod: TC

## 2021-06-14 PROCEDURE — 25500020 PHARM REV CODE 255: Performed by: UROLOGY

## 2021-06-14 PROCEDURE — 74178 CT UROGRAM ABD PELVIS W WO: ICD-10-PCS | Mod: 26,,, | Performed by: RADIOLOGY

## 2021-06-14 RX ADMIN — IOHEXOL 125 ML: 350 INJECTION, SOLUTION INTRAVENOUS at 07:06

## 2021-06-18 ENCOUNTER — PATIENT MESSAGE (OUTPATIENT)
Dept: SURGERY | Facility: HOSPITAL | Age: 46
End: 2021-06-18

## 2021-06-21 ENCOUNTER — TELEPHONE (OUTPATIENT)
Dept: UROLOGY | Facility: CLINIC | Age: 46
End: 2021-06-21

## 2021-06-21 ENCOUNTER — PATIENT MESSAGE (OUTPATIENT)
Dept: SURGERY | Facility: HOSPITAL | Age: 46
End: 2021-06-21

## 2021-06-22 ENCOUNTER — ANESTHESIA EVENT (OUTPATIENT)
Dept: SURGERY | Facility: HOSPITAL | Age: 46
End: 2021-06-22
Payer: COMMERCIAL

## 2021-06-22 ENCOUNTER — ANESTHESIA (OUTPATIENT)
Dept: SURGERY | Facility: HOSPITAL | Age: 46
End: 2021-06-22
Payer: COMMERCIAL

## 2021-06-22 ENCOUNTER — HOSPITAL ENCOUNTER (OUTPATIENT)
Facility: HOSPITAL | Age: 46
Discharge: HOME | End: 2021-06-22
Attending: UROLOGY | Admitting: UROLOGY
Payer: COMMERCIAL

## 2021-06-22 VITALS
WEIGHT: 147 LBS | OXYGEN SATURATION: 100 % | DIASTOLIC BLOOD PRESSURE: 75 MMHG | HEIGHT: 67 IN | RESPIRATION RATE: 18 BRPM | SYSTOLIC BLOOD PRESSURE: 110 MMHG | TEMPERATURE: 98 F | BODY MASS INDEX: 23.07 KG/M2 | HEART RATE: 65 BPM

## 2021-06-22 DIAGNOSIS — Z01.818 PRE-OP TESTING: ICD-10-CM

## 2021-06-22 DIAGNOSIS — N30.10 INTERSTITIAL CYSTITIS: Primary | ICD-10-CM

## 2021-06-22 PROCEDURE — 52000 PR CYSTOURETHROSCOPY: ICD-10-PCS | Mod: ,,, | Performed by: UROLOGY

## 2021-06-22 PROCEDURE — 63600175 PHARM REV CODE 636 W HCPCS: Performed by: NURSE ANESTHETIST, CERTIFIED REGISTERED

## 2021-06-22 PROCEDURE — 37000009 HC ANESTHESIA EA ADD 15 MINS: Performed by: UROLOGY

## 2021-06-22 PROCEDURE — 25000003 PHARM REV CODE 250: Performed by: NURSE ANESTHETIST, CERTIFIED REGISTERED

## 2021-06-22 PROCEDURE — D9220A PRA ANESTHESIA: Mod: ,,, | Performed by: ANESTHESIOLOGY

## 2021-06-22 PROCEDURE — 71000015 HC POSTOP RECOV 1ST HR: Performed by: UROLOGY

## 2021-06-22 PROCEDURE — D9220A PRA ANESTHESIA: Mod: ,,, | Performed by: NURSE ANESTHETIST, CERTIFIED REGISTERED

## 2021-06-22 PROCEDURE — 37000008 HC ANESTHESIA 1ST 15 MINUTES: Performed by: UROLOGY

## 2021-06-22 PROCEDURE — 36000706: Performed by: UROLOGY

## 2021-06-22 PROCEDURE — 63600175 PHARM REV CODE 636 W HCPCS: Performed by: STUDENT IN AN ORGANIZED HEALTH CARE EDUCATION/TRAINING PROGRAM

## 2021-06-22 PROCEDURE — 71000044 HC DOSC ROUTINE RECOVERY FIRST HOUR: Performed by: UROLOGY

## 2021-06-22 PROCEDURE — D9220A PRA ANESTHESIA: ICD-10-PCS | Mod: ,,, | Performed by: ANESTHESIOLOGY

## 2021-06-22 PROCEDURE — 36000707: Performed by: UROLOGY

## 2021-06-22 PROCEDURE — D9220A PRA ANESTHESIA: ICD-10-PCS | Mod: ,,, | Performed by: NURSE ANESTHETIST, CERTIFIED REGISTERED

## 2021-06-22 PROCEDURE — 52000 CYSTOURETHROSCOPY: CPT | Mod: ,,, | Performed by: UROLOGY

## 2021-06-22 RX ORDER — LIDOCAINE HYDROCHLORIDE 20 MG/ML
INJECTION INTRAVENOUS
Status: DISCONTINUED | OUTPATIENT
Start: 2021-06-22 | End: 2021-06-22

## 2021-06-22 RX ORDER — HYDROMORPHONE HYDROCHLORIDE 1 MG/ML
0.2 INJECTION, SOLUTION INTRAMUSCULAR; INTRAVENOUS; SUBCUTANEOUS EVERY 5 MIN PRN
Status: DISCONTINUED | OUTPATIENT
Start: 2021-06-22 | End: 2021-06-22 | Stop reason: HOSPADM

## 2021-06-22 RX ORDER — PROCHLORPERAZINE EDISYLATE 5 MG/ML
5 INJECTION INTRAMUSCULAR; INTRAVENOUS EVERY 30 MIN PRN
Status: DISCONTINUED | OUTPATIENT
Start: 2021-06-22 | End: 2021-06-22 | Stop reason: HOSPADM

## 2021-06-22 RX ORDER — FENTANYL CITRATE 50 UG/ML
25 INJECTION, SOLUTION INTRAMUSCULAR; INTRAVENOUS EVERY 5 MIN PRN
Status: DISCONTINUED | OUTPATIENT
Start: 2021-06-22 | End: 2021-06-22 | Stop reason: HOSPADM

## 2021-06-22 RX ORDER — MIDAZOLAM HYDROCHLORIDE 1 MG/ML
INJECTION, SOLUTION INTRAMUSCULAR; INTRAVENOUS
Status: DISCONTINUED | OUTPATIENT
Start: 2021-06-22 | End: 2021-06-22

## 2021-06-22 RX ORDER — FENTANYL CITRATE 50 UG/ML
INJECTION, SOLUTION INTRAMUSCULAR; INTRAVENOUS
Status: DISCONTINUED | OUTPATIENT
Start: 2021-06-22 | End: 2021-06-22

## 2021-06-22 RX ORDER — ONDANSETRON 2 MG/ML
4 INJECTION INTRAMUSCULAR; INTRAVENOUS DAILY PRN
Status: DISCONTINUED | OUTPATIENT
Start: 2021-06-22 | End: 2021-06-22 | Stop reason: HOSPADM

## 2021-06-22 RX ORDER — CEFAZOLIN SODIUM 1 G/3ML
2 INJECTION, POWDER, FOR SOLUTION INTRAMUSCULAR; INTRAVENOUS
Status: COMPLETED | OUTPATIENT
Start: 2021-06-22 | End: 2021-06-22

## 2021-06-22 RX ORDER — PROPOFOL 10 MG/ML
VIAL (ML) INTRAVENOUS
Status: DISCONTINUED | OUTPATIENT
Start: 2021-06-22 | End: 2021-06-22

## 2021-06-22 RX ORDER — OXYCODONE HYDROCHLORIDE 5 MG/1
5 TABLET ORAL
Status: DISCONTINUED | OUTPATIENT
Start: 2021-06-22 | End: 2021-06-22 | Stop reason: HOSPADM

## 2021-06-22 RX ADMIN — FENTANYL CITRATE 25 MCG: 50 INJECTION, SOLUTION INTRAMUSCULAR; INTRAVENOUS at 11:06

## 2021-06-22 RX ADMIN — CEFAZOLIN 2 G: 330 INJECTION, POWDER, FOR SOLUTION INTRAMUSCULAR; INTRAVENOUS at 11:06

## 2021-06-22 RX ADMIN — LIDOCAINE HYDROCHLORIDE 50 MG: 20 INJECTION, SOLUTION INTRAVENOUS at 11:06

## 2021-06-22 RX ADMIN — PROPOFOL 30 MG: 10 INJECTION, EMULSION INTRAVENOUS at 11:06

## 2021-06-22 RX ADMIN — MIDAZOLAM HYDROCHLORIDE 2 MG: 1 INJECTION, SOLUTION INTRAMUSCULAR; INTRAVENOUS at 11:06

## 2021-06-22 RX ADMIN — SODIUM CHLORIDE: 0.9 INJECTION, SOLUTION INTRAVENOUS at 11:06

## 2021-06-22 RX ADMIN — PROPOFOL 50 MG: 10 INJECTION, EMULSION INTRAVENOUS at 11:06

## 2021-06-22 RX ADMIN — FENTANYL CITRATE 50 MCG: 50 INJECTION, SOLUTION INTRAMUSCULAR; INTRAVENOUS at 11:06

## 2021-09-23 ENCOUNTER — LAB VISIT (OUTPATIENT)
Dept: PRIMARY CARE CLINIC | Facility: OTHER | Age: 46
End: 2021-09-23
Payer: COMMERCIAL

## 2021-09-23 DIAGNOSIS — Z20.822 ENCOUNTER FOR LABORATORY TESTING FOR COVID-19 VIRUS: ICD-10-CM

## 2021-09-23 LAB
SARS-COV-2 RNA RESP QL NAA+PROBE: NOT DETECTED
SARS-COV-2- CYCLE NUMBER: NORMAL

## 2021-09-23 PROCEDURE — U0003 INFECTIOUS AGENT DETECTION BY NUCLEIC ACID (DNA OR RNA); SEVERE ACUTE RESPIRATORY SYNDROME CORONAVIRUS 2 (SARS-COV-2) (CORONAVIRUS DISEASE [COVID-19]), AMPLIFIED PROBE TECHNIQUE, MAKING USE OF HIGH THROUGHPUT TECHNOLOGIES AS DESCRIBED BY CMS-2020-01-R: HCPCS | Performed by: INTERNAL MEDICINE

## 2021-09-25 ENCOUNTER — HOSPITAL ENCOUNTER (EMERGENCY)
Facility: OTHER | Age: 46
Discharge: HOME | End: 2021-09-25
Attending: EMERGENCY MEDICINE
Payer: COMMERCIAL

## 2021-09-25 VITALS
HEART RATE: 79 BPM | OXYGEN SATURATION: 100 % | HEIGHT: 67 IN | RESPIRATION RATE: 16 BRPM | SYSTOLIC BLOOD PRESSURE: 99 MMHG | DIASTOLIC BLOOD PRESSURE: 64 MMHG | WEIGHT: 146 LBS | BODY MASS INDEX: 22.91 KG/M2 | TEMPERATURE: 99 F

## 2021-09-25 DIAGNOSIS — U07.1 COVID-19 VIRUS INFECTION: Primary | ICD-10-CM

## 2021-09-25 LAB
CTP QC/QA: YES
SARS-COV-2 RDRP RESP QL NAA+PROBE: POSITIVE

## 2021-09-25 PROCEDURE — M0243 CASIRIVI AND IMDEVI INFUSION: HCPCS | Performed by: EMERGENCY MEDICINE

## 2021-09-25 PROCEDURE — 25000003 PHARM REV CODE 250: Performed by: EMERGENCY MEDICINE

## 2021-09-25 PROCEDURE — 99285 EMERGENCY DEPT VISIT HI MDM: CPT | Mod: 25

## 2021-09-25 PROCEDURE — 63600175 PHARM REV CODE 636 W HCPCS: Performed by: EMERGENCY MEDICINE

## 2021-09-25 PROCEDURE — U0002 COVID-19 LAB TEST NON-CDC: HCPCS | Performed by: EMERGENCY MEDICINE

## 2021-09-25 RX ORDER — PROMETHAZINE HYDROCHLORIDE 25 MG/1
25 TABLET ORAL EVERY 6 HOURS PRN
Qty: 25 TABLET | Refills: 0 | Status: SHIPPED | OUTPATIENT
Start: 2021-09-25

## 2021-09-25 RX ORDER — SODIUM CHLORIDE 0.9 % (FLUSH) 0.9 %
10 SYRINGE (ML) INJECTION
Status: DISCONTINUED | OUTPATIENT
Start: 2021-09-25 | End: 2021-09-26 | Stop reason: HOSPADM

## 2021-09-25 RX ORDER — DIPHENHYDRAMINE HYDROCHLORIDE 50 MG/ML
25 INJECTION INTRAMUSCULAR; INTRAVENOUS ONCE AS NEEDED
Status: DISCONTINUED | OUTPATIENT
Start: 2021-09-25 | End: 2021-09-26 | Stop reason: HOSPADM

## 2021-09-25 RX ORDER — ACETAMINOPHEN 325 MG/1
650 TABLET ORAL ONCE AS NEEDED
Status: DISCONTINUED | OUTPATIENT
Start: 2021-09-25 | End: 2021-09-26 | Stop reason: HOSPADM

## 2021-09-25 RX ORDER — NAPROXEN 500 MG/1
500 TABLET ORAL 2 TIMES DAILY WITH MEALS
Qty: 20 TABLET | Refills: 0 | Status: SHIPPED | OUTPATIENT
Start: 2021-09-25 | End: 2022-09-08

## 2021-09-25 RX ORDER — BENZONATATE 100 MG/1
100 CAPSULE ORAL 3 TIMES DAILY PRN
Qty: 30 CAPSULE | Refills: 0 | Status: SHIPPED | OUTPATIENT
Start: 2021-09-25 | End: 2021-10-05

## 2021-09-25 RX ORDER — ONDANSETRON 4 MG/1
4 TABLET, ORALLY DISINTEGRATING ORAL EVERY 6 HOURS PRN
Qty: 20 TABLET | Refills: 0 | Status: SHIPPED | OUTPATIENT
Start: 2021-09-25

## 2021-09-25 RX ORDER — EPINEPHRINE 0.3 MG/.3ML
0.3 INJECTION SUBCUTANEOUS
Status: DISCONTINUED | OUTPATIENT
Start: 2021-09-25 | End: 2021-09-26 | Stop reason: HOSPADM

## 2021-09-25 RX ORDER — ONDANSETRON 4 MG/1
4 TABLET, ORALLY DISINTEGRATING ORAL ONCE AS NEEDED
Status: DISCONTINUED | OUTPATIENT
Start: 2021-09-25 | End: 2021-09-26 | Stop reason: HOSPADM

## 2021-09-25 RX ORDER — ALBUTEROL SULFATE 90 UG/1
2 AEROSOL, METERED RESPIRATORY (INHALATION)
Status: DISCONTINUED | OUTPATIENT
Start: 2021-09-25 | End: 2021-09-26 | Stop reason: HOSPADM

## 2021-09-25 RX ADMIN — CASIRIVIMAB AND IMDEVIMAB 600 MG: 600; 600 INJECTION, SOLUTION, CONCENTRATE INTRAVENOUS at 09:09

## 2021-09-28 ENCOUNTER — OFFICE VISIT (OUTPATIENT)
Dept: PRIMARY CARE CLINIC | Facility: CLINIC | Age: 46
End: 2021-09-28
Payer: COMMERCIAL

## 2021-09-28 ENCOUNTER — PATIENT MESSAGE (OUTPATIENT)
Dept: PRIMARY CARE CLINIC | Facility: CLINIC | Age: 46
End: 2021-09-28

## 2021-09-28 DIAGNOSIS — Z87.42 HISTORY OF ENDOMETRIOSIS: ICD-10-CM

## 2021-09-28 DIAGNOSIS — U07.1 COVID-19 VIRUS INFECTION: Primary | ICD-10-CM

## 2021-09-28 DIAGNOSIS — K92.1 MELANOTIC STOOLS: ICD-10-CM

## 2021-09-28 DIAGNOSIS — R10.13 EPIGASTRIC PAIN: ICD-10-CM

## 2021-09-28 DIAGNOSIS — Z87.448 HISTORY OF PYELONEPHRITIS: ICD-10-CM

## 2021-09-28 DIAGNOSIS — Q62.5: ICD-10-CM

## 2021-09-28 DIAGNOSIS — M51.9 LUMBOSACRAL DISC DISEASE: ICD-10-CM

## 2021-09-28 DIAGNOSIS — R06.02 SHORTNESS OF BREATH: ICD-10-CM

## 2021-09-28 PROCEDURE — 99214 OFFICE O/P EST MOD 30 MIN: CPT | Mod: 95,,, | Performed by: FAMILY MEDICINE

## 2021-09-28 PROCEDURE — 99214 PR OFFICE/OUTPT VISIT, EST, LEVL IV, 30-39 MIN: ICD-10-PCS | Mod: 95,,, | Performed by: FAMILY MEDICINE

## 2021-09-28 RX ORDER — PROMETHAZINE HYDROCHLORIDE AND CODEINE PHOSPHATE 6.25; 1 MG/5ML; MG/5ML
5 SOLUTION ORAL EVERY 6 HOURS PRN
Qty: 180 ML | Refills: 0 | Status: SHIPPED | OUTPATIENT
Start: 2021-09-28 | End: 2022-09-08

## 2021-09-28 RX ORDER — AZITHROMYCIN 250 MG/1
TABLET, FILM COATED ORAL
Qty: 6 TABLET | Refills: 0 | Status: SHIPPED | OUTPATIENT
Start: 2021-09-28 | End: 2021-10-02

## 2021-09-29 ENCOUNTER — HOSPITAL ENCOUNTER (EMERGENCY)
Facility: OTHER | Age: 46
Discharge: HOME | End: 2021-09-29
Attending: EMERGENCY MEDICINE
Payer: COMMERCIAL

## 2021-09-29 VITALS
HEART RATE: 66 BPM | SYSTOLIC BLOOD PRESSURE: 104 MMHG | DIASTOLIC BLOOD PRESSURE: 75 MMHG | RESPIRATION RATE: 16 BRPM | OXYGEN SATURATION: 98 % | TEMPERATURE: 99 F

## 2021-09-29 DIAGNOSIS — Z20.822 SUSPECTED COVID-19 VIRUS INFECTION: ICD-10-CM

## 2021-09-29 DIAGNOSIS — U07.1 COVID-19: ICD-10-CM

## 2021-09-29 DIAGNOSIS — R14.0 ABDOMINAL DISTENTION: Primary | ICD-10-CM

## 2021-09-29 DIAGNOSIS — R06.09 DYSPNEA ON EXERTION: ICD-10-CM

## 2021-09-29 LAB
ABO + RH BLD: NORMAL
ALBUMIN SERPL BCP-MCNC: 3.8 G/DL (ref 3.5–5.2)
ALP SERPL-CCNC: 41 U/L (ref 55–135)
ALT SERPL W/O P-5'-P-CCNC: 13 U/L (ref 10–44)
ANION GAP SERPL CALC-SCNC: 10 MMOL/L (ref 8–16)
APTT BLDCRRT: 25.2 SEC (ref 21–32)
AST SERPL-CCNC: 14 U/L (ref 10–40)
BASOPHILS # BLD AUTO: 0.01 K/UL (ref 0–0.2)
BASOPHILS NFR BLD: 0.2 % (ref 0–1.9)
BILIRUB SERPL-MCNC: 0.4 MG/DL (ref 0.1–1)
BLD GP AB SCN CELLS X3 SERPL QL: NORMAL
BUN SERPL-MCNC: 12 MG/DL (ref 6–20)
CALCIUM SERPL-MCNC: 9.2 MG/DL (ref 8.7–10.5)
CHLORIDE SERPL-SCNC: 108 MMOL/L (ref 95–110)
CK SERPL-CCNC: 55 U/L (ref 20–180)
CO2 SERPL-SCNC: 20 MMOL/L (ref 23–29)
CREAT SERPL-MCNC: 0.8 MG/DL (ref 0.5–1.4)
CRP SERPL-MCNC: 20.1 MG/L (ref 0–8.2)
D DIMER PPP IA.FEU-MCNC: 0.38 MG/L FEU
DIFFERENTIAL METHOD: NORMAL
EOSINOPHIL # BLD AUTO: 0 K/UL (ref 0–0.5)
EOSINOPHIL NFR BLD: 0.7 % (ref 0–8)
ERYTHROCYTE [DISTWIDTH] IN BLOOD BY AUTOMATED COUNT: 12.4 % (ref 11.5–14.5)
EST. GFR  (AFRICAN AMERICAN): >60 ML/MIN/1.73 M^2
EST. GFR  (NON AFRICAN AMERICAN): >60 ML/MIN/1.73 M^2
FERRITIN SERPL-MCNC: 125 NG/ML (ref 20–300)
GLUCOSE SERPL-MCNC: 87 MG/DL (ref 70–110)
HCT VFR BLD AUTO: 40 % (ref 37–48.5)
HGB BLD-MCNC: 13.6 G/DL (ref 12–16)
IMM GRANULOCYTES # BLD AUTO: 0.02 K/UL (ref 0–0.04)
IMM GRANULOCYTES NFR BLD AUTO: 0.4 % (ref 0–0.5)
INR PPP: 0.9 (ref 0.8–1.2)
LACTATE SERPL-SCNC: 0.9 MMOL/L (ref 0.5–2.2)
LDH SERPL L TO P-CCNC: 238 U/L (ref 110–260)
LYMPHOCYTES # BLD AUTO: 1.3 K/UL (ref 1–4.8)
LYMPHOCYTES NFR BLD: 22.2 % (ref 18–48)
MCH RBC QN AUTO: 30.9 PG (ref 27–31)
MCHC RBC AUTO-ENTMCNC: 34 G/DL (ref 32–36)
MCV RBC AUTO: 91 FL (ref 82–98)
MONOCYTES # BLD AUTO: 0.4 K/UL (ref 0.3–1)
MONOCYTES NFR BLD: 7.2 % (ref 4–15)
NEUTROPHILS # BLD AUTO: 3.9 K/UL (ref 1.8–7.7)
NEUTROPHILS NFR BLD: 69.3 % (ref 38–73)
NRBC BLD-RTO: 0 /100 WBC
PLATELET # BLD AUTO: 263 K/UL (ref 150–450)
PMV BLD AUTO: 10.2 FL (ref 9.2–12.9)
POTASSIUM SERPL-SCNC: 3.9 MMOL/L (ref 3.5–5.1)
PROT SERPL-MCNC: 7.4 G/DL (ref 6–8.4)
PROTHROMBIN TIME: 9.9 SEC (ref 9–12.5)
RBC # BLD AUTO: 4.4 M/UL (ref 4–5.4)
SODIUM SERPL-SCNC: 138 MMOL/L (ref 136–145)
TROPONIN I SERPL DL<=0.01 NG/ML-MCNC: <0.006 NG/ML (ref 0–0.03)
WBC # BLD AUTO: 5.68 K/UL (ref 3.9–12.7)

## 2021-09-29 PROCEDURE — 93005 ELECTROCARDIOGRAM TRACING: CPT

## 2021-09-29 PROCEDURE — 93010 ELECTROCARDIOGRAM REPORT: CPT | Mod: ,,, | Performed by: INTERNAL MEDICINE

## 2021-09-29 PROCEDURE — 86900 BLOOD TYPING SEROLOGIC ABO: CPT | Performed by: EMERGENCY MEDICINE

## 2021-09-29 PROCEDURE — 85379 FIBRIN DEGRADATION QUANT: CPT | Performed by: EMERGENCY MEDICINE

## 2021-09-29 PROCEDURE — 82728 ASSAY OF FERRITIN: CPT | Performed by: EMERGENCY MEDICINE

## 2021-09-29 PROCEDURE — 85730 THROMBOPLASTIN TIME PARTIAL: CPT | Performed by: EMERGENCY MEDICINE

## 2021-09-29 PROCEDURE — 83605 ASSAY OF LACTIC ACID: CPT | Performed by: EMERGENCY MEDICINE

## 2021-09-29 PROCEDURE — 82550 ASSAY OF CK (CPK): CPT | Performed by: EMERGENCY MEDICINE

## 2021-09-29 PROCEDURE — 99285 EMERGENCY DEPT VISIT HI MDM: CPT | Mod: 25

## 2021-09-29 PROCEDURE — 85610 PROTHROMBIN TIME: CPT | Performed by: EMERGENCY MEDICINE

## 2021-09-29 PROCEDURE — 93010 EKG 12-LEAD: ICD-10-PCS | Mod: ,,, | Performed by: INTERNAL MEDICINE

## 2021-09-29 PROCEDURE — 25000003 PHARM REV CODE 250: Performed by: EMERGENCY MEDICINE

## 2021-09-29 PROCEDURE — 86140 C-REACTIVE PROTEIN: CPT | Performed by: EMERGENCY MEDICINE

## 2021-09-29 PROCEDURE — 84484 ASSAY OF TROPONIN QUANT: CPT | Performed by: EMERGENCY MEDICINE

## 2021-09-29 PROCEDURE — 85025 COMPLETE CBC W/AUTO DIFF WBC: CPT | Performed by: EMERGENCY MEDICINE

## 2021-09-29 PROCEDURE — 83615 LACTATE (LD) (LDH) ENZYME: CPT | Performed by: EMERGENCY MEDICINE

## 2021-09-29 PROCEDURE — 80053 COMPREHEN METABOLIC PANEL: CPT | Performed by: EMERGENCY MEDICINE

## 2021-09-29 RX ORDER — SIMETHICONE 80 MG
1 TABLET,CHEWABLE ORAL
Status: COMPLETED | OUTPATIENT
Start: 2021-09-29 | End: 2021-09-29

## 2021-09-29 RX ADMIN — SIMETHICONE 80 MG: 80 TABLET, CHEWABLE ORAL at 02:09

## 2021-09-29 RX ADMIN — ALUMINUM HYDROXIDE, MAGNESIUM HYDROXIDE, DIMETHICONE 50 ML: 200; 200; 20 LIQUID ORAL at 02:09

## 2021-10-04 ENCOUNTER — HOSPITAL ENCOUNTER (OUTPATIENT)
Dept: RADIOLOGY | Facility: OTHER | Age: 46
Discharge: HOME | End: 2021-10-04
Attending: FAMILY MEDICINE
Payer: COMMERCIAL

## 2021-10-04 DIAGNOSIS — R06.02 SHORTNESS OF BREATH: ICD-10-CM

## 2021-10-04 DIAGNOSIS — U07.1 COVID-19 VIRUS INFECTION: ICD-10-CM

## 2021-10-04 PROCEDURE — 71046 X-RAY EXAM CHEST 2 VIEWS: CPT | Mod: TC,FY

## 2021-10-04 PROCEDURE — 71046 X-RAY EXAM CHEST 2 VIEWS: CPT | Mod: 26,,, | Performed by: RADIOLOGY

## 2021-10-04 PROCEDURE — 71046 XR CHEST PA AND LATERAL: ICD-10-PCS | Mod: 26,,, | Performed by: RADIOLOGY

## 2021-10-06 ENCOUNTER — PATIENT MESSAGE (OUTPATIENT)
Dept: PRIMARY CARE CLINIC | Facility: CLINIC | Age: 46
End: 2021-10-06
Payer: COMMERCIAL

## 2021-10-11 DIAGNOSIS — G43.909 MIGRAINE SYNDROME: Primary | ICD-10-CM

## 2021-10-11 RX ORDER — BUTALBITAL, ACETAMINOPHEN AND CAFFEINE 300; 40; 50 MG/1; MG/1; MG/1
CAPSULE ORAL
Qty: 60 CAPSULE | Refills: 0 | Status: SHIPPED | OUTPATIENT
Start: 2021-10-11 | End: 2021-11-05 | Stop reason: SDUPTHER

## 2021-10-11 RX ORDER — NARATRIPTAN 2.5 MG/1
TABLET ORAL
Qty: 9 TABLET | Refills: 11 | Status: SHIPPED | OUTPATIENT
Start: 2021-10-11 | End: 2021-10-12

## 2021-10-12 ENCOUNTER — OFFICE VISIT (OUTPATIENT)
Dept: GASTROENTEROLOGY | Facility: CLINIC | Age: 46
End: 2021-10-12
Payer: COMMERCIAL

## 2021-10-12 ENCOUNTER — TELEPHONE (OUTPATIENT)
Dept: SLEEP MEDICINE | Facility: CLINIC | Age: 46
End: 2021-10-12

## 2021-10-12 ENCOUNTER — TELEPHONE (OUTPATIENT)
Dept: GASTROENTEROLOGY | Facility: CLINIC | Age: 46
End: 2021-10-12

## 2021-10-12 DIAGNOSIS — R14.0 ABDOMINAL BLOATING: ICD-10-CM

## 2021-10-12 DIAGNOSIS — R10.13 EPIGASTRIC PAIN: Primary | ICD-10-CM

## 2021-10-12 PROCEDURE — 1159F MED LIST DOCD IN RCRD: CPT | Mod: CPTII,95,, | Performed by: NURSE PRACTITIONER

## 2021-10-12 PROCEDURE — 1160F PR REVIEW ALL MEDS BY PRESCRIBER/CLIN PHARMACIST DOCUMENTED: ICD-10-PCS | Mod: CPTII,95,, | Performed by: NURSE PRACTITIONER

## 2021-10-12 PROCEDURE — 99214 OFFICE O/P EST MOD 30 MIN: CPT | Mod: 95,,, | Performed by: NURSE PRACTITIONER

## 2021-10-12 PROCEDURE — 1159F PR MEDICATION LIST DOCUMENTED IN MEDICAL RECORD: ICD-10-PCS | Mod: CPTII,95,, | Performed by: NURSE PRACTITIONER

## 2021-10-12 PROCEDURE — 99214 PR OFFICE/OUTPT VISIT, EST, LEVL IV, 30-39 MIN: ICD-10-PCS | Mod: 95,,, | Performed by: NURSE PRACTITIONER

## 2021-10-12 PROCEDURE — 1160F RVW MEDS BY RX/DR IN RCRD: CPT | Mod: CPTII,95,, | Performed by: NURSE PRACTITIONER

## 2021-10-12 RX ORDER — UBROGEPANT 50 MG/1
TABLET ORAL
Qty: 10 TABLET | Refills: 1 | Status: SHIPPED | OUTPATIENT
Start: 2021-10-12

## 2021-10-12 RX ORDER — PANTOPRAZOLE SODIUM 40 MG/1
40 TABLET, DELAYED RELEASE ORAL DAILY
Qty: 30 TABLET | Refills: 3 | Status: SHIPPED | OUTPATIENT
Start: 2021-10-12 | End: 2022-08-16 | Stop reason: SDUPTHER

## 2021-10-13 ENCOUNTER — LAB VISIT (OUTPATIENT)
Dept: LAB | Facility: OTHER | Age: 46
End: 2021-10-13
Attending: NURSE PRACTITIONER
Payer: COMMERCIAL

## 2021-10-13 DIAGNOSIS — R10.13 EPIGASTRIC PAIN: ICD-10-CM

## 2021-10-13 LAB
AMYLASE SERPL-CCNC: 60 U/L (ref 20–110)
LIPASE SERPL-CCNC: 29 U/L (ref 4–60)

## 2021-10-13 PROCEDURE — 82150 ASSAY OF AMYLASE: CPT | Performed by: NURSE PRACTITIONER

## 2021-10-13 PROCEDURE — 83690 ASSAY OF LIPASE: CPT | Performed by: NURSE PRACTITIONER

## 2021-10-13 PROCEDURE — 36415 COLL VENOUS BLD VENIPUNCTURE: CPT | Performed by: NURSE PRACTITIONER

## 2021-10-14 ENCOUNTER — TELEPHONE (OUTPATIENT)
Dept: PHARMACY | Facility: CLINIC | Age: 46
End: 2021-10-14

## 2021-10-14 ENCOUNTER — PATIENT MESSAGE (OUTPATIENT)
Dept: GASTROENTEROLOGY | Facility: CLINIC | Age: 46
End: 2021-10-14

## 2021-10-18 NOTE — MR AVS SNAPSHOT
[facility]  [address] [facility] [place] [address]  Phone: [phone]  Fax: [phone]                  Mirtha Gary   2017 3:00 PM   Office Visit    Descripción:  Female : 1975   Personal Médico:  Kiera Biggs MD   Departamento:  [facility]           Razón de la kerry     Low-back Pain           Diagnósticos de Esta Visita        Comentarios    Myofascial pain on left side    -  Primario     Left lower quadrant pain         Lumbosacral disc disease         Annular tear of intervertebral disc         Left lumbar radiculitis                Lista de tareas           Citas próximas        Personal Médico Departamento Tfno del dpto    2017 8:45 AM Katrin Garcia MD [facility] [address] [phone]    2017 2:15 PM Katrin Garcia MD [phone]      Metas (5 Years of Data)     Ninguna      Recetas para recoger        Disp Refills Start End    ibuprofen (ADVIL,MOTRIN) 800 MG tablet 90 tablet 5 2017    Take 1 tablet (800 mg total) by mouth 3 (three) times daily. - Oral    [facility]arm[facility]: [facility] [address] [facility], [place] - [address] AT [facility] [place] [address]fo: #: [phone]       methocarbamol (ROBAXIN) 500 MG Tab 90 tablet 5 2017    Take 1 tablet (500 mg total) by mouth 3 (three) times daily as needed (muscle spasms). - Oral    Farmacia: [facility] [id number] - [place], [place] - [address] AT [facility] [place] [address] de [address]lfo: #: [phone]         [facility]     [facility]fermeras - Asistencia   Enfermeras registradas de [facility] pueden ayudarle a reservar maurice kerry, proveer educación para la zechariah, asesoría clínica, y otros servicios de asesoramiento.   Llame para anuja servicio gratuito a [phone].             Medicamentos           Mensaje sobre Medicamentos     Verificar los cambios y / o  "adiciones a vega régimen de medicación son los mismos que discutir con vega médico. Si cualquiera de estos cambios o adiciones son incorrectos, por favor notifique a vega proveedor de atención médica.        EMPEZAR a colt estos medicamentos NUEVOS        Refills    ibuprofen (ADVIL,MOTRIN) 800 MG tablet 5    Sig: Take 1 tablet (800 mg total) by mouth 3 (three) times daily.    Categoría: Normal    Vía: Oral    methocarbamol (ROBAXIN) 500 MG Tab 5    Sig: Take 1 tablet (500 mg total) by mouth 3 (three) times daily as needed (muscle spasms).    Categoría: Normal    Vía: Oral           Verifique que la siguiente lista de medicamentos es maurice representación exacta de los medicamentos que está tomando actualmente. Si no hay ningunos reportados, la lista puede estar en berumen. Si no es correcta, por favor póngase en contacto con vega proveedor de atención médica. Lleve esta lista con usted en mitchell de emergencia.           Medicamentos Actuales     butalbital-acetaminophen-caff -40 mg Cap TK 1 C PO BID PRF SEVERE HA    PROAIR HFA 90 mcg/actuation inhaler USE 2 PUFFS PO Q 6 H    zolpidem (AMBIEN) 10 mg Tab TK 1 T PO QHS PRN FOR INSOMNIA    ibuprofen (ADVIL,MOTRIN) 800 MG tablet Take 1 tablet (800 mg total) by mouth 3 (three) times daily.    methocarbamol (ROBAXIN) 500 MG Tab Take 1 tablet (500 mg total) by mouth 3 (three) times daily as needed (muscle spasms).           Información de referencia clínica           Lashay signos vitales kathya     PS Pulso Temperatura [place] Peso BMI (IMC)    136/66 64 98.5 °F (36.9 °C) (Oral) 5' 6" (1.676 m) 62.1 kg (136 lb 14.5 oz) 22.1 kg/m2      Blood Pressure          Most Recent Value    BP  136/66      Alergias     A partir del:  2/13/2017        No Known Allergies      Vacunas     Administradas en la fecha de la visita:  2/13/2017        None      Orders Placed During Today's Visit      Órdenes normales de esta visita    Ambulatory consult to [facility]       Language Assistance " Services     ATTENTION: Language assistance services are available, free of charge. Please call [phone].      ATENCIÓN: Si habla everton, tiene a vega disposición servicios gratuitos de asistencia lingüística. Llame al [phone].     CHÚ Ý: N?u b?n nói Ti?ng Vi?t, có các d?ch v? h? tr? ngôn ng? mi?n phí dành cho b?n. G?i s? [phone].         [facility] cumple con las leyes federales aplicables de derechos civiles y no discrimina por motivos de dmitri, color, origen nacional, edad, discapacidad, o sexo.                 Mirtha Gary   2017 3:00 PM   Office Visit    Description:  Female : 1975   Provider:  Kiera Biggs MD   Department:  [facility]           Reason for Visit     Low-back Pain           Diagnoses this Visit        Comments    Myofascial pain on left side    -  Primary     Left lower quadrant pain         Lumbosacral disc disease         Annular tear of intervertebral disc         Left lumbar radiculitis                To Do List           Future Appointments        Provider Department Dept Phone    2017 8:45 AM Katrin Garcia MD [facility] [address] [phone]    2017 2:15 PM Katrin Garcia MD [facility] [phone]      Goals     None       These Medications        Disp Refills Start End    ibuprofen (ADVIL,MOTRIN) 800 MG tablet 90 tablet 5 2017    Take 1 tablet (800 mg total) by mouth 3 (three) times daily. - Oral    Pharmacy: [facility] [id number] [address] AT [facility] [address]  #: [phone]       methocarbamol (ROBAXIN) 500 MG Tab 90 tablet 5 2017    Take 1 tablet (500 mg total) by mouth 3 (three) times daily as needed (muscle spasms). - Oral    Pharmacy: [facility] [address] - [place] [address] AT [facility] [address] Ph #: [phone]         [facility]     [facility]  "Nurse Care Line - 24/7 Assistance  Registered nurses in the [facility] provide clinical advisement, health education, appointment booking, and other advisory services.  Call for this free service at [phone].             Medications           Message regarding Medications     Verify the changes and/or additions to your medication regime listed below are the same as discussed with your clinician today.  If any of these changes or additions are incorrect, please notify your healthcare provider.        START taking these NEW medications        Refills    ibuprofen (ADVIL,MOTRIN) 800 MG tablet 5    Sig: Take 1 tablet (800 mg total) by mouth 3 (three) times daily.    Class: Normal    Route: Oral    methocarbamol (ROBAXIN) 500 MG Tab 5    Sig: Take 1 tablet (500 mg total) by mouth 3 (three) times daily as needed (muscle spasms).    Class: Normal    Route: Oral           Verify that the below list of medications is an accurate representation of the medications you are currently taking.  If none reported, the list may be blank. If incorrect, please contact your healthcare provider. Carry this list with you in case of emergency.           Current Medications     butalbital-acetaminophen-caff -40 mg Cap TK 1 C PO BID PRF SEVERE HA    PROAIR HFA 90 mcg/actuation inhaler USE 2 PUFFS PO Q 6 H    zolpidem (AMBIEN) 10 mg Tab TK 1 T PO QHS PRN FOR INSOMNIA    ibuprofen (ADVIL,MOTRIN) 800 MG tablet Take 1 tablet (800 mg total) by mouth 3 (three) times daily.    methocarbamol (ROBAXIN) 500 MG Tab Take 1 tablet (500 mg total) by mouth 3 (three) times daily as needed (muscle spasms).           Clinical Reference Information           Your Vitals Were     BP Pulse Temp Height Weight BMI    136/66 64 98.5 °F (36.9 °C) (Oral) 5' 6" (1.676 m) 62.1 kg (136 lb 14.5 oz) 22.1 kg/m2      Blood Pressure          Most Recent Value    BP  136/66      Allergies as of 2/13/2017     No Known Allergies      Immunizations " Administered on Date of Encounter - 2/13/2017     None      Orders Placed During Today's Visit      Normal Orders This Visit    [facility]ulatory consult to [facility]       Language Assistance Services     ATTENTION: Language assistance services are available, free of charge. Please call [phone].      ATENCIÓN: Si habla everton, tiene a vega disposición servicios gratuitos de asistencia lingüística. Llame al [phone].     CHÚ Ý: N?u b?n nói Ti?ng Vi?t, có các d?ch v? h? tr? ngôn ng? mi?n phí dành cho b?n. G?i s? [phone].         [facility] complies with applicable Federal civil rights laws and does not discriminate on the basis of race, color, national origin, age, disability, or sex.           Attending Attestation (For Attendings USE Only)...

## 2021-11-01 ENCOUNTER — TELEPHONE (OUTPATIENT)
Dept: ENDOSCOPY | Facility: HOSPITAL | Age: 46
End: 2021-11-01
Payer: COMMERCIAL

## 2021-11-05 ENCOUNTER — OFFICE VISIT (OUTPATIENT)
Dept: SLEEP MEDICINE | Facility: CLINIC | Age: 46
End: 2021-11-05
Payer: COMMERCIAL

## 2021-11-05 VITALS
DIASTOLIC BLOOD PRESSURE: 69 MMHG | BODY MASS INDEX: 22.91 KG/M2 | HEIGHT: 67 IN | HEART RATE: 66 BPM | WEIGHT: 146 LBS | SYSTOLIC BLOOD PRESSURE: 98 MMHG

## 2021-11-05 DIAGNOSIS — G43.909 MIGRAINE SYNDROME: ICD-10-CM

## 2021-11-05 DIAGNOSIS — G43.009 MIGRAINE WITHOUT AURA AND WITHOUT STATUS MIGRAINOSUS, NOT INTRACTABLE: Primary | ICD-10-CM

## 2021-11-05 PROCEDURE — 99999 PR PBB SHADOW E&M-EST. PATIENT-LVL III: CPT | Mod: PBBFAC,,, | Performed by: NURSE PRACTITIONER

## 2021-11-05 PROCEDURE — 3074F PR MOST RECENT SYSTOLIC BLOOD PRESSURE < 130 MM HG: ICD-10-PCS | Mod: CPTII,S$GLB,, | Performed by: NURSE PRACTITIONER

## 2021-11-05 PROCEDURE — 1159F MED LIST DOCD IN RCRD: CPT | Mod: CPTII,S$GLB,, | Performed by: NURSE PRACTITIONER

## 2021-11-05 PROCEDURE — 3078F PR MOST RECENT DIASTOLIC BLOOD PRESSURE < 80 MM HG: ICD-10-PCS | Mod: CPTII,S$GLB,, | Performed by: NURSE PRACTITIONER

## 2021-11-05 PROCEDURE — 99213 PR OFFICE/OUTPT VISIT, EST, LEVL III, 20-29 MIN: ICD-10-PCS | Mod: S$GLB,,, | Performed by: NURSE PRACTITIONER

## 2021-11-05 PROCEDURE — 3078F DIAST BP <80 MM HG: CPT | Mod: CPTII,S$GLB,, | Performed by: NURSE PRACTITIONER

## 2021-11-05 PROCEDURE — 3008F PR BODY MASS INDEX (BMI) DOCUMENTED: ICD-10-PCS | Mod: CPTII,S$GLB,, | Performed by: NURSE PRACTITIONER

## 2021-11-05 PROCEDURE — 99999 PR PBB SHADOW E&M-EST. PATIENT-LVL III: ICD-10-PCS | Mod: PBBFAC,,, | Performed by: NURSE PRACTITIONER

## 2021-11-05 PROCEDURE — 3074F SYST BP LT 130 MM HG: CPT | Mod: CPTII,S$GLB,, | Performed by: NURSE PRACTITIONER

## 2021-11-05 PROCEDURE — 3008F BODY MASS INDEX DOCD: CPT | Mod: CPTII,S$GLB,, | Performed by: NURSE PRACTITIONER

## 2021-11-05 PROCEDURE — 1159F PR MEDICATION LIST DOCUMENTED IN MEDICAL RECORD: ICD-10-PCS | Mod: CPTII,S$GLB,, | Performed by: NURSE PRACTITIONER

## 2021-11-05 PROCEDURE — 99213 OFFICE O/P EST LOW 20 MIN: CPT | Mod: S$GLB,,, | Performed by: NURSE PRACTITIONER

## 2021-11-05 RX ORDER — SUMATRIPTAN 10 MG/1
10 SPRAY NASAL
Qty: 8 EACH | Refills: 5 | Status: SHIPPED | OUTPATIENT
Start: 2021-11-05 | End: 2022-05-04 | Stop reason: SDUPTHER

## 2021-11-05 RX ORDER — GALCANEZUMAB 120 MG/ML
240 INJECTION, SOLUTION SUBCUTANEOUS ONCE
Qty: 2 ML | Refills: 0 | Status: SHIPPED | OUTPATIENT
Start: 2021-11-05 | End: 2021-11-05

## 2021-11-05 RX ORDER — BUTALBITAL, ACETAMINOPHEN AND CAFFEINE 300; 40; 50 MG/1; MG/1; MG/1
CAPSULE ORAL
Qty: 30 CAPSULE | Refills: 3 | Status: SHIPPED | OUTPATIENT
Start: 2021-11-05

## 2021-11-05 RX ORDER — GALCANEZUMAB 120 MG/ML
120 INJECTION, SOLUTION SUBCUTANEOUS
Qty: 1 ML | Refills: 11 | Status: SHIPPED | OUTPATIENT
Start: 2021-11-05 | End: 2023-02-23

## 2021-11-05 RX ORDER — RIMEGEPANT SULFATE 75 MG/75MG
75 TABLET, ORALLY DISINTEGRATING ORAL ONCE AS NEEDED
Qty: 8 TABLET | Refills: 5 | Status: SHIPPED | OUTPATIENT
Start: 2021-11-05 | End: 2021-11-05

## 2021-11-05 RX ORDER — RIZATRIPTAN BENZOATE 10 MG/1
10 TABLET ORAL
Qty: 9 TABLET | Refills: 3 | Status: SHIPPED | OUTPATIENT
Start: 2021-11-05 | End: 2022-09-08

## 2021-11-05 RX ORDER — NARATRIPTAN 2.5 MG/1
TABLET ORAL
Qty: 9 TABLET | Refills: 3 | Status: SHIPPED | OUTPATIENT
Start: 2021-11-05

## 2021-11-09 ENCOUNTER — OFFICE VISIT (OUTPATIENT)
Dept: GASTROENTEROLOGY | Facility: CLINIC | Age: 46
End: 2021-11-09
Payer: COMMERCIAL

## 2021-11-09 VITALS
WEIGHT: 150.56 LBS | DIASTOLIC BLOOD PRESSURE: 70 MMHG | SYSTOLIC BLOOD PRESSURE: 100 MMHG | HEIGHT: 67 IN | BODY MASS INDEX: 23.63 KG/M2 | HEART RATE: 63 BPM | OXYGEN SATURATION: 98 % | RESPIRATION RATE: 16 BRPM

## 2021-11-09 DIAGNOSIS — R14.0 ABDOMINAL BLOATING: ICD-10-CM

## 2021-11-09 DIAGNOSIS — R10.13 DYSPEPSIA: Primary | ICD-10-CM

## 2021-11-09 DIAGNOSIS — R10.9 LEFT SIDED ABDOMINAL PAIN: ICD-10-CM

## 2021-11-09 PROCEDURE — 99213 PR OFFICE/OUTPT VISIT, EST, LEVL III, 20-29 MIN: ICD-10-PCS | Mod: S$GLB,,, | Performed by: INTERNAL MEDICINE

## 2021-11-09 PROCEDURE — 1159F MED LIST DOCD IN RCRD: CPT | Mod: CPTII,S$GLB,, | Performed by: INTERNAL MEDICINE

## 2021-11-09 PROCEDURE — 3074F SYST BP LT 130 MM HG: CPT | Mod: CPTII,S$GLB,, | Performed by: INTERNAL MEDICINE

## 2021-11-09 PROCEDURE — 3008F BODY MASS INDEX DOCD: CPT | Mod: CPTII,S$GLB,, | Performed by: INTERNAL MEDICINE

## 2021-11-09 PROCEDURE — 99999 PR PBB SHADOW E&M-EST. PATIENT-LVL V: ICD-10-PCS | Mod: PBBFAC,,, | Performed by: INTERNAL MEDICINE

## 2021-11-09 PROCEDURE — 3078F DIAST BP <80 MM HG: CPT | Mod: CPTII,S$GLB,, | Performed by: INTERNAL MEDICINE

## 2021-11-09 PROCEDURE — 3078F PR MOST RECENT DIASTOLIC BLOOD PRESSURE < 80 MM HG: ICD-10-PCS | Mod: CPTII,S$GLB,, | Performed by: INTERNAL MEDICINE

## 2021-11-09 PROCEDURE — 99999 PR PBB SHADOW E&M-EST. PATIENT-LVL V: CPT | Mod: PBBFAC,,, | Performed by: INTERNAL MEDICINE

## 2021-11-09 PROCEDURE — 3074F PR MOST RECENT SYSTOLIC BLOOD PRESSURE < 130 MM HG: ICD-10-PCS | Mod: CPTII,S$GLB,, | Performed by: INTERNAL MEDICINE

## 2021-11-09 PROCEDURE — 3008F PR BODY MASS INDEX (BMI) DOCUMENTED: ICD-10-PCS | Mod: CPTII,S$GLB,, | Performed by: INTERNAL MEDICINE

## 2021-11-09 PROCEDURE — 1160F PR REVIEW ALL MEDS BY PRESCRIBER/CLIN PHARMACIST DOCUMENTED: ICD-10-PCS | Mod: CPTII,S$GLB,, | Performed by: INTERNAL MEDICINE

## 2021-11-09 PROCEDURE — 1159F PR MEDICATION LIST DOCUMENTED IN MEDICAL RECORD: ICD-10-PCS | Mod: CPTII,S$GLB,, | Performed by: INTERNAL MEDICINE

## 2021-11-09 PROCEDURE — 1160F RVW MEDS BY RX/DR IN RCRD: CPT | Mod: CPTII,S$GLB,, | Performed by: INTERNAL MEDICINE

## 2021-11-09 PROCEDURE — 99213 OFFICE O/P EST LOW 20 MIN: CPT | Mod: S$GLB,,, | Performed by: INTERNAL MEDICINE

## 2021-11-10 ENCOUNTER — PATIENT MESSAGE (OUTPATIENT)
Dept: ENDOSCOPY | Facility: HOSPITAL | Age: 46
End: 2021-11-10
Payer: COMMERCIAL

## 2021-11-10 ENCOUNTER — TELEPHONE (OUTPATIENT)
Dept: ENDOSCOPY | Facility: HOSPITAL | Age: 46
End: 2021-11-10
Payer: COMMERCIAL

## 2021-11-15 ENCOUNTER — TELEPHONE (OUTPATIENT)
Dept: PHARMACY | Facility: CLINIC | Age: 46
End: 2021-11-15
Payer: COMMERCIAL

## 2021-12-01 ENCOUNTER — HOSPITAL ENCOUNTER (OUTPATIENT)
Facility: HOSPITAL | Age: 46
Discharge: HOME | End: 2021-12-01
Attending: INTERNAL MEDICINE | Admitting: INTERNAL MEDICINE
Payer: COMMERCIAL

## 2021-12-01 ENCOUNTER — ANESTHESIA EVENT (OUTPATIENT)
Dept: ENDOSCOPY | Facility: HOSPITAL | Age: 46
End: 2021-12-01
Payer: COMMERCIAL

## 2021-12-01 ENCOUNTER — ANESTHESIA (OUTPATIENT)
Dept: ENDOSCOPY | Facility: HOSPITAL | Age: 46
End: 2021-12-01
Payer: COMMERCIAL

## 2021-12-01 VITALS
TEMPERATURE: 98 F | DIASTOLIC BLOOD PRESSURE: 68 MMHG | SYSTOLIC BLOOD PRESSURE: 99 MMHG | BODY MASS INDEX: 22.6 KG/M2 | HEIGHT: 67 IN | RESPIRATION RATE: 18 BRPM | HEART RATE: 74 BPM | OXYGEN SATURATION: 100 % | WEIGHT: 144 LBS

## 2021-12-01 DIAGNOSIS — R10.13 DYSPEPSIA: ICD-10-CM

## 2021-12-01 LAB
B-HCG UR QL: NEGATIVE
CTP QC/QA: YES
POCT GLUCOSE: 82 MG/DL (ref 70–110)

## 2021-12-01 PROCEDURE — D9220A PRA ANESTHESIA: Mod: ,,, | Performed by: NURSE ANESTHETIST, CERTIFIED REGISTERED

## 2021-12-01 PROCEDURE — 88305 TISSUE EXAM BY PATHOLOGIST: CPT | Mod: 59 | Performed by: STUDENT IN AN ORGANIZED HEALTH CARE EDUCATION/TRAINING PROGRAM

## 2021-12-01 PROCEDURE — 63600175 PHARM REV CODE 636 W HCPCS: Performed by: ANESTHESIOLOGY

## 2021-12-01 PROCEDURE — 88305 TISSUE EXAM BY PATHOLOGIST: CPT | Mod: 26,,, | Performed by: STUDENT IN AN ORGANIZED HEALTH CARE EDUCATION/TRAINING PROGRAM

## 2021-12-01 PROCEDURE — 27201012 HC FORCEPS, HOT/COLD, DISP: Performed by: INTERNAL MEDICINE

## 2021-12-01 PROCEDURE — 37000008 HC ANESTHESIA 1ST 15 MINUTES: Performed by: INTERNAL MEDICINE

## 2021-12-01 PROCEDURE — 43239 EGD BIOPSY SINGLE/MULTIPLE: CPT | Mod: ,,, | Performed by: INTERNAL MEDICINE

## 2021-12-01 PROCEDURE — D9220A PRA ANESTHESIA: ICD-10-PCS | Mod: ,,, | Performed by: NURSE ANESTHETIST, CERTIFIED REGISTERED

## 2021-12-01 PROCEDURE — 43239 EGD BIOPSY SINGLE/MULTIPLE: CPT | Performed by: INTERNAL MEDICINE

## 2021-12-01 PROCEDURE — 25000003 PHARM REV CODE 250: Performed by: INTERNAL MEDICINE

## 2021-12-01 PROCEDURE — 88342 IMHCHEM/IMCYTCHM 1ST ANTB: CPT | Mod: 26,,, | Performed by: STUDENT IN AN ORGANIZED HEALTH CARE EDUCATION/TRAINING PROGRAM

## 2021-12-01 PROCEDURE — 43239 PR EGD, FLEX, W/BIOPSY, SGL/MULTI: ICD-10-PCS | Mod: ,,, | Performed by: INTERNAL MEDICINE

## 2021-12-01 PROCEDURE — 88342 CHG IMMUNOCYTOCHEMISTRY: ICD-10-PCS | Mod: 26,,, | Performed by: STUDENT IN AN ORGANIZED HEALTH CARE EDUCATION/TRAINING PROGRAM

## 2021-12-01 PROCEDURE — D9220A PRA ANESTHESIA: ICD-10-PCS | Mod: ,,, | Performed by: ANESTHESIOLOGY

## 2021-12-01 PROCEDURE — 37000009 HC ANESTHESIA EA ADD 15 MINS: Performed by: INTERNAL MEDICINE

## 2021-12-01 PROCEDURE — 25000003 PHARM REV CODE 250: Performed by: NURSE ANESTHETIST, CERTIFIED REGISTERED

## 2021-12-01 PROCEDURE — 88342 IMHCHEM/IMCYTCHM 1ST ANTB: CPT | Performed by: STUDENT IN AN ORGANIZED HEALTH CARE EDUCATION/TRAINING PROGRAM

## 2021-12-01 PROCEDURE — 81025 URINE PREGNANCY TEST: CPT | Performed by: INTERNAL MEDICINE

## 2021-12-01 PROCEDURE — D9220A PRA ANESTHESIA: Mod: ,,, | Performed by: ANESTHESIOLOGY

## 2021-12-01 PROCEDURE — 88305 TISSUE EXAM BY PATHOLOGIST: ICD-10-PCS | Mod: 26,,, | Performed by: STUDENT IN AN ORGANIZED HEALTH CARE EDUCATION/TRAINING PROGRAM

## 2021-12-01 PROCEDURE — 63600175 PHARM REV CODE 636 W HCPCS: Performed by: NURSE ANESTHETIST, CERTIFIED REGISTERED

## 2021-12-01 RX ORDER — SODIUM CHLORIDE 0.9 % (FLUSH) 0.9 %
10 SYRINGE (ML) INJECTION
Status: DISCONTINUED | OUTPATIENT
Start: 2021-12-01 | End: 2021-12-01 | Stop reason: HOSPADM

## 2021-12-01 RX ORDER — SODIUM CHLORIDE 9 MG/ML
INJECTION, SOLUTION INTRAVENOUS CONTINUOUS
Status: DISCONTINUED | OUTPATIENT
Start: 2021-12-01 | End: 2021-12-01 | Stop reason: HOSPADM

## 2021-12-01 RX ORDER — HYDROMORPHONE HYDROCHLORIDE 1 MG/ML
0.2 INJECTION, SOLUTION INTRAMUSCULAR; INTRAVENOUS; SUBCUTANEOUS EVERY 5 MIN PRN
Status: DISCONTINUED | OUTPATIENT
Start: 2021-12-01 | End: 2021-12-01 | Stop reason: HOSPADM

## 2021-12-01 RX ORDER — LIDOCAINE HYDROCHLORIDE 20 MG/ML
INJECTION INTRAVENOUS
Status: DISCONTINUED | OUTPATIENT
Start: 2021-12-01 | End: 2021-12-01

## 2021-12-01 RX ORDER — ONDANSETRON 2 MG/ML
4 INJECTION INTRAMUSCULAR; INTRAVENOUS ONCE AS NEEDED
Status: DISCONTINUED | OUTPATIENT
Start: 2021-12-01 | End: 2021-12-01 | Stop reason: HOSPADM

## 2021-12-01 RX ORDER — PROPOFOL 10 MG/ML
VIAL (ML) INTRAVENOUS
Status: DISCONTINUED | OUTPATIENT
Start: 2021-12-01 | End: 2021-12-01

## 2021-12-01 RX ORDER — KETOROLAC TROMETHAMINE 30 MG/ML
30 INJECTION, SOLUTION INTRAMUSCULAR; INTRAVENOUS ONCE
Status: COMPLETED | OUTPATIENT
Start: 2021-12-01 | End: 2021-12-01

## 2021-12-01 RX ADMIN — GLYCOPYRROLATE 0.2 MG: 0.2 INJECTION, SOLUTION INTRAMUSCULAR; INTRAVITREAL at 02:12

## 2021-12-01 RX ADMIN — PROPOFOL 30 MG: 10 INJECTION, EMULSION INTRAVENOUS at 02:12

## 2021-12-01 RX ADMIN — KETOROLAC TROMETHAMINE 30 MG: 30 INJECTION, SOLUTION INTRAMUSCULAR at 01:12

## 2021-12-01 RX ADMIN — LIDOCAINE HYDROCHLORIDE 60 MG: 20 INJECTION, SOLUTION INTRAVENOUS at 02:12

## 2021-12-01 RX ADMIN — Medication 150 MG: at 02:12

## 2021-12-01 RX ADMIN — PROPOFOL 60 MG: 10 INJECTION, EMULSION INTRAVENOUS at 02:12

## 2021-12-01 RX ADMIN — SODIUM CHLORIDE: 0.9 INJECTION, SOLUTION INTRAVENOUS at 01:12

## 2021-12-13 LAB
FINAL PATHOLOGIC DIAGNOSIS: NORMAL
GROSS: NORMAL
Lab: NORMAL
MICROSCOPIC EXAM: NORMAL

## 2021-12-17 ENCOUNTER — TELEPHONE (OUTPATIENT)
Dept: SLEEP MEDICINE | Facility: CLINIC | Age: 46
End: 2021-12-17
Payer: COMMERCIAL

## 2022-01-10 ENCOUNTER — TELEPHONE (OUTPATIENT)
Dept: ELECTROPHYSIOLOGY | Facility: CLINIC | Age: 47
End: 2022-01-10

## 2022-01-10 ENCOUNTER — TELEPHONE (OUTPATIENT)
Dept: ELECTROPHYSIOLOGY | Facility: CLINIC | Age: 47
End: 2022-01-10
Payer: COMMERCIAL

## 2022-01-10 ENCOUNTER — OFFICE VISIT (OUTPATIENT)
Dept: ELECTROPHYSIOLOGY | Facility: CLINIC | Age: 47
End: 2022-01-10
Payer: COMMERCIAL

## 2022-01-10 DIAGNOSIS — R00.2 PALPITATIONS: Primary | ICD-10-CM

## 2022-01-10 DIAGNOSIS — R06.02 SHORTNESS OF BREATH: ICD-10-CM

## 2022-01-10 PROCEDURE — 1159F PR MEDICATION LIST DOCUMENTED IN MEDICAL RECORD: ICD-10-PCS | Mod: CPTII,95,, | Performed by: INTERNAL MEDICINE

## 2022-01-10 PROCEDURE — 1159F MED LIST DOCD IN RCRD: CPT | Mod: CPTII,95,, | Performed by: INTERNAL MEDICINE

## 2022-01-10 PROCEDURE — 1160F PR REVIEW ALL MEDS BY PRESCRIBER/CLIN PHARMACIST DOCUMENTED: ICD-10-PCS | Mod: CPTII,95,, | Performed by: INTERNAL MEDICINE

## 2022-01-10 PROCEDURE — 99203 OFFICE O/P NEW LOW 30 MIN: CPT | Mod: 95,,, | Performed by: INTERNAL MEDICINE

## 2022-01-10 PROCEDURE — 1160F RVW MEDS BY RX/DR IN RCRD: CPT | Mod: CPTII,95,, | Performed by: INTERNAL MEDICINE

## 2022-01-10 PROCEDURE — 99203 PR OFFICE/OUTPT VISIT, NEW, LEVL III, 30-44 MIN: ICD-10-PCS | Mod: 95,,, | Performed by: INTERNAL MEDICINE

## 2022-01-10 NOTE — TELEPHONE ENCOUNTER
Spoke with patient and instructed that an appointment has been scheduled for her to have a [facility], with appointment details in her patient portal.  Patient verbalized understanding.

## 2022-01-10 NOTE — PROGRESS NOTES
carSubjective:    Patient ID:  Mirtha Gary is a 46 y.o. female who presents for evaluation of Palpitations    Referring Cardiologist: Deshaun Mike MD  Primary Care Physician: Sophia Johnson MD    The patient location is: [place], [place]  The chief complaint leading to consultation is: Palpitations    Visit type: audiovisual    Face to Face time with patient: 8 minutes  20 minutes of total time spent on the encounter, which includes face to face time and non-face to face time preparing to see the patient (eg, review of tests), Obtaining and/or reviewing separately obtained history, Documenting clinical information in the electronic or other health record, Independently interpreting results (not separately reported) and communicating results to the patient/family/caregiver, or Care coordination (not separately reported).         Each patient to whom he or she provides medical services by telemedicine is:  (1) informed of the relationship between the physician and patient and the respective role of any other health care provider with respect to management of the patient; and (2) notified that he or she may decline to receive medical services by telemedicine and may withdraw from such care at any time.      HPI   I had the pleasure of seeing Mrs. Gary today in our electrophysiology clinic in consultation for her episodes of palpitations/tachycardia. 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 She reports that she became infected with COVID-19 in September. Since that time she notes episodes of skipped beats and at times sudden onset of tachycardia. She reports she seems to be able to induce the tachycardia when she takes a triptan drug to abort a migraine headache. At times this can last for 1 day. She reports also recently she has developed shortness of breath. Her  recently tested + for COVID 19. She was retested today and her lab result  is pending.    I reviewed available ECGs in Epic which show sinus rhythm without pre-excitation.    Review of Systems   Constitutional: Negative for fever and malaise/fatigue.   HENT: Negative for congestion and sore throat.    Eyes: Negative for blurred vision and visual disturbance.   Cardiovascular: Positive for palpitations. Negative for chest pain, dyspnea on exertion, irregular heartbeat, near-syncope and syncope.   Respiratory: Positive for shortness of breath. Negative for cough.    Hematologic/Lymphatic: Negative for bleeding problem. Does not bruise/bleed easily.   Skin: Negative.    Musculoskeletal: Negative.    Gastrointestinal: Negative for bloating, abdominal pain, hematochezia and melena.   Neurological: Negative for focal weakness and weakness.   Psychiatric/Behavioral: Negative.         Objective:    Physical Exam  Constitutional:       General: She is not in acute distress.     Appearance: Normal appearance. She is not ill-appearing, toxic-appearing or diaphoretic.   HENT:      Head: Normocephalic and atraumatic.   Neurological:      Mental Status: She is alert and oriented to person, place, and time. Mental status is at baseline.   Psychiatric:         Mood and Affect: Mood normal.         Behavior: Behavior normal.         Thought Content: Thought content normal.         Judgment: Judgment normal.     Limited exam due to telemedicine visit      Assessment:       1. Palpitations    2. Shortness of breath         Plan:       In summary, Mrs. 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 Discussed need for a symptom-rhythm correlate. Will order a 14 day bardy patch, echo and CXR. I will call her with the results and discuss next steps. All questions answered.    RTC otherwise in 8 weeks    Thank you for allowing me to participate in the care of this patient. Please do not  hesitate to call me with any questions or concerns.    Josh Bates MD, PhD  Cardiac Electrophysiology

## 2022-01-11 ENCOUNTER — TELEPHONE (OUTPATIENT)
Dept: INTERNAL MEDICINE | Facility: CLINIC | Age: 47
End: 2022-01-11
Payer: COMMERCIAL

## 2022-01-11 ENCOUNTER — CLINICAL SUPPORT (OUTPATIENT)
Dept: CARDIOLOGY | Facility: HOSPITAL | Age: 47
End: 2022-01-11
Attending: INTERNAL MEDICINE
Payer: COMMERCIAL

## 2022-01-11 ENCOUNTER — HOSPITAL ENCOUNTER (OUTPATIENT)
Dept: RADIOLOGY | Facility: HOSPITAL | Age: 47
Discharge: HOME | End: 2022-01-11
Attending: INTERNAL MEDICINE
Payer: COMMERCIAL

## 2022-01-11 DIAGNOSIS — R06.02 SHORTNESS OF BREATH: ICD-10-CM

## 2022-01-11 DIAGNOSIS — R00.2 PALPITATIONS: ICD-10-CM

## 2022-01-11 PROCEDURE — 71046 XR CHEST PA AND LATERAL: ICD-10-PCS | Mod: 26,,, | Performed by: RADIOLOGY

## 2022-01-11 PROCEDURE — 71046 X-RAY EXAM CHEST 2 VIEWS: CPT | Mod: 26,,, | Performed by: RADIOLOGY

## 2022-01-11 PROCEDURE — 71046 X-RAY EXAM CHEST 2 VIEWS: CPT | Mod: TC

## 2022-01-11 PROCEDURE — 93244 EXT ECG>48HR<7D REV&INTERPJ: CPT | Mod: ,,, | Performed by: INTERNAL MEDICINE

## 2022-01-11 PROCEDURE — 93244 CV CARDIAC MONITOR - 3-15 DAY ADULT (CUPID ONLY): ICD-10-PCS | Mod: ,,, | Performed by: INTERNAL MEDICINE

## 2022-01-11 NOTE — TELEPHONE ENCOUNTER
----- Message from Ligia Case sent at 1/11/2022 12:57 PM CST -----  Contact: Patient [phone]  Type: Patient Call Back    Who called: Patient     What is the request in detail: Calling to find out if it's okay for her to come in to appointment on tomorrow, 01-12-22. Patient states that her  tested Positive for COVID19, but she's Negative. Please call pt to let know what to do.     Would the patient rather a call back or a response via [facility]? Call back    Best call back number: [phone]

## 2022-01-11 NOTE — TELEPHONE ENCOUNTER
"Called pt and relayed Provider message: " If she wants to switch to virtual I would recommend that, please let patient know, thank you!"    Pt agrees, changed appt to VV.  "

## 2022-01-12 ENCOUNTER — TELEPHONE (OUTPATIENT)
Dept: ELECTROPHYSIOLOGY | Facility: CLINIC | Age: 47
End: 2022-01-12
Payer: COMMERCIAL

## 2022-01-12 ENCOUNTER — OFFICE VISIT (OUTPATIENT)
Dept: INTERNAL MEDICINE | Facility: CLINIC | Age: 47
End: 2022-01-12
Payer: COMMERCIAL

## 2022-01-12 ENCOUNTER — PATIENT MESSAGE (OUTPATIENT)
Dept: INTERNAL MEDICINE | Facility: CLINIC | Age: 47
End: 2022-01-12
Payer: COMMERCIAL

## 2022-01-12 DIAGNOSIS — Z13.1 SCREENING FOR DIABETES MELLITUS: ICD-10-CM

## 2022-01-12 DIAGNOSIS — Z01.812 ENCOUNTER FOR PREOPERATIVE SCREENING LABORATORY TESTING FOR COVID-19 VIRUS: Primary | ICD-10-CM

## 2022-01-12 DIAGNOSIS — Z00.00 HEALTH MAINTENANCE EXAMINATION: ICD-10-CM

## 2022-01-12 DIAGNOSIS — R00.2 PALPITATIONS: Primary | ICD-10-CM

## 2022-01-12 DIAGNOSIS — Z11.52 ENCOUNTER FOR PREOPERATIVE SCREENING LABORATORY TESTING FOR COVID-19 VIRUS: Primary | ICD-10-CM

## 2022-01-12 DIAGNOSIS — I49.8 OTHER SPECIFIED CARDIAC ARRHYTHMIAS: Primary | ICD-10-CM

## 2022-01-12 DIAGNOSIS — Z13.6 ENCOUNTER FOR SCREENING FOR CARDIOVASCULAR DISORDERS: ICD-10-CM

## 2022-01-12 PROCEDURE — 3044F PR MOST RECENT HEMOGLOBIN A1C LEVEL <7.0%: ICD-10-PCS | Mod: CPTII,95,, | Performed by: STUDENT IN AN ORGANIZED HEALTH CARE EDUCATION/TRAINING PROGRAM

## 2022-01-12 PROCEDURE — 99203 PR OFFICE/OUTPT VISIT, NEW, LEVL III, 30-44 MIN: ICD-10-PCS | Mod: 95,,, | Performed by: STUDENT IN AN ORGANIZED HEALTH CARE EDUCATION/TRAINING PROGRAM

## 2022-01-12 PROCEDURE — 3044F HG A1C LEVEL LT 7.0%: CPT | Mod: CPTII,95,, | Performed by: STUDENT IN AN ORGANIZED HEALTH CARE EDUCATION/TRAINING PROGRAM

## 2022-01-12 PROCEDURE — 99203 OFFICE O/P NEW LOW 30 MIN: CPT | Mod: 95,,, | Performed by: STUDENT IN AN ORGANIZED HEALTH CARE EDUCATION/TRAINING PROGRAM

## 2022-01-12 NOTE — TELEPHONE ENCOUNTER
----- Message from Josh Bates MD sent at 1/11/2022 11:33 AM CST -----  Please notify the patient that her chest xray is normal

## 2022-01-12 NOTE — TELEPHONE ENCOUNTER
Spoke with patient and advised that her CXR was normal. Patient verbalized understanding and denies any further questions.

## 2022-01-12 NOTE — PROGRESS NOTES
The patient's location is:  Patient's Home   The chief complaint leading to consultation is:  Palpitations [R00.2]    Visit type: audiovisual    Time spent in discussion with the patient: 11 minutes  23 minutes of total time spent on the encounter. This includes time spent in discussion with the patient and time preparing for the patient appointment: review of tests, obtaining and/or reviewing separately obtained history, documenting clinical information in the electronic or other health record, independently interpreting results (not separately reported), and communicating results to the patient/family/caregiver or care coordination (not separately reported).     Each patient to whom he or she provides medical services by telemedicine is: (1) informed of the relationship between the physician and patient and the respective role of any other health care provider with respect to management of the patient; and (2) notified that he or she may decline to receive medical services by telemedicine and may withdraw from such care at any time.      Subjective:   Mirtha Gary is a 46 y.o. female who presents for Palpitations [R00.2].       Patient is new to me, PCP is Dr. Johnson    Palpitations every occurring multiple times daily  Chest discomfort, dyspnea.   Chronic fatigue, dyspnea/chest discomfort  One cup of coffee in the morning.   Water 4 liters daily.  Received COVID vaccination  Echocardiogram scheduled for tomorrow  Has been evaluated by cardiology already for palpitations  Needs referral to cardiology    Due for health maintenance exam  Due for routine lab screening, diabetes and lipid screening       Review of Systems   Constitutional: Negative for activity change and unexpected weight change.   HENT: Negative for hearing loss, rhinorrhea and trouble swallowing.    Eyes: Negative for discharge and visual disturbance.   Respiratory: Positive for chest tightness. Negative for wheezing.    Cardiovascular:  Positive for chest pain and palpitations.   Gastrointestinal: Negative for blood in stool, constipation, diarrhea and vomiting.   Endocrine: Negative for polydipsia and polyuria.   Genitourinary: Negative for difficulty urinating, dysuria, hematuria and menstrual problem.   Musculoskeletal: Positive for neck pain. Negative for arthralgias.   Neurological: Positive for headaches. Negative for weakness.   Psychiatric/Behavioral: Negative for confusion and dysphoric mood.         Current Outpatient Medications   Medication Instructions    betamethasone valerate 0.1% (VALISONE) 0.1 % Crea Topical (Top), 2 times daily    butalbital-acetaminophen-caff -40 mg Cap TK 1 C PO BID PRF SEVERE HA    calcipotriene-betamethasone (ENSTILAR) 0.005-0.064 % Foam Apply to scalp once or twice daily. Then wear shower cap overnight    EMGALITY  mg, Subcutaneous, Every 28 days    EPINEPHrine (EPIPEN) 0.3 mg/0.3 mL AtIn No dose, route, or frequency recorded.    fluticasone (VERAMYST) 27.5 mcg/actuation nasal spray 2 sprays, Nasal, Daily    ketoconazole (NIZORAL) 2 % shampoo Apply to scalp, let sit for 5-10 minutes then rinse. Use 3-5 times weekly    naproxen (NAPROSYN) 500 mg, Oral, 2 times daily with meals, For pain    naratriptan (AMERGE) 2.5 MG tablet TAKE 1 TABLET BY MOUTH AT ONSET OF HEADACHE, MAY REPEAT IN 4 HOURS IF NEEDED    ondansetron (ZOFRAN-ODT) 4 mg, Oral, Every 6 hours PRN    pantoprazole (PROTONIX) 40 mg, Oral, Daily    promethazine (PHENERGAN) 25 mg, Oral, Every 6 hours PRN    promethazine-codeine 6.25-10 mg/5 ml (PHENERGAN WITH CODEINE) 6.25-10 mg/5 mL syrup 5 mLs, Oral, Every 6 hours PRN    rizatriptan (MAXALT) 10 mg, Oral, Every 2 hours PRN    TOSYMRA 10 mg, Nasal, Every 2 hours PRN    ubrogepant (UBROGEPANT) 50 mg tablet TAKE 1 TABLET (50 MG TOTAL) BY MOUTH EVERY 2 (TWO) HOURS AS NEEDED FOR MIGRAINE.       Objective:   No home vitals reported.     Physical Exam  Psychiatric:         Attention  and Perception: Attention normal.         Mood and Affect: Mood normal.         Speech: Speech normal.           Assessment/Plan:       Palpitations  -     Ambulatory referral/consult to Cardiology; Future  -     TSH; Future  -     Magnesium; Future    Health maintenance examination  Recommend coming to clinic in person for regular health exam  -     CBC Auto Differential; Future  -     Comprehensive Metabolic Panel; Future    Screening for diabetes mellitus  -     Hemoglobin A1C; Future    Encounter for screening for cardiovascular disorders  -     Lipid Panel; Future        Shanika Lindsay MD  01/12/2022

## 2022-01-13 ENCOUNTER — HOSPITAL ENCOUNTER (OUTPATIENT)
Dept: CARDIOLOGY | Facility: HOSPITAL | Age: 47
Discharge: HOME | End: 2022-01-13
Attending: INTERNAL MEDICINE
Payer: COMMERCIAL

## 2022-01-13 VITALS
BODY MASS INDEX: 22.6 KG/M2 | HEART RATE: 80 BPM | HEIGHT: 67 IN | WEIGHT: 144 LBS | SYSTOLIC BLOOD PRESSURE: 110 MMHG | DIASTOLIC BLOOD PRESSURE: 78 MMHG

## 2022-01-13 DIAGNOSIS — R00.2 PALPITATIONS: ICD-10-CM

## 2022-01-13 DIAGNOSIS — R06.02 SHORTNESS OF BREATH: ICD-10-CM

## 2022-01-13 LAB
ASCENDING AORTA: 2.18 CM
AV INDEX (PROSTH): 0.99
AV MEAN GRADIENT: 4 MMHG
AV PEAK GRADIENT: 6 MMHG
AV VALVE AREA: 3.24 CM2
AV VELOCITY RATIO: 0.97
BSA FOR ECHO PROCEDURE: 1.76 M2
CV ECHO LV RWT: 0.28 CM
DOP CALC AO PEAK VEL: 1.18 M/S
DOP CALC AO VTI: 27 CM
DOP CALC LVOT AREA: 3.3 CM2
DOP CALC LVOT DIAMETER: 2.04 CM
DOP CALC LVOT PEAK VEL: 1.14 M/S
DOP CALC LVOT STROKE VOLUME: 87.52 CM3
DOP CALCLVOT PEAK VEL VTI: 26.79 CM
E WAVE DECELERATION TIME: 220.21 MSEC
E/A RATIO: 1.07
E/E' RATIO: 6.96 M/S
ECHO LV POSTERIOR WALL: 0.6 CM (ref 0.6–1.1)
EJECTION FRACTION: 70 %
FRACTIONAL SHORTENING: 38 % (ref 28–44)
INTERVENTRICULAR SEPTUM: 0.6 CM (ref 0.6–1.1)
LA MAJOR: 4.62 CM
LA MINOR: 4.3 CM
LA WIDTH: 3.22 CM
LEFT ATRIUM SIZE: 3.01 CM
LEFT ATRIUM VOLUME INDEX MOD: 16 ML/M2
LEFT ATRIUM VOLUME INDEX: 20.8 ML/M2
LEFT ATRIUM VOLUME MOD: 28.13 CM3
LEFT ATRIUM VOLUME: 36.7 CM3
LEFT INTERNAL DIMENSION IN SYSTOLE: 2.61 CM (ref 2.1–4)
LEFT VENTRICLE DIASTOLIC VOLUME INDEX: 45.42 ML/M2
LEFT VENTRICLE DIASTOLIC VOLUME: 79.94 ML
LEFT VENTRICLE MASS INDEX: 40 G/M2
LEFT VENTRICLE SYSTOLIC VOLUME INDEX: 14.1 ML/M2
LEFT VENTRICLE SYSTOLIC VOLUME: 24.86 ML
LEFT VENTRICULAR INTERNAL DIMENSION IN DIASTOLE: 4.23 CM (ref 3.5–6)
LEFT VENTRICULAR MASS: 70.83 G
LV LATERAL E/E' RATIO: 5.8 M/S
LV SEPTAL E/E' RATIO: 8.7 M/S
MV A" WAVE DURATION": 9.13 MSEC
MV PEAK A VEL: 0.81 M/S
MV PEAK E VEL: 0.87 M/S
MV STENOSIS PRESSURE HALF TIME: 63.86 MS
MV VALVE AREA P 1/2 METHOD: 3.45 CM2
PULM VEIN S/D RATIO: 1.52
PV PEAK D VEL: 0.27 M/S
PV PEAK S VEL: 0.41 M/S
RA MAJOR: 3.92 CM
RA PRESSURE: 3 MMHG
RA WIDTH: 3.25 CM
RIGHT VENTRICULAR END-DIASTOLIC DIMENSION: 2.76 CM
RV TISSUE DOPPLER FREE WALL SYSTOLIC VELOCITY 1 (APICAL 4 CHAMBER VIEW): 12.21 CM/S
SINUS: 2.6 CM
STJ: 2.23 CM
TDI LATERAL: 0.15 M/S
TDI SEPTAL: 0.1 M/S
TDI: 0.13 M/S
TRICUSPID ANNULAR PLANE SYSTOLIC EXCURSION: 1.87 CM

## 2022-01-13 PROCEDURE — 93306 TTE W/DOPPLER COMPLETE: CPT

## 2022-01-13 PROCEDURE — 93306 ECHO (CUPID ONLY): ICD-10-PCS | Mod: 26,,, | Performed by: INTERNAL MEDICINE

## 2022-01-13 PROCEDURE — 93306 TTE W/DOPPLER COMPLETE: CPT | Mod: 26,,, | Performed by: INTERNAL MEDICINE

## 2022-01-13 NOTE — TELEPHONE ENCOUNTER
Spoke to Ms. Gary and informed her that the order for Covid testing is placed.  Patient states that she will go on tomorrow.

## 2022-01-14 ENCOUNTER — LAB VISIT (OUTPATIENT)
Dept: PRIMARY CARE CLINIC | Facility: CLINIC | Age: 47
End: 2022-01-14
Payer: COMMERCIAL

## 2022-01-14 ENCOUNTER — TELEPHONE (OUTPATIENT)
Dept: ELECTROPHYSIOLOGY | Facility: CLINIC | Age: 47
End: 2022-01-14
Payer: COMMERCIAL

## 2022-01-14 DIAGNOSIS — Z20.822 CONTACT WITH AND (SUSPECTED) EXPOSURE TO COVID-19: ICD-10-CM

## 2022-01-14 LAB
CTP QC/QA: YES
SARS-COV-2 AG RESP QL IA.RAPID: NEGATIVE

## 2022-01-14 PROCEDURE — 87811 SARS-COV-2 COVID19 W/OPTIC: CPT

## 2022-01-14 NOTE — TELEPHONE ENCOUNTER
Spoke with pt to inform her that per Dr. Bates her echo showed that she has completely normal heart function. She asked about her event monitor that was ordered at her last visit. She would like it mailed to her.   Message sent to Cierra to have it mailed to her

## 2022-02-18 ENCOUNTER — OFFICE VISIT (OUTPATIENT)
Dept: GASTROENTEROLOGY | Facility: CLINIC | Age: 47
End: 2022-02-18
Payer: COMMERCIAL

## 2022-02-18 VITALS
HEIGHT: 67 IN | WEIGHT: 153.88 LBS | SYSTOLIC BLOOD PRESSURE: 120 MMHG | HEART RATE: 70 BPM | DIASTOLIC BLOOD PRESSURE: 86 MMHG | BODY MASS INDEX: 24.15 KG/M2

## 2022-02-18 DIAGNOSIS — Z12.11 COLON CANCER SCREENING: ICD-10-CM

## 2022-02-18 DIAGNOSIS — K29.00 ACUTE SUPERFICIAL GASTRITIS WITHOUT HEMORRHAGE: Primary | ICD-10-CM

## 2022-02-18 DIAGNOSIS — R10.13 DYSPEPSIA: ICD-10-CM

## 2022-02-18 DIAGNOSIS — K31.A0 INTESTINAL METAPLASIA OF GASTRIC MUCOSA: ICD-10-CM

## 2022-02-18 PROCEDURE — 1160F PR REVIEW ALL MEDS BY PRESCRIBER/CLIN PHARMACIST DOCUMENTED: ICD-10-PCS | Mod: CPTII,S$GLB,, | Performed by: INTERNAL MEDICINE

## 2022-02-18 PROCEDURE — 3079F DIAST BP 80-89 MM HG: CPT | Mod: CPTII,S$GLB,, | Performed by: INTERNAL MEDICINE

## 2022-02-18 PROCEDURE — 99999 PR PBB SHADOW E&M-EST. PATIENT-LVL IV: CPT | Mod: PBBFAC,,, | Performed by: INTERNAL MEDICINE

## 2022-02-18 PROCEDURE — 99213 OFFICE O/P EST LOW 20 MIN: CPT | Mod: S$GLB,,, | Performed by: INTERNAL MEDICINE

## 2022-02-18 PROCEDURE — 3044F PR MOST RECENT HEMOGLOBIN A1C LEVEL <7.0%: ICD-10-PCS | Mod: CPTII,S$GLB,, | Performed by: INTERNAL MEDICINE

## 2022-02-18 PROCEDURE — 3074F PR MOST RECENT SYSTOLIC BLOOD PRESSURE < 130 MM HG: ICD-10-PCS | Mod: CPTII,S$GLB,, | Performed by: INTERNAL MEDICINE

## 2022-02-18 PROCEDURE — 3008F PR BODY MASS INDEX (BMI) DOCUMENTED: ICD-10-PCS | Mod: CPTII,S$GLB,, | Performed by: INTERNAL MEDICINE

## 2022-02-18 PROCEDURE — 99213 PR OFFICE/OUTPT VISIT, EST, LEVL III, 20-29 MIN: ICD-10-PCS | Mod: S$GLB,,, | Performed by: INTERNAL MEDICINE

## 2022-02-18 PROCEDURE — 99999 PR PBB SHADOW E&M-EST. PATIENT-LVL IV: ICD-10-PCS | Mod: PBBFAC,,, | Performed by: INTERNAL MEDICINE

## 2022-02-18 PROCEDURE — 1160F RVW MEDS BY RX/DR IN RCRD: CPT | Mod: CPTII,S$GLB,, | Performed by: INTERNAL MEDICINE

## 2022-02-18 PROCEDURE — 1159F PR MEDICATION LIST DOCUMENTED IN MEDICAL RECORD: ICD-10-PCS | Mod: CPTII,S$GLB,, | Performed by: INTERNAL MEDICINE

## 2022-02-18 PROCEDURE — 3079F PR MOST RECENT DIASTOLIC BLOOD PRESSURE 80-89 MM HG: ICD-10-PCS | Mod: CPTII,S$GLB,, | Performed by: INTERNAL MEDICINE

## 2022-02-18 PROCEDURE — 1159F MED LIST DOCD IN RCRD: CPT | Mod: CPTII,S$GLB,, | Performed by: INTERNAL MEDICINE

## 2022-02-18 PROCEDURE — 3074F SYST BP LT 130 MM HG: CPT | Mod: CPTII,S$GLB,, | Performed by: INTERNAL MEDICINE

## 2022-02-18 PROCEDURE — 3008F BODY MASS INDEX DOCD: CPT | Mod: CPTII,S$GLB,, | Performed by: INTERNAL MEDICINE

## 2022-02-18 PROCEDURE — 3044F HG A1C LEVEL LT 7.0%: CPT | Mod: CPTII,S$GLB,, | Performed by: INTERNAL MEDICINE

## 2022-02-18 NOTE — PROGRESS NOTES
[facility] Established Patient Visit    Reason for Visit:    Chief Complaint   Patient presents with    Abdominal Pain       PCP: Sophia Johnson      HPI:  Mirtha Gary is a 46 y.o. female here for follow-up of gastritis and dyspepsia.  I last saw her in clinic 11/9/21 for mild, lingering dyspepsia symptoms while taking Protonix.  She had COVID infection from Delta variant which worsened her symptoms.  I performed an EGD 12/1/21 which was a normal exam.  Gastric biopsies revealed active gastritis with focal area of intestinal metaplasia.  H pylori stains were negative.  Duodenal biopsies were normal.  Following our endoscopy, she contracted COVID again in January, thought due to the Omicron variant.  Symptoms were not as bad as the prior infection, which she attributes to having received the booster vaccination shot a week prior to the infection.  She has had lingering symptoms of shortness of breath and intermittent palpitations for which she has been seen by cardiology.  She had worsening of her stomach symptoms, which are starting to subside.  She is on a probiotic, which is helping, but she cannot recall the name.  She is also using FDgard, which helps.  She is still using Protonix daily.  She made some dietary changes as well.          ROS:  Constitutional: No fevers, chills, No weight loss, normal appetite  GI: see HPI          PMHX:  has a past medical history of Endometriosis and Migraine.    PSHX:  has a past surgical history that includes Ectopic pregnancy surgery; Cosmetic surgery; Dilation and curettage of uterus using suction (N/A, 6/29/2018); Transforaminal epidural injection of steroid (Left, 8/2/2019); Augmentation of breast; Cystoscopy (N/A, 6/22/2021); and Esophagogastroduodenoscopy (N/A, 12/1/2021).    The patient's social and family histories were reviewed by me and updated in the appropriate section of the electronic medical record.    Review of patient's allergies  indicates:   Allergen Reactions    Ciprofloxacin Rash     T-cell mediated fixed drug eruption       Prior to Admission medications    Medication Sig Start Date End Date Taking? Authorizing Provider   betamethasone valerate 0.1% (VALISONE) 0.1 % Crea Apply topically 2 (two) times daily. 12/23/20   Alfred Ramos MD   butalbital-acetaminophen-caff -40 mg Cap TK 1 C PO BID PRF SEVERE HA 11/5/21   Susy Marti NP   calcipotriene-betamethasone (ENSTILAR) 0.005-0.064 % Foam Apply to scalp once or twice daily. Then wear shower cap overnight 12/30/20   Ciarra Gamboa MD   EPINEPHrine (EPIPEN) 0.3 mg/0.3 mL AtIn  12/29/20   Historical Provider   fluticasone (VERAMYST) 27.5 mcg/actuation nasal spray 2 sprays by Nasal route once daily. 2/3/21   Riddhi Thomas MD   galcanezumab-gnlm (EMGALITY PEN) 120 mg/mL PnIj Inject 120 mg into the skin every 28 days. 11/5/21   Susy Marti NP   ketoconazole (NIZORAL) 2 % shampoo Apply to scalp, let sit for 5-10 minutes then rinse. Use 3-5 times weekly 12/30/20   Ciarra Gamboa MD   naproxen (NAPROSYN) 500 MG tablet Take 1 tablet (500 mg total) by mouth 2 (two) times daily with meals. For pain 9/25/21   Gera Ellis MD   naratriptan (AMERGE) 2.5 MG tablet TAKE 1 TABLET BY MOUTH AT ONSET OF HEADACHE, MAY REPEAT IN 4 HOURS IF NEEDED 11/5/21   Susy Marti NP   ondansetron (ZOFRAN-ODT) 4 MG TbDL Take 1 tablet (4 mg total) by mouth every 6 (six) hours as needed (Nausea and vomiting). 9/25/21   Gera Ellis MD   pantoprazole (PROTONIX) 40 MG tablet Take 1 tablet (40 mg total) by mouth once daily. 10/12/21 11/11/21  Melyssa Hernandez NP   promethazine (PHENERGAN) 25 MG tablet Take 1 tablet (25 mg total) by mouth every 6 (six) hours as needed for Nausea. 9/25/21   Gera Ellis MD   promethazine-codeine 6.25-10 mg/5 ml (PHENERGAN WITH CODEINE) 6.25-10 mg/5 mL syrup Take 5 mLs by mouth every 6 (six) hours as needed.  Patient not taking: Reported on  "11/9/2021 9/28/21   Alfred Ramos MD   rizatriptan (MAXALT) 10 MG tablet Take 1 tablet (10 mg total) by mouth every 2 (two) hours as needed for Migraine. 11/5/21 12/5/21  Susy Marti NP   SUMAtriptan (TOSYMRA) 10 mg/actuation Spry 10 mg by Nasal route every 2 (two) hours as needed (migraine). 11/5/21   Susy Marti NP   ubrogepant (UBROGEPANT) 50 mg tablet TAKE 1 TABLET (50 MG TOTAL) BY MOUTH EVERY 2 (TWO) HOURS AS NEEDED FOR MIGRAINE. 10/12/21   Susy Marti NP         Objective Findings:  Vital Signs:  /86   Pulse 70   Ht 5' 7" (1.702 m)   Wt 69.8 kg (153 lb 14.1 oz)   LMP 02/04/2022 (Approximate)   BMI 24.10 kg/m²  Body mass index is 24.1 kg/m².      Physical Exam:  General Appearance:  Well appearing in no acute distress, appears stated age          Labs:  Lab Results   Component Value Date    WBC 5.18 01/13/2022    HGB 14.4 01/13/2022    HCT 44.1 01/13/2022    MCV 93 01/13/2022    RDW 12.0 01/13/2022     01/13/2022    GRAN 3.4 01/13/2022    GRAN 66.1 01/13/2022    LYMPH 1.3 01/13/2022    LYMPH 25.5 01/13/2022    MONO 0.3 01/13/2022    MONO 6.6 01/13/2022    EOS 0.1 01/13/2022    BASO 0.03 01/13/2022     Lab Results   Component Value Date     01/13/2022    K 4.1 01/13/2022     01/13/2022    CO2 26 01/13/2022    GLU 79 01/13/2022    BUN 12 01/13/2022    CREATININE 0.7 01/13/2022    CALCIUM 9.5 01/13/2022    PROT 7.3 01/13/2022    ALBUMIN 4.0 01/13/2022    BILITOT 0.6 01/13/2022    ALKPHOS 44 (L) 01/13/2022    AST 14 01/13/2022    ALT 15 01/13/2022     H pylori stool antigen negative 7/2020                Assessment:  Mirtha Gary is a 46 y.o. female here with:  1. Acute superficial gastritis without hemorrhage    2. Dyspepsia    3. Intestinal metaplasia of gastric mucosa    4. Colon cancer screening      Causes of active gastritis with focal intestinal metaplasia can be varied.  H pylori has been negative on both stool sampling in July 2020, and on " gastric biopsies taken during EGD 12/1/21.  I think that autoimmune gastritis is unlikely given the features and probability of the same.  She is not on NSAIDs.  I think the most likely cause is from COVID viral infection.  The metaplasia may be more reactive and transient as this is only focal and not more widespread.  I think the cancer risk of this is low.  She has no family history of gastric cancer.  She has been improving slowly with OTC remedies and use of daily Protonix.      She will be due for colon cancer screening later this year.  This is according to new guidelines recommending colon cancer screening starting at age 45.  She is average-risk.  We discussed options of colon cancer screening, and she is agreeable to colonoscopy.       Recommendations:  Continue current regimen for now.  I will see her back in 6 months.  At that time we will arrange for a colonoscopy for colon cancer screening.  I will plan to perform an EGD at the same time to follow-up gastritis and intestinal metaplasia.  Will get mapping biopsies of the stomach to ensure this is not extensive metaplasia.  She may contact our office with any problems in the interim.              Mich Painting MD

## 2022-02-21 PROBLEM — K92.1 MELANOTIC STOOLS: Status: RESOLVED | Noted: 2021-09-28 | Resolved: 2022-02-21

## 2022-02-21 PROBLEM — R10.13 EPIGASTRIC PAIN: Status: RESOLVED | Noted: 2021-09-28 | Resolved: 2022-02-21

## 2022-03-07 ENCOUNTER — TELEPHONE (OUTPATIENT)
Dept: ELECTROPHYSIOLOGY | Facility: CLINIC | Age: 47
End: 2022-03-07
Payer: COMMERCIAL

## 2022-03-08 ENCOUNTER — OFFICE VISIT (OUTPATIENT)
Dept: ELECTROPHYSIOLOGY | Facility: CLINIC | Age: 47
End: 2022-03-08
Payer: COMMERCIAL

## 2022-03-08 ENCOUNTER — HOSPITAL ENCOUNTER (OUTPATIENT)
Dept: CARDIOLOGY | Facility: CLINIC | Age: 47
Discharge: HOME | End: 2022-03-08
Payer: COMMERCIAL

## 2022-03-08 VITALS
HEART RATE: 63 BPM | DIASTOLIC BLOOD PRESSURE: 65 MMHG | BODY MASS INDEX: 23.91 KG/M2 | WEIGHT: 152.31 LBS | SYSTOLIC BLOOD PRESSURE: 101 MMHG | HEIGHT: 67 IN

## 2022-03-08 DIAGNOSIS — R00.2 PALPITATIONS: Primary | ICD-10-CM

## 2022-03-08 DIAGNOSIS — I49.8 OTHER SPECIFIED CARDIAC ARRHYTHMIAS: ICD-10-CM

## 2022-03-08 PROCEDURE — 3044F HG A1C LEVEL LT 7.0%: CPT | Mod: CPTII,S$GLB,, | Performed by: INTERNAL MEDICINE

## 2022-03-08 PROCEDURE — 3074F PR MOST RECENT SYSTOLIC BLOOD PRESSURE < 130 MM HG: ICD-10-PCS | Mod: CPTII,S$GLB,, | Performed by: INTERNAL MEDICINE

## 2022-03-08 PROCEDURE — 3074F SYST BP LT 130 MM HG: CPT | Mod: CPTII,S$GLB,, | Performed by: INTERNAL MEDICINE

## 2022-03-08 PROCEDURE — 99999 PR PBB SHADOW E&M-EST. PATIENT-LVL IV: ICD-10-PCS | Mod: PBBFAC,,, | Performed by: INTERNAL MEDICINE

## 2022-03-08 PROCEDURE — 1160F RVW MEDS BY RX/DR IN RCRD: CPT | Mod: CPTII,S$GLB,, | Performed by: INTERNAL MEDICINE

## 2022-03-08 PROCEDURE — 93005 ELECTROCARDIOGRAM TRACING: CPT | Mod: S$GLB,,, | Performed by: INTERNAL MEDICINE

## 2022-03-08 PROCEDURE — 93010 RHYTHM STRIP: ICD-10-PCS | Mod: S$GLB,,, | Performed by: INTERNAL MEDICINE

## 2022-03-08 PROCEDURE — 99213 PR OFFICE/OUTPT VISIT, EST, LEVL III, 20-29 MIN: ICD-10-PCS | Mod: S$GLB,,, | Performed by: INTERNAL MEDICINE

## 2022-03-08 PROCEDURE — 1159F MED LIST DOCD IN RCRD: CPT | Mod: CPTII,S$GLB,, | Performed by: INTERNAL MEDICINE

## 2022-03-08 PROCEDURE — 93010 ELECTROCARDIOGRAM REPORT: CPT | Mod: S$GLB,,, | Performed by: INTERNAL MEDICINE

## 2022-03-08 PROCEDURE — 3008F PR BODY MASS INDEX (BMI) DOCUMENTED: ICD-10-PCS | Mod: CPTII,S$GLB,, | Performed by: INTERNAL MEDICINE

## 2022-03-08 PROCEDURE — 3078F PR MOST RECENT DIASTOLIC BLOOD PRESSURE < 80 MM HG: ICD-10-PCS | Mod: CPTII,S$GLB,, | Performed by: INTERNAL MEDICINE

## 2022-03-08 PROCEDURE — 93005 RHYTHM STRIP: ICD-10-PCS | Mod: S$GLB,,, | Performed by: INTERNAL MEDICINE

## 2022-03-08 PROCEDURE — 99213 OFFICE O/P EST LOW 20 MIN: CPT | Mod: S$GLB,,, | Performed by: INTERNAL MEDICINE

## 2022-03-08 PROCEDURE — 1160F PR REVIEW ALL MEDS BY PRESCRIBER/CLIN PHARMACIST DOCUMENTED: ICD-10-PCS | Mod: CPTII,S$GLB,, | Performed by: INTERNAL MEDICINE

## 2022-03-08 PROCEDURE — 3008F BODY MASS INDEX DOCD: CPT | Mod: CPTII,S$GLB,, | Performed by: INTERNAL MEDICINE

## 2022-03-08 PROCEDURE — 1159F PR MEDICATION LIST DOCUMENTED IN MEDICAL RECORD: ICD-10-PCS | Mod: CPTII,S$GLB,, | Performed by: INTERNAL MEDICINE

## 2022-03-08 PROCEDURE — 3044F PR MOST RECENT HEMOGLOBIN A1C LEVEL <7.0%: ICD-10-PCS | Mod: CPTII,S$GLB,, | Performed by: INTERNAL MEDICINE

## 2022-03-08 PROCEDURE — 3078F DIAST BP <80 MM HG: CPT | Mod: CPTII,S$GLB,, | Performed by: INTERNAL MEDICINE

## 2022-03-08 PROCEDURE — 99999 PR PBB SHADOW E&M-EST. PATIENT-LVL IV: CPT | Mod: PBBFAC,,, | Performed by: INTERNAL MEDICINE

## 2022-03-08 NOTE — PROGRESS NOTES
carSubjective:    Patient ID:  Mirtha Gary is a 46 y.o. female who presents for evaluation of Palpitations    Referring Cardiologist: Deshaun Mike MD  Primary Care Physician: Sophia Johnson MD    HPI  Prior Hx:  I had the pleasure of seeing Mrs. Gary today in our electrophysiology clinic in follow-up for her episodes of palpitations/tachycardia. 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 She reported that she became infected with COVID-19 in September. Since that time she noted episodes of skipped beats and at times sudden onset of tachycardia. She reported she seems to be able to induce the tachycardia when she takes a triptan drug to abort a migraine headache. At times this can last for 1 day. She reports also recently she has developed shortness of breath. Her  recently tested + for COVID 19. She was retested today and her lab result is pending.     At our first visit in January of 2022 we discussed need to obtain a symptom rhythm correlate. A holter was ordered. CXR and echocardiogram were normal.    Interim Hx:  Mrs. Gary returns today for follow-up. Holter monitor noted sinus rhythm with rare PACs. She reports once or twice a week she has a butterfly type palpitations that can last up to 30 seconds. She could not wear the 14 day holter long because she developed a skin reaction to the adhesive.    I reviewed available ECGs in Epic which show sinus rhythm without pre-excitation.    Review of Systems   Constitutional: Negative for fever and malaise/fatigue.   HENT: Negative for congestion and sore throat.    Eyes: Negative for blurred vision and visual disturbance.   Cardiovascular: Positive for palpitations. Negative for chest pain, dyspnea on exertion, irregular heartbeat, near-syncope and syncope.   Respiratory: Positive for shortness of breath. Negative for cough.    Hematologic/Lymphatic: Negative for bleeding problem.  Does not bruise/bleed easily.   Skin: Negative.    Musculoskeletal: Negative.    Gastrointestinal: Negative for bloating, abdominal pain, hematochezia and melena.   Neurological: Negative for focal weakness and weakness.   Psychiatric/Behavioral: Negative.         Objective:    Physical Exam  Vitals reviewed.   Constitutional:       General: She is not in acute distress.     Appearance: She is well-developed. She is not diaphoretic.   HENT:      Head: Normocephalic and atraumatic.   Eyes:      General:         Right eye: No discharge.         Left eye: No discharge.      Conjunctiva/sclera: Conjunctivae normal.   Cardiovascular:      Rate and Rhythm: Normal rate and regular rhythm.      Heart sounds: No murmur heard.    No friction rub. No gallop.   Pulmonary:      Effort: Pulmonary effort is normal. No respiratory distress.      Breath sounds: Normal breath sounds. No wheezing or rales.   Abdominal:      General: Bowel sounds are normal. There is no distension.      Palpations: Abdomen is soft.      Tenderness: There is no abdominal tenderness.   Musculoskeletal:      Cervical back: Neck supple.   Skin:     General: Skin is warm and dry.   Neurological:      Mental Status: She is alert and oriented to person, place, and time.   Psychiatric:         Behavior: Behavior normal.         Thought Content: Thought content normal.         Judgment: Judgment normal.           Assessment:       1. Palpitations         Plan:       In summary, Mrs. 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 Discussed need for a symptom-rhythm correlate. She has an Apple watch now and will try to capture and episode. She could only wear the Bardy patch for 3 days due to developing a skin reaction.    RTC otherwise in 6 months, sooner if needed    Thank you for allowing me to participate in the care of this patient.  Please do not hesitate to call me with any questions or concerns.    Josh Bates MD, PhD  Cardiac Electrophysiology

## 2022-04-07 ENCOUNTER — OFFICE VISIT (OUTPATIENT)
Dept: FAMILY MEDICINE | Facility: CLINIC | Age: 47
End: 2022-04-07
Attending: FAMILY MEDICINE
Payer: COMMERCIAL

## 2022-04-07 ENCOUNTER — HOSPITAL ENCOUNTER (OUTPATIENT)
Dept: RADIOLOGY | Facility: OTHER | Age: 47
Discharge: HOME | End: 2022-04-07
Attending: FAMILY MEDICINE
Payer: COMMERCIAL

## 2022-04-07 DIAGNOSIS — M25.532 LEFT WRIST PAIN: Primary | ICD-10-CM

## 2022-04-07 DIAGNOSIS — M25.532 LEFT WRIST PAIN: ICD-10-CM

## 2022-04-07 PROCEDURE — 3044F PR MOST RECENT HEMOGLOBIN A1C LEVEL <7.0%: ICD-10-PCS | Mod: CPTII,95,, | Performed by: FAMILY MEDICINE

## 2022-04-07 PROCEDURE — 3044F HG A1C LEVEL LT 7.0%: CPT | Mod: CPTII,95,, | Performed by: FAMILY MEDICINE

## 2022-04-07 PROCEDURE — 3008F PR BODY MASS INDEX (BMI) DOCUMENTED: ICD-10-PCS | Mod: CPTII,95,, | Performed by: FAMILY MEDICINE

## 2022-04-07 PROCEDURE — 1160F PR REVIEW ALL MEDS BY PRESCRIBER/CLIN PHARMACIST DOCUMENTED: ICD-10-PCS | Mod: CPTII,95,, | Performed by: FAMILY MEDICINE

## 2022-04-07 PROCEDURE — 99213 PR OFFICE/OUTPT VISIT, EST, LEVL III, 20-29 MIN: ICD-10-PCS | Mod: 95,,, | Performed by: FAMILY MEDICINE

## 2022-04-07 PROCEDURE — 73110 X-RAY EXAM OF WRIST: CPT | Mod: TC,FY,LT

## 2022-04-07 PROCEDURE — 73110 XR WRIST COMPLETE 3 VIEWS LEFT: ICD-10-PCS | Mod: 26,LT,, | Performed by: RADIOLOGY

## 2022-04-07 PROCEDURE — 1160F RVW MEDS BY RX/DR IN RCRD: CPT | Mod: CPTII,95,, | Performed by: FAMILY MEDICINE

## 2022-04-07 PROCEDURE — 1159F MED LIST DOCD IN RCRD: CPT | Mod: CPTII,95,, | Performed by: FAMILY MEDICINE

## 2022-04-07 PROCEDURE — 1159F PR MEDICATION LIST DOCUMENTED IN MEDICAL RECORD: ICD-10-PCS | Mod: CPTII,95,, | Performed by: FAMILY MEDICINE

## 2022-04-07 PROCEDURE — 3008F BODY MASS INDEX DOCD: CPT | Mod: CPTII,95,, | Performed by: FAMILY MEDICINE

## 2022-04-07 PROCEDURE — 73110 X-RAY EXAM OF WRIST: CPT | Mod: 26,LT,, | Performed by: RADIOLOGY

## 2022-04-07 PROCEDURE — 99213 OFFICE O/P EST LOW 20 MIN: CPT | Mod: 95,,, | Performed by: FAMILY MEDICINE

## 2022-04-09 ENCOUNTER — PATIENT MESSAGE (OUTPATIENT)
Dept: FAMILY MEDICINE | Facility: CLINIC | Age: 47
End: 2022-04-09
Payer: COMMERCIAL

## 2022-04-09 VITALS — HEIGHT: 67 IN | BODY MASS INDEX: 23.54 KG/M2 | RESPIRATION RATE: 16 BRPM | TEMPERATURE: 99 F | WEIGHT: 150 LBS
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
## 2022-05-03 ENCOUNTER — PATIENT MESSAGE (OUTPATIENT)
Dept: RESEARCH | Facility: HOSPITAL | Age: 47
End: 2022-05-03
Payer: COMMERCIAL

## 2022-05-04 DIAGNOSIS — G43.009 MIGRAINE WITHOUT AURA AND WITHOUT STATUS MIGRAINOSUS, NOT INTRACTABLE: ICD-10-CM

## 2022-05-04 RX ORDER — SUMATRIPTAN 10 MG/1
10 SPRAY NASAL
Qty: 8 EACH | Refills: 5 | Status: SHIPPED | OUTPATIENT
Start: 2022-05-04 | End: 2022-09-08

## 2022-05-04 RX ORDER — SUMATRIPTAN 10 MG/1
10 SPRAY NASAL
Qty: 6 EACH | Refills: 5 | Status: SHIPPED | OUTPATIENT
Start: 2022-05-04 | End: 2022-09-08

## 2022-05-04 NOTE — TELEPHONE ENCOUNTER
Requested Prescriptions     Pending Prescriptions Disp Refills    SUMAtriptan (TOSYMRA) 10 mg/actuation Spry 8 each 5     Sig: 10 mg by Nasal route every 2 (two) hours as needed (migraine).     LOV: 11/05/21

## 2022-05-11 ENCOUNTER — PATIENT MESSAGE (OUTPATIENT)
Dept: ADMINISTRATIVE | Facility: OTHER | Age: 47
End: 2022-05-11
Payer: COMMERCIAL

## 2022-05-11 ENCOUNTER — PATIENT OUTREACH (OUTPATIENT)
Dept: ADMINISTRATIVE | Facility: OTHER | Age: 47
End: 2022-05-11
Payer: COMMERCIAL

## 2022-05-11 ENCOUNTER — TELEPHONE (OUTPATIENT)
Dept: ORTHOPEDICS | Facility: CLINIC | Age: 47
End: 2022-05-11
Payer: COMMERCIAL

## 2022-05-11 DIAGNOSIS — Z12.31 ENCOUNTER FOR SCREENING MAMMOGRAM FOR MALIGNANT NEOPLASM OF BREAST: Primary | ICD-10-CM

## 2022-05-11 NOTE — PROGRESS NOTES
Care Everywhere: updated  Immunization: updated  Health Maintenance: updated  Media Review: review for outside mammogram report   Legacy Review:   DIS: no profile in portal   Order placed: mammogram   Upcoming appts:  EFAX:  Task Tickets:Mammogram scheduling ticket sent to patient's portal   Referrals:

## 2022-05-12 ENCOUNTER — OFFICE VISIT (OUTPATIENT)
Dept: ORTHOPEDICS | Facility: CLINIC | Age: 47
End: 2022-05-12
Attending: FAMILY MEDICINE
Payer: COMMERCIAL

## 2022-05-12 VITALS — BODY MASS INDEX: 23.54 KG/M2 | HEIGHT: 67 IN | WEIGHT: 150 LBS

## 2022-05-12 DIAGNOSIS — G89.29 CHRONIC PAIN OF RIGHT KNEE: Primary | ICD-10-CM

## 2022-05-12 DIAGNOSIS — M25.532 LEFT WRIST PAIN: ICD-10-CM

## 2022-05-12 DIAGNOSIS — M25.561 CHRONIC PAIN OF RIGHT KNEE: Primary | ICD-10-CM

## 2022-05-12 PROCEDURE — 99999 PR PBB SHADOW E&M-EST. PATIENT-LVL IV: ICD-10-PCS | Mod: PBBFAC,,, | Performed by: ORTHOPAEDIC SURGERY

## 2022-05-12 PROCEDURE — 3044F HG A1C LEVEL LT 7.0%: CPT | Mod: CPTII,S$GLB,, | Performed by: ORTHOPAEDIC SURGERY

## 2022-05-12 PROCEDURE — 1159F MED LIST DOCD IN RCRD: CPT | Mod: CPTII,S$GLB,, | Performed by: ORTHOPAEDIC SURGERY

## 2022-05-12 PROCEDURE — 99999 PR PBB SHADOW E&M-EST. PATIENT-LVL IV: CPT | Mod: PBBFAC,,, | Performed by: ORTHOPAEDIC SURGERY

## 2022-05-12 PROCEDURE — 3044F PR MOST RECENT HEMOGLOBIN A1C LEVEL <7.0%: ICD-10-PCS | Mod: CPTII,S$GLB,, | Performed by: ORTHOPAEDIC SURGERY

## 2022-05-12 PROCEDURE — 3008F PR BODY MASS INDEX (BMI) DOCUMENTED: ICD-10-PCS | Mod: CPTII,S$GLB,, | Performed by: ORTHOPAEDIC SURGERY

## 2022-05-12 PROCEDURE — 1159F PR MEDICATION LIST DOCUMENTED IN MEDICAL RECORD: ICD-10-PCS | Mod: CPTII,S$GLB,, | Performed by: ORTHOPAEDIC SURGERY

## 2022-05-12 PROCEDURE — 99204 OFFICE O/P NEW MOD 45 MIN: CPT | Mod: S$GLB,,, | Performed by: ORTHOPAEDIC SURGERY

## 2022-05-12 PROCEDURE — 99204 PR OFFICE/OUTPT VISIT, NEW, LEVL IV, 45-59 MIN: ICD-10-PCS | Mod: S$GLB,,, | Performed by: ORTHOPAEDIC SURGERY

## 2022-05-12 PROCEDURE — 3008F BODY MASS INDEX DOCD: CPT | Mod: CPTII,S$GLB,, | Performed by: ORTHOPAEDIC SURGERY
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
## 2022-05-16 ENCOUNTER — HOSPITAL ENCOUNTER (OUTPATIENT)
Dept: RADIOLOGY | Facility: OTHER | Age: 47
Discharge: HOME | End: 2022-05-16
Attending: STUDENT IN AN ORGANIZED HEALTH CARE EDUCATION/TRAINING PROGRAM
Payer: COMMERCIAL

## 2022-05-16 DIAGNOSIS — M25.532 LEFT WRIST PAIN: ICD-10-CM

## 2022-05-16 PROCEDURE — 73130 X-RAY EXAM OF HAND: CPT | Mod: TC,FY,RT

## 2022-05-16 PROCEDURE — 73130 X-RAY EXAM OF HAND: CPT | Mod: 26,RT,, | Performed by: RADIOLOGY

## 2022-05-16 PROCEDURE — 73130 XR HAND COMPLETE 3 VIEW RIGHT: ICD-10-PCS | Mod: 26,RT,, | Performed by: RADIOLOGY
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
## 2022-05-21 ENCOUNTER — HOSPITAL ENCOUNTER (OUTPATIENT)
Dept: RADIOLOGY | Facility: OTHER | Age: 47
Discharge: HOME | End: 2022-05-21
Attending: STUDENT IN AN ORGANIZED HEALTH CARE EDUCATION/TRAINING PROGRAM
Payer: COMMERCIAL

## 2022-05-21 DIAGNOSIS — M25.532 LEFT WRIST PAIN: ICD-10-CM

## 2022-05-21 PROCEDURE — 73221 MRI WRIST WITHOUT CONTRAST LEFT: ICD-10-PCS | Mod: 26,LT,, | Performed by: RADIOLOGY

## 2022-05-21 PROCEDURE — 73221 MRI JOINT UPR EXTREM W/O DYE: CPT | Mod: TC,LT

## 2022-05-21 PROCEDURE — 73221 MRI JOINT UPR EXTREM W/O DYE: CPT | Mod: 26,LT,, | Performed by: RADIOLOGY

## 2022-05-25 ENCOUNTER — TELEPHONE (OUTPATIENT)
Dept: ORTHOPEDICS | Facility: CLINIC | Age: 47
End: 2022-05-25
Payer: COMMERCIAL

## 2022-05-25 NOTE — TELEPHONE ENCOUNTER
Spoke with patient to confirm appointment for tomorrow 5/26/2022 with Dr. Hays at 8:00 am at [facility]. Patient verbalized understanding of appointment date, time and location.

## 2022-05-26 ENCOUNTER — OFFICE VISIT (OUTPATIENT)
Dept: ORTHOPEDICS | Facility: CLINIC | Age: 47
End: 2022-05-26
Payer: COMMERCIAL

## 2022-05-26 VITALS
DIASTOLIC BLOOD PRESSURE: 66 MMHG | BODY MASS INDEX: 23.53 KG/M2 | HEART RATE: 80 BPM | SYSTOLIC BLOOD PRESSURE: 102 MMHG | HEIGHT: 67 IN | WEIGHT: 149.94 LBS

## 2022-05-26 DIAGNOSIS — M25.532 LEFT WRIST PAIN: ICD-10-CM

## 2022-05-26 PROCEDURE — 3044F HG A1C LEVEL LT 7.0%: CPT | Mod: CPTII,S$GLB,, | Performed by: ORTHOPAEDIC SURGERY

## 2022-05-26 PROCEDURE — 3044F PR MOST RECENT HEMOGLOBIN A1C LEVEL <7.0%: ICD-10-PCS | Mod: CPTII,S$GLB,, | Performed by: ORTHOPAEDIC SURGERY

## 2022-05-26 PROCEDURE — 99214 PR OFFICE/OUTPT VISIT, EST, LEVL IV, 30-39 MIN: ICD-10-PCS | Mod: S$GLB,,, | Performed by: ORTHOPAEDIC SURGERY

## 2022-05-26 PROCEDURE — 3008F BODY MASS INDEX DOCD: CPT | Mod: CPTII,S$GLB,, | Performed by: ORTHOPAEDIC SURGERY

## 2022-05-26 PROCEDURE — 1159F PR MEDICATION LIST DOCUMENTED IN MEDICAL RECORD: ICD-10-PCS | Mod: CPTII,S$GLB,, | Performed by: ORTHOPAEDIC SURGERY

## 2022-05-26 PROCEDURE — 3078F PR MOST RECENT DIASTOLIC BLOOD PRESSURE < 80 MM HG: ICD-10-PCS | Mod: CPTII,S$GLB,, | Performed by: ORTHOPAEDIC SURGERY

## 2022-05-26 PROCEDURE — 99999 PR PBB SHADOW E&M-EST. PATIENT-LVL IV: ICD-10-PCS | Mod: PBBFAC,,, | Performed by: ORTHOPAEDIC SURGERY

## 2022-05-26 PROCEDURE — 3008F PR BODY MASS INDEX (BMI) DOCUMENTED: ICD-10-PCS | Mod: CPTII,S$GLB,, | Performed by: ORTHOPAEDIC SURGERY

## 2022-05-26 PROCEDURE — 99999 PR PBB SHADOW E&M-EST. PATIENT-LVL IV: CPT | Mod: PBBFAC,,, | Performed by: ORTHOPAEDIC SURGERY

## 2022-05-26 PROCEDURE — 3078F DIAST BP <80 MM HG: CPT | Mod: CPTII,S$GLB,, | Performed by: ORTHOPAEDIC SURGERY

## 2022-05-26 PROCEDURE — 3074F PR MOST RECENT SYSTOLIC BLOOD PRESSURE < 130 MM HG: ICD-10-PCS | Mod: CPTII,S$GLB,, | Performed by: ORTHOPAEDIC SURGERY

## 2022-05-26 PROCEDURE — 3074F SYST BP LT 130 MM HG: CPT | Mod: CPTII,S$GLB,, | Performed by: ORTHOPAEDIC SURGERY

## 2022-05-26 PROCEDURE — 1159F MED LIST DOCD IN RCRD: CPT | Mod: CPTII,S$GLB,, | Performed by: ORTHOPAEDIC SURGERY

## 2022-05-26 PROCEDURE — 99214 OFFICE O/P EST MOD 30 MIN: CPT | Mod: S$GLB,,, | Performed by: ORTHOPAEDIC SURGERY

## 2022-05-26 NOTE — PROGRESS NOTES
I have personally taken the history and examined the patient. I agree with the Hand Surgery PA's note. The plan will be :  OT  MRI neg  Pt feeling better  Declined injection
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
## 2022-05-30 ENCOUNTER — PATIENT MESSAGE (OUTPATIENT)
Dept: ADMINISTRATIVE | Facility: HOSPITAL | Age: 47
End: 2022-05-30
Payer: COMMERCIAL

## 2022-06-01 ENCOUNTER — PATIENT MESSAGE (OUTPATIENT)
Dept: GASTROENTEROLOGY | Facility: CLINIC | Age: 47
End: 2022-06-01
Payer: COMMERCIAL

## 2022-06-01 RX ORDER — SUCRALFATE 1 G/1
1 TABLET ORAL 3 TIMES DAILY PRN
Qty: 120 TABLET | Refills: 2 | Status: SHIPPED | OUTPATIENT
Start: 2022-06-01 | End: 2022-08-16

## 2022-06-07 ENCOUNTER — TELEPHONE (OUTPATIENT)
Dept: PHARMACY | Facility: CLINIC | Age: 47
End: 2022-06-07
Payer: COMMERCIAL

## 2022-06-21 ENCOUNTER — CLINICAL SUPPORT (OUTPATIENT)
Dept: REHABILITATION | Facility: HOSPITAL | Age: 47
End: 2022-06-21
Payer: COMMERCIAL

## 2022-06-21 DIAGNOSIS — M25.532 LEFT WRIST PAIN: ICD-10-CM

## 2022-06-21 PROCEDURE — 97018 PARAFFIN BATH THERAPY: CPT | Mod: 59

## 2022-06-21 PROCEDURE — 97110 THERAPEUTIC EXERCISES: CPT

## 2022-06-21 PROCEDURE — 97165 OT EVAL LOW COMPLEX 30 MIN: CPT

## 2022-06-21 NOTE — PATIENT INSTRUCTIONS
"[facility] - OCCUPATIONAL THERAPY  HOME EXERCISE PROGRAM     Soak hand in hot water or use hot wet compress for 5-10 min before exercises or in the morning to decrease stiffness.     Cold pack for 10 min at night for pain and swelling.   Complete the following exercises with 10 repetitions each, 3-5x/day.         AROM: Palmar Adduction / Abduction                    AROM: Opposition   Touch tip of thumb to nail tip of each finger in turn, making an "O" shape.  AROM: Composite Movement Circumduction    AROM: MP Extension   With palm on table, lift thumb up. Relax and lower thumb.                                   AROM: Wrist Flexion / Extension              OR FISHTAIL  Bend your wrist forward and back as far as possible.      AROM: Wrist Radial / Ulnar Deviation  Bend your wrist from side to side as far as possible.    AROM: Wrist Flexion / Extension  Make a fist, then bend your wrist forward then back as far as possible.         AROM: Wrist Radial / Ulnar Deviation   Make a fist then bend your wrist toward your body, then away.         Copyright © [employer]. All rights reserved.     Therapist: DAY Weathers CHT        "
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
## 2022-06-23 ENCOUNTER — PATIENT MESSAGE (OUTPATIENT)
Dept: GASTROENTEROLOGY | Facility: CLINIC | Age: 47
End: 2022-06-23
Payer: COMMERCIAL

## 2022-06-29 ENCOUNTER — HOSPITAL ENCOUNTER (OUTPATIENT)
Dept: RADIOLOGY | Facility: OTHER | Age: 47
Discharge: HOME | End: 2022-06-29
Attending: STUDENT IN AN ORGANIZED HEALTH CARE EDUCATION/TRAINING PROGRAM
Payer: COMMERCIAL

## 2022-06-29 DIAGNOSIS — Z12.31 ENCOUNTER FOR SCREENING MAMMOGRAM FOR MALIGNANT NEOPLASM OF BREAST: ICD-10-CM

## 2022-06-29 PROCEDURE — 77063 MAMMO DIGITAL SCREENING BILAT WITH TOMO: ICD-10-PCS | Mod: 26,,, | Performed by: RADIOLOGY

## 2022-06-29 PROCEDURE — 77063 BREAST TOMOSYNTHESIS BI: CPT | Mod: 26,,, | Performed by: RADIOLOGY

## 2022-06-29 PROCEDURE — 77063 BREAST TOMOSYNTHESIS BI: CPT | Mod: TC

## 2022-06-29 PROCEDURE — 77067 SCR MAMMO BI INCL CAD: CPT | Mod: 26,,, | Performed by: RADIOLOGY

## 2022-06-29 PROCEDURE — 77067 MAMMO DIGITAL SCREENING BILAT WITH TOMO: ICD-10-PCS | Mod: 26,,, | Performed by: RADIOLOGY

## 2022-07-11 ENCOUNTER — OFFICE VISIT (OUTPATIENT)
Dept: OBSTETRICS AND GYNECOLOGY | Facility: CLINIC | Age: 47
End: 2022-07-11
Payer: COMMERCIAL

## 2022-07-11 VITALS — HEIGHT: 67 IN | BODY MASS INDEX: 23.48 KG/M2 | SYSTOLIC BLOOD PRESSURE: 116 MMHG | DIASTOLIC BLOOD PRESSURE: 70 MMHG

## 2022-07-11 DIAGNOSIS — Z30.431 IUD CHECK UP: ICD-10-CM

## 2022-07-11 DIAGNOSIS — Z12.11 COLON CANCER SCREENING: ICD-10-CM

## 2022-07-11 DIAGNOSIS — Z01.419 ENCOUNTER FOR GYNECOLOGICAL EXAMINATION: Primary | ICD-10-CM

## 2022-07-11 PROCEDURE — 3008F BODY MASS INDEX DOCD: CPT | Mod: CPTII,S$GLB,, | Performed by: OBSTETRICS & GYNECOLOGY

## 2022-07-11 PROCEDURE — 99999 PR PBB SHADOW E&M-EST. PATIENT-LVL III: ICD-10-PCS | Mod: PBBFAC,,, | Performed by: OBSTETRICS & GYNECOLOGY

## 2022-07-11 PROCEDURE — 3078F PR MOST RECENT DIASTOLIC BLOOD PRESSURE < 80 MM HG: ICD-10-PCS | Mod: CPTII,S$GLB,, | Performed by: OBSTETRICS & GYNECOLOGY

## 2022-07-11 PROCEDURE — 3078F DIAST BP <80 MM HG: CPT | Mod: CPTII,S$GLB,, | Performed by: OBSTETRICS & GYNECOLOGY

## 2022-07-11 PROCEDURE — 1159F MED LIST DOCD IN RCRD: CPT | Mod: CPTII,S$GLB,, | Performed by: OBSTETRICS & GYNECOLOGY

## 2022-07-11 PROCEDURE — 3044F HG A1C LEVEL LT 7.0%: CPT | Mod: CPTII,S$GLB,, | Performed by: OBSTETRICS & GYNECOLOGY

## 2022-07-11 PROCEDURE — 99396 PR PREVENTIVE VISIT,EST,40-64: ICD-10-PCS | Mod: S$GLB,,, | Performed by: OBSTETRICS & GYNECOLOGY

## 2022-07-11 PROCEDURE — 99396 PREV VISIT EST AGE 40-64: CPT | Mod: S$GLB,,, | Performed by: OBSTETRICS & GYNECOLOGY

## 2022-07-11 PROCEDURE — 3044F PR MOST RECENT HEMOGLOBIN A1C LEVEL <7.0%: ICD-10-PCS | Mod: CPTII,S$GLB,, | Performed by: OBSTETRICS & GYNECOLOGY

## 2022-07-11 PROCEDURE — 3074F PR MOST RECENT SYSTOLIC BLOOD PRESSURE < 130 MM HG: ICD-10-PCS | Mod: CPTII,S$GLB,, | Performed by: OBSTETRICS & GYNECOLOGY

## 2022-07-11 PROCEDURE — 99999 PR PBB SHADOW E&M-EST. PATIENT-LVL III: CPT | Mod: PBBFAC,,, | Performed by: OBSTETRICS & GYNECOLOGY

## 2022-07-11 PROCEDURE — 3074F SYST BP LT 130 MM HG: CPT | Mod: CPTII,S$GLB,, | Performed by: OBSTETRICS & GYNECOLOGY

## 2022-07-11 PROCEDURE — 1159F PR MEDICATION LIST DOCUMENTED IN MEDICAL RECORD: ICD-10-PCS | Mod: CPTII,S$GLB,, | Performed by: OBSTETRICS & GYNECOLOGY

## 2022-07-11 PROCEDURE — 3008F PR BODY MASS INDEX (BMI) DOCUMENTED: ICD-10-PCS | Mod: CPTII,S$GLB,, | Performed by: OBSTETRICS & GYNECOLOGY

## 2022-07-11 NOTE — PROGRESS NOTES
HPI: Pt is a 47 y.o.  female who presents for routine annual exam. She uses Kyleena for contraception since 1/30/2020. Reports her  will be getting a vasectomy later this year and she would like to keep her IUD in until he gets his 3 month follow up clearance. She does not want STD screening. No gyn complaints. Does still report she is having lower abdominal/pelvic pain. It has been persistent since she had Covid last year - experienced GI bleeding and had an EGD done that showed inflammation. She is on medicine to help this. Has GI follow up in September and would like to get colonoscopy done prior to this appt.     Last pap/HPV 5/13/21 NORMAL.   MMG 6/29/22 NORMAL.     Colon cancer screening: needed.           ROS:  GENERAL: Feeling well overall. Denies fever or chills.   SKIN: Denies rash or lesions.   HEAD: Denies head injury or headache.   NODES: Denies enlarged lymph nodes.   CHEST: Denies chest pain or shortness of breath.   CARDIOVASCULAR: Denies palpitations or left sided chest pain.   ABDOMEN: No constipation, diarrhea, nausea or vomiting. (+) lower abdominal pain - intermittent  URINARY: No dysuria, hematuria, or burning on urination.  REPRODUCTIVE: See HPI.   BREASTS: Denies pain, lumps, or nipple discharge.   HEMATOLOGIC: No easy bruisability or excessive bleeding.   MUSCULOSKELETAL: Denies joint pain or swelling.   NEUROLOGIC: Denies syncope or weakness.   PSYCHIATRIC: Denies acute depression or anxiety     PE:   APPEARANCE: Well nourished, well developed, friendly White female in no acute distress.  NODES: no inguinal lymphadenopathy  BREASTS: deferred. MMG done 1 week ago.   ABDOMEN: Soft. No tenderness or masses. No distention.   VULVA: No lesions. Normal external female genitalia.  URETHRAL MEATUS: Normal size and location, no lesions, no prolapse.  URETHRA: No masses, tenderness, or prolapse.  VAGINA: Moist. No lesions or lacerations noted. No abnormal discharge present. No odor present.    CERVIX: No lesions or discharge. No cervical motion tenderness. Strings seen 1 cm out.   UTERUS: Normal size, regular shape, mobile, non-tender. Mildly tender to palpation. No masses palpated.   ADNEXA: No tenderness. No fullness or masses palpated in the adnexal regions.   ANUS PERINEUM: Normal.      Diagnosis:  1. Encounter for gynecological examination    2. IUD check up    3. Colon cancer screening        Plan:     Orders Placed This Encounter    Case Request Endoscopy: COLONOSCOPY       Patient was counseled today on the new ACS guidelines for cervical cytology screening as well as the current recommendations for breast cancer screening. She was counseled to follow up with her PCP for other routine health maintenance.     Follow-up with me in 1 year for routine exam; pap/HPV in 2 years.

## 2022-07-11 NOTE — PROGRESS NOTES
[facility]  Occupational Therapy Treatment Note    Date: 7/12/2022  Name: Mirtha Gary  Clinic Number: [id number]    Therapy Diagnosis:   Encounter Diagnosis   Name Primary?    Pain in wrist, left      Physician: Shaista Sweeney PA-C    Physician Orders: Left wrist pain s/p injury   MRI negative  2x/wk x 6 wks   Modalities prn     Surgical Procedure and Date: n/a, n/a      Evaluation Date: 6/21/2022     Plan of Care Certification Period: 8/21/2022         Date of Return to MD / Progress Note due : as needed      Visit # / Visits authorized: 1/8 pending   Insurance Authorization Period Expiration: pending      FOTO: unavailable      Precautions:  Standard     Time In:10:05  Time Out: 10:47 am    Total Appointment Time (timed & untimed codes): 42  minutes    SUBJECTIVE     Pt reports: Pt reports feeling a lot better and only having pain at wrist with lifting heavier things. She expressed having pain at the radial side of the index finger at the PIP.  She was compliant with home exercise program given last session.   Response to previous treatment:decreasing pain, improving wrist motion  Functional change: HEP, daily activity participation    Pain: 0/10  Location: left wrist and IF      Leisure: limited  for riding bike, gym workouts, yoga, weightbearing activities   Occupation:     Working presently: employed  Work Duties/ Activities : typing    OBJECTIVE   Objective Measures updated at progress report unless specified.  6/21/22    Hand ROM. Measured in degrees.   Wrist ext/flex 60 / 48   RD / UD 18 / 12                 Full fist, full opposition; hypermobility and laxity  of thumb at MP joint noted.      Manual Muscle Testing   EI/EDM 3-/5   EPL/B 3-/5      Strength (Dyanmometer) and Pinch Strength (Pinch Gauge)  Measured in pounds and psi. Average of three trials.       6/21/2022 6/21/2022     RIGHT LEFT    Rung II 37.4  32.3   Amaya Pinch 12.5  12    3pt Pinch NT NT  "  2pt Pinch NT       NT     Treatment     Mirtha received the treatments listed below:     Supervised modalities after being cleared for contradictions for 10 minutes:   -Patient received paraffin bath to left wrist/ hand  for 10 minutes to increase blood flow, circulation, pain management and for tissue elasticity prior to therex.      Therapeutic exercises for  24 minutes including:  -THUMB extension, RA, PA, opposition "o" x 10 reps   - wrist flex, ext, RD, UD, full fist with wrist flexion to stretch EDC/EI, wrist circumduction x 10 reps each,   - juxaciser x 2 min  - proprioception stick x 2 min   - isometric wrist/flex/RD/UD x 10 reps 3 sec holds   - gentle pain free molding and squeezing with yellow putty x 2 min     Manual therapy techniques: Manual Lymphatic Drainage and Soft tissue Mobilization were applied to the: left wrist for 8 minutes, including:  DTM to ulnar wrist to improve pain, motion,  and tissue extensibility     Patient Education and Home Exercises      Education provided:   - modality use including MH and cold pack for pain   - ice massage to dorsal hand/wrist following tx session   - KT tape to thumb in ribbon patterns to provide stability to MP joint, dorsal piece over EDC/EI with cross strap @ 50% stretch.   Provided extra KT tape for home use.   - Progress towards goals     Written Home Exercises Provided: yes.  Exercises were reviewed and Mirhta was able to demonstrate them prior to the end of the session.  Mirtha demonstrated good  understanding of the HEP provided. See EMR under Patient Instructions for exercises provided during therapy sessions.      ASSESSMENT     Pt would continue to benefit from skilled OT. Pt reports significant improvements with wrist and only having increased pain at the dorsal and radial wrist with end-range active range of motion and with more strenuous activity. She is compliant with HEP, brace wear, K tape wear,  and left understanding pain-free participation in " added wrist isometrics to HEP. Will cont progress as tolerated.     Mirtha is progressing well towards her goals and there are no updates to goals at this time. Pt prognosis is Excellent.     Pt will continue to benefit from skilled outpatient occupational therapy to address the deficits listed in the problem list on initial evaluation, provide pt/family education and to maximize pt's level of independence in the home and community environment.     Pt's spiritual, cultural and educational needs considered and pt agreeable to plan of care and goals.    Anticipated barriers to occupational therapy: none at this time    Goals:  Short Term Goals (6-8 weeks)   1)  Patient to be IND with HEP and modalities for pain management  2)  Increase ROM 5-15 degrees where limited to increase functional hand use for ADLs/work/leisure activities  3)  Increase  strength 2-8 lbs. For lifting, carrying, riding bike      PLAN     Continue skilled occupational therapy with individualized plan of care focusing on improved functional use, range of motion, and strength of left wrist.    Updates/Grading for next session: progress as tolerated    Froylan Zhang, OT

## 2022-07-12 ENCOUNTER — CLINICAL SUPPORT (OUTPATIENT)
Dept: REHABILITATION | Facility: HOSPITAL | Age: 47
End: 2022-07-12
Payer: COMMERCIAL

## 2022-07-12 ENCOUNTER — TELEPHONE (OUTPATIENT)
Dept: ORTHOPEDICS | Facility: CLINIC | Age: 47
End: 2022-07-12
Payer: COMMERCIAL

## 2022-07-12 DIAGNOSIS — M25.532 PAIN IN WRIST, LEFT: ICD-10-CM

## 2022-07-12 PROCEDURE — 97018 PARAFFIN BATH THERAPY: CPT | Mod: 59

## 2022-07-12 PROCEDURE — 97140 MANUAL THERAPY 1/> REGIONS: CPT

## 2022-07-12 PROCEDURE — 97110 THERAPEUTIC EXERCISES: CPT

## 2022-07-12 NOTE — PATIENT INSTRUCTIONS
[facility], OCCUPATIONAL THERAPY  HOME EXERCISE PROGRAM     Complete the following strengthening exercises 2x/day.  Hold each position x3 seconds then relax. Complete 15 repetitions each.     Resisted Wrist Extension   With forearm resting palm down, resist upward movement   of hand with other hand.       Resisted Wrist Flexion   With forearm resting palm up, resist upward movement   of hand with other hand.       Resisted Wrist Radial Deviation  With forearm resting with thumb up, use other hand to   resist upward movement of hand at wrist.       Resisted Wrist Ulnar Deviation  With forearm resting thumb up, use other hand to resist   downward movement of hand at wrist.    Copyright © I. All rights reserved.     Therapist: Froylan Zhang OT  7/12/2022

## 2022-07-14 ENCOUNTER — OFFICE VISIT (OUTPATIENT)
Dept: ORTHOPEDICS | Facility: CLINIC | Age: 47
End: 2022-07-14
Payer: COMMERCIAL

## 2022-07-14 VITALS
BODY MASS INDEX: 23.53 KG/M2 | DIASTOLIC BLOOD PRESSURE: 70 MMHG | SYSTOLIC BLOOD PRESSURE: 102 MMHG | WEIGHT: 149.94 LBS | HEIGHT: 67 IN | HEART RATE: 92 BPM

## 2022-07-14 DIAGNOSIS — M25.532 LEFT WRIST PAIN: Primary | ICD-10-CM

## 2022-07-14 PROCEDURE — 3008F BODY MASS INDEX DOCD: CPT | Mod: CPTII,S$GLB,, | Performed by: PHYSICIAN ASSISTANT

## 2022-07-14 PROCEDURE — 1159F MED LIST DOCD IN RCRD: CPT | Mod: CPTII,S$GLB,, | Performed by: PHYSICIAN ASSISTANT

## 2022-07-14 PROCEDURE — 3078F DIAST BP <80 MM HG: CPT | Mod: CPTII,S$GLB,, | Performed by: PHYSICIAN ASSISTANT

## 2022-07-14 PROCEDURE — 3044F PR MOST RECENT HEMOGLOBIN A1C LEVEL <7.0%: ICD-10-PCS | Mod: CPTII,S$GLB,, | Performed by: PHYSICIAN ASSISTANT

## 2022-07-14 PROCEDURE — 3074F PR MOST RECENT SYSTOLIC BLOOD PRESSURE < 130 MM HG: ICD-10-PCS | Mod: CPTII,S$GLB,, | Performed by: PHYSICIAN ASSISTANT

## 2022-07-14 PROCEDURE — 99214 OFFICE O/P EST MOD 30 MIN: CPT | Mod: S$GLB,,, | Performed by: PHYSICIAN ASSISTANT

## 2022-07-14 PROCEDURE — 3074F SYST BP LT 130 MM HG: CPT | Mod: CPTII,S$GLB,, | Performed by: PHYSICIAN ASSISTANT

## 2022-07-14 PROCEDURE — 3008F PR BODY MASS INDEX (BMI) DOCUMENTED: ICD-10-PCS | Mod: CPTII,S$GLB,, | Performed by: PHYSICIAN ASSISTANT

## 2022-07-14 PROCEDURE — 1159F PR MEDICATION LIST DOCUMENTED IN MEDICAL RECORD: ICD-10-PCS | Mod: CPTII,S$GLB,, | Performed by: PHYSICIAN ASSISTANT

## 2022-07-14 PROCEDURE — 3078F PR MOST RECENT DIASTOLIC BLOOD PRESSURE < 80 MM HG: ICD-10-PCS | Mod: CPTII,S$GLB,, | Performed by: PHYSICIAN ASSISTANT

## 2022-07-14 PROCEDURE — 99214 PR OFFICE/OUTPT VISIT, EST, LEVL IV, 30-39 MIN: ICD-10-PCS | Mod: S$GLB,,, | Performed by: PHYSICIAN ASSISTANT

## 2022-07-14 PROCEDURE — 99999 PR PBB SHADOW E&M-EST. PATIENT-LVL IV: ICD-10-PCS | Mod: PBBFAC,,, | Performed by: PHYSICIAN ASSISTANT

## 2022-07-14 PROCEDURE — 99999 PR PBB SHADOW E&M-EST. PATIENT-LVL IV: CPT | Mod: PBBFAC,,, | Performed by: PHYSICIAN ASSISTANT

## 2022-07-14 PROCEDURE — 3044F HG A1C LEVEL LT 7.0%: CPT | Mod: CPTII,S$GLB,, | Performed by: PHYSICIAN ASSISTANT

## 2022-07-14 RX ORDER — RIMEGEPANT SULFATE 75 MG/75MG
75 TABLET, ORALLY DISINTEGRATING ORAL DAILY
COMMUNITY
Start: 2022-04-01 | End: 2022-12-05
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
## 2022-07-19 ENCOUNTER — CLINICAL SUPPORT (OUTPATIENT)
Dept: REHABILITATION | Facility: HOSPITAL | Age: 47
End: 2022-07-19
Payer: COMMERCIAL

## 2022-07-19 DIAGNOSIS — M25.532 PAIN IN WRIST, LEFT: Primary | ICD-10-CM

## 2022-07-19 PROCEDURE — 97140 MANUAL THERAPY 1/> REGIONS: CPT

## 2022-07-19 PROCEDURE — 97110 THERAPEUTIC EXERCISES: CPT

## 2022-07-19 PROCEDURE — 97018 PARAFFIN BATH THERAPY: CPT | Mod: 59

## 2022-07-19 NOTE — PROGRESS NOTES
[facility]  Occupational Therapy Treatment Note    Date: 7/19/2022  Name: Mirtha Gary  Clinic Number: [id number]    Therapy Diagnosis:   Encounter Diagnosis   Name Primary?    Pain in wrist, left Yes     Physician: Shaista Sweeney PA-C    Physician Orders: Left wrist pain s/p injury   MRI negative  2x/wk x 6 wks   Modalities prn     Surgical Procedure and Date: n/a, n/a      Evaluation Date: 6/21/2022     Plan of Care Certification Period: 8/21/2022         Date of Return to MD / Progress Note due : as needed      Visit # / Visits authorized: 2/20  Insurance Authorization Period Expiration: 12/31/2022     FOTO: unavailable      Precautions:  Standard     Time In:905  Time Out: 950  Total Appointment Time (timed & untimed codes): 45  minutes    SUBJECTIVE     Pt reports: I think its getting better   She was compliant with home exercise program given last session.   Response to previous treatment:decreasing pain, improving wrist motion  Functional change: HEP, daily activity participation    Pain: 0/10  Location: left wrist and IF      Leisure: limited  for riding bike, gym workouts, yoga, weightbearing activities   Occupation:     Working presently: employed  Work Duties/ Activities : typing    OBJECTIVE   Objective Measures updated at progress report unless specified.  6/21/22    Hand ROM. Measured in degrees.   Wrist ext/flex 60 / 48   RD / UD 18 / 12                 Full fist, full opposition; hypermobility and laxity  of thumb at MP joint noted.      Manual Muscle Testing   EI/EDM 3-/5   EPL/B 3-/5      Strength (Dyanmometer) and Pinch Strength (Pinch Gauge)  Measured in pounds and psi. Average of three trials.       6/21/2022 6/21/2022     RIGHT LEFT    Rung II 37.4  32.3   Aamya Pinch 12.5  12    3pt Pinch NT NT   2pt Pinch NT       NT     Treatment     Mirtha received the treatments listed below:     Supervised modalities after being cleared for contradictions  "for 10 minutes:   -Patient received paraffin bath to left wrist/ hand  for 10 minutes to increase blood flow, circulation, pain management and for tissue elasticity prior to therex.      Therapeutic exercises for  25 minutes including:  - proprioception stick x 2 min   - isometric wrist/flex/RD/UD x 10 reps 3 sec holds   - 1# wrist flexion, ext, RD, UD x 10 reps each   - 1/2 lbs. Hammer for wrist rotation x 10 reps CW/CCW   - red therabar for wrist flex, ext , smiles, frowns x 10 reps each   - gentle pain free molding and squeezing with mixed putty  And digit extension "donut" x 10 reps each.     Manual therapy techniques: Manual Lymphatic Drainage and Soft tissue Mobilization were applied to the: left wrist for 10 minutes, including:  - DTM and CFM  to dorsal  wrist to improve pain, motion,  and tissue extensibility   - brief ice massage x 5 min for pain and inflammation     Patient Education and Home Exercises      Education provided:   - modality use including MH and cold pack for pain   - ice massage to dorsal hand/wrist following tx session   - - Progress towards goals     Written Home Exercises Provided: yes.  Exercises were reviewed and Mirtha was able to demonstrate them prior to the end of the session.  Mirtha demonstrated good  understanding of the HEP provided. See EMR under Patient Instructions for exercises provided during therapy sessions.      ASSESSMENT     Pt would continue to benefit from skilled OT. Pt reports significant improvements with wrist and only having minimal pain at the dorsal and radial wrist with end-range active range of motion and with more strenuous activity. She is compliant with HEP, brace wear,  and left understanding pain-free participation in added wrist isometrics to HEP. Will cont progress as tolerated.     Mirtha is progressing well towards her goals and there are no updates to goals at this time. Pt prognosis is Excellent.     Pt will continue to benefit from skilled " outpatient occupational therapy to address the deficits listed in the problem list on initial evaluation, provide pt/family education and to maximize pt's level of independence in the home and community environment.     Pt's spiritual, cultural and educational needs considered and pt agreeable to plan of care and goals.    Anticipated barriers to occupational therapy: none at this time    Goals:  Short Term Goals (6-8 weeks)   1)  Patient to be IND with HEP and modalities for pain management  2)  Increase ROM 5-15 degrees where limited to increase functional hand use for ADLs/work/leisure activities  3)  Increase  strength 2-8 lbs. For lifting, carrying, riding bike      PLAN     Continue skilled occupational therapy with individualized plan of care focusing on improved functional use, range of motion, and strength of left wrist.    Updates/Grading for next session: progress as tolerated    Shirley Morales, OT , CHT

## 2022-08-02 ENCOUNTER — CLINICAL SUPPORT (OUTPATIENT)
Dept: REHABILITATION | Facility: HOSPITAL | Age: 47
End: 2022-08-02
Payer: COMMERCIAL

## 2022-08-02 DIAGNOSIS — M25.532 PAIN IN WRIST, LEFT: Primary | ICD-10-CM

## 2022-08-02 PROCEDURE — 97110 THERAPEUTIC EXERCISES: CPT

## 2022-08-02 PROCEDURE — 97018 PARAFFIN BATH THERAPY: CPT | Mod: 59

## 2022-08-02 PROCEDURE — 97140 MANUAL THERAPY 1/> REGIONS: CPT

## 2022-08-02 NOTE — PROGRESS NOTES
[facility]  Occupational Therapy Treatment Note    Date: 8/2/2022  Name: Mirtha Gary  Clinic Number: [id number]    Therapy Diagnosis:   No diagnosis found.  Physician: Shaista Sweeney PA-C    Physician Orders: Left wrist pain s/p injury   MRI negative  2x/wk x 6 wks   Modalities prn     Surgical Procedure and Date: n/a, n/a      Evaluation Date: 6/21/2022     Plan of Care Certification Period: 8/21/2022       Date of Return to MD / Progress Note due : as needed      Visit # / Visits authorized: 3/20  Insurance Authorization Period Expiration: 12/31/2022     FOTO: unavailable      Precautions:  Standard     Time In:1010  Time Out: 1055  Total Appointment Time (timed & untimed codes): 45  minutes    SUBJECTIVE     Pt reports: I don't know what I did to it, but I think I re-injured it, I couldn't open an umbrella today.  I did heat yesterday, but no ice.   She was compliant with home exercise program given last session.   Response to previous treatment:decreasing pain, improving wrist motion  Functional change: HEP, daily activity participation    Pain: 4-5 /10 with umbrella   Location: left wrist and IF  ; more pain along thumb and radial wrist     Leisure: limited  for riding bike, gym workouts, yoga, weightbearing activities     Occupation:     Working presently: employed  Work Duties/ Activities : typing    OBJECTIVE   Objective Measures updated at progress report unless specified.  6/21/22    Hand ROM. Measured in degrees.   Wrist ext/flex 60 / 48   RD / UD 18 / 12                 Full fist, full opposition; hypermobility and laxity  of thumb at MP joint noted.      Manual Muscle Testing   EI/EDM 3-/5   EPL/B 3-/5      Strength (Dyanmometer) and Pinch Strength (Pinch Gauge)  Measured in pounds and psi. Average of three trials.       6/21/2022 6/21/2022     RIGHT LEFT    Rung II 37.4  32.3   Amaya Pinch 12.5  12    3pt Pinch NT NT   2pt Pinch NT       NT  "    Treatment     Mirtha received the treatments listed below:     Supervised modalities after being cleared for contradictions for 10 minutes:   -Patient received paraffin bath to left wrist/ hand  for 10 minutes to increase blood flow, circulation, pain management and for tissue elasticity prior to therex.      Therapeutic exercises for  25 minutes including:  - proprioception stick x 2 min     - decrease to 1/2 wrist wt in hand for  wrist flexion, ext, RD, UD x 10 reps each   - 1/2 lbs. Hammer for wrist rotation x 10 reps CW/CCW   - juxtaciser for wrist rotation x 3 min for ROM,   -  - gentle pain free molding and squeezing with yellow (decreased from red)  putty  And digit extension "donut" x 10 reps each.     Manual therapy techniques: Manual Lymphatic Drainage and Soft tissue Mobilization were applied to the: left wrist for 10 minutes, including:  - DTM and CFM  to dorsal  wrist to improve pain, motion,  and tissue extensibility   - brief ice massage x 5 min for pain and inflammation  Over dorsal / radial wrist and thumb and forearm.     Patient Education and Home Exercises      Education provided:   - modality use including MH and cold pack for pain   - ice massage to dorsal hand/wrist following tx session   - - Progress towards goals     Written Home Exercises Provided: yes.  Exercises were reviewed and Mirtha was able to demonstrate them prior to the end of the session.  Mirtha demonstrated good  understanding of the HEP provided. See EMR under Patient Instructions for exercises provided during therapy sessions.      ASSESSMENT     Pt would continue to benefit from skilled OT. Increased complaints of wrist pain and discomfort this date.  Decreased resistance due to increased pain complaints this session.  Will re-assess in 1-2 visits; continued tenosynovitis symptoms remain in dorsal/radial wrist.  Will cont progress as tolerated.     Mirtha is progressing well towards her goals and there are no updates to " goals at this time. Pt prognosis is Excellent.     Pt will continue to benefit from skilled outpatient occupational therapy to address the deficits listed in the problem list on initial evaluation, provide pt/family education and to maximize pt's level of independence in the home and community environment.     Pt's spiritual, cultural and educational needs considered and pt agreeable to plan of care and goals.    Anticipated barriers to occupational therapy: none at this time    Goals:  Short Term Goals (6-8 weeks)   1)  Patient to be IND with HEP and modalities for pain management  2)  Increase ROM 5-15 degrees where limited to increase functional hand use for ADLs/work/leisure activities  3)  Increase  strength 2-8 lbs. For lifting, carrying, riding bike      PLAN     Continue skilled occupational therapy with individualized plan of care focusing on improved functional use, range of motion, and strength of left wrist.    Updates/Grading for next session: progress as tolerated    Shirley Morales, OT , CHT

## 2022-08-16 ENCOUNTER — CLINICAL SUPPORT (OUTPATIENT)
Dept: REHABILITATION | Facility: HOSPITAL | Age: 47
End: 2022-08-16
Payer: COMMERCIAL

## 2022-08-16 ENCOUNTER — OFFICE VISIT (OUTPATIENT)
Dept: INTERNAL MEDICINE | Facility: CLINIC | Age: 47
End: 2022-08-16
Payer: COMMERCIAL

## 2022-08-16 DIAGNOSIS — R63.4 UNINTENTIONAL WEIGHT LOSS: ICD-10-CM

## 2022-08-16 DIAGNOSIS — M25.532 PAIN IN WRIST, LEFT: Primary | ICD-10-CM

## 2022-08-16 DIAGNOSIS — R10.12 LUQ ABDOMINAL PAIN: Primary | ICD-10-CM

## 2022-08-16 PROCEDURE — 99214 PR OFFICE/OUTPT VISIT, EST, LEVL IV, 30-39 MIN: ICD-10-PCS | Mod: 95,,, | Performed by: STUDENT IN AN ORGANIZED HEALTH CARE EDUCATION/TRAINING PROGRAM

## 2022-08-16 PROCEDURE — 3044F HG A1C LEVEL LT 7.0%: CPT | Mod: CPTII,95,, | Performed by: STUDENT IN AN ORGANIZED HEALTH CARE EDUCATION/TRAINING PROGRAM

## 2022-08-16 PROCEDURE — 3044F PR MOST RECENT HEMOGLOBIN A1C LEVEL <7.0%: ICD-10-PCS | Mod: CPTII,95,, | Performed by: STUDENT IN AN ORGANIZED HEALTH CARE EDUCATION/TRAINING PROGRAM

## 2022-08-16 PROCEDURE — 97018 PARAFFIN BATH THERAPY: CPT | Mod: 59

## 2022-08-16 PROCEDURE — 97140 MANUAL THERAPY 1/> REGIONS: CPT

## 2022-08-16 PROCEDURE — 99214 OFFICE O/P EST MOD 30 MIN: CPT | Mod: 95,,, | Performed by: STUDENT IN AN ORGANIZED HEALTH CARE EDUCATION/TRAINING PROGRAM

## 2022-08-16 PROCEDURE — 97110 THERAPEUTIC EXERCISES: CPT

## 2022-08-16 RX ORDER — PANTOPRAZOLE SODIUM 40 MG/1
40 TABLET, DELAYED RELEASE ORAL DAILY
Qty: 30 TABLET | Refills: 2 | Status: SHIPPED | OUTPATIENT
Start: 2022-08-16 | End: 2023-01-19

## 2022-08-16 RX ORDER — SUCRALFATE 1 G/10ML
1 SUSPENSION ORAL
Qty: 560 ML | Refills: 0 | Status: SHIPPED | OUTPATIENT
Start: 2022-08-16 | End: 2022-08-30

## 2022-08-16 NOTE — PROGRESS NOTES
The patient's location is:  Patient's Home   The chief complaint leading to consultation is:  LUQ abdominal pain [R10.12]    Visit type: audio only, connectivity issues    Time spent in discussion with the patient: 11 min  16 minutes of total time spent on the encounter. This includes time spent in discussion with the patient and time preparing for the patient appointment: review of tests, obtaining and/or reviewing separately obtained history, documenting clinical information in the electronic or other health record, independently interpreting results (not separately reported), and communicating results to the patient/family/caregiver or care coordination (not separately reported).     Each patient to whom he or she provides medical services by telemedicine is: (1) informed of the relationship between the physician and patient and the respective role of any other health care provider with respect to management of the patient; and (2) notified that he or she may decline to receive medical services by telemedicine and may withdraw from such care at any time.      Subjective:   Mirtha Gary is a 47 y.o. female who presents for LUQ abdominal pain [R10.12].       Patient is known to me, PCP is Dr. Lima.    Having pain on left side of abdomen, cramping and stabbing pain  Had EGD and colonoscopy with GI last year  Taking Protonix 40 mg daily  PRN antacids   Has made dietary modifications for reflux without significant benefit  Has lost weight recently due to worsening pain with eating  Endorses nausea  No vomiting, diarrhea, constipation, BRBPR, or   Has appt with GI 14SEP  Also recently traveling internationally, [place]  Interested in stool testing to r/o infectious etiology of symptoms       Review of Systems   Constitutional: Positive for appetite change and unexpected weight change. Negative for chills and fever.   Gastrointestinal: Positive for abdominal pain and nausea. Negative for blood in stool,  constipation, diarrhea and vomiting.         Current Outpatient Medications   Medication Instructions    betamethasone valerate 0.1% (VALISONE) 0.1 % Crea Topical (Top), 2 times daily    butalbital-acetaminophen-caff -40 mg Cap TK 1 C PO BID PRF SEVERE HA    calcipotriene-betamethasone (ENSTILAR) 0.005-0.064 % Foam Apply to scalp once or twice daily. Then wear shower cap overnight    EMGALITY  mg, Subcutaneous, Every 28 days    EPINEPHrine (EPIPEN) 0.3 mg/0.3 mL AtIn No dose, route, or frequency recorded.    fluticasone (VERAMYST) 27.5 mcg/actuation nasal spray 2 sprays, Nasal, Daily    ketoconazole (NIZORAL) 2 % shampoo Apply to scalp, let sit for 5-10 minutes then rinse. Use 3-5 times weekly    naproxen (NAPROSYN) 500 mg, Oral, 2 times daily with meals, For pain    naratriptan (AMERGE) 2.5 MG tablet TAKE 1 TABLET BY MOUTH AT ONSET OF HEADACHE, MAY REPEAT IN 4 HOURS IF NEEDED    NURTEC 75 mg, Oral, Daily    ondansetron (ZOFRAN-ODT) 4 mg, Oral, Every 6 hours PRN    pantoprazole (PROTONIX) 40 mg, Oral, Daily    promethazine (PHENERGAN) 25 mg, Oral, Every 6 hours PRN    promethazine-codeine 6.25-10 mg/5 ml (PHENERGAN WITH CODEINE) 6.25-10 mg/5 mL syrup 5 mLs, Oral, Every 6 hours PRN    rizatriptan (MAXALT) 10 mg, Oral, Every 2 hours PRN    sucralfate (CARAFATE) 1 g, Oral, Before meals & nightly    TOSYMRA 10 mg, Nasal, Every 2 hours PRN    TOSYMRA 10 mg, Nasal, Every 2 hours PRN    ubrogepant (UBROGEPANT) 50 mg tablet TAKE 1 TABLET (50 MG TOTAL) BY MOUTH EVERY 2 (TWO) HOURS AS NEEDED FOR MIGRAINE.       Objective:   No home vitals reported.     Physical Exam  Psychiatric:         Attention and Perception: Attention normal.         Mood and Affect: Mood normal.         Speech: Speech normal.           Assessment/Plan:       LUQ abdominal pain  Unintentional weight loss  Continue Protonix  Carafate liquid qAC and qHS x14 days  Follow up with GI, plans for repeat EGD  H pylori testing     Stool testing for infectious etiology  -     Stool Exam-Ova,Cysts,Parasites; Future  -     Stool culture; Future  -     Giardia / Cryptosporidum, EIA; Future  -     WBC, Stool; Future  -     US Abdomen Complete; Future  -     H. pylori antigen, stool; Future  -     sucralfate (CARAFATE) 100 mg/mL suspension; Take 10 mLs (1 g total) by mouth 4 (four) times daily before meals and nightly. for 14 days  -     pantoprazole (PROTONIX) 40 MG tablet; Take 1 tablet (40 mg total) by mouth once daily.      Shanika Lindsay MD  08/16/2022

## 2022-08-16 NOTE — PROGRESS NOTES
[facility]  Occupational Therapy Treatment Note    Date: 8/16/2022  Name: Mirtha Gary  Clinic Number: [id number]    Therapy Diagnosis:   Encounter Diagnosis   Name Primary?    Pain in wrist, left Yes     Physician: Shaista Sweeney PA-C    Physician Orders: Left wrist pain s/p injury   MRI negative  2x/wk x 6 wks   Modalities prn     Surgical Procedure and Date: n/a, n/a      Evaluation Date: 6/21/2022     Plan of Care Certification Period: 8/21/2022       Date of Return to MD / Progress Note due : as needed      Visit # / Visits authorized: 5/20  Insurance Authorization Period Expiration: 12/31/2022     FOTO: unavailable      Precautions:  Standard     Time In:1110  Time Out: 1145   Total Appointment Time (timed & untimed codes): 35  minutes    SUBJECTIVE     Pt reports:  I was able to ride my bike in [place] with no pain, but I guess I'll go back to doctor but its doing better overall.   She was compliant with home exercise program given last session.   Response to previous treatment:decreasing pain, improving wrist motion  Functional change: HEP, daily activity participation    Pain:2 /10 with umbrella   Location: left wrist and IF  ; more pain along thumb and radial wrist     Leisure: limited  for riding bike, gym workouts, yoga, weightbearing activities     Occupation:     Working presently: employed  Work Duties/ Activities : typing    OBJECTIVE   Objective Measures updated at progress report unless specified.    Discharge status as follows 8/16/2022     Hand ROM. Measured in degrees.   Wrist ext/flex 60 / 48   RD / UD 18 / 12                 Full fist, full opposition; hypermobility and laxity  of thumb at MP joint noted. Encouraged joint protection for thumb to prevent hyperextension.      Manual Muscle Testing   EI/EDM 3-/5   EPL/B 3-/5      Strength (Dyanmometer) and Pinch Strength (Pinch Gauge)  Measured in pounds and psi. Average of three trials.        "6/21/2022 6/21/2022     RIGHT LEFT    Rung II 37.4  32.3   Amaya Pinch 12.5  12    3pt Pinch NT NT   2pt Pinch NT       NT     Treatment     Mirtha received the treatments listed below:     Supervised modalities after being cleared for contradictions for 10 minutes:   -Patient received paraffin bath to left wrist/ hand  for 10 minutes to increase blood flow, circulation, pain management and for tissue elasticity prior to therex.      Therapeutic exercises for  25 minutes including:  - proprioception stick x 2 min     - increase  to 1#  hand for  wrist flexion, ext, RD, UD x 10 reps each   - 1  lbs. Hammer for wrist rotation x 10 reps CW/CCW   - yellow digi extender for EPL/ APB x 10 reps   - red therabar for wrist flex, ext , smiles, frowns x 10 reps each   - gentle pain free molding and squeezing with yellow (decreased from red)  putty  And digit extension "donut" x 10 reps each.     Manual therapy techniques: Manual Lymphatic Drainage and Soft tissue Mobilization were applied to the: left wrist for 10 minutes, including:  - DTM and CFM  to dorsal  wrist to improve pain, motion,  and tissue extensibility   - brief ice massage x 5 min for pain and inflammation  Over dorsal / radial wrist and thumb and forearm.     Patient Education and Home Exercises      Education provided:   - modality use including MH and cold pack for pain   - ice massage to dorsal hand/wrist following tx session   - - Progress towards goals     Written Home Exercises Provided: yes.  Exercises were reviewed and Mirtha was able to demonstrate them prior to the end of the session.  Mirtha demonstrated good  understanding of the HEP provided. See EMR under Patient Instructions for exercises provided during therapy sessions.      ASSESSMENT     Pt would continue to benefit from skilled OT. Increased complaints of wrist pain and discomfort this date.  Decreased resistance due to increased pain complaints this session. Minimal  tenosynovitis symptoms " remain in dorsal/radial wrist with certain positions.      Mirtha is progressing well towards her goals and there are no updates to goals at this time. Pt prognosis is Excellent.       Pt's spiritual, cultural and educational needs considered and pt agreeable to plan of care and goals.    Anticipated barriers to occupational therapy: none at this time    Goals:  Short Term Goals (6-8 weeks)   1)  Patient to be IND with HEP and modalities for pain management- met   2)  Increase ROM 5-15 degrees where limited to increase functional hand use for ADLs/work/leisure activities- progressing   3)  Increase  strength 2-8 lbs. For lifting, carrying, riding bike- progressing       PLAN      Will discharge with HEP unless otherwise instructed.  POC expires on 8/21/2022    Shirley Morales, OT , CHT

## 2022-08-18 ENCOUNTER — LAB VISIT (OUTPATIENT)
Dept: LAB | Facility: OTHER | Age: 47
End: 2022-08-18
Attending: STUDENT IN AN ORGANIZED HEALTH CARE EDUCATION/TRAINING PROGRAM
Payer: COMMERCIAL

## 2022-08-18 DIAGNOSIS — R10.12 LUQ ABDOMINAL PAIN: ICD-10-CM

## 2022-08-18 DIAGNOSIS — R63.4 UNINTENTIONAL WEIGHT LOSS: ICD-10-CM

## 2022-08-18 PROCEDURE — 87329 GIARDIA AG IA: CPT | Performed by: STUDENT IN AN ORGANIZED HEALTH CARE EDUCATION/TRAINING PROGRAM

## 2022-08-18 PROCEDURE — 87177 OVA AND PARASITES SMEARS: CPT | Performed by: STUDENT IN AN ORGANIZED HEALTH CARE EDUCATION/TRAINING PROGRAM

## 2022-08-18 PROCEDURE — 89055 LEUKOCYTE ASSESSMENT FECAL: CPT | Performed by: STUDENT IN AN ORGANIZED HEALTH CARE EDUCATION/TRAINING PROGRAM

## 2022-08-18 PROCEDURE — 87338 HPYLORI STOOL AG IA: CPT | Performed by: STUDENT IN AN ORGANIZED HEALTH CARE EDUCATION/TRAINING PROGRAM

## 2022-08-18 PROCEDURE — 87046 STOOL CULTR AEROBIC BACT EA: CPT | Mod: 59 | Performed by: STUDENT IN AN ORGANIZED HEALTH CARE EDUCATION/TRAINING PROGRAM

## 2022-08-18 PROCEDURE — 87209 SMEAR COMPLEX STAIN: CPT | Performed by: STUDENT IN AN ORGANIZED HEALTH CARE EDUCATION/TRAINING PROGRAM

## 2022-08-18 PROCEDURE — 87045 FECES CULTURE AEROBIC BACT: CPT | Performed by: STUDENT IN AN ORGANIZED HEALTH CARE EDUCATION/TRAINING PROGRAM

## 2022-08-18 PROCEDURE — 87427 SHIGA-LIKE TOXIN AG IA: CPT | Mod: 59 | Performed by: STUDENT IN AN ORGANIZED HEALTH CARE EDUCATION/TRAINING PROGRAM

## 2022-08-23 LAB
CRYPTOSP AG STL QL IA: NEGATIVE
E COLI SXT1 STL QL IA: NEGATIVE
E COLI SXT2 STL QL IA: NEGATIVE
G LAMBLIA AG STL QL IA: NEGATIVE
WBC #/AREA STL HPF: NORMAL /[HPF]

## 2022-08-25 ENCOUNTER — HOSPITAL ENCOUNTER (OUTPATIENT)
Dept: RADIOLOGY | Facility: OTHER | Age: 47
Discharge: HOME | End: 2022-08-25
Attending: STUDENT IN AN ORGANIZED HEALTH CARE EDUCATION/TRAINING PROGRAM
Payer: COMMERCIAL

## 2022-08-25 DIAGNOSIS — R10.12 LUQ ABDOMINAL PAIN: ICD-10-CM

## 2022-08-25 LAB — BACTERIA STL CULT: NORMAL

## 2022-08-25 PROCEDURE — 76700 US EXAM ABDOM COMPLETE: CPT | Mod: TC

## 2022-08-25 PROCEDURE — 76700 US EXAM ABDOM COMPLETE: CPT | Mod: 26,,, | Performed by: RADIOLOGY

## 2022-08-25 PROCEDURE — 76700 US ABDOMEN COMPLETE: ICD-10-PCS | Mod: 26,,, | Performed by: RADIOLOGY

## 2022-08-26 ENCOUNTER — TELEPHONE (OUTPATIENT)
Dept: GASTROENTEROLOGY | Facility: CLINIC | Age: 47
End: 2022-08-26
Payer: COMMERCIAL

## 2022-08-26 DIAGNOSIS — R10.9 LEFT SIDED ABDOMINAL PAIN: ICD-10-CM

## 2022-08-26 DIAGNOSIS — K31.A0 INTESTINAL METAPLASIA OF GASTRIC MUCOSA: Primary | ICD-10-CM

## 2022-08-26 DIAGNOSIS — Z12.11 COLON CANCER SCREENING: ICD-10-CM

## 2022-08-26 DIAGNOSIS — A07.8 BLASTOCYSTIS HOMINIS INFECTION: Primary | ICD-10-CM

## 2022-08-26 LAB — O+P STL MICRO: ABNORMAL

## 2022-08-26 RX ORDER — TINIDAZOLE 500 MG/1
2 TABLET ORAL ONCE
Qty: 4 TABLET | Refills: 0 | Status: SHIPPED | OUTPATIENT
Start: 2022-08-26 | End: 2022-08-26

## 2022-08-26 NOTE — TELEPHONE ENCOUNTER
I spoke with her by phone.  She continues to have similar pian to what she described to be before.  She was seen in primary care recently.  Stool tests and a an abdominal ultrasounds were done.  H pylori test is pending.  The parasite exam revealed many B. Hominis.  It is difficult to tell if this is causing symptoms or not, and is not traditionally considered a pathogen; however, treatment is reasonable to see how she responds.    We need to schedule her EGD and colonoscopy that we discussed at our last clinic visit.  Order placed.

## 2022-08-30 LAB
H PYLORI AG STL QL IA: NOT DETECTED
SPECIMEN SOURCE: NORMAL

## 2022-09-06 ENCOUNTER — PATIENT MESSAGE (OUTPATIENT)
Dept: ENDOSCOPY | Facility: HOSPITAL | Age: 47
End: 2022-09-06
Payer: COMMERCIAL

## 2022-09-06 ENCOUNTER — TELEPHONE (OUTPATIENT)
Dept: ENDOSCOPY | Facility: HOSPITAL | Age: 47
End: 2022-09-06
Payer: COMMERCIAL

## 2022-09-06 DIAGNOSIS — Z12.11 SCREEN FOR COLON CANCER: Primary | ICD-10-CM

## 2022-09-06 DIAGNOSIS — K31.A0 INTESTINAL METAPLASIA OF GASTRIC MUCOSA: ICD-10-CM

## 2022-09-06 DIAGNOSIS — R10.9 LEFT SIDED ABDOMINAL PAIN: ICD-10-CM

## 2022-09-06 RX ORDER — POLYETHYLENE GLYCOL 3350, SODIUM SULFATE ANHYDROUS, SODIUM BICARBONATE, SODIUM CHLORIDE, POTASSIUM CHLORIDE 236; 22.74; 6.74; 5.86; 2.97 G/4L; G/4L; G/4L; G/4L; G/4L
4 POWDER, FOR SOLUTION ORAL ONCE
Qty: 4000 ML | Refills: 0 | Status: SHIPPED | OUTPATIENT
Start: 2022-09-06 | End: 2022-09-06

## 2022-09-07 ENCOUNTER — PATIENT MESSAGE (OUTPATIENT)
Dept: ENDOSCOPY | Facility: HOSPITAL | Age: 47
End: 2022-09-07
Payer: COMMERCIAL

## 2022-09-08 ENCOUNTER — OFFICE VISIT (OUTPATIENT)
Dept: INTERNAL MEDICINE | Facility: CLINIC | Age: 47
End: 2022-09-08
Payer: COMMERCIAL

## 2022-09-08 VITALS
SYSTOLIC BLOOD PRESSURE: 105 MMHG | HEART RATE: 79 BPM | BODY MASS INDEX: 32.55 KG/M2 | HEIGHT: 55 IN | DIASTOLIC BLOOD PRESSURE: 67 MMHG | WEIGHT: 140.63 LBS

## 2022-09-08 DIAGNOSIS — E66.01 MORBID OBESITY: ICD-10-CM

## 2022-09-08 DIAGNOSIS — Z23 NEED FOR DIPHTHERIA, TETANUS, PERTUSSIS, AND HIB VACCINATION: ICD-10-CM

## 2022-09-08 DIAGNOSIS — Z00.00 ENCOUNTER FOR GENERAL MEDICAL EXAMINATION: ICD-10-CM

## 2022-09-08 DIAGNOSIS — R10.32 ABDOMINAL PAIN, CHRONIC, LEFT LOWER QUADRANT: ICD-10-CM

## 2022-09-08 DIAGNOSIS — Z00.00 HEALTHCARE MAINTENANCE: ICD-10-CM

## 2022-09-08 DIAGNOSIS — A07.8 BLASTOCYSTIS HOMINIS INFECTION: Chronic | ICD-10-CM

## 2022-09-08 DIAGNOSIS — G89.29 ABDOMINAL PAIN, CHRONIC, LEFT LOWER QUADRANT: ICD-10-CM

## 2022-09-08 DIAGNOSIS — Z11.59 NEED FOR HEPATITIS C SCREENING TEST: Primary | ICD-10-CM

## 2022-09-08 DIAGNOSIS — U09.9 LONG COVID: Chronic | ICD-10-CM

## 2022-09-08 PROBLEM — R06.02 SHORTNESS OF BREATH: Status: RESOLVED | Noted: 2021-09-28 | Resolved: 2022-09-08

## 2022-09-08 PROBLEM — L98.9 SKIN LESION: Status: RESOLVED | Noted: 2020-12-23 | Resolved: 2022-09-08

## 2022-09-08 PROBLEM — G43.909 MIGRAINE HEADACHE: Chronic | Status: ACTIVE | Noted: 2022-09-08

## 2022-09-08 PROBLEM — L40.9 PSORIASIS OF SCALP: Chronic | Status: ACTIVE | Noted: 2022-09-08

## 2022-09-08 PROBLEM — L98.9 SCALP LESION: Status: RESOLVED | Noted: 2020-12-23 | Resolved: 2022-09-08

## 2022-09-08 PROCEDURE — 1159F MED LIST DOCD IN RCRD: CPT | Mod: CPTII,S$GLB,, | Performed by: STUDENT IN AN ORGANIZED HEALTH CARE EDUCATION/TRAINING PROGRAM

## 2022-09-08 PROCEDURE — 99214 PR OFFICE/OUTPT VISIT, EST, LEVL IV, 30-39 MIN: ICD-10-PCS | Mod: S$GLB,,, | Performed by: STUDENT IN AN ORGANIZED HEALTH CARE EDUCATION/TRAINING PROGRAM

## 2022-09-08 PROCEDURE — 3074F PR MOST RECENT SYSTOLIC BLOOD PRESSURE < 130 MM HG: ICD-10-PCS | Mod: CPTII,S$GLB,, | Performed by: STUDENT IN AN ORGANIZED HEALTH CARE EDUCATION/TRAINING PROGRAM

## 2022-09-08 PROCEDURE — 99999 PR PBB SHADOW E&M-EST. PATIENT-LVL V: ICD-10-PCS | Mod: PBBFAC,,, | Performed by: STUDENT IN AN ORGANIZED HEALTH CARE EDUCATION/TRAINING PROGRAM

## 2022-09-08 PROCEDURE — 1160F RVW MEDS BY RX/DR IN RCRD: CPT | Mod: CPTII,S$GLB,, | Performed by: STUDENT IN AN ORGANIZED HEALTH CARE EDUCATION/TRAINING PROGRAM

## 2022-09-08 PROCEDURE — 3044F HG A1C LEVEL LT 7.0%: CPT | Mod: CPTII,S$GLB,, | Performed by: STUDENT IN AN ORGANIZED HEALTH CARE EDUCATION/TRAINING PROGRAM

## 2022-09-08 PROCEDURE — 3044F PR MOST RECENT HEMOGLOBIN A1C LEVEL <7.0%: ICD-10-PCS | Mod: CPTII,S$GLB,, | Performed by: STUDENT IN AN ORGANIZED HEALTH CARE EDUCATION/TRAINING PROGRAM

## 2022-09-08 PROCEDURE — 3008F BODY MASS INDEX DOCD: CPT | Mod: CPTII,S$GLB,, | Performed by: STUDENT IN AN ORGANIZED HEALTH CARE EDUCATION/TRAINING PROGRAM

## 2022-09-08 PROCEDURE — 99214 OFFICE O/P EST MOD 30 MIN: CPT | Mod: S$GLB,,, | Performed by: STUDENT IN AN ORGANIZED HEALTH CARE EDUCATION/TRAINING PROGRAM

## 2022-09-08 PROCEDURE — 3078F PR MOST RECENT DIASTOLIC BLOOD PRESSURE < 80 MM HG: ICD-10-PCS | Mod: CPTII,S$GLB,, | Performed by: STUDENT IN AN ORGANIZED HEALTH CARE EDUCATION/TRAINING PROGRAM

## 2022-09-08 PROCEDURE — 3078F DIAST BP <80 MM HG: CPT | Mod: CPTII,S$GLB,, | Performed by: STUDENT IN AN ORGANIZED HEALTH CARE EDUCATION/TRAINING PROGRAM

## 2022-09-08 PROCEDURE — 1160F PR REVIEW ALL MEDS BY PRESCRIBER/CLIN PHARMACIST DOCUMENTED: ICD-10-PCS | Mod: CPTII,S$GLB,, | Performed by: STUDENT IN AN ORGANIZED HEALTH CARE EDUCATION/TRAINING PROGRAM

## 2022-09-08 PROCEDURE — 1159F PR MEDICATION LIST DOCUMENTED IN MEDICAL RECORD: ICD-10-PCS | Mod: CPTII,S$GLB,, | Performed by: STUDENT IN AN ORGANIZED HEALTH CARE EDUCATION/TRAINING PROGRAM

## 2022-09-08 PROCEDURE — 3008F PR BODY MASS INDEX (BMI) DOCUMENTED: ICD-10-PCS | Mod: CPTII,S$GLB,, | Performed by: STUDENT IN AN ORGANIZED HEALTH CARE EDUCATION/TRAINING PROGRAM

## 2022-09-08 PROCEDURE — 99999 PR PBB SHADOW E&M-EST. PATIENT-LVL V: CPT | Mod: PBBFAC,,, | Performed by: STUDENT IN AN ORGANIZED HEALTH CARE EDUCATION/TRAINING PROGRAM

## 2022-09-08 PROCEDURE — 3074F SYST BP LT 130 MM HG: CPT | Mod: CPTII,S$GLB,, | Performed by: STUDENT IN AN ORGANIZED HEALTH CARE EDUCATION/TRAINING PROGRAM

## 2022-09-08 NOTE — ASSESSMENT & PLAN NOTE
Diagnosed on 8/26  Patient has h/o of travel to [place] on multiple occasions.  Will f/u with new stool test and colonoscopy.  Patient is already under the care of GI: Dr. Lino Ghotra.

## 2022-09-08 NOTE — PROGRESS NOTES
Subjective:       Patient ID: Mirtha Gary is a 47 y.o. female.    Chief Complaint: Annual Exam    HPI    Patient is a 47 y.o. female , Albanian speaking, with a history of:  Migraine headaches  Lumbosacral radiculopathy  Psoriasis of the scalp  Palpitations  H/o endometriosis  Cyst of ovary  Small cyst of the left kidney  Interstitial cystitis  Recurrent UTIs  H/o of COVID x 2  H/o blastocystis hominis infection  Chronic abdominal pain in the LLQ  BL knee pain    That comes to the clinic for a to establish care and complaining of abdominal pain.    Patient has requested change in PCP out of personal preference and would like to continue to receive care in this office.    She reports chronic (6m h/o) abdominal pain and tenderness of the left side of the abdomen more prominent in the LLQ. She was recently diagnosed with  blastocystis hominis infection and she already sees GI. She has a scheduled endcoscopy and colonoscopy.  She received treatment with Metronidazole but the infection persisted. Last 8/26/22 she was prescribed a course of Tinidazole.   She states she has had mild to moderate improvement of her pain, however she has episodes of intense pain now more localized in her LLQ radiated to the pelvis.  8/26/22 Abdominal US showed incidental finding of L kidney cysts without other significant findings.    Tobacco: Denies  Alcohol: Denies  Recreational drug use: Denies  Recent travel: [place], last year multiple countries, including [place].    Healthcare Maintenance:  Vaccinations: Tetanus (unvaccinated.  COVID vaccination: completed x 4  Depression screening: PHQ2 score = 0    ROS  11-point review of systems done.   R Chest wall pain, around mammal gland, with mild tenderness to palpation that subsides on its own, no erythema or edema, no fever. Patient has breast implants.  Rest negative except for detailed in the HPI.        Objective:      Physical Exam  Vitals and nursing note reviewed.   Constitutional:        Appearance: Normal appearance.   HENT:      Head: Normocephalic and atraumatic.      Right Ear: Tympanic membrane normal.      Left Ear: Tympanic membrane normal.      Nose: Nose normal.      Mouth/Throat:      Mouth: Mucous membranes are moist.      Pharynx: Oropharynx is clear.   Eyes:      Extraocular Movements: Extraocular movements intact.      Conjunctiva/sclera: Conjunctivae normal.      Pupils: Pupils are equal, round, and reactive to light.   Cardiovascular:      Rate and Rhythm: Normal rate and regular rhythm.      Pulses: Normal pulses.      Heart sounds: Normal heart sounds.   Pulmonary:      Effort: Pulmonary effort is normal.      Breath sounds: Normal breath sounds.   Abdominal:      General: Bowel sounds are normal.      Palpations: Abdomen is soft.      Tenderness: There is abdominal tenderness.       Musculoskeletal:         General: Normal range of motion.      Cervical back: Normal range of motion and neck supple.      Comments: Breast implants present, uneven, R displaced downward.  Mild tenderness to palpation of R parasternal area an below the R breast. No erythema or edema noted.   Skin:     General: Skin is warm.   Neurological:      General: No focal deficit present.      Mental Status: She is alert and oriented to person, place, and time. Mental status is at baseline.   Psychiatric:         Mood and Affect: Mood normal.          Assessment:       Problem List Items Addressed This Visit          ID    Long COVID (Chronic)     2 episodes of COVID disease, 1 delta, 1 omicron  Persistent systemic symptoms, fatigue, diarrhea, inflammation, malaise.  Mild improvement after each episode.  Recommended mild to moderate physical activity and anti inflammatory beverages such as camomile tea, turmeric.    Will monitor         Blastocystis hominis infection (Chronic)     Diagnosed on 8/26  Patient has h/o of travel to [place] on multiple occasions.  Will f/u with new stool test and  colonoscopy.  Patient is already under the care of GI: Dr. Lino Ghotra.         Relevant Orders    Stool Exam-Ova,Cysts,Parasites    Giardia / Cryptosporidum, EIA    Stool culture       Endocrine    RESOLVED: Morbid obesity       GI    Abdominal pain, chronic, left lower quadrant     6m h/o left lower quadrant abdominal.  Recently treated with Tinidazol for Blastocystis hominis infection.  Mild improvement of abdominal pain.  Will order US pelvis  Will f/u colonoscopy and GI recommendations.  Will repeat stool testing in 1 month.         Relevant Orders    US Pelvis Comp with Transvag NON-OB (xpd)    Stool Exam-Ova,Cysts,Parasites    Giardia / Cryptosporidum, EIA    Stool culture       Other    Encounter for general medical examination     Patient [facility] the [facility] to establish care.            Healthcare maintenance     Will vaccinate against tetanus. Order placed.  Influenza vaccine recommended in the fall.          Other Visit Diagnoses       Need for hepatitis C screening test    -  Primary    Relevant Orders    Hepatitis C antibody    Need for diphtheria, tetanus, pertussis, and Hib vaccination        Relevant Orders    Td Vaccine (Adult)              Plan:         Ordered:  -US pelvis  -Hep C screening  Will f/u endoscopy, colonoscopy, US  Will repeat stool testing in 1 month and f/u      Education provided  Lifestyle recommendations given  AVS printed, explained, and given to the patient.  RTC in : 4 weeks      LENIN KEE MD, MPH  Internal Medicine  [facility]  [facility]

## 2022-09-08 NOTE — ASSESSMENT & PLAN NOTE
2 episodes of COVID disease, 1 delta, 1 omicron  Persistent systemic symptoms, fatigue, diarrhea, inflammation, malaise.  Mild improvement after each episode.  Recommended mild to moderate physical activity and anti inflammatory beverages such as camomile tea, turmeric.    Will monitor

## 2022-09-08 NOTE — ASSESSMENT & PLAN NOTE
6m h/o left lower quadrant abdominal.  Recently treated with Tinidazol for Blastocystis hominis infection.  Mild improvement of abdominal pain.  Will order US pelvis  Will f/u colonoscopy and GI recommendations.  Will repeat stool testing in 1 month.

## 2022-09-13 ENCOUNTER — ANESTHESIA (OUTPATIENT)
Dept: ENDOSCOPY | Facility: HOSPITAL | Age: 47
End: 2022-09-13
Payer: COMMERCIAL

## 2022-09-13 ENCOUNTER — HOSPITAL ENCOUNTER (OUTPATIENT)
Facility: HOSPITAL | Age: 47
Discharge: HOME | End: 2022-09-13
Attending: INTERNAL MEDICINE | Admitting: INTERNAL MEDICINE
Payer: COMMERCIAL

## 2022-09-13 ENCOUNTER — ANESTHESIA EVENT (OUTPATIENT)
Dept: ENDOSCOPY | Facility: HOSPITAL | Age: 47
End: 2022-09-13
Payer: COMMERCIAL

## 2022-09-13 VITALS
HEART RATE: 71 BPM | DIASTOLIC BLOOD PRESSURE: 67 MMHG | BODY MASS INDEX: 22.13 KG/M2 | SYSTOLIC BLOOD PRESSURE: 109 MMHG | WEIGHT: 141 LBS | TEMPERATURE: 98 F | HEIGHT: 67 IN | RESPIRATION RATE: 16 BRPM | OXYGEN SATURATION: 100 %

## 2022-09-13 DIAGNOSIS — R10.12 LEFT UPPER QUADRANT ABDOMINAL PAIN: ICD-10-CM

## 2022-09-13 DIAGNOSIS — R10.9 ABDOMINAL PAIN: ICD-10-CM

## 2022-09-13 DIAGNOSIS — K31.A0 GASTRIC INTESTINAL METAPLASIA: Primary | ICD-10-CM

## 2022-09-13 LAB
B-HCG UR QL: NEGATIVE
CTP QC/QA: YES

## 2022-09-13 PROCEDURE — 45378 DIAGNOSTIC COLONOSCOPY: CPT | Performed by: INTERNAL MEDICINE

## 2022-09-13 PROCEDURE — 88305 TISSUE EXAM BY PATHOLOGIST: ICD-10-PCS | Mod: 26,,, | Performed by: PATHOLOGY

## 2022-09-13 PROCEDURE — 43239 EGD BIOPSY SINGLE/MULTIPLE: CPT | Mod: 51,,, | Performed by: INTERNAL MEDICINE

## 2022-09-13 PROCEDURE — 45378 DIAGNOSTIC COLONOSCOPY: CPT | Mod: ,,, | Performed by: INTERNAL MEDICINE

## 2022-09-13 PROCEDURE — 25000003 PHARM REV CODE 250: Performed by: INTERNAL MEDICINE

## 2022-09-13 PROCEDURE — 88305 TISSUE EXAM BY PATHOLOGIST: CPT | Performed by: PATHOLOGY

## 2022-09-13 PROCEDURE — 27201012 HC FORCEPS, HOT/COLD, DISP: Performed by: INTERNAL MEDICINE

## 2022-09-13 PROCEDURE — 43239 PR EGD, FLEX, W/BIOPSY, SGL/MULTI: ICD-10-PCS | Mod: 51,,, | Performed by: INTERNAL MEDICINE

## 2022-09-13 PROCEDURE — 25000003 PHARM REV CODE 250: Performed by: NURSE ANESTHETIST, CERTIFIED REGISTERED

## 2022-09-13 PROCEDURE — 88342 CHG IMMUNOCYTOCHEMISTRY: ICD-10-PCS | Mod: 26,,, | Performed by: PATHOLOGY

## 2022-09-13 PROCEDURE — 63600175 PHARM REV CODE 636 W HCPCS: Performed by: NURSE ANESTHETIST, CERTIFIED REGISTERED

## 2022-09-13 PROCEDURE — 43239 EGD BIOPSY SINGLE/MULTIPLE: CPT | Performed by: INTERNAL MEDICINE

## 2022-09-13 PROCEDURE — 88305 TISSUE EXAM BY PATHOLOGIST: CPT | Mod: 26,,, | Performed by: PATHOLOGY

## 2022-09-13 PROCEDURE — 81025 URINE PREGNANCY TEST: CPT | Performed by: INTERNAL MEDICINE

## 2022-09-13 PROCEDURE — 37000009 HC ANESTHESIA EA ADD 15 MINS: Performed by: INTERNAL MEDICINE

## 2022-09-13 PROCEDURE — 37000008 HC ANESTHESIA 1ST 15 MINUTES: Performed by: INTERNAL MEDICINE

## 2022-09-13 PROCEDURE — 88342 IMHCHEM/IMCYTCHM 1ST ANTB: CPT | Mod: 26,,, | Performed by: PATHOLOGY

## 2022-09-13 PROCEDURE — 45378 PR COLONOSCOPY,DIAGNOSTIC: ICD-10-PCS | Mod: ,,, | Performed by: INTERNAL MEDICINE

## 2022-09-13 PROCEDURE — E9220 PRA ENDO ANESTHESIA: ICD-10-PCS | Mod: ,,, | Performed by: NURSE ANESTHETIST, CERTIFIED REGISTERED

## 2022-09-13 PROCEDURE — E9220 PRA ENDO ANESTHESIA: HCPCS | Mod: ,,, | Performed by: NURSE ANESTHETIST, CERTIFIED REGISTERED

## 2022-09-13 PROCEDURE — 88342 IMHCHEM/IMCYTCHM 1ST ANTB: CPT | Performed by: PATHOLOGY

## 2022-09-13 RX ORDER — PROPOFOL 10 MG/ML
VIAL (ML) INTRAVENOUS
Status: DISCONTINUED | OUTPATIENT
Start: 2022-09-13 | End: 2022-09-13

## 2022-09-13 RX ORDER — SODIUM CHLORIDE 9 MG/ML
INJECTION, SOLUTION INTRAVENOUS CONTINUOUS
Status: DISCONTINUED | OUTPATIENT
Start: 2022-09-13 | End: 2022-09-13 | Stop reason: HOSPADM

## 2022-09-13 RX ORDER — LIDOCAINE HYDROCHLORIDE 20 MG/ML
INJECTION INTRAVENOUS
Status: DISCONTINUED | OUTPATIENT
Start: 2022-09-13 | End: 2022-09-13

## 2022-09-13 RX ORDER — FENTANYL CITRATE 50 UG/ML
INJECTION, SOLUTION INTRAMUSCULAR; INTRAVENOUS
Status: DISCONTINUED | OUTPATIENT
Start: 2022-09-13 | End: 2022-09-13

## 2022-09-13 RX ORDER — PROPOFOL 10 MG/ML
VIAL (ML) INTRAVENOUS CONTINUOUS PRN
Status: DISCONTINUED | OUTPATIENT
Start: 2022-09-13 | End: 2022-09-13

## 2022-09-13 RX ORDER — ONDANSETRON 2 MG/ML
INJECTION INTRAMUSCULAR; INTRAVENOUS
Status: DISCONTINUED | OUTPATIENT
Start: 2022-09-13 | End: 2022-09-13

## 2022-09-13 RX ADMIN — ONDANSETRON 4 MG: 2 INJECTION INTRAMUSCULAR; INTRAVENOUS at 12:09

## 2022-09-13 RX ADMIN — FENTANYL CITRATE 25 MCG: 50 INJECTION, SOLUTION INTRAMUSCULAR; INTRAVENOUS at 12:09

## 2022-09-13 RX ADMIN — LIDOCAINE HYDROCHLORIDE 75 MG: 20 INJECTION, SOLUTION INTRAVENOUS at 12:09

## 2022-09-13 RX ADMIN — SODIUM CHLORIDE: 0.9 INJECTION, SOLUTION INTRAVENOUS at 12:09

## 2022-09-13 RX ADMIN — FENTANYL CITRATE 50 MCG: 50 INJECTION, SOLUTION INTRAMUSCULAR; INTRAVENOUS at 12:09

## 2022-09-13 RX ADMIN — Medication 100 MCG/KG/MIN: at 12:09

## 2022-09-13 RX ADMIN — PROPOFOL 100 MG: 10 INJECTION, EMULSION INTRAVENOUS at 12:09

## 2022-09-13 RX ADMIN — SODIUM CHLORIDE: 0.9 INJECTION, SOLUTION INTRAVENOUS at 11:09

## 2022-09-13 RX ADMIN — GLYCOPYRROLATE 0.1 MG: 0.2 INJECTION, SOLUTION INTRAMUSCULAR; INTRAVITREAL at 12:09

## 2022-09-13 NOTE — PROVATION PATIENT INSTRUCTIONS
Discharge Summary/Instructions after an Endoscopic Procedure  Patient Name: Mirtha Gary  Patient MRN: [id number]  Patient YOB: 1975 Tuesday, September 13, 2022  Kike Manuel MD  Dear patient,  As a result of recent federal legislation (The Federal Cures Act), you may   receive lab or pathology results from your procedure in your [facility]   account before your physician is able to contact you. Your physician or   their representative will relay the results to you with their   recommendations at their soonest availability.  Thank you,  RESTRICTIONS:  During your procedure today, you received medications for sedation.  These   medications may affect your judgment, balance and coordination.  Therefore,   for 24 hours, you have the following restrictions:   - DO NOT drive a car, operate machinery, make legal/financial decisions,   sign important papers or drink alcohol.    ACTIVITY:  Today: no heavy lifting, straining or running due to procedural   sedation/anesthesia.  The following day: return to full activity including work.  DIET:  Eat and drink normally unless instructed otherwise.     TREATMENT FOR COMMON SIDE EFFECTS:  - Mild abdominal pain, nausea, belching, bloating or excessive gas:  rest,   eat lightly and use a heating pad.  - Sore Throat: treat with throat lozenges and/or gargle with warm salt   water.  - Because air was used during the procedure, expelling large amounts of air   from your rectum or belching is normal.  - If a bowel prep was taken, you may not have a bowel movement for 1-3 days.    This is normal.  SYMPTOMS TO WATCH FOR AND REPORT TO YOUR PHYSICIAN:  1. Abdominal pain or bloating, other than gas cramps.  2. Chest pain.  3. Back pain.  4. Signs of infection such as: chills or fever occurring within 24 hours   after the procedure.  5. Rectal bleeding, which would show as bright red, maroon, or black stools.   (A tablespoon of blood from the rectum is not serious, especially  if   hemorrhoids are present.)  6. Vomiting.  7. Weakness or dizziness.  GO DIRECTLY TO THE NEAREST EMERGENCY ROOM IF YOU HAVE ANY OF THE FOLLOWING:      Difficulty breathing              Chills and/or fever over 101 F   Persistent vomiting and/or vomiting blood   Severe abdominal pain   Severe chest pain   Black, tarry stools   Bleeding- more than one tablespoon   Any other symptom or condition that you feel may need urgent attention  Your doctor recommends these additional instructions:  If any biopsies were taken, your doctors clinic will contact you in 1 to 2   weeks with any results.  - Discharge patient to home.   - Patient has a contact number available for emergencies.  The signs and   symptoms of potential delayed complications were discussed with the   patient.  Return to normal activities tomorrow.  Written discharge   instructions were provided to the patient.   - Resume previous diet.   - Continue present medications.   - Await pathology results.   - Repeat colonoscopy in 10 years for screening purposes.  For questions, problems or results please call your physician - Kike Manuel MD at Work:  [phone].  [facility], EMERGENCY ROOM PHONE NUMBER: [phone]  IF A COMPLICATION OR EMERGENCY SITUATION ARISES AND YOU ARE UNABLE TO REACH   YOUR PHYSICIAN - GO DIRECTLY TO THE EMERGENCY ROOM.  Kike Manuel MD  9/13/2022 12:48:59 PM  This report has been verified and signed electronically.  Dear patient,  As a result of recent federal legislation (The Federal Cures Act), you may   receive lab or pathology results from your procedure in your [facility]   account before your physician is able to contact you. Your physician or   their representative will relay the results to you with their   recommendations at their soonest availability.  Thank you,  PROVATION

## 2022-09-13 NOTE — ANESTHESIA PREPROCEDURE EVALUATION
09/13/2022  Mirtha Gary is a 47 y.o., female.  Past Medical History:   Diagnosis Date    Endometriosis     Migraine      Past Surgical History:   Procedure Laterality Date    AUGMENTATION OF BREAST      COSMETIC SURGERY      breast implants    CYSTOSCOPY N/A 6/22/2021    Procedure: CYSTOSCOPY;  Surgeon: Evi James MD;  Location: [facility] 1ST FLR;  Service: Urology;  Laterality: N/A;    DILATION AND CURETTAGE OF UTERUS USING SUCTION N/A 6/29/2018    Procedure: DILATION AND CURETTAGE, UTERUS, USING SUCTION;  Surgeon: Katrin Garcia MD;  Location: [facility] OR;  Service: OB/GYN;  Laterality: N/A;    ECTOPIC PREGNANCY SURGERY      ESOPHAGOGASTRODUODENOSCOPY N/A 12/1/2021    Procedure: EGD (ESOPHAGOGASTRODUODENOSCOPY);  Surgeon: Mich Painting MD;  Location: [facility] (2ND FLR);  Service: Endoscopy;  Laterality: N/A;  fully vaccinated-GT    TRANSFORAMINAL EPIDURAL INJECTION OF STEROID Left 8/2/2019    Procedure: INJECTION, STEROID, EPIDURAL, TRANSFORAMINAL APPROACH;  Surgeon: Kiera Biggs MD;  Location: [facility] PAIN MGT;  Service: Pain Management;  Laterality: Left;  left L5 and S1 TF CHA   CONSENT NEEDED  MEDICALLY URGENT         Pre-op Assessment    I have reviewed the Patient Summary Reports.     I have reviewed the Nursing Notes. I have reviewed the NPO Status.   I have reviewed the Medications.     Review of Systems  Anesthesia Hx:  No problems with previous Anesthesia  Denies Family Hx of Anesthesia complications.   Denies Personal Hx of Anesthesia complications.   Hematology/Oncology:  Hematology Normal        Cardiovascular:  Cardiovascular Normal     Hepatic/GI:   Bowel Prep.    Musculoskeletal:   Arthritis     Neurological:   Neuromuscular Disease, Headaches    Dermatological:  Skin Normal        Physical Exam  General: Well nourished    Airway:  Mallampati: II   Mouth Opening:  Normal  TM Distance: Normal  Tongue: Normal  Neck ROM: Normal ROM    Dental:  Intact        Anesthesia Plan  Type of Anesthesia, risks & benefits discussed:    Anesthesia Type: Gen Natural Airway  Intra-op Monitoring Plan: Standard ASA Monitors  Post Op Pain Control Plan: multimodal analgesia and IV/PO Opioids PRN  Induction:  IV  ASA Score: 2  Day of Surgery Review of History & Physical: H&P Update referred to the surgeon/provider.    Ready For Surgery From Anesthesia Perspective.     .

## 2022-09-13 NOTE — H&P
Short Stay Endoscopy History and Physical    PCP - Joan Torres MD  Referring Physician - Ramila Gayle RN  No address on file    Procedure - EGD and Colonoscopy  ASA - per anesthesia  Mallampati - per anesthesia  History of Anesthesia problems - no  Family history Anesthesia problems -  no   Plan of anesthesia - General    HPI  47 y.o. female    Reason for procedure:   LUQ abd pain and generalized abdominal pain, h/o focal intestinal metaplasia    ROS:  Constitutional: No fevers, chills, No weight loss  CV: No chest pain  Pulm: No cough, No shortness of breath  GI: see HPI    Medical History:  has a past medical history of Endometriosis and Migraine.    Surgical History:  has a past surgical history that includes Ectopic pregnancy surgery; Cosmetic surgery; Dilation and curettage of uterus using suction (N/A, 6/29/2018); Transforaminal epidural injection of steroid (Left, 8/2/2019); Augmentation of breast; Cystoscopy (N/A, 6/22/2021); and Esophagogastroduodenoscopy (N/A, 12/1/2021).    Family History: family history is not on file.    Social History:  reports that she has never smoked. She has never used smokeless tobacco. She reports that she does not drink alcohol and does not use drugs.    Review of patient's allergies indicates:   Allergen Reactions    Ciprofloxacin Rash     T-cell mediated fixed drug eruption       Medications:   Medications Prior to Admission   Medication Sig Dispense Refill Last Dose    calcipotriene-betamethasone (ENSTILAR) 0.005-0.064 % Foam Apply to scalp once or twice daily. Then wear shower cap overnight 60 g 4 9/12/2022    NURTEC 75 mg odt Take 75 mg by mouth once daily.   9/12/2022    pantoprazole (PROTONIX) 40 MG tablet Take 1 tablet (40 mg total) by mouth once daily. 30 tablet 2 Past Month    betamethasone valerate 0.1% (VALISONE) 0.1 % Crea Apply topically 2 (two) times daily. 60 g 2     butalbital-acetaminophen-caff -40 mg Cap TK 1 C PO BID PRF SEVERE HA 30 capsule 3      EPINEPHrine (EPIPEN) 0.3 mg/0.3 mL AtIn        fluticasone (VERAMYST) 27.5 mcg/actuation nasal spray 2 sprays by Nasal route once daily. 15.8 mL 3 More than a month    galcanezumab-gnlm (EMGALITY PEN) 120 mg/mL PnIj Inject 120 mg into the skin every 28 days. 1 mL 11     ketoconazole (NIZORAL) 2 % shampoo Apply to scalp, let sit for 5-10 minutes then rinse. Use 3-5 times weekly 120 mL 3     naratriptan (AMERGE) 2.5 MG tablet TAKE 1 TABLET BY MOUTH AT ONSET OF HEADACHE, MAY REPEAT IN 4 HOURS IF NEEDED 9 tablet 3     ondansetron (ZOFRAN-ODT) 4 MG TbDL Take 1 tablet (4 mg total) by mouth every 6 (six) hours as needed (Nausea and vomiting). 20 tablet 0 More than a month    promethazine (PHENERGAN) 25 MG tablet Take 1 tablet (25 mg total) by mouth every 6 (six) hours as needed for Nausea. 25 tablet 0 More than a month    ubrogepant (UBROGEPANT) 50 mg tablet TAKE 1 TABLET (50 MG TOTAL) BY MOUTH EVERY 2 (TWO) HOURS AS NEEDED FOR MIGRAINE. 10 tablet 1        Physical Exam:    Vital Signs:   Vitals:    09/13/22 1133   BP: 108/70   Pulse: 87   Resp: 16   Temp: 97.3 °F (36.3 °C)       General Appearance: Well appearing in no acute distress  Abdomen: Soft, non tender, non distended with normal bowel sounds, no masses    Labs:  Lab Results   Component Value Date    WBC 5.18 01/13/2022    HGB 14.4 01/13/2022    HCT 44.1 01/13/2022     01/13/2022    CHOL 195 01/13/2022    TRIG 71 01/13/2022    HDL 47 01/13/2022    ALT 15 01/13/2022    AST 14 01/13/2022     01/13/2022    K 4.1 01/13/2022     01/13/2022    CREATININE 0.7 01/13/2022    BUN 12 01/13/2022    CO2 26 01/13/2022    TSH 1.999 01/13/2022    INR 0.9 09/29/2021    HGBA1C 4.8 01/13/2022       I have explained the risks and benefits of this endoscopic procedure to the patient including but not limited to bleeding, inflammation, infection, perforation, and death.      Kike Manuel MD

## 2022-09-13 NOTE — PLAN OF CARE
Written discharge plan of care reviewed, verbalizes understanding and all questions addressed at this time. Instructed to continue with protonix 40 mg by mouth daily and avoid NSAID's.

## 2022-09-13 NOTE — PROVATION PATIENT INSTRUCTIONS
Discharge Summary/Instructions after an Endoscopic Procedure  Patient Name: Mirtha Gary  Patient MRN: [id number]  Patient YOB: 1975 Tuesday, September 13, 2022  Kike Manuel MD  Dear patient,  As a result of recent federal legislation (The Federal Cures Act), you may   receive lab or pathology results from your procedure in your [facility]   account before your physician is able to contact you. Your physician or   their representative will relay the results to you with their   recommendations at their soonest availability.  Thank you,  RESTRICTIONS:  During your procedure today, you received medications for sedation.  These   medications may affect your judgment, balance and coordination.  Therefore,   for 24 hours, you have the following restrictions:   - DO NOT drive a car, operate machinery, make legal/financial decisions,   sign important papers or drink alcohol.    ACTIVITY:  Today: no heavy lifting, straining or running due to procedural   sedation/anesthesia.  The following day: return to full activity including work.  DIET:  Eat and drink normally unless instructed otherwise.     TREATMENT FOR COMMON SIDE EFFECTS:  - Mild abdominal pain, nausea, belching, bloating or excessive gas:  rest,   eat lightly and use a heating pad.  - Sore Throat: treat with throat lozenges and/or gargle with warm salt   water.  - Because air was used during the procedure, expelling large amounts of air   from your rectum or belching is normal.  - If a bowel prep was taken, you may not have a bowel movement for 1-3 days.    This is normal.  SYMPTOMS TO WATCH FOR AND REPORT TO YOUR PHYSICIAN:  1. Abdominal pain or bloating, other than gas cramps.  2. Chest pain.  3. Back pain.  4. Signs of infection such as: chills or fever occurring within 24 hours   after the procedure.  5. Rectal bleeding, which would show as bright red, maroon, or black stools.   (A tablespoon of blood from the rectum is not serious, especially  if   hemorrhoids are present.)  6. Vomiting.  7. Weakness or dizziness.  GO DIRECTLY TO THE NEAREST EMERGENCY ROOM IF YOU HAVE ANY OF THE FOLLOWING:      Difficulty breathing              Chills and/or fever over 101 F   Persistent vomiting and/or vomiting blood   Severe abdominal pain   Severe chest pain   Black, tarry stools   Bleeding- more than one tablespoon   Any other symptom or condition that you feel may need urgent attention  Your doctor recommends these additional instructions:  If any biopsies were taken, your doctors clinic will contact you in 1 to 2   weeks with any results.  - Discharge patient to home.   - Patient has a contact number available for emergencies.  The signs and   symptoms of potential delayed complications were discussed with the   patient.  Return to normal activities tomorrow.  Written discharge   instructions were provided to the patient.   - Resume previous diet.   - Continue present medications.   - Await pathology results.   - Return to GI clinic as previously scheduled.   - Avoid NSAIDs and continue protonix 40 mg daily.   For questions, problems or results please call your physician - Kike Manuel MD at Work:  [phone].  [facility] [place], EMERGENCY ROOM PHONE NUMBER: [phone]  IF A COMPLICATION OR EMERGENCY SITUATION ARISES AND YOU ARE UNABLE TO REACH   YOUR PHYSICIAN - GO DIRECTLY TO THE EMERGENCY ROOM.  Kike Manuel MD  9/13/2022 12:28:30 PM  This report has been verified and signed electronically.  Dear patient,  As a result of recent federal legislation (The Federal Cures Act), you may   receive lab or pathology results from your procedure in your [facility]   account before your physician is able to contact you. Your physician or   their representative will relay the results to you with their   recommendations at their soonest availability.  Thank you,  PROVATION

## 2022-09-13 NOTE — TRANSFER OF CARE
"Anesthesia Transfer of Care Note    Patient: Mirtha Gary    Procedure(s) Performed: Procedure(s) (LRB):  EGD (ESOPHAGOGASTRODUODENOSCOPY) (N/A)  COLONOSCOPY (N/A)    Patient location: GI    Anesthesia Type: general    Transport from OR: Transported from OR on room air with adequate spontaneous ventilation    Post pain: adequate analgesia    Post assessment: no apparent anesthetic complications    Post vital signs: stable    Level of consciousness: awake    Nausea/Vomiting: no nausea/vomiting    Complications: none    Transfer of care protocol was followed      Last vitals:   Visit Vitals  /70 (BP Location: Left arm, Patient Position: Lying)   Pulse 87   Temp 36.3 °C (97.3 °F) (Temporal)   Resp 16   Ht 5' 7" (1.702 m)   Wt 64 kg (141 lb)   LMP 09/01/2022   SpO2 100%   Breastfeeding No   BMI 22.08 kg/m²     "

## 2022-09-14 ENCOUNTER — LAB VISIT (OUTPATIENT)
Dept: LAB | Facility: HOSPITAL | Age: 47
End: 2022-09-14
Attending: INTERNAL MEDICINE
Payer: COMMERCIAL

## 2022-09-14 ENCOUNTER — OFFICE VISIT (OUTPATIENT)
Dept: GASTROENTEROLOGY | Facility: CLINIC | Age: 47
End: 2022-09-14
Payer: COMMERCIAL

## 2022-09-14 VITALS
HEIGHT: 67 IN | DIASTOLIC BLOOD PRESSURE: 71 MMHG | HEART RATE: 67 BPM | BODY MASS INDEX: 22.11 KG/M2 | WEIGHT: 140.88 LBS | SYSTOLIC BLOOD PRESSURE: 100 MMHG

## 2022-09-14 DIAGNOSIS — Z87.42 HISTORY OF ENDOMETRIOSIS: ICD-10-CM

## 2022-09-14 DIAGNOSIS — K25.3 ACUTE GASTRIC ULCER WITHOUT HEMORRHAGE OR PERFORATION: ICD-10-CM

## 2022-09-14 DIAGNOSIS — K29.30 CHRONIC SUPERFICIAL GASTRITIS WITHOUT BLEEDING: ICD-10-CM

## 2022-09-14 DIAGNOSIS — R10.9 LEFT SIDED ABDOMINAL PAIN: Primary | ICD-10-CM

## 2022-09-14 DIAGNOSIS — R10.9 LEFT SIDED ABDOMINAL PAIN: ICD-10-CM

## 2022-09-14 DIAGNOSIS — A07.8 BLASTOCYSTIS HOMINIS INFECTION: ICD-10-CM

## 2022-09-14 LAB
ALBUMIN SERPL BCP-MCNC: 4.1 G/DL (ref 3.5–5.2)
ALP SERPL-CCNC: 53 U/L (ref 55–135)
ALT SERPL W/O P-5'-P-CCNC: 21 U/L (ref 10–44)
ANION GAP SERPL CALC-SCNC: 8 MMOL/L (ref 8–16)
AST SERPL-CCNC: 17 U/L (ref 10–40)
BASOPHILS # BLD AUTO: 0.02 K/UL (ref 0–0.2)
BASOPHILS NFR BLD: 0.3 % (ref 0–1.9)
BILIRUB SERPL-MCNC: 0.5 MG/DL (ref 0.1–1)
BUN SERPL-MCNC: 8 MG/DL (ref 6–20)
CALCIUM SERPL-MCNC: 9.4 MG/DL (ref 8.7–10.5)
CHLORIDE SERPL-SCNC: 106 MMOL/L (ref 95–110)
CO2 SERPL-SCNC: 26 MMOL/L (ref 23–29)
CREAT SERPL-MCNC: 0.8 MG/DL (ref 0.5–1.4)
DIFFERENTIAL METHOD: ABNORMAL
EOSINOPHIL # BLD AUTO: 0.1 K/UL (ref 0–0.5)
EOSINOPHIL NFR BLD: 0.8 % (ref 0–8)
ERYTHROCYTE [DISTWIDTH] IN BLOOD BY AUTOMATED COUNT: 12.3 % (ref 11.5–14.5)
EST. GFR  (NO RACE VARIABLE): >60 ML/MIN/1.73 M^2
GLUCOSE SERPL-MCNC: 82 MG/DL (ref 70–110)
HCT VFR BLD AUTO: 41.1 % (ref 37–48.5)
HGB BLD-MCNC: 14.2 G/DL (ref 12–16)
IMM GRANULOCYTES # BLD AUTO: 0.01 K/UL (ref 0–0.04)
IMM GRANULOCYTES NFR BLD AUTO: 0.2 % (ref 0–0.5)
LYMPHOCYTES # BLD AUTO: 1.7 K/UL (ref 1–4.8)
LYMPHOCYTES NFR BLD: 25.1 % (ref 18–48)
MCH RBC QN AUTO: 32 PG (ref 27–31)
MCHC RBC AUTO-ENTMCNC: 34.5 G/DL (ref 32–36)
MCV RBC AUTO: 93 FL (ref 82–98)
MONOCYTES # BLD AUTO: 0.5 K/UL (ref 0.3–1)
MONOCYTES NFR BLD: 7.2 % (ref 4–15)
NEUTROPHILS # BLD AUTO: 4.4 K/UL (ref 1.8–7.7)
NEUTROPHILS NFR BLD: 66.4 % (ref 38–73)
NRBC BLD-RTO: 0 /100 WBC
PLATELET # BLD AUTO: 251 K/UL (ref 150–450)
PMV BLD AUTO: 10.7 FL (ref 9.2–12.9)
POTASSIUM SERPL-SCNC: 4.2 MMOL/L (ref 3.5–5.1)
PROT SERPL-MCNC: 7.6 G/DL (ref 6–8.4)
RBC # BLD AUTO: 4.44 M/UL (ref 4–5.4)
SODIUM SERPL-SCNC: 140 MMOL/L (ref 136–145)
WBC # BLD AUTO: 6.57 K/UL (ref 3.9–12.7)

## 2022-09-14 PROCEDURE — 36415 COLL VENOUS BLD VENIPUNCTURE: CPT | Performed by: INTERNAL MEDICINE

## 2022-09-14 PROCEDURE — 99999 PR PBB SHADOW E&M-EST. PATIENT-LVL IV: ICD-10-PCS | Mod: PBBFAC,,, | Performed by: INTERNAL MEDICINE

## 2022-09-14 PROCEDURE — 86256 FLUORESCENT ANTIBODY TITER: CPT | Performed by: INTERNAL MEDICINE

## 2022-09-14 PROCEDURE — 3044F PR MOST RECENT HEMOGLOBIN A1C LEVEL <7.0%: ICD-10-PCS | Mod: CPTII,S$GLB,, | Performed by: INTERNAL MEDICINE

## 2022-09-14 PROCEDURE — 3008F PR BODY MASS INDEX (BMI) DOCUMENTED: ICD-10-PCS | Mod: CPTII,S$GLB,, | Performed by: INTERNAL MEDICINE

## 2022-09-14 PROCEDURE — 3078F DIAST BP <80 MM HG: CPT | Mod: CPTII,S$GLB,, | Performed by: INTERNAL MEDICINE

## 2022-09-14 PROCEDURE — 3074F PR MOST RECENT SYSTOLIC BLOOD PRESSURE < 130 MM HG: ICD-10-PCS | Mod: CPTII,S$GLB,, | Performed by: INTERNAL MEDICINE

## 2022-09-14 PROCEDURE — 1159F MED LIST DOCD IN RCRD: CPT | Mod: CPTII,S$GLB,, | Performed by: INTERNAL MEDICINE

## 2022-09-14 PROCEDURE — 3044F HG A1C LEVEL LT 7.0%: CPT | Mod: CPTII,S$GLB,, | Performed by: INTERNAL MEDICINE

## 2022-09-14 PROCEDURE — 85025 COMPLETE CBC W/AUTO DIFF WBC: CPT | Performed by: INTERNAL MEDICINE

## 2022-09-14 PROCEDURE — 3078F PR MOST RECENT DIASTOLIC BLOOD PRESSURE < 80 MM HG: ICD-10-PCS | Mod: CPTII,S$GLB,, | Performed by: INTERNAL MEDICINE

## 2022-09-14 PROCEDURE — 99214 OFFICE O/P EST MOD 30 MIN: CPT | Mod: S$GLB,,, | Performed by: INTERNAL MEDICINE

## 2022-09-14 PROCEDURE — 3008F BODY MASS INDEX DOCD: CPT | Mod: CPTII,S$GLB,, | Performed by: INTERNAL MEDICINE

## 2022-09-14 PROCEDURE — 99999 PR PBB SHADOW E&M-EST. PATIENT-LVL IV: CPT | Mod: PBBFAC,,, | Performed by: INTERNAL MEDICINE

## 2022-09-14 PROCEDURE — 1160F PR REVIEW ALL MEDS BY PRESCRIBER/CLIN PHARMACIST DOCUMENTED: ICD-10-PCS | Mod: CPTII,S$GLB,, | Performed by: INTERNAL MEDICINE

## 2022-09-14 PROCEDURE — 3074F SYST BP LT 130 MM HG: CPT | Mod: CPTII,S$GLB,, | Performed by: INTERNAL MEDICINE

## 2022-09-14 PROCEDURE — 80053 COMPREHEN METABOLIC PANEL: CPT | Performed by: INTERNAL MEDICINE

## 2022-09-14 PROCEDURE — 1159F PR MEDICATION LIST DOCUMENTED IN MEDICAL RECORD: ICD-10-PCS | Mod: CPTII,S$GLB,, | Performed by: INTERNAL MEDICINE

## 2022-09-14 PROCEDURE — 1160F RVW MEDS BY RX/DR IN RCRD: CPT | Mod: CPTII,S$GLB,, | Performed by: INTERNAL MEDICINE

## 2022-09-14 PROCEDURE — 99214 PR OFFICE/OUTPT VISIT, EST, LEVL IV, 30-39 MIN: ICD-10-PCS | Mod: S$GLB,,, | Performed by: INTERNAL MEDICINE

## 2022-09-14 RX ORDER — NITAZOXANIDE 500 MG/1
500 TABLET ORAL 2 TIMES DAILY
Qty: 6 TABLET | Refills: 0 | Status: SHIPPED | OUTPATIENT
Start: 2022-09-14 | End: 2022-09-17

## 2022-09-14 NOTE — PROGRESS NOTES
[facility] Established Patient Visit    Reason for Visit:    Chief Complaint   Patient presents with    Abdominal Pain       PCP: Joan Torres      HPI:  Mirtha Gary is a 47 y.o. female here for follow-up of left-sided abdominal pain.  I last saw her in November 2021 for the same.  I started seeing her a little more than 2 years ago for this pain.  It has progressed over time.  She had intermittent improvement, but has reported worsening over the last few months.  The symptoms are daily now.  Her current pain level is 5/10, but it ranges from 2/10 up to an 8/10 at times.  The pain never fully resolved.  There seemed to be some dietary relationship with eating; however, she has adjusted her diet without improvement.  She is eliminated spicy foods and dairy intake.  She had been on Protonix for a while, but did not notice that it was improving her symptoms.  She stopped the Protonix 2 weeks ago in anticipation for an EGD and colonoscopy that was done yesterday by 1 of my partners, Dr. Kike Manuel.  Results are summarized below.  She started taking the Protonix again this morning.    She travels a lot, and was recently diagnosed with Blastocystis hominis in the stool the a stool testing.  No other parasites were seen in the stool test.  She took a course of tinidazole without much improvement.  She admits to a lot of bloating and gas.  Her bowel movements are regular, and unchanged.  She denies diarrhea or frequent bowel movements.  The pain is not associated with bowel movements.    Of note, she has a history of endometriosis diagnosed many years ago.  This was treated via laparoscopy with ablation.  A pelvic ultrasound scheduled for next week.      EGD 09/13/2022 revealing 2 superficial antral ulcers of the stomach.  The esophagus was normal, and the duodenum were normal.  Biopsies were taken from the stomach and duodenum.    Colonoscopy 09/13/2022 done for screening purposes.  This  was complete with excellent bowel preparation and intubation of the terminal ileum.  The exam was completely normal, and 10 year follow-up recommended.          ROS:  Constitutional: No fevers, chills, No weight loss, normal appetite  GI: see HPI  Derm: No rash or lesions          PMHX:  has a past medical history of Endometriosis and Migraine.    PSHX:  has a past surgical history that includes Ectopic pregnancy surgery; Cosmetic surgery; Dilation and curettage of uterus using suction (N/A, 6/29/2018); Transforaminal epidural injection of steroid (Left, 8/2/2019); Augmentation of breast; Cystoscopy (N/A, 6/22/2021); Esophagogastroduodenoscopy (N/A, 12/1/2021); Esophagogastroduodenoscopy (N/A, 9/13/2022); and Colonoscopy (N/A, 9/13/2022).    The patient's social and family histories were reviewed by me and updated in the appropriate section of the electronic medical record.    Review of patient's allergies indicates:   Allergen Reactions    Ciprofloxacin Rash     T-cell mediated fixed drug eruption       Prior to Admission medications    Medication Sig Start Date End Date Taking? Authorizing Provider   betamethasone valerate 0.1% (VALISONE) 0.1 % Crea Apply topically 2 (two) times daily. 12/23/20  Yes Alfred Ramos MD   butalbital-acetaminophen-caff -40 mg Cap TK 1 C PO BID PRF SEVERE HA 11/5/21  Yes Susy Marti NP   calcipotriene-betamethasone (ENSTILAR) 0.005-0.064 % Foam Apply to scalp once or twice daily. Then wear shower cap overnight 12/30/20  Yes Ciarra Gamboa MD   EPINEPHrine (EPIPEN) 0.3 mg/0.3 mL AtIn  12/29/20  Yes Historical Provider   fluticasone (VERAMYST) 27.5 mcg/actuation nasal spray 2 sprays by Nasal route once daily. 2/3/21  Yes Riddhi Thomas MD   galcanezumab-gnlm (EMGALITY PEN) 120 mg/mL PnIj Inject 120 mg into the skin every 28 days. 11/5/21  Yes Susy Marti NP   ketoconazole (NIZORAL) 2 % shampoo Apply to scalp, let sit for 5-10 minutes then rinse. Use 3-5  "times weekly 12/30/20  Yes Ciarra Gamboa MD   naratriptan (AMERGE) 2.5 MG tablet TAKE 1 TABLET BY MOUTH AT ONSET OF HEADACHE, MAY REPEAT IN 4 HOURS IF NEEDED 11/5/21  Yes Susy Marti NP   NURTEC 75 mg odt Take 75 mg by mouth once daily. 4/1/22  Yes Historical Provider   pantoprazole (PROTONIX) 40 MG tablet Take 1 tablet (40 mg total) by mouth once daily. 8/16/22 11/14/22 Yes Shanika Lindsay MD   promethazine (PHENERGAN) 25 MG tablet Take 1 tablet (25 mg total) by mouth every 6 (six) hours as needed for Nausea. 9/25/21  Yes Gera Ellis MD   ubrogepant (UBROGEPANT) 50 mg tablet TAKE 1 TABLET (50 MG TOTAL) BY MOUTH EVERY 2 (TWO) HOURS AS NEEDED FOR MIGRAINE. 10/12/21  Yes Susy Marti NP   ondansetron (ZOFRAN-ODT) 4 MG TbDL Take 1 tablet (4 mg total) by mouth every 6 (six) hours as needed (Nausea and vomiting).  Patient not taking: Reported on 9/14/2022 9/25/21   Gera Ellis MD         Objective Findings:  Vital Signs:  /71   Pulse 67   Ht 5' 7" (1.702 m)   Wt 63.9 kg (140 lb 14 oz)   LMP 09/01/2022   BMI 22.06 kg/m²  Body mass index is 22.06 kg/m².      Physical Exam:  General Appearance:  Well appearing in no acute distress, appears stated age  Head:  Normocephalic, atraumatic  Eyes:  No scleral icterus or pallor, EOMI  Abdomen:  Soft, mildly tender in the left lateral and upper abdomen, non distended, no defects. No hepatosplenomegaly, ascites, or mass  Skin:  No rash        Labs:  Lab Results   Component Value Date    WBC 5.18 01/13/2022    HGB 14.4 01/13/2022    HCT 44.1 01/13/2022    MCV 93 01/13/2022    RDW 12.0 01/13/2022     01/13/2022    GRAN 3.4 01/13/2022    GRAN 66.1 01/13/2022    LYMPH 1.3 01/13/2022    LYMPH 25.5 01/13/2022    MONO 0.3 01/13/2022    MONO 6.6 01/13/2022    EOS 0.1 01/13/2022    BASO 0.03 01/13/2022     Lab Results   Component Value Date     01/13/2022    K 4.1 01/13/2022     01/13/2022    CO2 26 01/13/2022    GLU 79 01/13/2022    " BUN 12 01/13/2022    CREATININE 0.7 01/13/2022    CALCIUM 9.5 01/13/2022    PROT 7.3 01/13/2022    ALBUMIN 4.0 01/13/2022    BILITOT 0.6 01/13/2022    ALKPHOS 44 (L) 01/13/2022    AST 14 01/13/2022    ALT 15 01/13/2022             Imaging:  Ultrasound of the abdomen 08/25/2022:   FINDINGS:  Pancreas: The visualized portions of pancreas appear normal.  Aorta: No aneurysm.  Liver: 15.2 cm, normal in size. Homogeneous parenchymal echotexture. No focal lesions.  Gallbladder: No calculi, wall thickening, or pericholecystic fluid.  Negative sonographic Beal's sign.  Biliary system: 2 mm common bile duct.  No intrahepatic ductal dilatation.  Inferior vena cava: Normal in appearance.  Right kidney: 11.7 cm. No hydronephrosis.  Left kidney: 10.9 cm. No hydronephrosis.  Anechoic structure measuring 1.5 x 1.5 x 1.0 cm likely represents a cyst..  Spleen: 10.4 cm.  Normal in size with homogeneous echotexture.  Miscellaneous: No ascites.     Impression:  No acute abnormality identified to explain this patient's left upper quadrant abdominal pain.  Left renal cyst.                  Assessment:  Mirtha Gary is a 47 y.o. female here with:  1. Left sided abdominal pain    2. Blastocystis hominis infection    3. Chronic superficial gastritis without bleeding    4. Acute gastric ulcer without hemorrhage or perforation    5. History of endometriosis      Ongoing left upper quadrant and left lateral quadrant abdominal pain that has been worsening over time.  This has been present for more than 2 years now.  The pain is fairly constant in nature, but waxes and wanes.  There appears to be some dietary relationship with eating, but no improvement with dietary adjustments.  She also had no improvement with Protonix when she was taking it regularly.  She reports normal bowel movements and no relationship with bowel movements.  She has a history of gastritis with upper endoscopy yesterday revealing 2 superficial gastric ulcers in the  antrum.  She does not take NSAIDs.  Previous H pylori testing has been negative.  Biopsies are pending at this time.  It is difficult to know if the superficial gastric ulcers are resulting in significant symptoms.  I have seen many patients with superficial ulcers like this that have no symptoms.  She had been off of Protonix for 2 weeks prior to the upper endoscopy.  Protonix was restarted today.  I also not convinced that her symptoms are due to blastocystis hominis.  This is not generally considered to be a pathogen; however, the levels of organism were high in the stool.  Occasionally, this could be a commensal organism indicating the presence of an undetected parasite in the stool.  She did not improve with tinidazole.  A note her history of endometriosis, which is a potential consideration for cause of symptoms.        Recommendations:  1. Labs:  CBC, CMP, and anti parietal cell antibody  2. Follow-up pathology results of samples taken yesterday during upper endoscopy   3. I will arrange for CT of the abdomen and pelvis with IV and oral contrast  4. I will give her a course of Alinia 500 mg twice daily for 3 days  5. Okay to restart taking Protonix daily          Order summary:  Orders Placed This Encounter    CT Abdomen Pelvis With Contrast    CBC Auto Differential    Comprehensive Metabolic Panel    ANTI-PARIETAL ANTIBODY    nitazoxanide (ALINIA) 500 MG Tab           Mich Painting MD

## 2022-09-16 LAB — PCA AB TITR SER IF: NORMAL {TITER}

## 2022-09-21 ENCOUNTER — HOSPITAL ENCOUNTER (OUTPATIENT)
Dept: RADIOLOGY | Facility: HOSPITAL | Age: 47
Discharge: HOME | End: 2022-09-21
Attending: INTERNAL MEDICINE
Payer: COMMERCIAL

## 2022-09-21 DIAGNOSIS — Z87.42 HISTORY OF ENDOMETRIOSIS: ICD-10-CM

## 2022-09-21 DIAGNOSIS — R10.9 LEFT SIDED ABDOMINAL PAIN: ICD-10-CM

## 2022-09-21 PROCEDURE — 74177 CT ABDOMEN PELVIS WITH CONTRAST: ICD-10-PCS | Mod: 26,,, | Performed by: RADIOLOGY

## 2022-09-21 PROCEDURE — 25500020 PHARM REV CODE 255: Performed by: INTERNAL MEDICINE

## 2022-09-21 PROCEDURE — A9698 NON-RAD CONTRAST MATERIALNOC: HCPCS | Performed by: INTERNAL MEDICINE

## 2022-09-21 PROCEDURE — 74177 CT ABD & PELVIS W/CONTRAST: CPT | Mod: 26,,, | Performed by: RADIOLOGY

## 2022-09-21 PROCEDURE — 74177 CT ABD & PELVIS W/CONTRAST: CPT | Mod: TC

## 2022-09-21 RX ADMIN — IOHEXOL 75 ML: 350 INJECTION, SOLUTION INTRAVENOUS at 02:09

## 2022-09-21 RX ADMIN — Medication 450 ML: at 02:09

## 2022-09-22 ENCOUNTER — TELEPHONE (OUTPATIENT)
Dept: GASTROENTEROLOGY | Facility: CLINIC | Age: 47
End: 2022-09-22
Payer: COMMERCIAL

## 2022-09-22 DIAGNOSIS — A04.8 H. PYLORI INFECTION: Primary | ICD-10-CM

## 2022-09-22 DIAGNOSIS — B96.81 GASTROINTESTINAL ULCER DUE TO HELICOBACTER PYLORI: ICD-10-CM

## 2022-09-22 DIAGNOSIS — A07.8 BLASTOCYSTIS HOMINIS INFECTION: Primary | ICD-10-CM

## 2022-09-22 DIAGNOSIS — K28.9 GASTROINTESTINAL ULCER DUE TO HELICOBACTER PYLORI: ICD-10-CM

## 2022-09-22 LAB
FINAL PATHOLOGIC DIAGNOSIS: NORMAL
GROSS: NORMAL
Lab: NORMAL

## 2022-09-22 RX ORDER — METRONIDAZOLE 500 MG/1
500 TABLET ORAL EVERY 8 HOURS
Qty: 42 TABLET | Refills: 0 | Status: SHIPPED | OUTPATIENT
Start: 2022-09-22 | End: 2022-10-06

## 2022-09-22 RX ORDER — BISMUTH SUBSALICYLATE 262 MG/1
1 TABLET ORAL 4 TIMES DAILY
Qty: 56 TABLET | Refills: 0 | Status: SHIPPED | OUTPATIENT
Start: 2022-09-22 | End: 2022-10-06

## 2022-09-22 RX ORDER — OMEPRAZOLE 40 MG/1
40 CAPSULE, DELAYED RELEASE ORAL
Qty: 28 CAPSULE | Refills: 0 | Status: SHIPPED | OUTPATIENT
Start: 2022-09-22 | End: 2023-01-19 | Stop reason: HOSPADM

## 2022-09-22 RX ORDER — DOXYCYCLINE 100 MG/1
100 CAPSULE ORAL 2 TIMES DAILY
Qty: 28 CAPSULE | Refills: 0 | Status: SHIPPED | OUTPATIENT
Start: 2022-09-22 | End: 2022-10-06

## 2022-09-22 NOTE — TELEPHONE ENCOUNTER
Called & spoke to pt  - Reviewed pathology results from EGD show inflammation & H.Pylori positive which if not treated can cause ulcers which can cause stomach cancer  - Informed of prescriptions sent in to treat this: 2 week course of Bismuth QID, prilosec BID, doxycycline BID, & Flagyl TID & to not consume alcohol while taking this medication   - Plan for stool sample 1 month after completing medication   - Pt has questions regarding if she needs to start taking Alinia for Blastocystis hominis infection  - Will send message to Dr. Painting's staff for recommendation regarding plan for Alinia in setting of H. Pylori treatment

## 2022-09-22 NOTE — TELEPHONE ENCOUNTER
----- Message from Kike Manuel MD sent at 9/22/2022  1:06 PM CDT -----  Please notify patient that on upper endoscopy stomach biopsies showed inflammation and H pylori is positive. If this is not treated then it can cause ulcers and can cause stomach cancer. I have sent in prescription for 2 week course of bismuth QID, prilosec BID, doxycycline BID and flagyl TID. Pt should not drink alcohol when on these meds  1 month after completion of treatment please have pt turn in stool calprotectin.     All orders in.     Dr. Manuel

## 2022-09-23 NOTE — TELEPHONE ENCOUNTER
Please advise if pt should start taking Alinia for Blastocystis hominis infection in setting of H. Pylori treatment

## 2022-09-26 ENCOUNTER — OFFICE VISIT (OUTPATIENT)
Dept: INFECTIOUS DISEASES | Facility: CLINIC | Age: 47
End: 2022-09-26
Payer: COMMERCIAL

## 2022-09-26 VITALS
WEIGHT: 145.06 LBS | HEIGHT: 67 IN | HEART RATE: 81 BPM | BODY MASS INDEX: 22.77 KG/M2 | DIASTOLIC BLOOD PRESSURE: 66 MMHG | SYSTOLIC BLOOD PRESSURE: 96 MMHG | TEMPERATURE: 99 F

## 2022-09-26 DIAGNOSIS — B96.81 GASTROINTESTINAL ULCER DUE TO HELICOBACTER PYLORI: ICD-10-CM

## 2022-09-26 DIAGNOSIS — A07.8 BLASTOCYSTIS HOMINIS INFECTION: ICD-10-CM

## 2022-09-26 DIAGNOSIS — K28.9 GASTROINTESTINAL ULCER DUE TO HELICOBACTER PYLORI: ICD-10-CM

## 2022-09-26 PROCEDURE — 99999 PR PBB SHADOW E&M-EST. PATIENT-LVL V: CPT | Mod: PBBFAC,,, | Performed by: INTERNAL MEDICINE

## 2022-09-26 PROCEDURE — 99204 OFFICE O/P NEW MOD 45 MIN: CPT | Mod: S$GLB,,, | Performed by: INTERNAL MEDICINE

## 2022-09-26 PROCEDURE — 99999 PR PBB SHADOW E&M-EST. PATIENT-LVL V: ICD-10-PCS | Mod: PBBFAC,,, | Performed by: INTERNAL MEDICINE

## 2022-09-26 PROCEDURE — 1160F PR REVIEW ALL MEDS BY PRESCRIBER/CLIN PHARMACIST DOCUMENTED: ICD-10-PCS | Mod: CPTII,S$GLB,, | Performed by: INTERNAL MEDICINE

## 2022-09-26 PROCEDURE — 3074F SYST BP LT 130 MM HG: CPT | Mod: CPTII,S$GLB,, | Performed by: INTERNAL MEDICINE

## 2022-09-26 PROCEDURE — 1159F PR MEDICATION LIST DOCUMENTED IN MEDICAL RECORD: ICD-10-PCS | Mod: CPTII,S$GLB,, | Performed by: INTERNAL MEDICINE

## 2022-09-26 PROCEDURE — 1159F MED LIST DOCD IN RCRD: CPT | Mod: CPTII,S$GLB,, | Performed by: INTERNAL MEDICINE

## 2022-09-26 PROCEDURE — 3044F PR MOST RECENT HEMOGLOBIN A1C LEVEL <7.0%: ICD-10-PCS | Mod: CPTII,S$GLB,, | Performed by: INTERNAL MEDICINE

## 2022-09-26 PROCEDURE — 3008F PR BODY MASS INDEX (BMI) DOCUMENTED: ICD-10-PCS | Mod: CPTII,S$GLB,, | Performed by: INTERNAL MEDICINE

## 2022-09-26 PROCEDURE — 1160F RVW MEDS BY RX/DR IN RCRD: CPT | Mod: CPTII,S$GLB,, | Performed by: INTERNAL MEDICINE

## 2022-09-26 PROCEDURE — 3044F HG A1C LEVEL LT 7.0%: CPT | Mod: CPTII,S$GLB,, | Performed by: INTERNAL MEDICINE

## 2022-09-26 PROCEDURE — 3078F DIAST BP <80 MM HG: CPT | Mod: CPTII,S$GLB,, | Performed by: INTERNAL MEDICINE

## 2022-09-26 PROCEDURE — 99204 PR OFFICE/OUTPT VISIT, NEW, LEVL IV, 45-59 MIN: ICD-10-PCS | Mod: S$GLB,,, | Performed by: INTERNAL MEDICINE

## 2022-09-26 PROCEDURE — 3078F PR MOST RECENT DIASTOLIC BLOOD PRESSURE < 80 MM HG: ICD-10-PCS | Mod: CPTII,S$GLB,, | Performed by: INTERNAL MEDICINE

## 2022-09-26 PROCEDURE — 3074F PR MOST RECENT SYSTOLIC BLOOD PRESSURE < 130 MM HG: ICD-10-PCS | Mod: CPTII,S$GLB,, | Performed by: INTERNAL MEDICINE

## 2022-09-26 PROCEDURE — 3008F BODY MASS INDEX DOCD: CPT | Mod: CPTII,S$GLB,, | Performed by: INTERNAL MEDICINE

## 2022-09-26 NOTE — PROGRESS NOTES
Subjective:      Patient ID: Mirtha Gary is a 47 y.o. female.    Chief Complaint:Infection      History of Present Illness  47 year old female.  Developed abdominal pain after having a covid infection in October 2021.  Started with pain in left upper abdomen.  Pain was a 7-8 out of 10.  She went to ER.  She had monoclonal therapy for COVID and proton pump inhibitor.  She changed her diet also.  The pain was terrible for 2 weeks and then the pain persisted.  Patient says pain is around a 7 out of 10.  She underwent upper GI which revealed gastritis.  Biopsies were obtained that were positive for H pylori.  She has been prescribed quadruple therapy with doxycycline, metronidazole, bismuth and a proton pump inhibitor.    Medical History  Endometriosis  Psoriasis  Migraines  Bulging disc in spine    Surgical History  Ectopic surgery  Endometriosis  Salpingectomy (left)    Allergy  Ciprofloxacin    Social History  Extensive foreign travel. [place], [place], [place], [place], [place], [place], [place] and [place].  Lives in [place].  No tobacco.  No ETOH.  She owns a dog.    Review of Systems   Constitutional: Negative for chills, decreased appetite, fever, malaise/fatigue, night sweats, weight gain and weight loss.   HENT:  Negative for congestion, ear pain, hearing loss, hoarse voice, sore throat and tinnitus.    Eyes:  Negative for blurred vision, redness and visual disturbance.   Cardiovascular:  Negative for chest pain, leg swelling and palpitations.   Respiratory:  Negative for cough, hemoptysis, shortness of breath, sputum production and wheezing.    Hematologic/Lymphatic: Negative for adenopathy. Does not bruise/bleed easily.   Skin:  Negative for dry skin, itching, rash and suspicious lesions.   Musculoskeletal:  Negative for back pain, joint pain, myalgias and neck pain.   Gastrointestinal:  Positive for abdominal pain. Negative for constipation, diarrhea, heartburn, nausea and vomiting.   Genitourinary:   Negative for dysuria, flank pain, frequency, hematuria, hesitancy and urgency.   Neurological:  Negative for dizziness, headaches, numbness, paresthesias and weakness.   Psychiatric/Behavioral:  Negative for depression and memory loss. The patient does not have insomnia and is not nervous/anxious.    Objective:   Physical Exam  Vitals and nursing note reviewed.   Constitutional:       General: She is not in acute distress.     Appearance: She is well-developed. She is not diaphoretic.   HENT:      Head: Normocephalic and atraumatic.      Right Ear: External ear normal.      Left Ear: External ear normal.      Nose: Nose normal.      Mouth/Throat:      Pharynx: No oropharyngeal exudate.   Eyes:      General: No scleral icterus.        Right eye: No discharge.         Left eye: No discharge.      Conjunctiva/sclera: Conjunctivae normal.      Pupils: Pupils are equal, round, and reactive to light.   Neck:      Thyroid: No thyromegaly.      Vascular: No JVD.      Trachea: No tracheal deviation.   Cardiovascular:      Rate and Rhythm: Normal rate and regular rhythm.      Heart sounds: No murmur heard.    No friction rub. No gallop.   Pulmonary:      Effort: Pulmonary effort is normal. No respiratory distress.      Breath sounds: Normal breath sounds. No stridor. No wheezing or rales.   Chest:      Chest wall: No tenderness.   Abdominal:      General: Bowel sounds are normal. There is no distension.      Palpations: Abdomen is soft. There is no mass.      Tenderness: There is no abdominal tenderness. There is no guarding or rebound.   Musculoskeletal:         General: No tenderness. Normal range of motion.      Cervical back: Normal range of motion and neck supple.   Lymphadenopathy:      Cervical: No cervical adenopathy.   Skin:     General: Skin is warm.      Coloration: Skin is not pale.      Findings: No erythema or rash.   Neurological:      Mental Status: She is alert and oriented to person, place, and time.       Cranial Nerves: No cranial nerve deficit.      Motor: No abnormal muscle tone.      Coordination: Coordination normal.      Deep Tendon Reflexes: Reflexes normal.   Psychiatric:         Behavior: Behavior normal.         Thought Content: Thought content normal.         Judgment: Judgment normal.     Assessment:       1. Blastocystis hominis infection    2. Gastrointestinal ulcer due to Helicobacter pylori          47 year old with persistent abdominal pain.  Symptoms likely secondary to H pylori.  Blastocystis hominis was incidentally found on stool studies.  Blastocystis is generally considered non-pathogenic.  I agree with treating her H pylori with doxycycline, metronidazole, bismuth and a proton pump inhibitor.  Incidentally, metronidazole will treat Blastocystis hominis.  She is to complete treatment for H pylori.  If her abdominal symptoms persist then will investigate other causes of abdominal pain.  Plan:       Blastocystis hominis infection  -     Ambulatory referral/consult to Infectious Disease    Gastrointestinal ulcer due to Helicobacter pylori  -     Ambulatory referral/consult to Infectious Disease

## 2022-10-03 ENCOUNTER — OFFICE VISIT (OUTPATIENT)
Dept: INTERNAL MEDICINE | Facility: CLINIC | Age: 47
End: 2022-10-03
Payer: COMMERCIAL

## 2022-10-03 VITALS
SYSTOLIC BLOOD PRESSURE: 119 MMHG | WEIGHT: 144 LBS | BODY MASS INDEX: 22.6 KG/M2 | HEART RATE: 66 BPM | HEIGHT: 67 IN | DIASTOLIC BLOOD PRESSURE: 71 MMHG

## 2022-10-03 DIAGNOSIS — K31.A0 GASTRIC INTESTINAL METAPLASIA: ICD-10-CM

## 2022-10-03 DIAGNOSIS — B96.81 GASTRIC ULCER DUE TO HELICOBACTER PYLORI, UNSPECIFIED CHRONICITY: ICD-10-CM

## 2022-10-03 DIAGNOSIS — Z23 NEED FOR DIPHTHERIA, TETANUS, PERTUSSIS, AND HIB VACCINATION: ICD-10-CM

## 2022-10-03 DIAGNOSIS — K64.8 OTHER HEMORRHOIDS: ICD-10-CM

## 2022-10-03 DIAGNOSIS — G89.29 ABDOMINAL PAIN, CHRONIC, LEFT LOWER QUADRANT: Primary | ICD-10-CM

## 2022-10-03 DIAGNOSIS — A07.8 BLASTOCYSTIS HOMINIS INFECTION: Chronic | ICD-10-CM

## 2022-10-03 DIAGNOSIS — R10.32 ABDOMINAL PAIN, CHRONIC, LEFT LOWER QUADRANT: Primary | ICD-10-CM

## 2022-10-03 DIAGNOSIS — K25.9 GASTRIC ULCER DUE TO HELICOBACTER PYLORI, UNSPECIFIED CHRONICITY: ICD-10-CM

## 2022-10-03 PROCEDURE — 3078F DIAST BP <80 MM HG: CPT | Mod: CPTII,S$GLB,, | Performed by: STUDENT IN AN ORGANIZED HEALTH CARE EDUCATION/TRAINING PROGRAM

## 2022-10-03 PROCEDURE — 3008F PR BODY MASS INDEX (BMI) DOCUMENTED: ICD-10-PCS | Mod: CPTII,S$GLB,, | Performed by: STUDENT IN AN ORGANIZED HEALTH CARE EDUCATION/TRAINING PROGRAM

## 2022-10-03 PROCEDURE — 99999 PR PBB SHADOW E&M-EST. PATIENT-LVL IV: ICD-10-PCS | Mod: PBBFAC,,, | Performed by: STUDENT IN AN ORGANIZED HEALTH CARE EDUCATION/TRAINING PROGRAM

## 2022-10-03 PROCEDURE — 3078F PR MOST RECENT DIASTOLIC BLOOD PRESSURE < 80 MM HG: ICD-10-PCS | Mod: CPTII,S$GLB,, | Performed by: STUDENT IN AN ORGANIZED HEALTH CARE EDUCATION/TRAINING PROGRAM

## 2022-10-03 PROCEDURE — 3008F BODY MASS INDEX DOCD: CPT | Mod: CPTII,S$GLB,, | Performed by: STUDENT IN AN ORGANIZED HEALTH CARE EDUCATION/TRAINING PROGRAM

## 2022-10-03 PROCEDURE — 3044F PR MOST RECENT HEMOGLOBIN A1C LEVEL <7.0%: ICD-10-PCS | Mod: CPTII,S$GLB,, | Performed by: STUDENT IN AN ORGANIZED HEALTH CARE EDUCATION/TRAINING PROGRAM

## 2022-10-03 PROCEDURE — 1160F PR REVIEW ALL MEDS BY PRESCRIBER/CLIN PHARMACIST DOCUMENTED: ICD-10-PCS | Mod: CPTII,S$GLB,, | Performed by: STUDENT IN AN ORGANIZED HEALTH CARE EDUCATION/TRAINING PROGRAM

## 2022-10-03 PROCEDURE — 1159F PR MEDICATION LIST DOCUMENTED IN MEDICAL RECORD: ICD-10-PCS | Mod: CPTII,S$GLB,, | Performed by: STUDENT IN AN ORGANIZED HEALTH CARE EDUCATION/TRAINING PROGRAM

## 2022-10-03 PROCEDURE — 99213 OFFICE O/P EST LOW 20 MIN: CPT | Mod: S$GLB,,, | Performed by: STUDENT IN AN ORGANIZED HEALTH CARE EDUCATION/TRAINING PROGRAM

## 2022-10-03 PROCEDURE — 3074F PR MOST RECENT SYSTOLIC BLOOD PRESSURE < 130 MM HG: ICD-10-PCS | Mod: CPTII,S$GLB,, | Performed by: STUDENT IN AN ORGANIZED HEALTH CARE EDUCATION/TRAINING PROGRAM

## 2022-10-03 PROCEDURE — 1159F MED LIST DOCD IN RCRD: CPT | Mod: CPTII,S$GLB,, | Performed by: STUDENT IN AN ORGANIZED HEALTH CARE EDUCATION/TRAINING PROGRAM

## 2022-10-03 PROCEDURE — 3074F SYST BP LT 130 MM HG: CPT | Mod: CPTII,S$GLB,, | Performed by: STUDENT IN AN ORGANIZED HEALTH CARE EDUCATION/TRAINING PROGRAM

## 2022-10-03 PROCEDURE — 99213 PR OFFICE/OUTPT VISIT, EST, LEVL III, 20-29 MIN: ICD-10-PCS | Mod: S$GLB,,, | Performed by: STUDENT IN AN ORGANIZED HEALTH CARE EDUCATION/TRAINING PROGRAM

## 2022-10-03 PROCEDURE — 99999 PR PBB SHADOW E&M-EST. PATIENT-LVL IV: CPT | Mod: PBBFAC,,, | Performed by: STUDENT IN AN ORGANIZED HEALTH CARE EDUCATION/TRAINING PROGRAM

## 2022-10-03 PROCEDURE — 1160F RVW MEDS BY RX/DR IN RCRD: CPT | Mod: CPTII,S$GLB,, | Performed by: STUDENT IN AN ORGANIZED HEALTH CARE EDUCATION/TRAINING PROGRAM

## 2022-10-03 PROCEDURE — 3044F HG A1C LEVEL LT 7.0%: CPT | Mod: CPTII,S$GLB,, | Performed by: STUDENT IN AN ORGANIZED HEALTH CARE EDUCATION/TRAINING PROGRAM

## 2022-10-03 NOTE — ASSESSMENT & PLAN NOTE
On appropriate therapy for gastric ulcers and H.pylori, possible medication side effect at this point.  Recommended to drink abundant water and have oatmeal daily to soften stools. Complete ATB treatement.  Collect stool sample at the end of the month to check if still positive or not for H.pylori.   Will continue to follow ID and GI recommendations.

## 2022-10-03 NOTE — PROGRESS NOTES
Subjective:       Patient ID: Mirtha Gary is a 47 y.o. female.    Chief Complaint: Follow-up    Follow-up      Patient is a 47 y.o. female , British speaking, with a history of:  Migraine headaches  Lumbosacral radiculopathy  Psoriasis of the scalp  Palpitations  H/o endometriosis  Cyst of ovary  Small cyst of the left kidney  Interstitial cystitis  Recurrent UTIs  H/o of COVID x 2  H/o blastocystis hominis infection  Chronic abdominal pain in the LLQ  BL knee pain    Patient comes to the clinic  for a follow up appointment to discuss how she is feeling from her abdominal pain.    Patient had EGD/Colonoscopy, and was evaluated by GI and ID.    EGD showed 2 gastric ulcers and pathology was positive for H. Pylori. She started treatment on 9/22/22 with: bismuth subsalicylate, doxycycline, metronidazole and omeprazole. She felt better in the beginning of the therapy however she has experience bloating and diffuse abdominal pain this week, for which she decided to return to the clinic for evaluation.    She has been constipated and having black, tarry, pasty stools. She has 4 more days to go of her current ATB therapy.    In addition, patient was evaluated by ID, they decided to keep the same regimen for now and they will f/u.    No other complaints.    Healthcare Maintenance:  Colonoscopy: 9/2022  Vaccinations: Pneumonia, Zoster, Tetanus (ordered in previous visit, pending)  COVID vaccination: completed  Depression screening: PHQ2 score = 1    ROS  11-point review of systems done. Negative except for detailed in the HPI.        Objective:      Physical Exam  Vitals and nursing note reviewed.   Constitutional:       Appearance: Normal appearance.   HENT:      Head: Normocephalic and atraumatic.      Right Ear: Tympanic membrane normal.      Left Ear: Tympanic membrane normal.      Nose: Nose normal.      Mouth/Throat:      Mouth: Mucous membranes are moist.      Pharynx: Oropharynx is clear.   Eyes:      Extraocular  Movements: Extraocular movements intact.      Conjunctiva/sclera: Conjunctivae normal.      Pupils: Pupils are equal, round, and reactive to light.   Cardiovascular:      Rate and Rhythm: Normal rate and regular rhythm.      Pulses: Normal pulses.      Heart sounds: Normal heart sounds.   Pulmonary:      Effort: Pulmonary effort is normal.      Breath sounds: Normal breath sounds.   Abdominal:      General: Bowel sounds are normal. There is distension.      Palpations: Abdomen is soft.      Tenderness: There is abdominal tenderness.      Comments: Diffuse abdominal tenderness, no rebound   Musculoskeletal:         General: Normal range of motion.      Cervical back: Normal range of motion and neck supple.   Skin:     General: Skin is warm.   Neurological:      General: No focal deficit present.      Mental Status: She is alert and oriented to person, place, and time. Mental status is at baseline.   Psychiatric:         Mood and Affect: Mood normal.          Assessment:       Problem List Items Addressed This Visit          ID    Blastocystis hominis infection (Chronic)       GI    Abdominal pain, chronic, left lower quadrant - Primary     On appropriate therapy for gastric ulcers and H.pylori, possible medication side effect at this point.  Recommended to drink abundant water and have oatmeal daily to soften stools. Complete ATB treatement.  Collect stool sample at the end of the month to check if still positive or not for H.pylori.   Will continue to follow ID and GI recommendations.         Gastric intestinal metaplasia    Gastric ulcer due to Helicobacter pylori     She started treatment on 9/22/22 with: bismuth subsalicylate, doxycycline, metronidazole and omeprazole.  Will complete therapy on 10/7/22. Will continue to f/u.          Other Visit Diagnoses       Need for diphtheria, tetanus, pertussis, and Hib vaccination        Relevant Orders    Td Vaccine (Adult) - Preservative Free              Plan:       -  Complete ATB therapy as indicated  - Abundant water and oatmeal  - Collect stool sample as ordered    Education provided  Lifestyle recommendations given  AVS printed, explained, and given to the patient.  RTC in : 3 months.      LENIN KEE MD, MPH  Internal Medicine  [facility]  [facility]

## 2022-10-03 NOTE — ASSESSMENT & PLAN NOTE
She started treatment on 9/22/22 with: bismuth subsalicylate, doxycycline, metronidazole and omeprazole.  Will complete therapy on 10/7/22. Will continue to f/u.

## 2022-10-05 ENCOUNTER — PATIENT MESSAGE (OUTPATIENT)
Dept: INTERNAL MEDICINE | Facility: CLINIC | Age: 47
End: 2022-10-05
Payer: COMMERCIAL

## 2022-10-05 DIAGNOSIS — K64.9 HEMORRHOIDS, UNSPECIFIED HEMORRHOID TYPE: Primary | ICD-10-CM

## 2022-10-05 PROBLEM — K64.8 OTHER HEMORRHOIDS: Status: ACTIVE | Noted: 2022-10-05

## 2022-10-05 NOTE — ASSESSMENT & PLAN NOTE
External, unsure if internal as well.  Increased pain and difficulty for voiding.  Will refer to General Surgery

## 2022-10-10 ENCOUNTER — TELEPHONE (OUTPATIENT)
Dept: SURGERY | Facility: CLINIC | Age: 47
End: 2022-10-10
Payer: COMMERCIAL

## 2022-10-11 ENCOUNTER — PATIENT MESSAGE (OUTPATIENT)
Dept: INFECTIOUS DISEASES | Facility: CLINIC | Age: 47
End: 2022-10-11
Payer: COMMERCIAL

## 2022-10-12 ENCOUNTER — HOSPITAL ENCOUNTER (OUTPATIENT)
Dept: RADIOLOGY | Facility: OTHER | Age: 47
Discharge: HOME | End: 2022-10-12
Attending: STUDENT IN AN ORGANIZED HEALTH CARE EDUCATION/TRAINING PROGRAM
Payer: COMMERCIAL

## 2022-10-12 DIAGNOSIS — R10.32 ABDOMINAL PAIN, CHRONIC, LEFT LOWER QUADRANT: ICD-10-CM

## 2022-10-12 DIAGNOSIS — G89.29 ABDOMINAL PAIN, CHRONIC, LEFT LOWER QUADRANT: ICD-10-CM

## 2022-10-12 PROCEDURE — 76830 TRANSVAGINAL US NON-OB: CPT | Mod: TC

## 2022-10-12 PROCEDURE — 76830 TRANSVAGINAL US NON-OB: CPT | Mod: 26,,, | Performed by: RADIOLOGY

## 2022-10-12 PROCEDURE — 76830 US PELVIS COMP WITH TRANSVAG NON-OB (XPD): ICD-10-PCS | Mod: 26,,, | Performed by: RADIOLOGY

## 2022-10-12 PROCEDURE — 76856 US EXAM PELVIC COMPLETE: CPT | Mod: 26,,, | Performed by: RADIOLOGY

## 2022-10-12 PROCEDURE — 76856 US PELVIS COMP WITH TRANSVAG NON-OB (XPD): ICD-10-PCS | Mod: 26,,, | Performed by: RADIOLOGY

## 2022-10-24 ENCOUNTER — PATIENT MESSAGE (OUTPATIENT)
Dept: INTERNAL MEDICINE | Facility: CLINIC | Age: 47
End: 2022-10-24
Payer: COMMERCIAL

## 2022-10-24 ENCOUNTER — OFFICE VISIT (OUTPATIENT)
Dept: INFECTIOUS DISEASES | Facility: CLINIC | Age: 47
End: 2022-10-24
Payer: COMMERCIAL

## 2022-10-24 VITALS
DIASTOLIC BLOOD PRESSURE: 61 MMHG | HEART RATE: 60 BPM | SYSTOLIC BLOOD PRESSURE: 90 MMHG | WEIGHT: 142.63 LBS | BODY MASS INDEX: 22.39 KG/M2 | TEMPERATURE: 99 F | HEIGHT: 67 IN

## 2022-10-24 DIAGNOSIS — K28.9 GASTROINTESTINAL ULCER DUE TO HELICOBACTER PYLORI: Primary | ICD-10-CM

## 2022-10-24 DIAGNOSIS — B96.81 GASTROINTESTINAL ULCER DUE TO HELICOBACTER PYLORI: Primary | ICD-10-CM

## 2022-10-24 DIAGNOSIS — A07.8 BLASTOCYSTIS HOMINIS INFECTION: ICD-10-CM

## 2022-10-24 PROCEDURE — 99214 PR OFFICE/OUTPT VISIT, EST, LEVL IV, 30-39 MIN: ICD-10-PCS | Mod: S$GLB,,, | Performed by: INTERNAL MEDICINE

## 2022-10-24 PROCEDURE — 1159F MED LIST DOCD IN RCRD: CPT | Mod: CPTII,S$GLB,, | Performed by: INTERNAL MEDICINE

## 2022-10-24 PROCEDURE — 1160F PR REVIEW ALL MEDS BY PRESCRIBER/CLIN PHARMACIST DOCUMENTED: ICD-10-PCS | Mod: CPTII,S$GLB,, | Performed by: INTERNAL MEDICINE

## 2022-10-24 PROCEDURE — 3044F HG A1C LEVEL LT 7.0%: CPT | Mod: CPTII,S$GLB,, | Performed by: INTERNAL MEDICINE

## 2022-10-24 PROCEDURE — 3078F PR MOST RECENT DIASTOLIC BLOOD PRESSURE < 80 MM HG: ICD-10-PCS | Mod: CPTII,S$GLB,, | Performed by: INTERNAL MEDICINE

## 2022-10-24 PROCEDURE — 3044F PR MOST RECENT HEMOGLOBIN A1C LEVEL <7.0%: ICD-10-PCS | Mod: CPTII,S$GLB,, | Performed by: INTERNAL MEDICINE

## 2022-10-24 PROCEDURE — 1159F PR MEDICATION LIST DOCUMENTED IN MEDICAL RECORD: ICD-10-PCS | Mod: CPTII,S$GLB,, | Performed by: INTERNAL MEDICINE

## 2022-10-24 PROCEDURE — 99999 PR PBB SHADOW E&M-EST. PATIENT-LVL III: CPT | Mod: PBBFAC,,, | Performed by: INTERNAL MEDICINE

## 2022-10-24 PROCEDURE — 99999 PR PBB SHADOW E&M-EST. PATIENT-LVL III: ICD-10-PCS | Mod: PBBFAC,,, | Performed by: INTERNAL MEDICINE

## 2022-10-24 PROCEDURE — 99214 OFFICE O/P EST MOD 30 MIN: CPT | Mod: S$GLB,,, | Performed by: INTERNAL MEDICINE

## 2022-10-24 PROCEDURE — 3074F SYST BP LT 130 MM HG: CPT | Mod: CPTII,S$GLB,, | Performed by: INTERNAL MEDICINE

## 2022-10-24 PROCEDURE — 3078F DIAST BP <80 MM HG: CPT | Mod: CPTII,S$GLB,, | Performed by: INTERNAL MEDICINE

## 2022-10-24 PROCEDURE — 3074F PR MOST RECENT SYSTOLIC BLOOD PRESSURE < 130 MM HG: ICD-10-PCS | Mod: CPTII,S$GLB,, | Performed by: INTERNAL MEDICINE

## 2022-10-24 PROCEDURE — 1160F RVW MEDS BY RX/DR IN RCRD: CPT | Mod: CPTII,S$GLB,, | Performed by: INTERNAL MEDICINE

## 2022-10-24 NOTE — PROGRESS NOTES
Subjective:      Patient ID: Mirtha Gary is a 47 y.o. female.    Chief Complaint:No chief complaint on file.      History of Present Illness  47 year old female.  Developed abdominal pain after having a covid infection in October 2021.  Started with pain in left upper abdomen.  Pain was a 7-8 out of 10.  She went to ER.  She had monoclonal therapy for COVID and proton pump inhibitor.  She changed her diet also.  The pain was terrible for 2 weeks and then the pain persisted.  Patient says pain is around a 7 out of 10. Stool studies were positive for Blastocystis hominis. She underwent upper GI which revealed gastritis.  Biopsies were obtained that were positive for H pylori.  She has been prescribed quadruple therapy with doxycycline, metronidazole, bismuth and a proton pump inhibitor.    Interval History  She completed therapy for H pylori.  As part of H pylori therapy, she was on metronidazole which is active against Blastocystis.  She is here today for follow up.    Previously had pain everyday all the time.  But now happens from time to time.  Completed 4 drug therapy for H pylori.  Last drug of therapy was October 6.  Had a lot of side effects.  Has been taking PPI since completion of therapy.     Medical History  Endometriosis  Psoriasis  Migraines  Bulging disc in spine     Surgical History  Ectopic surgery  Endometriosis  Salpingectomy (left)     Allergy  Ciprofloxacin     Social History  Extensive foreign travel. [place], [place], [place], [place], [place], [place], [place] and [place].  Lives in [place].  No tobacco.  No ETOH.  She owns a dog.      Review of Systems   Constitutional: Negative for chills, decreased appetite, fever, malaise/fatigue, night sweats, weight gain and weight loss.   HENT:  Negative for congestion, ear pain, hearing loss, hoarse voice, sore throat and tinnitus.    Eyes:  Negative for blurred vision, redness and visual disturbance.   Cardiovascular:  Negative for chest pain, leg  swelling and palpitations.   Respiratory:  Negative for cough, hemoptysis, shortness of breath, sputum production and wheezing.    Hematologic/Lymphatic: Negative for adenopathy. Does not bruise/bleed easily.   Skin:  Negative for dry skin, itching, rash and suspicious lesions.   Musculoskeletal:  Negative for back pain, joint pain, myalgias and neck pain.   Gastrointestinal:  Positive for abdominal pain. Negative for constipation, diarrhea, heartburn, nausea and vomiting.   Genitourinary:  Negative for dysuria, flank pain, frequency, hematuria, hesitancy and urgency.   Neurological:  Positive for headaches. Negative for dizziness, numbness, paresthesias and weakness.   Psychiatric/Behavioral:  Negative for depression and memory loss. The patient does not have insomnia and is not nervous/anxious.    Objective:   Physical Exam  Vitals and nursing note reviewed.   Constitutional:       General: She is not in acute distress.     Appearance: She is well-developed. She is not diaphoretic.   HENT:      Head: Normocephalic and atraumatic.      Right Ear: External ear normal.      Left Ear: External ear normal.      Nose: Nose normal.      Mouth/Throat:      Pharynx: No oropharyngeal exudate.   Eyes:      General: No scleral icterus.        Right eye: No discharge.         Left eye: No discharge.      Conjunctiva/sclera: Conjunctivae normal.      Pupils: Pupils are equal, round, and reactive to light.   Neck:      Thyroid: No thyromegaly.      Vascular: No JVD.      Trachea: No tracheal deviation.   Cardiovascular:      Rate and Rhythm: Normal rate and regular rhythm.      Heart sounds: No murmur heard.    No friction rub. No gallop.   Pulmonary:      Effort: Pulmonary effort is normal. No respiratory distress.      Breath sounds: Normal breath sounds. No stridor. No wheezing or rales.   Chest:      Chest wall: No tenderness.   Abdominal:      General: Bowel sounds are normal. There is no distension.      Palpations:  Abdomen is soft. There is no mass.      Tenderness: There is abdominal tenderness. There is no guarding or rebound.   Musculoskeletal:         General: No tenderness. Normal range of motion.      Cervical back: Normal range of motion and neck supple.   Lymphadenopathy:      Cervical: No cervical adenopathy.   Skin:     General: Skin is warm.      Coloration: Skin is not pale.      Findings: No erythema or rash.   Neurological:      Mental Status: She is alert and oriented to person, place, and time.      Cranial Nerves: No cranial nerve deficit.      Motor: No abnormal muscle tone.      Coordination: Coordination normal.      Deep Tendon Reflexes: Reflexes normal.   Psychiatric:         Behavior: Behavior normal.         Thought Content: Thought content normal.         Judgment: Judgment normal.     Assessment:       1. Gastrointestinal ulcer due to Helicobacter pylori :  s/p 4 drug therapy.  Will ask her to hold off on PPI therapy for 1 month and then will check stool for H pylori antigen to confirm cure.   2. Blastocystis hominis infection :  completed a course of metronidazole as part of treatment for H pylori.  Will check stool ova and parasites to confirm clearance.         Plan:       Gastrointestinal ulcer due to Helicobacter pylori  -     H. pylori antigen, stool; Future; Expected date: 10/24/2022  -     Stool Exam-Ova,Cysts,Parasites; Future; Expected date: 10/24/2022    Blastocystis hominis infection  -     H. pylori antigen, stool; Future; Expected date: 10/24/2022  -     Stool Exam-Ova,Cysts,Parasites; Future; Expected date: 10/24/2022

## 2022-10-24 NOTE — LETTER
October 31, 2022        Joan Torres MD  [address] [facility] [address]             [facility] - Infectious Disease 1st Fl  [address]  [place] [address]  Phone: [phone]  Fax: [phone]   Patient: Mirtha Gary   MR Number: [id number]   YOB: 1975   Date of Visit: 10/24/2022       Dear Dr. Torres:    Thank you for referring Mirtha Gary to me for evaluation. Below are the relevant portions of my assessment and plan of care.    1. Gastrointestinal ulcer due to Helicobacter pylori :  s/p 4 drug therapy.  Will ask her to hold off on PPI therapy for 1 month and then will check stool for H pylori antigen to confirm cure.   2. Blastocystis hominis infection :  completed a course of metronidazole as part of treatment for H pylori.  Will check stool ova and parasites to confirm clearance.     Gastrointestinal ulcer due to Helicobacter pylori  -     H. pylori antigen, stool; Future; Expected date: 10/24/2022  -     Stool Exam-Ova,Cysts,Parasites; Future; Expected date: 10/24/2022    Blastocystis hominis infection  -     H. pylori antigen, stool; Future; Expected date: 10/24/2022  -     Stool Exam-Ova,Cysts,Parasites; Future; Expected date: 10/24/2022        If you have questions, please do not hesitate to call me. I look forward to following Mirtha along with you.    Sincerely,      Isaac Rodriguez MD           CC    No Recipients

## 2022-12-05 ENCOUNTER — OFFICE VISIT (OUTPATIENT)
Dept: INTERNAL MEDICINE | Facility: CLINIC | Age: 47
End: 2022-12-05
Payer: COMMERCIAL

## 2022-12-05 ENCOUNTER — PATIENT MESSAGE (OUTPATIENT)
Dept: INTERNAL MEDICINE | Facility: CLINIC | Age: 47
End: 2022-12-05

## 2022-12-05 VITALS
WEIGHT: 142 LBS | BODY MASS INDEX: 22.29 KG/M2 | SYSTOLIC BLOOD PRESSURE: 90 MMHG | HEART RATE: 68 BPM | DIASTOLIC BLOOD PRESSURE: 60 MMHG | HEIGHT: 67 IN | TEMPERATURE: 99 F

## 2022-12-05 DIAGNOSIS — H57.11 OCULAR PAIN, RIGHT EYE: ICD-10-CM

## 2022-12-05 DIAGNOSIS — H54.61 DECREASED VISION OF RIGHT EYE: ICD-10-CM

## 2022-12-05 PROCEDURE — 1160F RVW MEDS BY RX/DR IN RCRD: CPT | Mod: CPTII,95,, | Performed by: STUDENT IN AN ORGANIZED HEALTH CARE EDUCATION/TRAINING PROGRAM

## 2022-12-05 PROCEDURE — 3044F PR MOST RECENT HEMOGLOBIN A1C LEVEL <7.0%: ICD-10-PCS | Mod: CPTII,95,, | Performed by: STUDENT IN AN ORGANIZED HEALTH CARE EDUCATION/TRAINING PROGRAM

## 2022-12-05 PROCEDURE — 1159F MED LIST DOCD IN RCRD: CPT | Mod: CPTII,95,, | Performed by: STUDENT IN AN ORGANIZED HEALTH CARE EDUCATION/TRAINING PROGRAM

## 2022-12-05 PROCEDURE — 3008F BODY MASS INDEX DOCD: CPT | Mod: CPTII,95,, | Performed by: STUDENT IN AN ORGANIZED HEALTH CARE EDUCATION/TRAINING PROGRAM

## 2022-12-05 PROCEDURE — 3074F SYST BP LT 130 MM HG: CPT | Mod: CPTII,95,, | Performed by: STUDENT IN AN ORGANIZED HEALTH CARE EDUCATION/TRAINING PROGRAM

## 2022-12-05 PROCEDURE — 3074F PR MOST RECENT SYSTOLIC BLOOD PRESSURE < 130 MM HG: ICD-10-PCS | Mod: CPTII,95,, | Performed by: STUDENT IN AN ORGANIZED HEALTH CARE EDUCATION/TRAINING PROGRAM

## 2022-12-05 PROCEDURE — 1159F PR MEDICATION LIST DOCUMENTED IN MEDICAL RECORD: ICD-10-PCS | Mod: CPTII,95,, | Performed by: STUDENT IN AN ORGANIZED HEALTH CARE EDUCATION/TRAINING PROGRAM

## 2022-12-05 PROCEDURE — 3078F PR MOST RECENT DIASTOLIC BLOOD PRESSURE < 80 MM HG: ICD-10-PCS | Mod: CPTII,95,, | Performed by: STUDENT IN AN ORGANIZED HEALTH CARE EDUCATION/TRAINING PROGRAM

## 2022-12-05 PROCEDURE — 3044F HG A1C LEVEL LT 7.0%: CPT | Mod: CPTII,95,, | Performed by: STUDENT IN AN ORGANIZED HEALTH CARE EDUCATION/TRAINING PROGRAM

## 2022-12-05 PROCEDURE — 3008F PR BODY MASS INDEX (BMI) DOCUMENTED: ICD-10-PCS | Mod: CPTII,95,, | Performed by: STUDENT IN AN ORGANIZED HEALTH CARE EDUCATION/TRAINING PROGRAM

## 2022-12-05 PROCEDURE — 1160F PR REVIEW ALL MEDS BY PRESCRIBER/CLIN PHARMACIST DOCUMENTED: ICD-10-PCS | Mod: CPTII,95,, | Performed by: STUDENT IN AN ORGANIZED HEALTH CARE EDUCATION/TRAINING PROGRAM

## 2022-12-05 PROCEDURE — 99214 OFFICE O/P EST MOD 30 MIN: CPT | Mod: 25,95,, | Performed by: STUDENT IN AN ORGANIZED HEALTH CARE EDUCATION/TRAINING PROGRAM

## 2022-12-05 PROCEDURE — 3078F DIAST BP <80 MM HG: CPT | Mod: CPTII,95,, | Performed by: STUDENT IN AN ORGANIZED HEALTH CARE EDUCATION/TRAINING PROGRAM

## 2022-12-05 PROCEDURE — 99214 PR OFFICE/OUTPT VISIT, EST, LEVL IV, 30-39 MIN: ICD-10-PCS | Mod: 25,95,, | Performed by: STUDENT IN AN ORGANIZED HEALTH CARE EDUCATION/TRAINING PROGRAM

## 2022-12-06 NOTE — ASSESSMENT & PLAN NOTE
Unclear etiology, differential diagnosis with migraine and cluster headaches, accompanied with decreased vision.  Recommended to f/u with Neurology and will refer to ophthalmology for evaluation.

## 2022-12-06 NOTE — PROGRESS NOTES
Answers submitted by the patient for this visit:  Review of Systems Questionnaire (Submitted on 12/5/2022)  activity change: No  unexpected weight change: No  neck pain: No  hearing loss: No  rhinorrhea: No  trouble swallowing: No  eye discharge: No  visual disturbance: Yes  chest tightness: No  wheezing: No  chest pain: No  palpitations: No  blood in stool: No  constipation: No  vomiting: No  diarrhea: No  difficulty urinating: No  hematuria: No  menstrual problem: No  dysuria: No  joint swelling: No  arthralgias: No  headaches: No  weakness: No  confusion: No  dysphoric mood: No  Subjective:       Patient ID: Mirtha Gary is a 47 y.o. female.    VIRTUAL VISIT    The patient location is: [place]  The chief complaint leading to consultation is: Follow up    Visit type: Audiovisual    Face to Face time with patient: 20 min  20 minutes of total time spent on the encounter, which includes face to face time and non-face to face time preparing to see the patient (eg, review of tests), Obtaining and/or reviewing separately obtained history, Documenting clinical information in the electronic or other health record, Independently interpreting results (not separately reported) and communicating results to the patient/family/caregiver, or Care coordination (not separately reported).     Each patient to whom he or she provides medical services by telemedicine is:  (1) informed of the relationship between the physician and patient and the respective role of any other health care provider with respect to management of the patient; and (2) notified that he or she may decline to receive medical services by telemedicine and may withdraw from such care at any time.    Notes:     Chief Complaint: No chief complaint on file.    HPI    Patient is a 47 y.o. female , Chinese speaking, with a history of:  Migraine headaches  Lumbosacral radiculopathy  Psoriasis of the scalp  Palpitations  H/o endometriosis  Cyst of ovary  Small cyst  "of the left kidney  Interstitial cystitis  Recurrent UTIs  H/o of COVID x 2  H/o blastocystis hominis infection  Chronic abdominal pain in the LLQ  BL knee pain       Patient is seen today for ocular pain and decreased vision of the right eye.    Patient complains of decreased vision right eye, right after she completed course of antibiotic, accompanied with retroauricular pain.      Patient had had ocular pain at having related to her migraine headaches, however she says it is pain is different than what she had experienced before.  Patient reports the pain is mild-to-moderate and resolves by itself, and is accompanied today decreased visual acuity of the right eye, no redness or tearing.    Healthcare Maintenance:  Colonoscopy: 9/2022  Vaccinations: Pneumonia, Zoster, Tetanus (ordered in previous visit, pending)  COVID vaccination: completed  Depression screening: PHQ2 score = 1       Annual Wellness visit approx. Month:  September ROS  11-point review of systems done. Negative except for detailed in the HPI.        Objective:      Vitals:    12/05/22 1645   BP: 90/60   Pulse: 68   Temp: 98.6 °F (37 °C)   Weight: 64.4 kg (142 lb)   Height: 5' 7" (1.702 m)       General appearance: Good general aspect, NAD, conversant   Eyes and HEENT: Normal sclerae  Respiratory: No work of breathing  Cardiovascular: RRR  Abdomen and : Soft, nondistended  Extremities: no edemas  Nervous System: Alert and oriented x 3  Psych: Appropriate affect, alert and oriented to person, place and time      Assessment:       Problem List Items Addressed This Visit          Ophtho    Ocular pain, right eye     Unclear etiology, differential diagnosis with migraine and cluster headaches, accompanied with decreased vision.  Recommended to f/u with Neurology and will refer to ophthalmology for evaluation.         Relevant Orders    Ambulatory referral/consult to Ophthalmology    Decreased vision of right eye     Subjective  unale to do full " exam during virtual visit.  Will refer to ophthalmology.         Relevant Orders    Ambulatory referral/consult to Ophthalmology         Plan:         Education provided  Lifestyle recommendations given  AVS printed, explained, and sent to the patient.  RTC in : January as planned      LENIN KEE MD, MPH  Internal Medicine  [facility]  [facility]

## 2022-12-07 DIAGNOSIS — A07.8 BLASTOCYSTIS HOMINIS INFECTION: Primary | ICD-10-CM

## 2022-12-12 PROBLEM — Z00.00 ENCOUNTER FOR GENERAL MEDICAL EXAMINATION: Status: RESOLVED | Noted: 2022-09-08 | Resolved: 2022-12-12

## 2022-12-12 PROBLEM — Z00.00 HEALTHCARE MAINTENANCE: Status: RESOLVED | Noted: 2022-09-08 | Resolved: 2022-12-12

## 2022-12-21 ENCOUNTER — LAB VISIT (OUTPATIENT)
Dept: LAB | Facility: OTHER | Age: 47
End: 2022-12-21
Attending: STUDENT IN AN ORGANIZED HEALTH CARE EDUCATION/TRAINING PROGRAM
Payer: COMMERCIAL

## 2022-12-21 DIAGNOSIS — G89.29 ABDOMINAL PAIN, CHRONIC, LEFT LOWER QUADRANT: ICD-10-CM

## 2022-12-21 DIAGNOSIS — R10.32 ABDOMINAL PAIN, CHRONIC, LEFT LOWER QUADRANT: ICD-10-CM

## 2022-12-21 DIAGNOSIS — A07.8 BLASTOCYSTIS HOMINIS INFECTION: Chronic | ICD-10-CM

## 2022-12-21 PROCEDURE — 87329 GIARDIA AG IA: CPT | Performed by: STUDENT IN AN ORGANIZED HEALTH CARE EDUCATION/TRAINING PROGRAM

## 2022-12-21 PROCEDURE — 87328 CRYPTOSPORIDIUM AG IA: CPT | Performed by: STUDENT IN AN ORGANIZED HEALTH CARE EDUCATION/TRAINING PROGRAM

## 2022-12-22 LAB
CRYPTOSP AG STL QL IA: NEGATIVE
G LAMBLIA AG STL QL IA: NEGATIVE

## 2023-01-05 ENCOUNTER — OFFICE VISIT (OUTPATIENT)
Dept: INTERNAL MEDICINE | Facility: CLINIC | Age: 48
End: 2023-01-05
Payer: COMMERCIAL

## 2023-01-05 VITALS
HEIGHT: 67 IN | SYSTOLIC BLOOD PRESSURE: 117 MMHG | HEART RATE: 55 BPM | DIASTOLIC BLOOD PRESSURE: 64 MMHG | WEIGHT: 145.13 LBS | BODY MASS INDEX: 22.78 KG/M2

## 2023-01-05 DIAGNOSIS — R10.32 ABDOMINAL PAIN, CHRONIC, LEFT LOWER QUADRANT: Primary | ICD-10-CM

## 2023-01-05 DIAGNOSIS — Z00.00 HEALTHCARE MAINTENANCE: ICD-10-CM

## 2023-01-05 DIAGNOSIS — B96.81 GASTRIC ULCER DUE TO HELICOBACTER PYLORI, UNSPECIFIED CHRONICITY: ICD-10-CM

## 2023-01-05 DIAGNOSIS — G89.29 ABDOMINAL PAIN, CHRONIC, LEFT LOWER QUADRANT: Primary | ICD-10-CM

## 2023-01-05 DIAGNOSIS — K25.9 GASTRIC ULCER DUE TO HELICOBACTER PYLORI, UNSPECIFIED CHRONICITY: ICD-10-CM

## 2023-01-05 PROBLEM — J06.9 VIRAL UPPER RESPIRATORY TRACT INFECTION: Status: ACTIVE | Noted: 2023-01-05

## 2023-01-05 PROCEDURE — 99999 PR PBB SHADOW E&M-EST. PATIENT-LVL III: CPT | Mod: PBBFAC,,, | Performed by: STUDENT IN AN ORGANIZED HEALTH CARE EDUCATION/TRAINING PROGRAM

## 2023-01-05 PROCEDURE — 99214 OFFICE O/P EST MOD 30 MIN: CPT | Mod: S$GLB,,, | Performed by: STUDENT IN AN ORGANIZED HEALTH CARE EDUCATION/TRAINING PROGRAM

## 2023-01-05 PROCEDURE — 3008F BODY MASS INDEX DOCD: CPT | Mod: CPTII,S$GLB,, | Performed by: STUDENT IN AN ORGANIZED HEALTH CARE EDUCATION/TRAINING PROGRAM

## 2023-01-05 PROCEDURE — 3078F PR MOST RECENT DIASTOLIC BLOOD PRESSURE < 80 MM HG: ICD-10-PCS | Mod: CPTII,S$GLB,, | Performed by: STUDENT IN AN ORGANIZED HEALTH CARE EDUCATION/TRAINING PROGRAM

## 2023-01-05 PROCEDURE — 3074F PR MOST RECENT SYSTOLIC BLOOD PRESSURE < 130 MM HG: ICD-10-PCS | Mod: CPTII,S$GLB,, | Performed by: STUDENT IN AN ORGANIZED HEALTH CARE EDUCATION/TRAINING PROGRAM

## 2023-01-05 PROCEDURE — 1160F PR REVIEW ALL MEDS BY PRESCRIBER/CLIN PHARMACIST DOCUMENTED: ICD-10-PCS | Mod: CPTII,S$GLB,, | Performed by: STUDENT IN AN ORGANIZED HEALTH CARE EDUCATION/TRAINING PROGRAM

## 2023-01-05 PROCEDURE — 99214 PR OFFICE/OUTPT VISIT, EST, LEVL IV, 30-39 MIN: ICD-10-PCS | Mod: S$GLB,,, | Performed by: STUDENT IN AN ORGANIZED HEALTH CARE EDUCATION/TRAINING PROGRAM

## 2023-01-05 PROCEDURE — 1159F MED LIST DOCD IN RCRD: CPT | Mod: CPTII,S$GLB,, | Performed by: STUDENT IN AN ORGANIZED HEALTH CARE EDUCATION/TRAINING PROGRAM

## 2023-01-05 PROCEDURE — 3078F DIAST BP <80 MM HG: CPT | Mod: CPTII,S$GLB,, | Performed by: STUDENT IN AN ORGANIZED HEALTH CARE EDUCATION/TRAINING PROGRAM

## 2023-01-05 PROCEDURE — 1160F RVW MEDS BY RX/DR IN RCRD: CPT | Mod: CPTII,S$GLB,, | Performed by: STUDENT IN AN ORGANIZED HEALTH CARE EDUCATION/TRAINING PROGRAM

## 2023-01-05 PROCEDURE — 3074F SYST BP LT 130 MM HG: CPT | Mod: CPTII,S$GLB,, | Performed by: STUDENT IN AN ORGANIZED HEALTH CARE EDUCATION/TRAINING PROGRAM

## 2023-01-05 PROCEDURE — 3008F PR BODY MASS INDEX (BMI) DOCUMENTED: ICD-10-PCS | Mod: CPTII,S$GLB,, | Performed by: STUDENT IN AN ORGANIZED HEALTH CARE EDUCATION/TRAINING PROGRAM

## 2023-01-05 PROCEDURE — 1159F PR MEDICATION LIST DOCUMENTED IN MEDICAL RECORD: ICD-10-PCS | Mod: CPTII,S$GLB,, | Performed by: STUDENT IN AN ORGANIZED HEALTH CARE EDUCATION/TRAINING PROGRAM

## 2023-01-05 PROCEDURE — 99999 PR PBB SHADOW E&M-EST. PATIENT-LVL III: ICD-10-PCS | Mod: PBBFAC,,, | Performed by: STUDENT IN AN ORGANIZED HEALTH CARE EDUCATION/TRAINING PROGRAM

## 2023-01-05 NOTE — PROGRESS NOTES
"Subjective:       Patient ID: Mirtha Gary is a 47 y.o. female.    Chief Complaint: Follow-up    Follow-up      Patient is a 47 y.o. female , [place]i speaking, with a history of:  Migraine headaches  Lumbosacral radiculopathy  Psoriasis of the scalp  Palpitations  H/o endometriosis  Cyst of ovary  Small cyst of the left kidney  Interstitial cystitis  Recurrent UTIs  H/o of COVID x 2  H/o blastocystis hominis infection  Chronic abdominal pain in the LLQ  BL knee pain    Patient comes to the clinic for a f/u of her abdominal pain.    She feels a little better after completing therapy recommended by ID with bismuth subsalicylate, doxycycline, metronidazole and omeprazole.    However she still presents left upper quadrant pain, mild to moderate, no fever or diarrhea.    Overall feeling well.      Healthcare Maintenance:  Colonoscopy: 9/2022  Vaccinations: Pneumonia, Zoster, Tetanus (ordered in previous visit, pending)  COVID vaccination: completed  Depression screening: PHQ2 score = 1    Annual Wellness visit approx. Month: September ROS  11-point review of systems done. Negative except for detailed in the HPI.        Objective:      Vitals:    01/05/23 1100   BP: 117/64   Pulse: (!) 55   Weight: 65.8 kg (145 lb 1.6 oz)   Height: 5' 7" (1.702 m)         Physical Exam  Vitals and nursing note reviewed.   Constitutional:       Appearance: Normal appearance.   HENT:      Head: Normocephalic and atraumatic.      Right Ear: Tympanic membrane normal.      Left Ear: Tympanic membrane normal.      Nose: Nose normal.      Mouth/Throat:      Mouth: Mucous membranes are moist.      Pharynx: Oropharynx is clear.   Eyes:      Extraocular Movements: Extraocular movements intact.      Conjunctiva/sclera: Conjunctivae normal.      Pupils: Pupils are equal, round, and reactive to light.   Cardiovascular:      Rate and Rhythm: Normal rate and regular rhythm.      Pulses: Normal pulses.      Heart sounds: Normal heart sounds. "   Pulmonary:      Effort: Pulmonary effort is normal.      Breath sounds: Normal breath sounds.   Abdominal:      General: Bowel sounds are normal. There is no distension.      Palpations: Abdomen is soft.      Tenderness: There is abdominal tenderness. There is guarding.   Musculoskeletal:         General: Normal range of motion.      Cervical back: Normal range of motion and neck supple.   Skin:     General: Skin is warm.   Neurological:      General: No focal deficit present.      Mental Status: She is alert and oriented to person, place, and time. Mental status is at baseline.   Psychiatric:         Mood and Affect: Mood normal.          Assessment:       Problem List Items Addressed This Visit          GI    Abdominal pain, chronic, left lower quadrant - Primary     Mild improvement.  Will resume PPI if negative stool test for H. Pillory.         Gastric ulcer due to Helicobacter pylori     Will repeat stool H. Pilory to decide about resuming PPI to treat gastric ulcer.         Relevant Orders    H. pylori antigen, stool (Completed)       Other    Healthcare maintenance     Health care maintenance and core gaps reviewed and assessed with patient.  Vaccinations recommended:        Tetanus - O, P       Shingles - N/A       Influenza - C, 2022       Pneumonia - N/A  Colon cancer screening:   Colonoscopy: 2022  Lifestyle recommendations given.  Physical activity recommended.    Legend: Ordered (O), Declined (D), Current (C)                Plan:       - H. Pylori    Education provided  Lifestyle recommendations given  AVS printed, explained, and given to the patient.  RTC in : 3 months, no labs.  September for CORBY KEE MD, MPH  Internal Medicine  [facility]

## 2023-01-09 ENCOUNTER — LAB VISIT (OUTPATIENT)
Dept: LAB | Facility: OTHER | Age: 48
End: 2023-01-09
Payer: COMMERCIAL

## 2023-01-09 DIAGNOSIS — K25.9 GASTRIC ULCER DUE TO HELICOBACTER PYLORI, UNSPECIFIED CHRONICITY: ICD-10-CM

## 2023-01-09 DIAGNOSIS — B96.81 GASTRIC ULCER DUE TO HELICOBACTER PYLORI, UNSPECIFIED CHRONICITY: ICD-10-CM

## 2023-01-09 PROCEDURE — 87338 HPYLORI STOOL AG IA: CPT | Performed by: STUDENT IN AN ORGANIZED HEALTH CARE EDUCATION/TRAINING PROGRAM

## 2023-01-09 NOTE — ASSESSMENT & PLAN NOTE
Health care maintenance and core gaps reviewed and assessed with patient.  Vaccinations recommended:        Tetanus - O, P       Shingles - N/A       Influenza - C, 2022       Pneumonia - N/A  Colon cancer screening:   Colonoscopy: 2022  Lifestyle recommendations given.  Physical activity recommended.    Legend: Ordered (O), Declined (D), Current (C)

## 2023-01-16 LAB
H PYLORI AG STL QL IA: NOT DETECTED
SPECIMEN SOURCE: NORMAL

## 2023-01-19 ENCOUNTER — PATIENT MESSAGE (OUTPATIENT)
Dept: INTERNAL MEDICINE | Facility: CLINIC | Age: 48
End: 2023-01-19
Payer: COMMERCIAL

## 2023-01-19 DIAGNOSIS — R10.12 LUQ ABDOMINAL PAIN: ICD-10-CM

## 2023-01-19 RX ORDER — PANTOPRAZOLE SODIUM 40 MG/1
40 TABLET, DELAYED RELEASE ORAL DAILY
Qty: 90 TABLET | Refills: 0 | Status: SHIPPED | OUTPATIENT
Start: 2023-01-19 | End: 2024-01-08 | Stop reason: SDUPTHER

## 2023-01-19 NOTE — PROGRESS NOTES
Good morning Dr. Rodriguez  Given the f/u H. Pylori test on Ms. Gary, I would like to resume her PPI to help with the healing of her gastric ulcer and improve the pain if that's ok with you. What do you think? Thank you  Joan

## 2023-01-31 ENCOUNTER — PATIENT MESSAGE (OUTPATIENT)
Dept: INTERNAL MEDICINE | Facility: CLINIC | Age: 48
End: 2023-01-31
Payer: COMMERCIAL

## 2023-02-23 ENCOUNTER — OFFICE VISIT (OUTPATIENT)
Dept: INTERNAL MEDICINE | Facility: CLINIC | Age: 48
End: 2023-02-23
Payer: COMMERCIAL

## 2023-02-23 ENCOUNTER — HOSPITAL ENCOUNTER (OUTPATIENT)
Dept: RADIOLOGY | Facility: HOSPITAL | Age: 48
Discharge: HOME | End: 2023-02-23
Attending: STUDENT IN AN ORGANIZED HEALTH CARE EDUCATION/TRAINING PROGRAM
Payer: COMMERCIAL

## 2023-02-23 ENCOUNTER — LAB VISIT (OUTPATIENT)
Dept: INTERNAL MEDICINE | Facility: CLINIC | Age: 48
End: 2023-02-23
Payer: COMMERCIAL

## 2023-02-23 VITALS
DIASTOLIC BLOOD PRESSURE: 68 MMHG | SYSTOLIC BLOOD PRESSURE: 103 MMHG | BODY MASS INDEX: 22.91 KG/M2 | WEIGHT: 146 LBS | HEIGHT: 67 IN | HEART RATE: 79 BPM

## 2023-02-23 DIAGNOSIS — Z20.822 ENCOUNTER FOR LABORATORY TESTING FOR COVID-19 VIRUS: ICD-10-CM

## 2023-02-23 DIAGNOSIS — N64.3 GALACTORRHEA: ICD-10-CM

## 2023-02-23 DIAGNOSIS — J02.0 STREP SORE THROAT: Primary | ICD-10-CM

## 2023-02-23 DIAGNOSIS — R06.02 SOB (SHORTNESS OF BREATH): Primary | ICD-10-CM

## 2023-02-23 DIAGNOSIS — R06.02 SOB (SHORTNESS OF BREATH): ICD-10-CM

## 2023-02-23 DIAGNOSIS — J06.9 UPPER RESPIRATORY TRACT INFECTION, UNSPECIFIED TYPE: Primary | ICD-10-CM

## 2023-02-23 LAB — SARS-COV-2 RNA RESP QL NAA+PROBE: NOT DETECTED

## 2023-02-23 PROCEDURE — 71046 X-RAY EXAM CHEST 2 VIEWS: CPT | Mod: 26,,, | Performed by: RADIOLOGY

## 2023-02-23 PROCEDURE — 71046 X-RAY EXAM CHEST 2 VIEWS: CPT | Mod: TC

## 2023-02-23 PROCEDURE — 99213 OFFICE O/P EST LOW 20 MIN: CPT | Mod: S$GLB,,, | Performed by: STUDENT IN AN ORGANIZED HEALTH CARE EDUCATION/TRAINING PROGRAM

## 2023-02-23 PROCEDURE — 3078F PR MOST RECENT DIASTOLIC BLOOD PRESSURE < 80 MM HG: ICD-10-PCS | Mod: CPTII,S$GLB,, | Performed by: STUDENT IN AN ORGANIZED HEALTH CARE EDUCATION/TRAINING PROGRAM

## 2023-02-23 PROCEDURE — 3008F PR BODY MASS INDEX (BMI) DOCUMENTED: ICD-10-PCS | Mod: CPTII,S$GLB,, | Performed by: STUDENT IN AN ORGANIZED HEALTH CARE EDUCATION/TRAINING PROGRAM

## 2023-02-23 PROCEDURE — U0005 INFEC AGEN DETEC AMPLI PROBE: HCPCS | Performed by: STUDENT IN AN ORGANIZED HEALTH CARE EDUCATION/TRAINING PROGRAM

## 2023-02-23 PROCEDURE — 1160F RVW MEDS BY RX/DR IN RCRD: CPT | Mod: CPTII,S$GLB,, | Performed by: STUDENT IN AN ORGANIZED HEALTH CARE EDUCATION/TRAINING PROGRAM

## 2023-02-23 PROCEDURE — 3008F BODY MASS INDEX DOCD: CPT | Mod: CPTII,S$GLB,, | Performed by: STUDENT IN AN ORGANIZED HEALTH CARE EDUCATION/TRAINING PROGRAM

## 2023-02-23 PROCEDURE — 99213 PR OFFICE/OUTPT VISIT, EST, LEVL III, 20-29 MIN: ICD-10-PCS | Mod: S$GLB,,, | Performed by: STUDENT IN AN ORGANIZED HEALTH CARE EDUCATION/TRAINING PROGRAM

## 2023-02-23 PROCEDURE — 1159F MED LIST DOCD IN RCRD: CPT | Mod: CPTII,S$GLB,, | Performed by: STUDENT IN AN ORGANIZED HEALTH CARE EDUCATION/TRAINING PROGRAM

## 2023-02-23 PROCEDURE — 1159F PR MEDICATION LIST DOCUMENTED IN MEDICAL RECORD: ICD-10-PCS | Mod: CPTII,S$GLB,, | Performed by: STUDENT IN AN ORGANIZED HEALTH CARE EDUCATION/TRAINING PROGRAM

## 2023-02-23 PROCEDURE — 3074F SYST BP LT 130 MM HG: CPT | Mod: CPTII,S$GLB,, | Performed by: STUDENT IN AN ORGANIZED HEALTH CARE EDUCATION/TRAINING PROGRAM

## 2023-02-23 PROCEDURE — 1160F PR REVIEW ALL MEDS BY PRESCRIBER/CLIN PHARMACIST DOCUMENTED: ICD-10-PCS | Mod: CPTII,S$GLB,, | Performed by: STUDENT IN AN ORGANIZED HEALTH CARE EDUCATION/TRAINING PROGRAM

## 2023-02-23 PROCEDURE — 71046 XR CHEST PA AND LATERAL: ICD-10-PCS | Mod: 26,,, | Performed by: RADIOLOGY

## 2023-02-23 PROCEDURE — 3074F PR MOST RECENT SYSTOLIC BLOOD PRESSURE < 130 MM HG: ICD-10-PCS | Mod: CPTII,S$GLB,, | Performed by: STUDENT IN AN ORGANIZED HEALTH CARE EDUCATION/TRAINING PROGRAM

## 2023-02-23 PROCEDURE — 3078F DIAST BP <80 MM HG: CPT | Mod: CPTII,S$GLB,, | Performed by: STUDENT IN AN ORGANIZED HEALTH CARE EDUCATION/TRAINING PROGRAM

## 2023-02-23 PROCEDURE — 99999 PR PBB SHADOW E&M-EST. PATIENT-LVL III: CPT | Mod: PBBFAC,,, | Performed by: STUDENT IN AN ORGANIZED HEALTH CARE EDUCATION/TRAINING PROGRAM

## 2023-02-23 PROCEDURE — 99999 PR PBB SHADOW E&M-EST. PATIENT-LVL III: ICD-10-PCS | Mod: PBBFAC,,, | Performed by: STUDENT IN AN ORGANIZED HEALTH CARE EDUCATION/TRAINING PROGRAM

## 2023-02-23 RX ORDER — ALBUTEROL SULFATE 90 UG/1
2 AEROSOL, METERED RESPIRATORY (INHALATION) EVERY 4 HOURS PRN
COMMUNITY
Start: 2023-02-17

## 2023-02-23 NOTE — PROGRESS NOTES
"Subjective:       Patient ID: Mirtha Gary is a 47 y.o. female.    Chief Complaint: No chief complaint on file.    HPI    Mirtha Gary is a 47 y.o. female , [place]o speaking, with a history of:  Migraine headaches  Lumbosacral radiculopathy  Psoriasis of the scalp  Palpitations  H/o endometriosis  Cyst of ovary  Small cyst of the left kidney  Interstitial cystitis  Recurrent UTIs  H/o of COVID x 2  H/o blastocystis hominis infection  Chronic abdominal pain in the LLQ  BL knee pain    Patient comes to the clinic complaining of shortness of breath.    One week h/o dyspnea, that started with sore throat, malaise, chills, [place] Patient self medicated with clarythromicin and has reported improvement of symptoms. Today is day 7 of ATB.    Today's CXR is WNL  CBC WNL predominance of granulocytes, no leukocytosis.  Normal ESR.  Poc COVID test negative  POC strep test negative    She is still experiencing abdominal pain, but it has improved. She is on the omeprazole treatment.    Healthcare Maintenance:  Colonoscopy: 9/2022  Vaccinations: Pneumonia, Zoster, Tetanus (ordered in previous visit, pending)  COVID vaccination: completed  Depression screening: PHQ2 score = 1     Annual Wellness visit approx. Month: September ROS 11-point review of systems done. Negative except for detailed in the HPI.        Objective:      Vitals:    02/23/23 1052   BP: 103/68   Pulse: 79   Weight: 66.2 kg (146 lb)   Height: 5' 7" (1.702 m)     Physical Exam  Vitals and nursing note reviewed.   Constitutional:       Appearance: Normal appearance.   HENT:      Head: Normocephalic and atraumatic.      Right Ear: Tympanic membrane normal.      Left Ear: Tympanic membrane normal.      Nose: Nose normal.      Mouth/Throat:      Mouth: Mucous membranes are moist.      Pharynx: Oropharynx is clear.   Eyes:      Extraocular Movements: Extraocular movements intact.      Conjunctiva/sclera: Conjunctivae normal.      " Pupils: Pupils are equal, round, and reactive to light.   Cardiovascular:      Rate and Rhythm: Normal rate and regular rhythm.      Pulses: Normal pulses.      Heart sounds: Normal heart sounds.   Pulmonary:      Effort: Pulmonary effort is normal.      Breath sounds: Normal breath sounds.      Comments: BL apical hypoventilation  Abdominal:      General: Bowel sounds are normal. There is no distension.      Palpations: Abdomen is soft.      Tenderness: There is no abdominal tenderness.   Musculoskeletal:         General: Normal range of motion.      Cervical back: Normal range of motion and neck supple.   Skin:     General: Skin is warm.   Neurological:      General: No focal deficit present.      Mental Status: She is alert and oriented to person, place, and time. Mental status is at baseline.   Psychiatric:         Mood and Affect: Mood normal.          Assessment:       1. Upper respiratory tract infection, unspecified type  Assessment & Plan:  Unclear etiology  Normal CBC with predominance of granulocytes  Possibly bacterial URI, treated at home with clarythromycin  Residual cough and SOB  Continue albuterol inhaler, warm drinks, antihistaminic medication  Will not treat with systemic steroid at this point, we are still treating gastric ulcer and patient is symptomatic.      2. Galactorrhea  -     PROLACTIN; Future; Expected date: 02/24/2023  -      Breast Bilateral Limited; Future; Expected date: 02/24/2023       Plan:         CXR ordered  CBC ordered  Continue albuterol PRN.    Education provided  Lifestyle recommendations given  AVS printed, explained, and given to the patient.  RTC as scheduled in April.            LENIN KEE MD, MPH  Internal Medicine  [facility]        Update:  After encounter, patient called back and reported new onset of painless galactorrhea BL.   She had experienced chest wall pain last week. She has a h/o BL implants.  She has had  multiple ATB treatments in the past months, possibly drug induced???  Will check prolactin level and breast US.

## 2023-02-23 NOTE — ASSESSMENT & PLAN NOTE
Unclear etiology  Normal CBC with predominance of granulocytes  Possibly bacterial URI, treated at home with clarythromycin  Residual cough and SOB  Continue albuterol inhaler, warm drinks, antihistaminic medication  Will not treat with systemic steroid at this point, we are still treating gastric ulcer and patient is symptomatic.

## 2023-02-24 ENCOUNTER — LAB VISIT (OUTPATIENT)
Dept: LAB | Facility: HOSPITAL | Age: 48
End: 2023-02-24
Attending: STUDENT IN AN ORGANIZED HEALTH CARE EDUCATION/TRAINING PROGRAM
Payer: COMMERCIAL

## 2023-02-24 DIAGNOSIS — N64.3 GALACTORRHEA: ICD-10-CM

## 2023-02-24 LAB — PROLACTIN SERPL IA-MCNC: 26.3 NG/ML (ref 5.2–26.5)

## 2023-02-24 PROCEDURE — 84146 ASSAY OF PROLACTIN: CPT | Performed by: STUDENT IN AN ORGANIZED HEALTH CARE EDUCATION/TRAINING PROGRAM

## 2023-02-24 PROCEDURE — 36415 COLL VENOUS BLD VENIPUNCTURE: CPT | Performed by: STUDENT IN AN ORGANIZED HEALTH CARE EDUCATION/TRAINING PROGRAM

## 2023-02-27 ENCOUNTER — HOSPITAL ENCOUNTER (OUTPATIENT)
Dept: RADIOLOGY | Facility: HOSPITAL | Age: 48
Discharge: HOME | End: 2023-02-27
Attending: STUDENT IN AN ORGANIZED HEALTH CARE EDUCATION/TRAINING PROGRAM
Payer: COMMERCIAL

## 2023-02-27 DIAGNOSIS — N64.3 GALACTORRHEA: ICD-10-CM

## 2023-02-27 PROCEDURE — 76642 US BREAST BILATERAL LIMITED: ICD-10-PCS | Mod: 26,,, | Performed by: RADIOLOGY

## 2023-02-27 PROCEDURE — 76642 ULTRASOUND BREAST LIMITED: CPT | Mod: TC,50

## 2023-02-27 PROCEDURE — 76642 ULTRASOUND BREAST LIMITED: CPT | Mod: 26,,, | Performed by: RADIOLOGY

## 2023-02-28 ENCOUNTER — TELEPHONE (OUTPATIENT)
Dept: SURGERY | Facility: CLINIC | Age: 48
End: 2023-02-28
Payer: COMMERCIAL

## 2023-03-03 ENCOUNTER — TELEPHONE (OUTPATIENT)
Dept: SURGERY | Facility: CLINIC | Age: 48
End: 2023-03-03
Payer: COMMERCIAL

## 2023-03-20 ENCOUNTER — OFFICE VISIT (OUTPATIENT)
Dept: OPTOMETRY | Facility: CLINIC | Age: 48
End: 2023-03-20
Payer: COMMERCIAL

## 2023-03-20 DIAGNOSIS — H52.4 PRESBYOPIA OF BOTH EYES: ICD-10-CM

## 2023-03-20 DIAGNOSIS — G43.009 MIGRAINE WITHOUT AURA AND WITHOUT STATUS MIGRAINOSUS, NOT INTRACTABLE: Primary | ICD-10-CM

## 2023-03-20 DIAGNOSIS — H10.13 ALLERGIC CONJUNCTIVITIS OF BOTH EYES: ICD-10-CM

## 2023-03-20 PROCEDURE — 92004 PR EYE EXAM, NEW PATIENT,COMPREHESV: ICD-10-PCS | Mod: S$GLB,,,

## 2023-03-20 PROCEDURE — 99999 PR PBB SHADOW E&M-EST. PATIENT-LVL III: ICD-10-PCS | Mod: PBBFAC,,,

## 2023-03-20 PROCEDURE — 92015 DETERMINE REFRACTIVE STATE: CPT | Mod: S$GLB,,,

## 2023-03-20 PROCEDURE — 1159F PR MEDICATION LIST DOCUMENTED IN MEDICAL RECORD: ICD-10-PCS | Mod: CPTII,S$GLB,,

## 2023-03-20 PROCEDURE — 99999 PR PBB SHADOW E&M-EST. PATIENT-LVL III: CPT | Mod: PBBFAC,,,

## 2023-03-20 PROCEDURE — 92015 PR REFRACTION: ICD-10-PCS | Mod: S$GLB,,,

## 2023-03-20 PROCEDURE — 1160F PR REVIEW ALL MEDS BY PRESCRIBER/CLIN PHARMACIST DOCUMENTED: ICD-10-PCS | Mod: CPTII,S$GLB,,

## 2023-03-20 PROCEDURE — 1159F MED LIST DOCD IN RCRD: CPT | Mod: CPTII,S$GLB,,

## 2023-03-20 PROCEDURE — 92004 COMPRE OPH EXAM NEW PT 1/>: CPT | Mod: S$GLB,,,

## 2023-03-20 PROCEDURE — 1160F RVW MEDS BY RX/DR IN RCRD: CPT | Mod: CPTII,S$GLB,,
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
## 2023-03-22 ENCOUNTER — PATIENT MESSAGE (OUTPATIENT)
Dept: OBSTETRICS AND GYNECOLOGY | Facility: CLINIC | Age: 48
End: 2023-03-22
Payer: COMMERCIAL

## 2023-03-31 ENCOUNTER — PATIENT MESSAGE (OUTPATIENT)
Dept: OPTOMETRY | Facility: CLINIC | Age: 48
End: 2023-03-31
Payer: COMMERCIAL

## 2023-04-05 ENCOUNTER — OFFICE VISIT (OUTPATIENT)
Dept: INTERNAL MEDICINE | Facility: CLINIC | Age: 48
End: 2023-04-05
Payer: COMMERCIAL

## 2023-04-05 VITALS
HEART RATE: 70 BPM | HEIGHT: 67 IN | WEIGHT: 146.81 LBS | OXYGEN SATURATION: 98 % | BODY MASS INDEX: 23.04 KG/M2 | DIASTOLIC BLOOD PRESSURE: 64 MMHG | SYSTOLIC BLOOD PRESSURE: 100 MMHG

## 2023-04-05 DIAGNOSIS — K25.7 CHRONIC GASTRIC ULCER DUE TO HELICOBACTER PYLORI: ICD-10-CM

## 2023-04-05 DIAGNOSIS — Z00.00 ROUTINE GENERAL MEDICAL EXAMINATION AT A HEALTH CARE FACILITY: ICD-10-CM

## 2023-04-05 DIAGNOSIS — B96.81 CHRONIC GASTRIC ULCER DUE TO HELICOBACTER PYLORI: ICD-10-CM

## 2023-04-05 DIAGNOSIS — G43.009 MIGRAINE WITHOUT AURA AND WITHOUT STATUS MIGRAINOSUS, NOT INTRACTABLE: Primary | Chronic | ICD-10-CM

## 2023-04-05 DIAGNOSIS — L28.2 PAPULAR URTICARIA: ICD-10-CM

## 2023-04-05 DIAGNOSIS — Z00.00 HEALTHCARE MAINTENANCE: ICD-10-CM

## 2023-04-05 DIAGNOSIS — J30.1 SEASONAL ALLERGIC RHINITIS DUE TO POLLEN: ICD-10-CM

## 2023-04-05 DIAGNOSIS — M54.17 LUMBOSACRAL RADICULOPATHY: ICD-10-CM

## 2023-04-05 PROCEDURE — 99214 PR OFFICE/OUTPT VISIT, EST, LEVL IV, 30-39 MIN: ICD-10-PCS | Mod: S$GLB,,, | Performed by: STUDENT IN AN ORGANIZED HEALTH CARE EDUCATION/TRAINING PROGRAM

## 2023-04-05 PROCEDURE — 99214 OFFICE O/P EST MOD 30 MIN: CPT | Mod: S$GLB,,, | Performed by: STUDENT IN AN ORGANIZED HEALTH CARE EDUCATION/TRAINING PROGRAM

## 2023-04-05 PROCEDURE — 3074F SYST BP LT 130 MM HG: CPT | Mod: CPTII,S$GLB,, | Performed by: STUDENT IN AN ORGANIZED HEALTH CARE EDUCATION/TRAINING PROGRAM

## 2023-04-05 PROCEDURE — 1159F MED LIST DOCD IN RCRD: CPT | Mod: CPTII,S$GLB,, | Performed by: STUDENT IN AN ORGANIZED HEALTH CARE EDUCATION/TRAINING PROGRAM

## 2023-04-05 PROCEDURE — 3078F PR MOST RECENT DIASTOLIC BLOOD PRESSURE < 80 MM HG: ICD-10-PCS | Mod: CPTII,S$GLB,, | Performed by: STUDENT IN AN ORGANIZED HEALTH CARE EDUCATION/TRAINING PROGRAM

## 2023-04-05 PROCEDURE — 3008F BODY MASS INDEX DOCD: CPT | Mod: CPTII,S$GLB,, | Performed by: STUDENT IN AN ORGANIZED HEALTH CARE EDUCATION/TRAINING PROGRAM

## 2023-04-05 PROCEDURE — 99999 PR PBB SHADOW E&M-EST. PATIENT-LVL IV: ICD-10-PCS | Mod: PBBFAC,,, | Performed by: STUDENT IN AN ORGANIZED HEALTH CARE EDUCATION/TRAINING PROGRAM

## 2023-04-05 PROCEDURE — 3074F PR MOST RECENT SYSTOLIC BLOOD PRESSURE < 130 MM HG: ICD-10-PCS | Mod: CPTII,S$GLB,, | Performed by: STUDENT IN AN ORGANIZED HEALTH CARE EDUCATION/TRAINING PROGRAM

## 2023-04-05 PROCEDURE — 99999 PR PBB SHADOW E&M-EST. PATIENT-LVL IV: CPT | Mod: PBBFAC,,, | Performed by: STUDENT IN AN ORGANIZED HEALTH CARE EDUCATION/TRAINING PROGRAM

## 2023-04-05 PROCEDURE — 1160F PR REVIEW ALL MEDS BY PRESCRIBER/CLIN PHARMACIST DOCUMENTED: ICD-10-PCS | Mod: CPTII,S$GLB,, | Performed by: STUDENT IN AN ORGANIZED HEALTH CARE EDUCATION/TRAINING PROGRAM

## 2023-04-05 PROCEDURE — 3078F DIAST BP <80 MM HG: CPT | Mod: CPTII,S$GLB,, | Performed by: STUDENT IN AN ORGANIZED HEALTH CARE EDUCATION/TRAINING PROGRAM

## 2023-04-05 PROCEDURE — 3008F PR BODY MASS INDEX (BMI) DOCUMENTED: ICD-10-PCS | Mod: CPTII,S$GLB,, | Performed by: STUDENT IN AN ORGANIZED HEALTH CARE EDUCATION/TRAINING PROGRAM

## 2023-04-05 PROCEDURE — 1159F PR MEDICATION LIST DOCUMENTED IN MEDICAL RECORD: ICD-10-PCS | Mod: CPTII,S$GLB,, | Performed by: STUDENT IN AN ORGANIZED HEALTH CARE EDUCATION/TRAINING PROGRAM

## 2023-04-05 PROCEDURE — 1160F RVW MEDS BY RX/DR IN RCRD: CPT | Mod: CPTII,S$GLB,, | Performed by: STUDENT IN AN ORGANIZED HEALTH CARE EDUCATION/TRAINING PROGRAM

## 2023-04-05 RX ORDER — FLUTICASONE PROPIONATE 50 MCG
1 SPRAY, SUSPENSION (ML) NASAL DAILY
Qty: 9.9 ML | Refills: 0 | Status: SHIPPED | OUTPATIENT
Start: 2023-04-05 | End: 2023-04-20

## 2023-04-05 NOTE — ASSESSMENT & PLAN NOTE
Every day migraine headaches during the last month, intractable  Very mild improvement with nurtec and triptans  Exacerbated with stress and allergies  Will refer to Neurology for evaluation

## 2023-04-05 NOTE — PROGRESS NOTES
Subjective:       Patient ID: Mirtha Gary is a 47 y.o. female.    Chief Complaint: Follow-up (Worm sting)    Follow-up      Mirtha Gary is a 47 y.o. female , Bhutanese speaking, with a history of:  Migraine headaches  Lumbosacral radiculopathy  Psoriasis of the scalp  Palpitations  H/o endometriosis  Cyst of ovary  Small cyst of the left kidney  Interstitial cystitis  Recurrent UTIs  H/o of COVID x 2  H/o blastocystis hominis infection  Chronic abdominal pain in the LLQ  BL knee pain    Patient comes to the clinic for a follow up appointment.    Since last seen patient reports exacerbation of her migraine headaches, now she has them daily, intractable, has had to use triptans and Nurtec several times to abort the migraines. In addition to her regular hemicranial presentation she is also presenting occipital pain BL, radiated to the temples.    Her latest neurology appointment was in 2020 as well as mRI.    As far as her abdominal pain, this has improved, she is no longer taking PPI and sometimes she gets pain but it is more manageable.    She also reports some improvement of her back pain with acupuncture and physical therapy.    She is going through some major changes in her life, she is finishing the construction of her home, she is also in the process of moving out of her existing home, in addition to stressors from work.    Patient also complaints of insect bite (black caterpillar) over the fingers of her right hand with inflammation, redness and itchiness for the past 3 days.    Healthcare Maintenance:  Colonoscopy: 9/2022  Vaccinations: Pneumonia, Zoster, Tetanus (ordered in previous visit, pending)  COVID vaccination: completed  Depression screening: PHQ2 score = 1     Annual Wellness visit approx. Month: September    ROS  11-point review of systems done. Negative except for detailed in the HPI.        Objective:      Vitals:    04/05/23 1344   BP: 100/64   Pulse: 70   Temp: Comment: drinking water  "  SpO2: 98%   Weight: 66.6 kg (146 lb 12.8 oz)   Height: 5' 7" (1.702 m)       Physical Exam  Vitals and nursing note reviewed.   Constitutional:       Appearance: Normal appearance.   HENT:      Head: Normocephalic and atraumatic.      Right Ear: Tympanic membrane normal.      Left Ear: Tympanic membrane normal.      Nose: Nose normal.      Mouth/Throat:      Mouth: Mucous membranes are moist.      Pharynx: Oropharynx is clear.   Eyes:      Extraocular Movements: Extraocular movements intact.      Conjunctiva/sclera: Conjunctivae normal.      Pupils: Pupils are equal, round, and reactive to light.   Cardiovascular:      Rate and Rhythm: Normal rate and regular rhythm.      Pulses: Normal pulses.      Heart sounds: Normal heart sounds.   Pulmonary:      Effort: Pulmonary effort is normal.      Breath sounds: Normal breath sounds.   Abdominal:      General: Bowel sounds are normal. There is no distension.      Palpations: Abdomen is soft.      Tenderness: There is no abdominal tenderness.   Musculoskeletal:         General: Normal range of motion.      Cervical back: Normal range of motion and neck supple.   Skin:     General: Skin is warm.      Comments: Mild swelling and erythema of the dorsum of the right hand, 3d and 4th fingers.   Neurological:      General: No focal deficit present.      Mental Status: She is alert and oriented to person, place, and time. Mental status is at baseline.   Psychiatric:         Mood and Affect: Mood normal.          Assessment:       1. Migraine without aura and without status migrainosus, not intractable  Assessment & Plan:  Every day migraine headaches during the last month, intractable  Very mild improvement with nurtec and triptans  Exacerbated with stress and allergies  Will refer to Neurology for evaluation    Orders:  -     Ambulatory referral/consult to Neurology; Future; Expected date: 04/12/2023    2. Seasonal allergic rhinitis due to pollen  Assessment & Plan:  Continue " antihistamine and flonase    Orders:  -     Discontinue: fluticasone propionate (FLONASE) 50 mcg/actuation nasal spray; 1 spray (50 mcg total) by Each Nostril route once daily.  Dispense: 9.9 mL; Refill: 0    3. Lumbosacral radiculopathy  Assessment & Plan:  On physical therapy and acupuncture  Chronic back pain, improved      4. Chronic gastric ulcer due to Helicobacter pylori  Assessment & Plan:  Stable  Not on PPI any more  Will continue to monitor  Use PPI PRN        5. Healthcare maintenance  Assessment & Plan:  Health care maintenance and core gaps reviewed and assessed with patient.  Vaccinations recommended:        Tetanus - O, P       Shingles - N/A       Influenza - C, 2022       Pneumonia - N/A  Colon cancer screening:   Colonoscopy: 2022  Lifestyle recommendations given.  Physical activity recommended.    Legend: Ordered (O), Declined (D), Current (C)        6. Routine general medical examination at a health care facility  -     CBC Auto Differential; Future; Expected date: 09/04/2023  -     Comprehensive Metabolic Panel; Future; Expected date: 09/04/2023  -     Hemoglobin A1C; Future; Expected date: 09/04/2023  -     Lipid Panel; Future; Expected date: 09/04/2023  -     Urinalysis; Future; Expected date: 09/04/2023  -     TSH; Future; Expected date: 09/04/2023    7. Papular urticaria  Assessment & Plan:  Over the right hand  2/2 insect bite (caterpillar sting)  Will treat with topical steroid for now and observation.    Orders:  -     Ambulatory referral/consult to Dermatology; Future; Expected date: 04/28/2023       Plan:       - referral to Neurology    Education provided  Lifestyle recommendations given  AVS printed, explained, and given to the patient.  RTC in : 6 months for AWV, appointment scheduled, labs ordered.            LENIN KEE MD, MPH  Internal Medicine  [facility]          Update 4/21/23.  Right hand swelling and itching persists, not with  ""spots" noted as if there were foreign bodies in the fingers.  Will refer to dermatology for evaluation.    "

## 2023-04-10 PROBLEM — Z00.00 HEALTHCARE MAINTENANCE: Status: RESOLVED | Noted: 2023-01-05 | Resolved: 2023-04-10

## 2023-04-21 PROBLEM — L28.2 PAPULAR URTICARIA: Status: ACTIVE | Noted: 2023-04-21

## 2023-04-21 NOTE — ASSESSMENT & PLAN NOTE
Over the right hand  2/2 insect bite (caterpillar sting)  Will treat with topical steroid for now and observation.

## 2023-04-28 ENCOUNTER — OFFICE VISIT (OUTPATIENT)
Dept: DERMATOLOGY | Facility: CLINIC | Age: 48
End: 2023-04-28
Payer: COMMERCIAL

## 2023-04-28 VITALS — BODY MASS INDEX: 22.87 KG/M2 | WEIGHT: 146 LBS

## 2023-04-28 DIAGNOSIS — L40.8 SEBOPSORIASIS: ICD-10-CM

## 2023-04-28 DIAGNOSIS — L28.2 PAPULAR URTICARIA: ICD-10-CM
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
## 2023-04-28 PROCEDURE — 99999 PR PBB SHADOW E&M-EST. PATIENT-LVL IV: ICD-10-PCS | Mod: PBBFAC,,, | Performed by: DERMATOLOGY

## 2023-04-28 PROCEDURE — 1160F RVW MEDS BY RX/DR IN RCRD: CPT | Mod: CPTII,S$GLB,, | Performed by: DERMATOLOGY

## 2023-04-28 PROCEDURE — 99999 PR PBB SHADOW E&M-EST. PATIENT-LVL IV: CPT | Mod: PBBFAC,,, | Performed by: DERMATOLOGY

## 2023-04-28 PROCEDURE — 99214 OFFICE O/P EST MOD 30 MIN: CPT | Mod: S$GLB,,, | Performed by: DERMATOLOGY

## 2023-04-28 PROCEDURE — 1160F PR REVIEW ALL MEDS BY PRESCRIBER/CLIN PHARMACIST DOCUMENTED: ICD-10-PCS | Mod: CPTII,S$GLB,, | Performed by: DERMATOLOGY

## 2023-04-28 PROCEDURE — 99214 PR OFFICE/OUTPT VISIT, EST, LEVL IV, 30-39 MIN: ICD-10-PCS | Mod: S$GLB,,, | Performed by: DERMATOLOGY

## 2023-04-28 PROCEDURE — 1159F PR MEDICATION LIST DOCUMENTED IN MEDICAL RECORD: ICD-10-PCS | Mod: CPTII,S$GLB,, | Performed by: DERMATOLOGY

## 2023-04-28 PROCEDURE — 3008F BODY MASS INDEX DOCD: CPT | Mod: CPTII,S$GLB,, | Performed by: DERMATOLOGY

## 2023-04-28 PROCEDURE — 1159F MED LIST DOCD IN RCRD: CPT | Mod: CPTII,S$GLB,, | Performed by: DERMATOLOGY

## 2023-04-28 PROCEDURE — 3008F PR BODY MASS INDEX (BMI) DOCUMENTED: ICD-10-PCS | Mod: CPTII,S$GLB,, | Performed by: DERMATOLOGY

## 2023-04-28 RX ORDER — KETOCONAZOLE 20 MG/ML
SHAMPOO, SUSPENSION TOPICAL
Qty: 120 ML | Refills: 3 | Status: SHIPPED | OUTPATIENT
Start: 2023-04-28

## 2023-04-28 RX ORDER — TACROLIMUS 1 MG/G
OINTMENT TOPICAL 2 TIMES DAILY
Qty: 30 G | Refills: 3 | Status: SHIPPED | OUTPATIENT
Start: 2023-04-28

## 2023-04-28 RX ORDER — CALCIPOTRIENE AND BETAMETHASONE DIPROPIONATE 50; .5 UG/G; MG/G
AEROSOL, FOAM TOPICAL
Qty: 60 G | Refills: 4 | Status: SHIPPED | OUTPATIENT
Start: 2023-04-28

## 2023-04-28 NOTE — PROGRESS NOTES
Subjective:      Patient ID:  Mirtha Gary is a 47 y.o. female who presents for   Chief Complaint   Patient presents with    Insect Bite     Right hand    Psoriasis     Folow up     Follow up for sebopsoriasis scalp uses ketoconizole shampoo and enstilar prn working well.  Now with caterpillar sting on 2nd and 3rd digits right hand for 3 weeks used cortisone cream no better, itches.     Insect Bite - Initial  Affected locations: left fingers and right fingers  Signs / symptoms: asymptomatic  Aggravated by: nothing  Relieving factors/Treatments tried: nothing    Review of Systems   Constitutional:  Negative for fever, chills, weight loss, weight gain, fatigue, night sweats and malaise.   Skin:  Negative for daily sunscreen use, activity-related sunscreen use and wears hat.   Hematologic/Lymphatic: Does not bruise/bleed easily.     Objective:   Physical Exam   Constitutional: She appears well-developed and well-nourished.   Neurological: She is alert and oriented to person, place, and time.   Psychiatric: She has a normal mood and affect.   Skin:   Areas Examined (abnormalities noted in diagram):   Scalp / Hair Palpated and Inspected  Nails and Digits Inspection Performed         Diagram Legend     Erythematous scaling macule/papule c/w actinic keratosis       Vascular papule c/w angioma      Pigmented verrucoid papule/plaque c/w seborrheic keratosis      Yellow umbilicated papule c/w sebaceous hyperplasia      Irregularly shaped tan macule c/w lentigo     1-2 mm smooth white papules consistent with Milia      Movable subcutaneous cyst with punctum c/w epidermal inclusion cyst      Subcutaneous movable cyst c/w pilar cyst      Firm pink to brown papule c/w dermatofibroma      Pedunculated fleshy papule(s) c/w skin tag(s)      Evenly pigmented macule c/w junctional nevus     Mildly variegated pigmented, slightly irregular-bordered macule c/w mildly atypical nevus      Flesh colored to evenly pigmented papule c/w  intradermal nevus       Pink pearly papule/plaque c/w basal cell carcinoma      Erythematous hyperkeratotic cursted plaque c/w SCC      Surgical scar with no sign of skin cancer recurrence      Open and closed comedones      Inflammatory papules and pustules      Verrucoid papule consistent consistent with wart     Erythematous eczematous patches and plaques     Dystrophic onycholytic nail with subungual debris c/w onychomycosis     Umbilicated papule    Erythematous-base heme-crusted tan verrucoid plaque consistent with inflamed seborrheic keratosis     Erythematous Silvery Scaling Plaque c/w Psoriasis     See annotation      Assessment / Plan:        Persistent reaction to insect bite  -     tacrolimus (PROTOPIC) 0.1 % ointment; Apply topically 2 (two) times daily.  Dispense: 30 g; Refill: 3  Dermeleve for pruritus    Papular urticaria  -     Ambulatory referral/consult to Dermatology    Sebopsoriasis  -     ketoconazole (NIZORAL) 2 % shampoo; Apply to scalp, let sit for 5-10 minutes then rinse. Use 3-5 times weekly  Dispense: 120 mL; Refill: 3  -     calcipotriene-betamethasone (ENSTILAR) 0.005-0.064 % Foam; Apply to scalp once or twice daily. Then wear shower cap overnight  Dispense: 60 g; Refill: 4             Follow up in about 1 year (around 4/28/2024).

## 2023-04-30 RX ORDER — METHYLPREDNISOLONE 4 MG/1
TABLET ORAL
Qty: 30 TABLET | Refills: 0 | Status: SHIPPED | OUTPATIENT
Start: 2023-04-30 | End: 2023-09-06

## 2023-05-01 ENCOUNTER — DOCUMENTATION ONLY (OUTPATIENT)
Dept: INTERNAL MEDICINE | Facility: CLINIC | Age: 48
End: 2023-05-01

## 2023-05-01 ENCOUNTER — OFFICE VISIT (OUTPATIENT)
Dept: INTERNAL MEDICINE | Facility: CLINIC | Age: 48
End: 2023-05-01
Payer: COMMERCIAL

## 2023-05-01 VITALS
HEIGHT: 67 IN | DIASTOLIC BLOOD PRESSURE: 70 MMHG | BODY MASS INDEX: 22.91 KG/M2 | WEIGHT: 146 LBS | HEART RATE: 70 BPM | SYSTOLIC BLOOD PRESSURE: 100 MMHG

## 2023-05-01 DIAGNOSIS — J06.9 ACUTE UPPER RESPIRATORY INFECTION, UNSPECIFIED: ICD-10-CM

## 2023-05-01 DIAGNOSIS — J04.0 ACUTE LARYNGITIS: Primary | ICD-10-CM

## 2023-05-01 DIAGNOSIS — J98.01 ACUTE BRONCHOSPASM: ICD-10-CM

## 2023-05-01 DIAGNOSIS — J30.1 SEASONAL ALLERGIC RHINITIS DUE TO POLLEN: ICD-10-CM

## 2023-05-01 LAB
CTP QC/QA: YES
SARS-COV-2 AG RESP QL IA.RAPID: NEGATIVE

## 2023-05-01 PROCEDURE — 3074F SYST BP LT 130 MM HG: CPT | Mod: CPTII,S$GLB,, | Performed by: STUDENT IN AN ORGANIZED HEALTH CARE EDUCATION/TRAINING PROGRAM

## 2023-05-01 PROCEDURE — 87811 SARS-COV-2 COVID19 W/OPTIC: CPT | Mod: QW,S$GLB,, | Performed by: STUDENT IN AN ORGANIZED HEALTH CARE EDUCATION/TRAINING PROGRAM

## 2023-05-01 PROCEDURE — 87811 SARS CORONAVIRUS 2 ANTIGEN POCT, MANUAL READ: ICD-10-PCS | Mod: QW,S$GLB,, | Performed by: STUDENT IN AN ORGANIZED HEALTH CARE EDUCATION/TRAINING PROGRAM

## 2023-05-01 PROCEDURE — 99214 OFFICE O/P EST MOD 30 MIN: CPT | Mod: S$GLB,,, | Performed by: STUDENT IN AN ORGANIZED HEALTH CARE EDUCATION/TRAINING PROGRAM

## 2023-05-01 PROCEDURE — 1159F MED LIST DOCD IN RCRD: CPT | Mod: CPTII,S$GLB,, | Performed by: STUDENT IN AN ORGANIZED HEALTH CARE EDUCATION/TRAINING PROGRAM

## 2023-05-01 PROCEDURE — 1160F PR REVIEW ALL MEDS BY PRESCRIBER/CLIN PHARMACIST DOCUMENTED: ICD-10-PCS | Mod: CPTII,S$GLB,, | Performed by: STUDENT IN AN ORGANIZED HEALTH CARE EDUCATION/TRAINING PROGRAM

## 2023-05-01 PROCEDURE — 99214 PR OFFICE/OUTPT VISIT, EST, LEVL IV, 30-39 MIN: ICD-10-PCS | Mod: S$GLB,,, | Performed by: STUDENT IN AN ORGANIZED HEALTH CARE EDUCATION/TRAINING PROGRAM

## 2023-05-01 PROCEDURE — 3008F PR BODY MASS INDEX (BMI) DOCUMENTED: ICD-10-PCS | Mod: CPTII,S$GLB,, | Performed by: STUDENT IN AN ORGANIZED HEALTH CARE EDUCATION/TRAINING PROGRAM

## 2023-05-01 PROCEDURE — 3078F DIAST BP <80 MM HG: CPT | Mod: CPTII,S$GLB,, | Performed by: STUDENT IN AN ORGANIZED HEALTH CARE EDUCATION/TRAINING PROGRAM

## 2023-05-01 PROCEDURE — 3074F PR MOST RECENT SYSTOLIC BLOOD PRESSURE < 130 MM HG: ICD-10-PCS | Mod: CPTII,S$GLB,, | Performed by: STUDENT IN AN ORGANIZED HEALTH CARE EDUCATION/TRAINING PROGRAM

## 2023-05-01 PROCEDURE — 99999 PR PBB SHADOW E&M-EST. PATIENT-LVL III: CPT | Mod: PBBFAC,,, | Performed by: STUDENT IN AN ORGANIZED HEALTH CARE EDUCATION/TRAINING PROGRAM

## 2023-05-01 PROCEDURE — 1160F RVW MEDS BY RX/DR IN RCRD: CPT | Mod: CPTII,S$GLB,, | Performed by: STUDENT IN AN ORGANIZED HEALTH CARE EDUCATION/TRAINING PROGRAM

## 2023-05-01 PROCEDURE — 99999 PR PBB SHADOW E&M-EST. PATIENT-LVL III: ICD-10-PCS | Mod: PBBFAC,,, | Performed by: STUDENT IN AN ORGANIZED HEALTH CARE EDUCATION/TRAINING PROGRAM

## 2023-05-01 PROCEDURE — 3078F PR MOST RECENT DIASTOLIC BLOOD PRESSURE < 80 MM HG: ICD-10-PCS | Mod: CPTII,S$GLB,, | Performed by: STUDENT IN AN ORGANIZED HEALTH CARE EDUCATION/TRAINING PROGRAM

## 2023-05-01 PROCEDURE — 1159F PR MEDICATION LIST DOCUMENTED IN MEDICAL RECORD: ICD-10-PCS | Mod: CPTII,S$GLB,, | Performed by: STUDENT IN AN ORGANIZED HEALTH CARE EDUCATION/TRAINING PROGRAM

## 2023-05-01 PROCEDURE — 3008F BODY MASS INDEX DOCD: CPT | Mod: CPTII,S$GLB,, | Performed by: STUDENT IN AN ORGANIZED HEALTH CARE EDUCATION/TRAINING PROGRAM

## 2023-05-01 NOTE — PROGRESS NOTES
"Subjective:       Patient ID: Mirtha Gary is a 47 y.o. female.    Chief Complaint: No chief complaint on file.    HPI    Mirtha Gary is a 47 y.o. female , Upper sorbian speaking, with a history of:  Migraine headaches  Lumbosacral radiculopathy  Psoriasis of the scalp  Palpitations  H/o endometriosis  Cyst of ovary  Small cyst of the left kidney  Interstitial cystitis  Recurrent UTIs  H/o of COVID x 2  H/o blastocystis hominis infection  Chronic abdominal pain in the LLQ  BL knee pain    Patient comes to the clinic complaining of hoarseness for 2 days.    She first started presenting "itchiness" of her throat, mild rhinorrhea, and dry persistent cough.    She has a h/o chronic allergic rhinitis for which she uses nasal fluticasone and oral Zyrtec.  Since the day of symptoms presentation she has used her medications without improvement.  She has also used combivent inhaler and today she is on her second day of Medrol pack.  Cough is persistent and doesn't let her sleep.    She has been exposed to dust and pollen. No sick contacts.    No other symptoms.    Healthcare Maintenance:  Colonoscopy: 9/2022  Vaccinations: Pneumonia, Zoster, Tetanus (ordered in previous visit, pending)  COVID vaccination: completed  Depression screening: PHQ2 score = 1     Annual Wellness visit approx. Month: September ROS  11-point review of systems done. Negative except for detailed in the HPI.        Objective:      Vitals:    05/01/23 1711   BP: 100/70   Pulse: 70   Weight: 66.2 kg (146 lb)   Height: 5' 7" (1.702 m)         Physical Exam  Vitals and nursing note reviewed.   Constitutional:       Appearance: Normal appearance.   HENT:      Head: Normocephalic and atraumatic.      Comments: Hoarseness     Right Ear: Tympanic membrane normal.      Left Ear: Tympanic membrane normal.      Nose: Nose normal.      Mouth/Throat:      Mouth: Mucous membranes are moist.      Pharynx: Oropharynx is clear.   Eyes:      Extraocular Movements: " Extraocular movements intact.      Conjunctiva/sclera: Conjunctivae normal.      Pupils: Pupils are equal, round, and reactive to light.   Cardiovascular:      Rate and Rhythm: Normal rate and regular rhythm.      Pulses: Normal pulses.      Heart sounds: Normal heart sounds.   Pulmonary:      Effort: Pulmonary effort is normal.      Breath sounds: Normal breath sounds.   Abdominal:      General: Bowel sounds are normal. There is no distension.      Palpations: Abdomen is soft.      Tenderness: There is no abdominal tenderness.   Musculoskeletal:         General: Normal range of motion.      Cervical back: Normal range of motion and neck supple.   Skin:     General: Skin is warm.   Neurological:      General: No focal deficit present.      Mental Status: She is alert and oriented to person, place, and time. Mental status is at baseline.   Psychiatric:         Mood and Affect: Mood normal.          Assessment:       1. Acute laryngitis  Comments:  Complete Medrol pack treatment    2. Acute upper respiratory infection, unspecified  Comments:  POC COVID ordered  Orders:  -     SARS Coronavirus 2 Antigen, POCT Manual Read    3. Seasonal allergic rhinitis due to pollen  Comments:  Referral to allergy specialist  Orders:  -     Ambulatory referral/consult to Allergy; Future; Expected date: 05/08/2023    4. Acute bronchospasm  Comments:  Continue albuterol inhaler, increase frequency to q4h       Plan:       - medrol pack  - Continue albuterol inhaler  - continue fluticasone  - continue zyrtec  - Referral to Allergy specialist  - POC covid ordered: Negative      Education provided  Lifestyle recommendations given  AVS printed, explained, and given to the patient.  RTC in :6 months for AWV, appointment scheduled, labs ordered.             LENIN KEE MD, MPH  Internal Medicine  [facility]  [facility]

## 2023-05-02 RX ORDER — CLARITHROMYCIN 500 MG/1
500 TABLET, FILM COATED ORAL
Status: CANCELLED | OUTPATIENT
Start: 2023-05-02

## 2023-05-02 RX ORDER — AZITHROMYCIN 1 G/1
1 POWDER, FOR SUSPENSION ORAL ONCE
Qty: 1 PACKET | Refills: 0 | Status: CANCELLED | OUTPATIENT
Start: 2023-05-02 | End: 2023-05-02

## 2023-05-03 RX ORDER — ALBUTEROL SULFATE 0.63 MG/3ML
0.63 SOLUTION RESPIRATORY (INHALATION) EVERY 6 HOURS PRN
Qty: 75 ML | Refills: 0 | Status: SHIPPED | OUTPATIENT
Start: 2023-05-03 | End: 2023-05-17

## 2023-05-03 RX ORDER — BUDESONIDE 0.25 MG/2ML
0.25 INHALANT ORAL DAILY
Qty: 20 ML | Refills: 0 | Status: SHIPPED | OUTPATIENT
Start: 2023-05-03 | End: 2023-05-25 | Stop reason: SDUPTHER

## 2023-05-06 DIAGNOSIS — J22 LOWER RESPIRATORY INFECTION (E.G., BRONCHITIS, PNEUMONIA, PNEUMONITIS, PULMONITIS): Primary | ICD-10-CM

## 2023-05-08 ENCOUNTER — HOSPITAL ENCOUNTER (OUTPATIENT)
Dept: RADIOLOGY | Facility: OTHER | Age: 48
Discharge: HOME | End: 2023-05-08
Attending: STUDENT IN AN ORGANIZED HEALTH CARE EDUCATION/TRAINING PROGRAM
Payer: COMMERCIAL

## 2023-05-08 ENCOUNTER — OFFICE VISIT (OUTPATIENT)
Dept: INTERNAL MEDICINE | Facility: CLINIC | Age: 48
End: 2023-05-08
Payer: COMMERCIAL

## 2023-05-08 VITALS
DIASTOLIC BLOOD PRESSURE: 60 MMHG | WEIGHT: 144.81 LBS | SYSTOLIC BLOOD PRESSURE: 102 MMHG | HEIGHT: 67 IN | HEART RATE: 78 BPM | BODY MASS INDEX: 22.73 KG/M2

## 2023-05-08 DIAGNOSIS — J22 LOWER RESPIRATORY INFECTION (E.G., BRONCHITIS, PNEUMONIA, PNEUMONITIS, PULMONITIS): ICD-10-CM

## 2023-05-08 PROCEDURE — 99999 PR PBB SHADOW E&M-EST. PATIENT-LVL III: ICD-10-PCS | Mod: PBBFAC,,, | Performed by: STUDENT IN AN ORGANIZED HEALTH CARE EDUCATION/TRAINING PROGRAM

## 2023-05-08 PROCEDURE — 1160F RVW MEDS BY RX/DR IN RCRD: CPT | Mod: CPTII,S$GLB,, | Performed by: STUDENT IN AN ORGANIZED HEALTH CARE EDUCATION/TRAINING PROGRAM

## 2023-05-08 PROCEDURE — 99213 PR OFFICE/OUTPT VISIT, EST, LEVL III, 20-29 MIN: ICD-10-PCS | Mod: S$GLB,,, | Performed by: STUDENT IN AN ORGANIZED HEALTH CARE EDUCATION/TRAINING PROGRAM

## 2023-05-08 PROCEDURE — 99213 OFFICE O/P EST LOW 20 MIN: CPT | Mod: S$GLB,,, | Performed by: STUDENT IN AN ORGANIZED HEALTH CARE EDUCATION/TRAINING PROGRAM

## 2023-05-08 PROCEDURE — 71046 X-RAY EXAM CHEST 2 VIEWS: CPT | Mod: TC,FY

## 2023-05-08 PROCEDURE — 71046 X-RAY EXAM CHEST 2 VIEWS: CPT | Mod: 26,,, | Performed by: RADIOLOGY

## 2023-05-08 PROCEDURE — 3078F DIAST BP <80 MM HG: CPT | Mod: CPTII,S$GLB,, | Performed by: STUDENT IN AN ORGANIZED HEALTH CARE EDUCATION/TRAINING PROGRAM

## 2023-05-08 PROCEDURE — 3074F SYST BP LT 130 MM HG: CPT | Mod: CPTII,S$GLB,, | Performed by: STUDENT IN AN ORGANIZED HEALTH CARE EDUCATION/TRAINING PROGRAM

## 2023-05-08 PROCEDURE — 1159F MED LIST DOCD IN RCRD: CPT | Mod: CPTII,S$GLB,, | Performed by: STUDENT IN AN ORGANIZED HEALTH CARE EDUCATION/TRAINING PROGRAM

## 2023-05-08 PROCEDURE — 3044F HG A1C LEVEL LT 7.0%: CPT | Mod: CPTII,S$GLB,, | Performed by: STUDENT IN AN ORGANIZED HEALTH CARE EDUCATION/TRAINING PROGRAM

## 2023-05-08 PROCEDURE — 1160F PR REVIEW ALL MEDS BY PRESCRIBER/CLIN PHARMACIST DOCUMENTED: ICD-10-PCS | Mod: CPTII,S$GLB,, | Performed by: STUDENT IN AN ORGANIZED HEALTH CARE EDUCATION/TRAINING PROGRAM

## 2023-05-08 PROCEDURE — 71046 XR CHEST PA AND LATERAL: ICD-10-PCS | Mod: 26,,, | Performed by: RADIOLOGY

## 2023-05-08 PROCEDURE — 3074F PR MOST RECENT SYSTOLIC BLOOD PRESSURE < 130 MM HG: ICD-10-PCS | Mod: CPTII,S$GLB,, | Performed by: STUDENT IN AN ORGANIZED HEALTH CARE EDUCATION/TRAINING PROGRAM

## 2023-05-08 PROCEDURE — 3008F PR BODY MASS INDEX (BMI) DOCUMENTED: ICD-10-PCS | Mod: CPTII,S$GLB,, | Performed by: STUDENT IN AN ORGANIZED HEALTH CARE EDUCATION/TRAINING PROGRAM

## 2023-05-08 PROCEDURE — 3044F PR MOST RECENT HEMOGLOBIN A1C LEVEL <7.0%: ICD-10-PCS | Mod: CPTII,S$GLB,, | Performed by: STUDENT IN AN ORGANIZED HEALTH CARE EDUCATION/TRAINING PROGRAM

## 2023-05-08 PROCEDURE — 99999 PR PBB SHADOW E&M-EST. PATIENT-LVL III: CPT | Mod: PBBFAC,,, | Performed by: STUDENT IN AN ORGANIZED HEALTH CARE EDUCATION/TRAINING PROGRAM

## 2023-05-08 PROCEDURE — 3008F BODY MASS INDEX DOCD: CPT | Mod: CPTII,S$GLB,, | Performed by: STUDENT IN AN ORGANIZED HEALTH CARE EDUCATION/TRAINING PROGRAM

## 2023-05-08 PROCEDURE — 1159F PR MEDICATION LIST DOCUMENTED IN MEDICAL RECORD: ICD-10-PCS | Mod: CPTII,S$GLB,, | Performed by: STUDENT IN AN ORGANIZED HEALTH CARE EDUCATION/TRAINING PROGRAM

## 2023-05-08 PROCEDURE — 3078F PR MOST RECENT DIASTOLIC BLOOD PRESSURE < 80 MM HG: ICD-10-PCS | Mod: CPTII,S$GLB,, | Performed by: STUDENT IN AN ORGANIZED HEALTH CARE EDUCATION/TRAINING PROGRAM

## 2023-05-08 RX ORDER — CLARITHROMYCIN 500 MG/1
500 TABLET, FILM COATED ORAL EVERY 12 HOURS
Qty: 20 TABLET | Refills: 0 | Status: SHIPPED | OUTPATIENT
Start: 2023-05-08 | End: 2023-05-18

## 2023-05-08 NOTE — PROGRESS NOTES
"Subjective:       Patient ID: Mirtha Gary is a 47 y.o. female.    Chief Complaint: No chief complaint on file.    HPI    Mirtha Gary is a 47 y.o. female , Khmer speaking, with a history of:  Migraine headaches  Lumbosacral radiculopathy  Psoriasis of the scalp  Palpitations  H/o endometriosis  Cyst of ovary  Small cyst of the left kidney  Interstitial cystitis  Recurrent UTIs  H/o of COVID x 2  H/o blastocystis hominis infection  Chronic abdominal pain in the LLQ  BL knee pain    Patient comes to the clinic for a follow up visit.    Patient has completed a total of 10 days of a respiratory infection that started weight acute laryngitis, progress to cough, intractable, managed at home with inhalers with poor response, patient has been reported to the office and she was prescribed clarithromycin 5 days ago.  She has had significant improvement, her voices returning, her cough has decreased.    Today's chest x-ray is within normal limits and does not show any signs of consolidations.    CBC has normal WBC count with normal distribution of neutrophils and lymphocytes with some immature granulocytes observed.    Normal CMP  No fever    Healthcare Maintenance:  Colonoscopy: 9/2022  Vaccinations: Pneumonia, Zoster, Tetanus (ordered in previous visit, pending)  COVID vaccination: completed  Depression screening: PHQ2 score = 1     Annual Wellness visit approx. Month: September ROS  11-point review of systems done. Negative except for detailed in the HPI.        Objective:      Vitals:    05/08/23 1437   BP: 102/60   Pulse: 78   Weight: 65.7 kg (144 lb 12.8 oz)   Height: 5' 7" (1.702 m)         Physical Exam  Vitals and nursing note reviewed.   Constitutional:       Appearance: Normal appearance.      Comments: Accompanied by her spouse and her son.  Mild hoarseness noted.   HENT:      Head: Normocephalic and atraumatic.      Right Ear: Tympanic membrane normal.      Left Ear: Tympanic membrane normal.      " Nose: Nose normal.      Mouth/Throat:      Mouth: Mucous membranes are moist.      Pharynx: Oropharynx is clear.   Eyes:      Extraocular Movements: Extraocular movements intact.      Conjunctiva/sclera: Conjunctivae normal.      Pupils: Pupils are equal, round, and reactive to light.   Cardiovascular:      Rate and Rhythm: Normal rate and regular rhythm.      Pulses: Normal pulses.      Heart sounds: Normal heart sounds.   Pulmonary:      Effort: Pulmonary effort is normal.      Breath sounds: Normal breath sounds.   Abdominal:      General: Bowel sounds are normal. There is no distension.      Palpations: Abdomen is soft.      Tenderness: There is no abdominal tenderness.   Musculoskeletal:         General: Normal range of motion.      Cervical back: Normal range of motion and neck supple.   Skin:     General: Skin is warm.   Neurological:      General: No focal deficit present.      Mental Status: She is alert and oriented to person, place, and time. Mental status is at baseline.   Psychiatric:         Mood and Affect: Mood normal.          Assessment:       1. Lower respiratory infection (e.g., bronchitis, pneumonia, pneumonitis, pulmonitis)  Assessment & Plan:  Improved  Likely atypical pneumonia s/p pneumonitis  Treated with clarithromycin, will complete ATB course.  Continue duonebz, inhaler, and avoid exposure to pain, fumes, dust.    Orders:  -     clarithromycin (BIAXIN) 500 MG tablet; Take 1 tablet (500 mg total) by mouth every 12 (twelve) hours. for 10 days  Dispense: 20 tablet; Refill: 0       Plan:       Complete ATB  Continue nebz  Continue inhalers    Education provided  Lifestyle recommendations given  AVS printed, explained, and given to the patient.  RTC PRN            LENIN KEE MD, MPH  Internal Medicine  [facility]  [facility]

## 2023-05-08 NOTE — ASSESSMENT & PLAN NOTE
Improved  Likely atypical pneumonia s/p pneumonitis  Treated with clarithromycin, will complete ATB course.  Continue duonebz, inhaler, and avoid exposure to pain, fumes, dust.

## 2023-05-25 DIAGNOSIS — J45.909 MODERATE ASTHMA WITHOUT COMPLICATION, UNSPECIFIED WHETHER PERSISTENT: Primary | ICD-10-CM

## 2023-05-25 RX ORDER — BUDESONIDE 0.25 MG/2ML
0.25 INHALANT ORAL DAILY
Qty: 60 ML | Refills: 0 | Status: SHIPPED | OUTPATIENT
Start: 2023-05-25 | End: 2023-06-09

## 2023-06-09 DIAGNOSIS — J45.909 MODERATE ASTHMA WITHOUT COMPLICATION, UNSPECIFIED WHETHER PERSISTENT: ICD-10-CM

## 2023-06-09 RX ORDER — BUDESONIDE 0.25 MG/2ML
0.25 INHALANT ORAL DAILY
Qty: 180 ML | Refills: 0 | Status: SHIPPED | OUTPATIENT
Start: 2023-06-09 | End: 2023-09-07

## 2023-07-18 ENCOUNTER — TELEPHONE (OUTPATIENT)
Dept: SLEEP MEDICINE | Facility: CLINIC | Age: 48
End: 2023-07-18
Payer: COMMERCIAL

## 2023-07-31 ENCOUNTER — PATIENT MESSAGE (OUTPATIENT)
Dept: INTERNAL MEDICINE | Facility: CLINIC | Age: 48
End: 2023-07-31
Payer: COMMERCIAL

## 2023-07-31 ENCOUNTER — LAB VISIT (OUTPATIENT)
Dept: LAB | Facility: OTHER | Age: 48
End: 2023-07-31
Attending: STUDENT IN AN ORGANIZED HEALTH CARE EDUCATION/TRAINING PROGRAM
Payer: COMMERCIAL

## 2023-07-31 DIAGNOSIS — R10.12 LEFT UPPER QUADRANT ABDOMINAL PAIN: ICD-10-CM

## 2023-07-31 DIAGNOSIS — A09 TRAVELER'S DIARRHEA: ICD-10-CM

## 2023-07-31 DIAGNOSIS — R10.12 LEFT UPPER QUADRANT ABDOMINAL PAIN: Primary | ICD-10-CM

## 2023-07-31 LAB
ALBUMIN SERPL BCP-MCNC: 4.2 G/DL (ref 3.5–5.2)
ALP SERPL-CCNC: 51 U/L (ref 55–135)
ALT SERPL W/O P-5'-P-CCNC: 68 U/L (ref 10–44)
ANION GAP SERPL CALC-SCNC: 9 MMOL/L (ref 8–16)
AST SERPL-CCNC: 32 U/L (ref 10–40)
BASOPHILS # BLD AUTO: 0.03 K/UL (ref 0–0.2)
BASOPHILS NFR BLD: 0.4 % (ref 0–1.9)
BILIRUB SERPL-MCNC: 0.5 MG/DL (ref 0.1–1)
BUN SERPL-MCNC: 12 MG/DL (ref 6–20)
CALCIUM SERPL-MCNC: 9.8 MG/DL (ref 8.7–10.5)
CHLORIDE SERPL-SCNC: 106 MMOL/L (ref 95–110)
CO2 SERPL-SCNC: 23 MMOL/L (ref 23–29)
CREAT SERPL-MCNC: 0.8 MG/DL (ref 0.5–1.4)
DIFFERENTIAL METHOD: NORMAL
EOSINOPHIL # BLD AUTO: 0.1 K/UL (ref 0–0.5)
EOSINOPHIL NFR BLD: 0.9 % (ref 0–8)
ERYTHROCYTE [DISTWIDTH] IN BLOOD BY AUTOMATED COUNT: 12 % (ref 11.5–14.5)
EST. GFR  (NO RACE VARIABLE): >60 ML/MIN/1.73 M^2
GLUCOSE SERPL-MCNC: 78 MG/DL (ref 70–110)
HCT VFR BLD AUTO: 45.4 % (ref 37–48.5)
HGB BLD-MCNC: 14.7 G/DL (ref 12–16)
IMM GRANULOCYTES # BLD AUTO: 0.02 K/UL (ref 0–0.04)
IMM GRANULOCYTES NFR BLD AUTO: 0.3 % (ref 0–0.5)
LYMPHOCYTES # BLD AUTO: 1.7 K/UL (ref 1–4.8)
LYMPHOCYTES NFR BLD: 22.1 % (ref 18–48)
MCH RBC QN AUTO: 30.5 PG (ref 27–31)
MCHC RBC AUTO-ENTMCNC: 32.4 G/DL (ref 32–36)
MCV RBC AUTO: 94 FL (ref 82–98)
MONOCYTES # BLD AUTO: 0.6 K/UL (ref 0.3–1)
MONOCYTES NFR BLD: 8.1 % (ref 4–15)
NEUTROPHILS # BLD AUTO: 5.2 K/UL (ref 1.8–7.7)
NEUTROPHILS NFR BLD: 68.2 % (ref 38–73)
NRBC BLD-RTO: 0 /100 WBC
PLATELET # BLD AUTO: 244 K/UL (ref 150–450)
PMV BLD AUTO: 10.3 FL (ref 9.2–12.9)
POTASSIUM SERPL-SCNC: 4.1 MMOL/L (ref 3.5–5.1)
PROT SERPL-MCNC: 7.5 G/DL (ref 6–8.4)
RBC # BLD AUTO: 4.82 M/UL (ref 4–5.4)
SODIUM SERPL-SCNC: 138 MMOL/L (ref 136–145)
WBC # BLD AUTO: 7.68 K/UL (ref 3.9–12.7)

## 2023-07-31 PROCEDURE — 85025 COMPLETE CBC W/AUTO DIFF WBC: CPT | Performed by: STUDENT IN AN ORGANIZED HEALTH CARE EDUCATION/TRAINING PROGRAM

## 2023-07-31 PROCEDURE — 87329 GIARDIA AG IA: CPT | Performed by: STUDENT IN AN ORGANIZED HEALTH CARE EDUCATION/TRAINING PROGRAM

## 2023-07-31 PROCEDURE — 36415 COLL VENOUS BLD VENIPUNCTURE: CPT | Performed by: STUDENT IN AN ORGANIZED HEALTH CARE EDUCATION/TRAINING PROGRAM

## 2023-07-31 PROCEDURE — 87338 HPYLORI STOOL AG IA: CPT | Performed by: STUDENT IN AN ORGANIZED HEALTH CARE EDUCATION/TRAINING PROGRAM

## 2023-07-31 PROCEDURE — 87209 SMEAR COMPLEX STAIN: CPT | Performed by: STUDENT IN AN ORGANIZED HEALTH CARE EDUCATION/TRAINING PROGRAM

## 2023-07-31 PROCEDURE — 80053 COMPREHEN METABOLIC PANEL: CPT | Performed by: STUDENT IN AN ORGANIZED HEALTH CARE EDUCATION/TRAINING PROGRAM

## 2023-07-31 NOTE — TELEPHONE ENCOUNTER
LUQ pain s/o international travel, abdominal distension, diarrhea (watery), nausea.  Son is also sick, same symptoms.  H/o H. P.ylori  Will order bruce test for ova and parasites, H. Pylori, Giardia, CBC, CMP.  Will treat accordingly.

## 2023-08-01 ENCOUNTER — HOSPITAL ENCOUNTER (OUTPATIENT)
Dept: RADIOLOGY | Facility: HOSPITAL | Age: 48
Discharge: HOME | End: 2023-08-01
Attending: STUDENT IN AN ORGANIZED HEALTH CARE EDUCATION/TRAINING PROGRAM
Payer: COMMERCIAL

## 2023-08-01 DIAGNOSIS — R10.12 LEFT UPPER QUADRANT ABDOMINAL PAIN: ICD-10-CM

## 2023-08-01 PROCEDURE — 76700 US EXAM ABDOM COMPLETE: CPT | Mod: TC

## 2023-08-01 PROCEDURE — 76700 US ABDOMEN COMPLETE: ICD-10-PCS | Mod: 26,,, | Performed by: RADIOLOGY

## 2023-08-01 PROCEDURE — 76700 US EXAM ABDOM COMPLETE: CPT | Mod: 26,,, | Performed by: RADIOLOGY

## 2023-08-03 LAB
CRYPTOSP AG STL QL IA: NEGATIVE
G LAMBLIA AG STL QL IA: NEGATIVE

## 2023-08-09 LAB — O+P STL MICRO: NORMAL

## 2023-08-10 LAB
H PYLORI AG STL QL IA: NOT DETECTED
SPECIMEN SOURCE: NORMAL

## 2023-08-11 ENCOUNTER — PATIENT MESSAGE (OUTPATIENT)
Dept: RESEARCH | Facility: HOSPITAL | Age: 48
End: 2023-08-11
Payer: COMMERCIAL

## 2023-08-31 ENCOUNTER — OFFICE VISIT (OUTPATIENT)
Dept: ALLERGY | Facility: CLINIC | Age: 48
End: 2023-08-31
Payer: COMMERCIAL

## 2023-08-31 VITALS
BODY MASS INDEX: 22.44 KG/M2 | SYSTOLIC BLOOD PRESSURE: 110 MMHG | WEIGHT: 143.31 LBS | DIASTOLIC BLOOD PRESSURE: 62 MMHG | HEART RATE: 67 BPM

## 2023-08-31 DIAGNOSIS — R05.3 CHRONIC COUGH: ICD-10-CM

## 2023-08-31 DIAGNOSIS — J31.0 CHRONIC RHINITIS: Primary | ICD-10-CM

## 2023-08-31 DIAGNOSIS — R21 RASH: ICD-10-CM

## 2023-08-31 DIAGNOSIS — Z91.040 LATEX ALLERGY STATUS: ICD-10-CM

## 2023-08-31 DIAGNOSIS — R68.89 THROAT SYMPTOM: ICD-10-CM

## 2023-08-31 DIAGNOSIS — H10.403 CHRONIC CONJUNCTIVITIS OF BOTH EYES, UNSPECIFIED CHRONIC CONJUNCTIVITIS TYPE: ICD-10-CM

## 2023-08-31 PROCEDURE — 3078F DIAST BP <80 MM HG: CPT | Mod: CPTII,S$GLB,, | Performed by: STUDENT IN AN ORGANIZED HEALTH CARE EDUCATION/TRAINING PROGRAM

## 2023-08-31 PROCEDURE — 1160F PR REVIEW ALL MEDS BY PRESCRIBER/CLIN PHARMACIST DOCUMENTED: ICD-10-PCS | Mod: CPTII,S$GLB,, | Performed by: STUDENT IN AN ORGANIZED HEALTH CARE EDUCATION/TRAINING PROGRAM

## 2023-08-31 PROCEDURE — 3074F PR MOST RECENT SYSTOLIC BLOOD PRESSURE < 130 MM HG: ICD-10-PCS | Mod: CPTII,S$GLB,, | Performed by: STUDENT IN AN ORGANIZED HEALTH CARE EDUCATION/TRAINING PROGRAM

## 2023-08-31 PROCEDURE — 3044F PR MOST RECENT HEMOGLOBIN A1C LEVEL <7.0%: ICD-10-PCS | Mod: CPTII,S$GLB,, | Performed by: STUDENT IN AN ORGANIZED HEALTH CARE EDUCATION/TRAINING PROGRAM

## 2023-08-31 PROCEDURE — 99999 PR PBB SHADOW E&M-EST. PATIENT-LVL IV: ICD-10-PCS | Mod: PBBFAC,,, | Performed by: STUDENT IN AN ORGANIZED HEALTH CARE EDUCATION/TRAINING PROGRAM

## 2023-08-31 PROCEDURE — 3074F SYST BP LT 130 MM HG: CPT | Mod: CPTII,S$GLB,, | Performed by: STUDENT IN AN ORGANIZED HEALTH CARE EDUCATION/TRAINING PROGRAM

## 2023-08-31 PROCEDURE — 3044F HG A1C LEVEL LT 7.0%: CPT | Mod: CPTII,S$GLB,, | Performed by: STUDENT IN AN ORGANIZED HEALTH CARE EDUCATION/TRAINING PROGRAM

## 2023-08-31 PROCEDURE — 1159F MED LIST DOCD IN RCRD: CPT | Mod: CPTII,S$GLB,, | Performed by: STUDENT IN AN ORGANIZED HEALTH CARE EDUCATION/TRAINING PROGRAM

## 2023-08-31 PROCEDURE — 1159F PR MEDICATION LIST DOCUMENTED IN MEDICAL RECORD: ICD-10-PCS | Mod: CPTII,S$GLB,, | Performed by: STUDENT IN AN ORGANIZED HEALTH CARE EDUCATION/TRAINING PROGRAM

## 2023-08-31 PROCEDURE — 3078F PR MOST RECENT DIASTOLIC BLOOD PRESSURE < 80 MM HG: ICD-10-PCS | Mod: CPTII,S$GLB,, | Performed by: STUDENT IN AN ORGANIZED HEALTH CARE EDUCATION/TRAINING PROGRAM

## 2023-08-31 PROCEDURE — 99215 PR OFFICE/OUTPT VISIT, EST, LEVL V, 40-54 MIN: ICD-10-PCS | Mod: S$GLB,,, | Performed by: STUDENT IN AN ORGANIZED HEALTH CARE EDUCATION/TRAINING PROGRAM

## 2023-08-31 PROCEDURE — 1160F RVW MEDS BY RX/DR IN RCRD: CPT | Mod: CPTII,S$GLB,, | Performed by: STUDENT IN AN ORGANIZED HEALTH CARE EDUCATION/TRAINING PROGRAM

## 2023-08-31 PROCEDURE — 3008F PR BODY MASS INDEX (BMI) DOCUMENTED: ICD-10-PCS | Mod: CPTII,S$GLB,, | Performed by: STUDENT IN AN ORGANIZED HEALTH CARE EDUCATION/TRAINING PROGRAM

## 2023-08-31 PROCEDURE — 99215 OFFICE O/P EST HI 40 MIN: CPT | Mod: S$GLB,,, | Performed by: STUDENT IN AN ORGANIZED HEALTH CARE EDUCATION/TRAINING PROGRAM

## 2023-08-31 PROCEDURE — 3008F BODY MASS INDEX DOCD: CPT | Mod: CPTII,S$GLB,, | Performed by: STUDENT IN AN ORGANIZED HEALTH CARE EDUCATION/TRAINING PROGRAM

## 2023-08-31 PROCEDURE — 99999 PR PBB SHADOW E&M-EST. PATIENT-LVL IV: CPT | Mod: PBBFAC,,, | Performed by: STUDENT IN AN ORGANIZED HEALTH CARE EDUCATION/TRAINING PROGRAM

## 2023-08-31 RX ORDER — KETOROLAC TROMETHAMINE 5 MG/ML
1 SOLUTION OPHTHALMIC 4 TIMES DAILY PRN
Qty: 10 ML | Refills: 1 | Status: SHIPPED | OUTPATIENT
Start: 2023-08-31 | End: 2023-08-31

## 2023-08-31 RX ORDER — AZELASTINE 1 MG/ML
2 SPRAY, METERED NASAL 2 TIMES DAILY PRN
Qty: 30 ML | Refills: 11 | Status: SHIPPED | OUTPATIENT
Start: 2023-08-31 | End: 2024-08-30

## 2023-08-31 RX ORDER — KETOROLAC TROMETHAMINE 5 MG/ML
1 SOLUTION OPHTHALMIC 4 TIMES DAILY PRN
Qty: 10 ML | Refills: 1 | Status: SHIPPED | OUTPATIENT
Start: 2023-08-31

## 2023-08-31 RX ORDER — ALBUTEROL SULFATE 0.63 MG/3ML
SOLUTION RESPIRATORY (INHALATION)
COMMUNITY
Start: 2023-05-03

## 2023-08-31 RX ORDER — IPRATROPIUM BROMIDE AND ALBUTEROL 20; 100 UG/1; UG/1
SPRAY, METERED RESPIRATORY (INHALATION)
COMMUNITY
Start: 2023-05-28 | End: 2024-03-06

## 2023-08-31 RX ORDER — AZELASTINE 1 MG/ML
2 SPRAY, METERED NASAL 2 TIMES DAILY PRN
Qty: 30 ML | Refills: 11 | Status: SHIPPED | OUTPATIENT
Start: 2023-08-31 | End: 2023-08-31

## 2023-08-31 NOTE — PROGRESS NOTES
Allergy Clinic Note  [facility]    This note was created by combination of typed  and M-Modal dictation. Transcription errors may be present.  If there are any questions, please contact me.    HISTORY      Patient ID: Mirtha Gary is a 48 y.o. female.    Chief Complaint: Sinusitis (Sneezing, ), Allergic Rhinitis  (Eyes itch, ), and Cough (Stuffy nose)      Referring Provider: Joan Torres       History of Present Illness       Mirtha Gary is a 48 y.o. female referred by internal medicine physician, Joan Torres MD, for evaluation of chronic rhinitis.  She is here alone, and she is a very good historian.    Related medications and other interventions  EpiPen   Budesonide 0.25 mg via nebulizer twice daily  Albuterol MDI or neb as needed  Combivent  Flonase 1 squirt each nostril daily  (Protonix)   (Phenergan)       08/31/2023:  At initial visit, Ms Gary a long history rhinitis only partially controlled on medications.  In complaints are nasal congestion and itchy eyes.  Associated symptoms include red/runny eyes, throat itching, ear pain/fullness.  Additional symptoms include postnasal drip that feels like something is stuck in her throat.  Cough the level of her throat, throat clearing, and hoarseness.  She denies dysphagia but admits heartburn about twice a week.  She is suspicious of pollen and dust as well as possible molds.  Exposures are notable for a dog.  There is no clear seasonal pattern.  She was not helped by unknown over-the-counter medications and an unknown nasal spray used your regularly.  She has some relief from Zyrtec and from an unknown over-the-counter eyedrop.  She has never had allergy testing.            MEDICAL HISTORY      Significant past medical history:  Chronic pain, history of gastric ulcer  Active Problem List reviewed  ENT surgery:  None   Significant family history:  Son with allergic rhinitis to dust mite and dog.  No asthma.  Exposures:   "One dog not in the bedroom.  Recently they were building a house with a lot of sheet rock exposure as well as mold exposure.  No smoke.  Smoking Hx:  Client  reports that she has never smoked. She has never used smokeless tobacco.    Meds: MAR reviewed    Asthma:  Yes, mild and intermittent  Eczema:  No  Rhinitis:  Yes.  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  Red spots at areas of contact.    Patient Active Problem List   Diagnosis    Lumbosacral disc disease    Annular tear of intervertebral disc    Lumbosacral radiculopathy    Left lumbar radiculitis    Chondromalacia of right patella    Right knee pain    Post-traumatic osteoarthritis of right knee    Chronic bilateral low back pain without sciatica    Missed     S/P dilation and curettage    Abscess of finger    Range of motion deficit    Hand pain, left    Radiculopathy    Muscle weakness    Abnormal coordination    History of migraine headaches    History of endometriosis    History of ectopic pregnancy    Cyst of ovary    Double ureter    History of pyelonephritis    Recurrent UTI    Acute cystitis without hematuria    Interstitial cystitis    COVID-19 virus infection    Shortness of breath    Palpitations    Pain in wrist, left    Psoriasis of scalp    Long COVID    Abdominal pain, chronic, left lower quadrant    Blastocystis hominis infection    Migraine headache    Gastric intestinal metaplasia    Left upper quadrant abdominal pain    TB lung, latent    Gastric ulcer due to Helicobacter pylori    Other hemorrhoids    Ocular pain, right eye    Decreased vision of right eye    Viral upper respiratory tract infection    Upper respiratory tract infection    Seasonal allergic rhinitis due to pollen    Papular urticaria    Acute upper respiratory infection, unspecified    Lower respiratory infection (e.g., bronchitis, pneumonia, pneumonitis, pulmonitis)     Medication " List with Changes/Refills   New Medications    AZELASTINE (ASTELIN) 137 MCG (0.1 %) NASAL SPRAY    2 sprays (274 mcg total) by Nasal route 2 (two) times daily as needed for Rhinitis. Donot snort (because it tastes bad)       Start Date: 8/31/2023 End Date: 8/30/2024    KETOROLAC 0.5% (ACULAR) 0.5 % DROP    Place 1 drop into both eyes 4 (four) times daily as needed (eye itching).       Start Date: 8/31/2023 End Date: --   Current Medications    ALBUTEROL (ACCUNEB) 0.63 MG/3 ML NEBU    SMARTSIG:3 Milliliter(s) Via Nebulizer Every 6 Hours PRN       Start Date: 5/3/2023  End Date: --    ALBUTEROL (PROVENTIL/VENTOLIN HFA) 90 MCG/ACTUATION INHALER    Inhale 2 puffs into the lungs every 4 (four) hours as needed.       Start Date: 2/17/2023 End Date: --    BETAMETHASONE VALERATE 0.1% (VALISONE) 0.1 % CREA    Apply topically 2 (two) times daily.       Start Date: 12/23/2020End Date: --    BUDESONIDE (PULMICORT) 0.25 MG/2 ML NEBULIZER SOLUTION    Take 2 mLs (0.25 mg total) by nebulization once daily. Controller       Start Date: 6/9/2023  End Date: 9/7/2023    BUTALBITAL-ACETAMINOPHEN-CAFF -40 MG CAP    TK 1 C PO BID PRF SEVERE HA       Start Date: 11/5/2021 End Date: --    CALCIPOTRIENE-BETAMETHASONE (ENSTILAR) 0.005-0.064 % FOAM    Apply to scalp once or twice daily. Then wear shower cap overnight       Start Date: 4/28/2023 End Date: --    COMBIVENT RESPIMAT  MCG/ACTUATION INHALER    Inhale into the lungs.       Start Date: 5/28/2023 End Date: --    EPINEPHRINE (EPIPEN) 0.3 MG/0.3 ML ATIN           Start Date: 12/29/2020End Date: --    FLUTICASONE PROPIONATE (FLONASE) 50 MCG/ACTUATION NASAL SPRAY    USE 1 SPRAY (50 MCG TOTAL) IN EACH NOSTRIL ONCE DAILY       Start Date: 4/20/2023 End Date: --    KETOCONAZOLE (NIZORAL) 2 % SHAMPOO    Apply to scalp, let sit for 5-10 minutes then rinse. Use 3-5 times weekly       Start Date: 4/28/2023 End Date: --    METHYLPREDNISOLONE (MEDROL DOSEPACK) 4 MG TABLET    Take as  directed       Start Date: 4/30/2023 End Date: --    NARATRIPTAN (AMERGE) 2.5 MG TABLET    TAKE 1 TABLET BY MOUTH AT ONSET OF HEADACHE, MAY REPEAT IN 4 HOURS IF NEEDED       Start Date: 11/5/2021 End Date: --    NURTEC 75 MG ODT    TAKE 1 TABLET (75 MG TOTAL) BY MOUTH ONCE AS NEEDED FOR MIGRAINE. PLACE ODT TABLET ON THE TONGUE       Start Date: 12/5/2022 End Date: --    ONDANSETRON (ZOFRAN-ODT) 4 MG TBDL    Take 1 tablet (4 mg total) by mouth every 6 (six) hours as needed (Nausea and vomiting).       Start Date: 9/25/2021 End Date: --    PANTOPRAZOLE (PROTONIX) 40 MG TABLET    Take 1 tablet (40 mg total) by mouth once daily.       Start Date: 1/19/2023 End Date: 8/31/2023    PROMETHAZINE (PHENERGAN) 25 MG TABLET    Take 1 tablet (25 mg total) by mouth every 6 (six) hours as needed for Nausea.       Start Date: 9/25/2021 End Date: --    TACROLIMUS (PROTOPIC) 0.1 % OINTMENT    Apply topically 2 (two) times daily.       Start Date: 4/28/2023 End Date: --    UBROGEPANT (UBROGEPANT) 50 MG TABLET    TAKE 1 TABLET (50 MG TOTAL) BY MOUTH EVERY 2 (TWO) HOURS AS NEEDED FOR MIGRAINE.       Start Date: 10/12/2021End Date: --           REVIEW OF SYSTEMS      CONST: no F/C/NS, no unintentional weight changes  NEURO:  no tremor, no weakness  EYES: no discharge, + erythema  EARS: no hearing loss, + sensation of fullness  PULM:  no SOB, no wheezing, + cough  CV: no CP, no palpitations  DERM: + rashes, no skin breaks         PHYSICAL EXAM      /62   Pulse 67   Wt 65 kg (143 lb 4.8 oz)   BMI 22.44 kg/m²   GEN: Awake and alert, no distress  DERM:  No flushing, no rashes  EYE:  No occular discharge, conjunctiva injected bilaterally  HEENT: No nasal discharge, no hoarseness, TM's clear bilaterally.  Nares pink with moderate turbinate swelling.  Oropharynx benign without exudate.  Tongue not coated.  NECK:  No LAD,   PULM: Normal work of breathing, no cough, CTA  COR:  RRR, normal pulses  NEURO:  No focal deficit, speech fluent  and logical  PSYCH: appropriate affect, normal behavior   EXTREM:  No edema, no deformity            MEDICAL DECISION-MAKING           Data reviewed        Allergy Testing      ordered      Lab results      Eosinophil count 100, 2023      Imaging and other diagnostics            Medical records review   At initial visit reviewed allergy note of [facility] allergy fellow Riddhi Thomas MD from 02/03/2021.  Patient was complaining of a round circular burning patch on her shoulder.  It was attributed to ciprofloxacin Luis the 2nd time it occurred during therapy.  Aeroallergen skin testing was negative.  She was diagnosed with a fixed drug eruption, likely.  T-cell mediated team was considering ciprofloxacin patch testing; however client was lost to follow-up.    Diagnoses:     Mirtha Gary is a 48 y.o. female. with  1. Chronic rhinitis    2. Chronic conjunctivitis of both eyes, unspecified chronic conjunctivitis type    3. Chronic cough    4. Throat symptom    5. Rash    6. Latex allergy status        Assessment / Plan / Orders   Client is presenting with chronic rhinitis and chronic conjunctivitis of unknown etiology.  It is possible that some of her symptoms represent rhinitis whereas others may represent LPR.  Discussed the usual differential of off the level of the throat as postnasal drip versus LPR.  Diagnostically, we will give a trial of azelastine as well as screen for allergies by the Immunocap method.  At next visit, if LPR is still a significant consideration, will likely offer ENT endoscopy versus trial higher dose PPI.  Therapeutically I am recommending azelastine as needed for nasal congestion or postnasal drip sensation.  I also recommend ketorolac as needed for eye symptoms and she continue her Zyrtec as needed for itching and other symptoms.      Chronic rhinitis  -     Ambulatory referral/consult to Allergy  -     IgE; Future; Expected date: 08/31/2023  -     Dermatophagoides [place]; Future;  Expected date: 08/31/2023  -     Dermatophagoides Pteronyssinus; Future; Expected date: 08/31/2023  -     [place]; Future; Expected date: 08/31/2023  -     Kevin; Future; Expected date: 08/31/2023  -     Braxton; Future; Expected date: 08/31/2023  -     English Plantain; Future; Expected date: 08/31/2023  -     Bridgeton; Future; Expected date: 08/31/2023  -     Pecan; Future; Expected date: 08/31/2023  -     Ragweed; Future; Expected date: 08/31/2023  -     Alternaria; Future; Expected date: 08/31/2023  -     Aspergillus; Future; Expected date: 08/31/2023  -     Cat; Future; Expected date: 08/31/2023  -     Dog; Future; Expected date: 08/31/2023  -     ALLERGEN PENICILLIUM; Future; Expected date: 08/31/2023  -     azelastine (ASTELIN) 137 mcg (0.1 %) nasal spray; 2 sprays (274 mcg total) by Nasal route 2 (two) times daily as needed for Rhinitis. Donot snort (because it tastes bad)  Dispense: 30 mL; Refill: 11    Chronic conjunctivitis of both eyes,  -     ketorolac 0.5% (ACULAR) 0.5 % Drop; Place 1 drop into both eyes 4 (four) times daily as needed (eye itching).  Dispense: 10 mL; Refill: 1    Chronic cough and Throat symptom  -- suspect LPR  -     Trial azelastine (ASTELIN) 137 mcg (0.1 %) nasal spray; 2 sprays (274 mcg total) by Nasal route 2 (two) times daily as needed for Rhinitis. Donot snort (because it tastes bad)  Dispense: 30 mL; Refill: 11      Latex allergy status  --manifest by rash; probably type 4, rule out type 1       -     Rast Allergen-Latex; Future; Expected date: 08/31/2023      Comorbidities  GERD - presence make cough more likely due to LPR    Patient Instructions and follow up     Patient Instructions   Testing  Blood work for allergy testing today  Also testing to see which type of Latex allergy you have.       Check [facility] in one week for results or call [phone]       Contact me with questions or concerns       I will contact you if anything needs immediate  attention.        Treatment    Astelin = azelastine nasal spray:    2 squirts each nostril as needed, up to twice a day   Do not snort (because it burns and tastes bad)             Stop after 3 days if no improvement      Ketorolac eye drops:  1 drop each eye as needed, up to 4 times daily             Warning:  burns    Continue Zyrtec as needed      Return 2-4 weeks            Raysa Peter MD  Allergy, Asthma & Immunology      I spent a total of 60 minutes on the day of the visit.This includes face to face time and non-face to face time preparing to see the patient (eg, review of tests), obtaining and/or reviewing separately obtained history, documenting clinical information in the electronic or other health record, independently interpreting results and communicating results to the patient/family/caregiver, or care coordinator.

## 2023-08-31 NOTE — PATIENT INSTRUCTIONS
Testing  Blood work for allergy testing today  Also testing to see which type of Latex allergy you have.       Check [facility] in one week for results or call [phone]       Contact me with questions or concerns       I will contact you if anything needs immediate attention.        Treatment    Astelin = azelastine nasal spray:    2 squirts each nostril as needed, up to twice a day   Do not snort (because it burns and tastes bad)             Stop after 3 days if no improvement      Ketorolac eye drops:  1 drop each eye as needed, up to 4 times daily             Warning:  burns    Continue Zyrtec as needed      Return 2-4 weeks

## 2023-09-06 ENCOUNTER — LAB VISIT (OUTPATIENT)
Dept: LAB | Facility: HOSPITAL | Age: 48
End: 2023-09-06
Attending: STUDENT IN AN ORGANIZED HEALTH CARE EDUCATION/TRAINING PROGRAM
Payer: COMMERCIAL

## 2023-09-06 ENCOUNTER — OFFICE VISIT (OUTPATIENT)
Dept: INTERNAL MEDICINE | Facility: CLINIC | Age: 48
End: 2023-09-06
Payer: COMMERCIAL

## 2023-09-06 VITALS
HEIGHT: 67 IN | DIASTOLIC BLOOD PRESSURE: 63 MMHG | BODY MASS INDEX: 22.91 KG/M2 | HEART RATE: 55 BPM | WEIGHT: 145.94 LBS | SYSTOLIC BLOOD PRESSURE: 103 MMHG

## 2023-09-06 DIAGNOSIS — Z00.00 ROUTINE GENERAL MEDICAL EXAMINATION AT A HEALTH CARE FACILITY: Primary | ICD-10-CM

## 2023-09-06 DIAGNOSIS — B96.81 CHRONIC GASTRIC ULCER DUE TO HELICOBACTER PYLORI: ICD-10-CM

## 2023-09-06 DIAGNOSIS — J30.1 SEASONAL ALLERGIC RHINITIS DUE TO POLLEN: ICD-10-CM

## 2023-09-06 DIAGNOSIS — Z00.00 HEALTHCARE MAINTENANCE: ICD-10-CM

## 2023-09-06 DIAGNOSIS — G43.009 MIGRAINE WITHOUT AURA AND WITHOUT STATUS MIGRAINOSUS, NOT INTRACTABLE: Chronic | ICD-10-CM

## 2023-09-06 DIAGNOSIS — G89.29 CHRONIC BILATERAL LOW BACK PAIN WITHOUT SCIATICA: ICD-10-CM

## 2023-09-06 DIAGNOSIS — L40.9 PSORIASIS OF SCALP: Chronic | ICD-10-CM

## 2023-09-06 DIAGNOSIS — R74.8 ELEVATED LIVER ENZYMES: ICD-10-CM

## 2023-09-06 DIAGNOSIS — J31.0 CHRONIC RHINITIS: ICD-10-CM

## 2023-09-06 DIAGNOSIS — R21 RASH: ICD-10-CM

## 2023-09-06 DIAGNOSIS — K25.7 CHRONIC GASTRIC ULCER DUE TO HELICOBACTER PYLORI: ICD-10-CM

## 2023-09-06 DIAGNOSIS — Z00.00 ROUTINE GENERAL MEDICAL EXAMINATION AT A HEALTH CARE FACILITY: ICD-10-CM

## 2023-09-06 DIAGNOSIS — M54.50 CHRONIC BILATERAL LOW BACK PAIN WITHOUT SCIATICA: ICD-10-CM

## 2023-09-06 LAB
ALBUMIN SERPL BCP-MCNC: 4 G/DL (ref 3.5–5.2)
ALP SERPL-CCNC: 47 U/L (ref 55–135)
ALT SERPL W/O P-5'-P-CCNC: 44 U/L (ref 10–44)
ANION GAP SERPL CALC-SCNC: 10 MMOL/L (ref 8–16)
AST SERPL-CCNC: 30 U/L (ref 10–40)
BASOPHILS # BLD AUTO: 0.02 K/UL (ref 0–0.2)
BASOPHILS NFR BLD: 0.4 % (ref 0–1.9)
BILIRUB SERPL-MCNC: 0.8 MG/DL (ref 0.1–1)
BUN SERPL-MCNC: 12 MG/DL (ref 6–20)
CALCIUM SERPL-MCNC: 9.2 MG/DL (ref 8.7–10.5)
CHLORIDE SERPL-SCNC: 107 MMOL/L (ref 95–110)
CO2 SERPL-SCNC: 23 MMOL/L (ref 23–29)
CREAT SERPL-MCNC: 0.7 MG/DL (ref 0.5–1.4)
DIFFERENTIAL METHOD: NORMAL
EOSINOPHIL # BLD AUTO: 0.1 K/UL (ref 0–0.5)
EOSINOPHIL NFR BLD: 1.3 % (ref 0–8)
ERYTHROCYTE [DISTWIDTH] IN BLOOD BY AUTOMATED COUNT: 12.2 % (ref 11.5–14.5)
EST. GFR  (NO RACE VARIABLE): >60 ML/MIN/1.73 M^2
GLUCOSE SERPL-MCNC: 74 MG/DL (ref 70–110)
HCT VFR BLD AUTO: 40.5 % (ref 37–48.5)
HGB BLD-MCNC: 13 G/DL (ref 12–16)
IGE SERPL-ACNC: <35 IU/ML (ref 0–100)
IMM GRANULOCYTES # BLD AUTO: 0.01 K/UL (ref 0–0.04)
IMM GRANULOCYTES NFR BLD AUTO: 0.2 % (ref 0–0.5)
LYMPHOCYTES # BLD AUTO: 1.3 K/UL (ref 1–4.8)
LYMPHOCYTES NFR BLD: 27.6 % (ref 18–48)
MCH RBC QN AUTO: 29.9 PG (ref 27–31)
MCHC RBC AUTO-ENTMCNC: 32.1 G/DL (ref 32–36)
MCV RBC AUTO: 93 FL (ref 82–98)
MONOCYTES # BLD AUTO: 0.4 K/UL (ref 0.3–1)
MONOCYTES NFR BLD: 7.9 % (ref 4–15)
NEUTROPHILS # BLD AUTO: 3 K/UL (ref 1.8–7.7)
NEUTROPHILS NFR BLD: 62.6 % (ref 38–73)
NRBC BLD-RTO: 0 /100 WBC
PLATELET # BLD AUTO: 238 K/UL (ref 150–450)
PMV BLD AUTO: 11.3 FL (ref 9.2–12.9)
POTASSIUM SERPL-SCNC: 3.8 MMOL/L (ref 3.5–5.1)
PROT SERPL-MCNC: 7.2 G/DL (ref 6–8.4)
RBC # BLD AUTO: 4.35 M/UL (ref 4–5.4)
SODIUM SERPL-SCNC: 140 MMOL/L (ref 136–145)
WBC # BLD AUTO: 4.79 K/UL (ref 3.9–12.7)

## 2023-09-06 PROCEDURE — 3008F BODY MASS INDEX DOCD: CPT | Mod: CPTII,S$GLB,, | Performed by: STUDENT IN AN ORGANIZED HEALTH CARE EDUCATION/TRAINING PROGRAM

## 2023-09-06 PROCEDURE — 1160F RVW MEDS BY RX/DR IN RCRD: CPT | Mod: CPTII,S$GLB,, | Performed by: STUDENT IN AN ORGANIZED HEALTH CARE EDUCATION/TRAINING PROGRAM

## 2023-09-06 PROCEDURE — 86003 ALLG SPEC IGE CRUDE XTRC EA: CPT | Mod: 59 | Performed by: STUDENT IN AN ORGANIZED HEALTH CARE EDUCATION/TRAINING PROGRAM

## 2023-09-06 PROCEDURE — 99999 PR PBB SHADOW E&M-EST. PATIENT-LVL IV: ICD-10-PCS | Mod: PBBFAC,,, | Performed by: STUDENT IN AN ORGANIZED HEALTH CARE EDUCATION/TRAINING PROGRAM

## 2023-09-06 PROCEDURE — 99999 PR PBB SHADOW E&M-EST. PATIENT-LVL IV: CPT | Mod: PBBFAC,,, | Performed by: STUDENT IN AN ORGANIZED HEALTH CARE EDUCATION/TRAINING PROGRAM

## 2023-09-06 PROCEDURE — 1159F PR MEDICATION LIST DOCUMENTED IN MEDICAL RECORD: ICD-10-PCS | Mod: CPTII,S$GLB,, | Performed by: STUDENT IN AN ORGANIZED HEALTH CARE EDUCATION/TRAINING PROGRAM

## 2023-09-06 PROCEDURE — 80053 COMPREHEN METABOLIC PANEL: CPT | Performed by: STUDENT IN AN ORGANIZED HEALTH CARE EDUCATION/TRAINING PROGRAM

## 2023-09-06 PROCEDURE — 82785 ASSAY OF IGE: CPT | Performed by: STUDENT IN AN ORGANIZED HEALTH CARE EDUCATION/TRAINING PROGRAM

## 2023-09-06 PROCEDURE — 99396 PR PREVENTIVE VISIT,EST,40-64: ICD-10-PCS | Mod: S$GLB,,, | Performed by: STUDENT IN AN ORGANIZED HEALTH CARE EDUCATION/TRAINING PROGRAM

## 2023-09-06 PROCEDURE — 3078F DIAST BP <80 MM HG: CPT | Mod: CPTII,S$GLB,, | Performed by: STUDENT IN AN ORGANIZED HEALTH CARE EDUCATION/TRAINING PROGRAM

## 2023-09-06 PROCEDURE — 1160F PR REVIEW ALL MEDS BY PRESCRIBER/CLIN PHARMACIST DOCUMENTED: ICD-10-PCS | Mod: CPTII,S$GLB,, | Performed by: STUDENT IN AN ORGANIZED HEALTH CARE EDUCATION/TRAINING PROGRAM

## 2023-09-06 PROCEDURE — 3074F SYST BP LT 130 MM HG: CPT | Mod: CPTII,S$GLB,, | Performed by: STUDENT IN AN ORGANIZED HEALTH CARE EDUCATION/TRAINING PROGRAM

## 2023-09-06 PROCEDURE — 85025 COMPLETE CBC W/AUTO DIFF WBC: CPT | Performed by: STUDENT IN AN ORGANIZED HEALTH CARE EDUCATION/TRAINING PROGRAM

## 2023-09-06 PROCEDURE — 3044F HG A1C LEVEL LT 7.0%: CPT | Mod: CPTII,S$GLB,, | Performed by: STUDENT IN AN ORGANIZED HEALTH CARE EDUCATION/TRAINING PROGRAM

## 2023-09-06 PROCEDURE — 99396 PREV VISIT EST AGE 40-64: CPT | Mod: S$GLB,,, | Performed by: STUDENT IN AN ORGANIZED HEALTH CARE EDUCATION/TRAINING PROGRAM

## 2023-09-06 PROCEDURE — 3044F PR MOST RECENT HEMOGLOBIN A1C LEVEL <7.0%: ICD-10-PCS | Mod: CPTII,S$GLB,, | Performed by: STUDENT IN AN ORGANIZED HEALTH CARE EDUCATION/TRAINING PROGRAM

## 2023-09-06 PROCEDURE — 3008F PR BODY MASS INDEX (BMI) DOCUMENTED: ICD-10-PCS | Mod: CPTII,S$GLB,, | Performed by: STUDENT IN AN ORGANIZED HEALTH CARE EDUCATION/TRAINING PROGRAM

## 2023-09-06 PROCEDURE — 1159F MED LIST DOCD IN RCRD: CPT | Mod: CPTII,S$GLB,, | Performed by: STUDENT IN AN ORGANIZED HEALTH CARE EDUCATION/TRAINING PROGRAM

## 2023-09-06 PROCEDURE — 86003 ALLG SPEC IGE CRUDE XTRC EA: CPT | Performed by: STUDENT IN AN ORGANIZED HEALTH CARE EDUCATION/TRAINING PROGRAM

## 2023-09-06 PROCEDURE — 3074F PR MOST RECENT SYSTOLIC BLOOD PRESSURE < 130 MM HG: ICD-10-PCS | Mod: CPTII,S$GLB,, | Performed by: STUDENT IN AN ORGANIZED HEALTH CARE EDUCATION/TRAINING PROGRAM

## 2023-09-06 PROCEDURE — 3078F PR MOST RECENT DIASTOLIC BLOOD PRESSURE < 80 MM HG: ICD-10-PCS | Mod: CPTII,S$GLB,, | Performed by: STUDENT IN AN ORGANIZED HEALTH CARE EDUCATION/TRAINING PROGRAM

## 2023-09-06 NOTE — ASSESSMENT & PLAN NOTE
Health care maintenance and core gaps reviewed and assessed with patient.  Vaccinations recommended:        Tetanus - O       Shingles - N/A       Influenza - N/A       Pneumonia - O  Colon cancer screening:   Colonoscopy: 2022  Lifestyle recommendations given.  Physical activity recommended.    Legend: Ordered (O), Declined (D), Current (C)

## 2023-09-06 NOTE — PROGRESS NOTES
Subjective:       Patient ID: Mirtha Gary is a 48 y.o. female.    Chief Complaint: No chief complaint on file.    HPI    Mirtha Gary is a 48 y.o. female , Portuguese speaking, with a history of:  Migraine headaches  Lumbosacral radiculopathy  Psoriasis of the scalp  Palpitations  H/o endometriosis  Cyst of ovary  Small cyst of the left kidney  Interstitial cystitis  Recurrent UTIs  H/o of COVID x 2  H/o blastocystis hominis infection  Chronic abdominal pain in the LLQ  BL knee pain    Patient comes to the clinic for her annual physical exam    PATIENT REPORTS SOME IMPROVEMENT OF HER CHRONIC CONDITIONS.      She was able to abort her long crisis of migraine headaches with medication adjustments made by Neurology.    Migraine headaches are better.    She is now complaining of left-sided neck pain.      She was seen by Allergy specialists and she is still being studied for different allergies.      She continues to have lumbar pain radiating to the right lower extremity, currently on an exacerbation.      Her epigastric and left-sided upper quadrant pain has continued, it comes and goes, improved with PPI.    She needs a referral for follow-up with GI specialist.    She was seen by Gynecology who removed her Mirena    Since last seen by me:    05/08/23    Changes in health or medications: No    Specialists visits and recommendations:   Neurology:  Medication adjustments   Allergy specialist:  Ordered tests for chronic rhinitis.          H/o ER visits:   NO    H/o Hospitalizations:  NO    H/o falls: None    Life events / lifestyle:   Moved to new house.      Most recent laboratories reviewed:    Recent Labs   Lab 05/08/23  0851 07/31/23  1321 09/06/23  0859   WBC 7.46 7.68 4.79   Hemoglobin 15.0 14.7 13.0   Hematocrit 45.0 45.4 40.5   MCV 93 94 93   Platelets 295 244 238       Recent Labs   Lab 06/09/21  1456 09/29/21  0244 01/13/22  0833 09/14/22  1154 05/08/23  0851 07/31/23  1321   Glucose 82 87 79 82 78 78  "  Sodium 141 138 139 140 140 138   Potassium 3.9 3.9 4.1 4.2 4.5 4.1   BUN 12 12 12 8 13 12   Creatinine 0.7 0.8 0.7 0.8 0.8 0.8   eGFR if non African American >60.0 >60 >60.0  --   --   --    Total Bilirubin  --  0.4 0.6 0.5 0.6 0.5   AST  --  14 14 17 15 32   ALT  --  13 15 21 25 68 H       Recent Labs   Lab 01/13/22  0833 05/08/23  0851   Hemoglobin A1C 4.8 4.6       Recent Labs   Lab 01/13/22  0833 05/08/23  0851   Cholesterol 195 186   Triglycerides 71 89   HDL 47 49   LDL Cholesterol 133.8 119.2   Non-HDL Cholesterol 148 137       Recent Labs   Lab 01/13/22  0833 05/08/23  0851   TSH 1.999 1.178             Healthcare Maintenance:  Colonoscopy: Last Colonoscopy completed on 9/13/2022   Vaccinations: Pneumonia, Zoster, Tetanus  COVID vaccination: completed  Depression screening: PHQ2 score = 0    Annual Wellness visit approx. Month: September ROS  11-point review of systems done. Negative except for detailed in the HPI.        Objective:      /63   Pulse (!) 55   Ht 5' 7" (1.702 m)   Wt 66.2 kg (145 lb 15.1 oz)   BMI 22.86 kg/m²       Physical Exam  Vitals and nursing note reviewed.   Constitutional:       Appearance: Normal appearance.   HENT:      Head: Normocephalic and atraumatic.      Right Ear: Tympanic membrane normal.      Left Ear: Tympanic membrane normal.      Nose: Nose normal.      Mouth/Throat:      Mouth: Mucous membranes are moist.      Pharynx: Oropharynx is clear.   Eyes:      Extraocular Movements: Extraocular movements intact.      Conjunctiva/sclera: Conjunctivae normal.      Pupils: Pupils are equal, round, and reactive to light.   Cardiovascular:      Rate and Rhythm: Normal rate and regular rhythm.      Pulses: Normal pulses.      Heart sounds: Normal heart sounds.   Pulmonary:      Effort: Pulmonary effort is normal.      Breath sounds: Normal breath sounds.   Abdominal:      General: Bowel sounds are normal. There is no distension.      Palpations: Abdomen is soft.      " Tenderness: There is no abdominal tenderness.   Musculoskeletal:         General: Normal range of motion.      Cervical back: Normal range of motion and neck supple.   Skin:     General: Skin is warm.   Neurological:      General: No focal deficit present.      Mental Status: She is alert and oriented to person, place, and time. Mental status is at baseline.   Psychiatric:         Mood and Affect: Mood normal.           Assessment:       1. Routine general medical examination at a health care facility  Assessment & Plan:  Patient is in overall good general health.  Stable medical conditions listed on the PMH.  Labs reviewed and patient notified.        2. Chronic bilateral low back pain without sciatica  Assessment & Plan:  Will refer to PT    Orders:  -     Ambulatory referral/consult to Physical/Occupational Therapy; Future; Expected date: 09/13/2023    3. Migraine without aura and without status migrainosus, not intractable  Assessment & Plan:  Improved.    No new exacerbations since last time seen.    Continue with naratriptan and Nurtec as prescribed by Neurology      4. Seasonal allergic rhinitis due to pollen  Assessment & Plan:  Stable, we will follow allergy specialist recommendations      5. Psoriasis of scalp  Assessment & Plan:  Stable, controlled at the moment  Managed by dermatology      6. Chronic gastric ulcer due to Helicobacter pylori  Assessment & Plan:  Continues with epigastric pain and reflux  Will continue PPI  Will refer to GI.    Orders:  -     Ambulatory referral/consult to Gastroenterology; Future; Expected date: 09/13/2023    7. Elevated liver enzymes  Comments:  Will repeat CMP in 6 months, observe for now  Orders:  -     COMPREHENSIVE METABOLIC PANEL; Future; Expected date: 03/06/2024    8. Healthcare maintenance  Assessment & Plan:  Health care maintenance and core gaps reviewed and assessed with patient.  Vaccinations recommended:        Tetanus - O       Shingles - N/A        Influenza - N/A       Pneumonia - O  Colon cancer screening:   Colonoscopy: 2022  Lifestyle recommendations given.  Physical activity recommended.    Legend: Ordered (O), Declined (D), Current (C)      Orders:  -     Pneumococcal Conjugate Vaccine (20 Valent) (IM)       Plan:         GI referral  Referral to PT  Prevnar 20 ordered    Education provided  Lifestyle recommendations given  AVS printed, explained, and given to the patient.  RTC in : 6 months for general f/u with CMP.            LENIN KEE MD, MPH  Internal Medicine  [facility]  [facility]

## 2023-09-06 NOTE — ASSESSMENT & PLAN NOTE
Improved.    No new exacerbations since last time seen.    Continue with naratriptan and Nurtec as prescribed by Neurology

## 2023-09-08 ENCOUNTER — TELEPHONE (OUTPATIENT)
Dept: GASTROENTEROLOGY | Facility: CLINIC | Age: 48
End: 2023-09-08
Payer: COMMERCIAL

## 2023-09-08 DIAGNOSIS — G43.009 MIGRAINE WITHOUT AURA AND WITHOUT STATUS MIGRAINOSUS, NOT INTRACTABLE: Primary | ICD-10-CM

## 2023-09-08 RX ORDER — RIMEGEPANT SULFATE 75 MG/75MG
75 TABLET, ORALLY DISINTEGRATING ORAL DAILY PRN
Qty: 30 TABLET | Refills: 3 | Status: SHIPPED | OUTPATIENT
Start: 2023-09-08

## 2023-09-08 NOTE — TELEPHONE ENCOUNTER
Spoke w/pt and scheduled pt an appt on 10/18 at 9 am w/Dr. Ring. Pt confirmed appt and thank me  ----- Message from Pedro Ring MD sent at 9/7/2023  3:30 PM CDT -----  Regarding: FW: Referral  Can we offer a Wednesday slot for this referral from Dr Torres?  ----- Message -----  From: Joan Torres MD  Sent: 9/6/2023   3:24 PM CDT  To: Pedro Ring MD  Subject: Referral                                         Dear Pedro  I would like you to see one of my patients, she has a h/o gastric ulcer, already treated, she needs to establish care with a GI.  We tried to make her an appointment with you but it seems your schedule is closed.  Are you accepting patients at this time?   Thanks  Joan

## 2023-09-12 LAB
A ALTERNATA IGE QN: <0.1 KU/L
A FUMIGATUS IGE QN: <0.1 KU/L
ALLERGEN LATEX IGE: <0.1 KU/L
BERMUDA GRASS IGE QN: <0.1 KU/L
CAT DANDER IGE QN: <0.1 KU/L
CEDAR IGE QN: <0.1 KU/L
D FARINAE IGE QN: <0.1 KU/L
D PTERONYSS IGE QN: <0.1 KU/L
DEPRECATED A ALTERNATA IGE RAST QL: NORMAL
DEPRECATED A FUMIGATUS IGE RAST QL: NORMAL
DEPRECATED BERMUDA GRASS IGE RAST QL: NORMAL
DEPRECATED CAT DANDER IGE RAST QL: NORMAL
DEPRECATED CEDAR IGE RAST QL: NORMAL
DEPRECATED D FARINAE IGE RAST QL: NORMAL
DEPRECATED D PTERONYSS IGE RAST QL: NORMAL
DEPRECATED DOG DANDER IGE RAST QL: NORMAL
DEPRECATED ENGL PLANTAIN IGE RAST QL: NORMAL
DEPRECATED P NOTATUM IGE RAST QL: NORMAL
DEPRECATED PECAN/HICK TREE IGE RAST QL: NORMAL
DEPRECATED TIMOTHY IGE RAST QL: NORMAL
DEPRECATED WEST RAGWEED IGE RAST QL: NORMAL
DEPRECATED WHITE OAK IGE RAST QL: NORMAL
DOG DANDER IGE QN: <0.1 KU/L
ENGL PLANTAIN IGE QN: <0.1 KU/L
LATEX CLASS: NORMAL
P NOTATUM IGE QN: <0.1 KU/L
PECAN/HICK TREE IGE QN: <0.1 KU/L
TIMOTHY IGE QN: <0.1 KU/L
WEST RAGWEED IGE QN: <0.1 KU/L
WHITE OAK IGE QN: <0.1 KU/L

## 2023-10-18 ENCOUNTER — TELEPHONE (OUTPATIENT)
Dept: ENDOSCOPY | Facility: HOSPITAL | Age: 48
End: 2023-10-18
Payer: COMMERCIAL

## 2023-10-18 ENCOUNTER — OFFICE VISIT (OUTPATIENT)
Dept: GASTROENTEROLOGY | Facility: CLINIC | Age: 48
End: 2023-10-18
Payer: COMMERCIAL

## 2023-10-18 VITALS
WEIGHT: 150.81 LBS | BODY MASS INDEX: 23.67 KG/M2 | SYSTOLIC BLOOD PRESSURE: 96 MMHG | DIASTOLIC BLOOD PRESSURE: 69 MMHG | HEIGHT: 67 IN | HEART RATE: 74 BPM

## 2023-10-18 DIAGNOSIS — K27.9 PUD (PEPTIC ULCER DISEASE): ICD-10-CM

## 2023-10-18 DIAGNOSIS — R10.9 ABDOMINAL PAIN, UNSPECIFIED ABDOMINAL LOCATION: Primary | ICD-10-CM

## 2023-10-18 DIAGNOSIS — B96.81 CHRONIC GASTRIC ULCER DUE TO HELICOBACTER PYLORI: ICD-10-CM

## 2023-10-18 DIAGNOSIS — R10.13 ABDOMINAL PAIN, EPIGASTRIC: ICD-10-CM

## 2023-10-18 DIAGNOSIS — A07.8 BLASTOCYSTIS HOMINIS INFECTION: Primary | ICD-10-CM

## 2023-10-18 DIAGNOSIS — K25.7 CHRONIC GASTRIC ULCER DUE TO HELICOBACTER PYLORI: ICD-10-CM

## 2023-10-18 PROCEDURE — 3008F PR BODY MASS INDEX (BMI) DOCUMENTED: ICD-10-PCS | Mod: CPTII,S$GLB,, | Performed by: INTERNAL MEDICINE

## 2023-10-18 PROCEDURE — 99999 PR PBB SHADOW E&M-EST. PATIENT-LVL IV: CPT | Mod: PBBFAC,,, | Performed by: INTERNAL MEDICINE

## 2023-10-18 PROCEDURE — 99214 OFFICE O/P EST MOD 30 MIN: CPT | Mod: S$GLB,,, | Performed by: INTERNAL MEDICINE

## 2023-10-18 PROCEDURE — 99214 PR OFFICE/OUTPT VISIT, EST, LEVL IV, 30-39 MIN: ICD-10-PCS | Mod: S$GLB,,, | Performed by: INTERNAL MEDICINE

## 2023-10-18 PROCEDURE — 3074F SYST BP LT 130 MM HG: CPT | Mod: CPTII,S$GLB,, | Performed by: INTERNAL MEDICINE

## 2023-10-18 PROCEDURE — 3044F PR MOST RECENT HEMOGLOBIN A1C LEVEL <7.0%: ICD-10-PCS | Mod: CPTII,S$GLB,, | Performed by: INTERNAL MEDICINE

## 2023-10-18 PROCEDURE — 3078F PR MOST RECENT DIASTOLIC BLOOD PRESSURE < 80 MM HG: ICD-10-PCS | Mod: CPTII,S$GLB,, | Performed by: INTERNAL MEDICINE

## 2023-10-18 PROCEDURE — 99999 PR PBB SHADOW E&M-EST. PATIENT-LVL IV: ICD-10-PCS | Mod: PBBFAC,,, | Performed by: INTERNAL MEDICINE

## 2023-10-18 PROCEDURE — 3044F HG A1C LEVEL LT 7.0%: CPT | Mod: CPTII,S$GLB,, | Performed by: INTERNAL MEDICINE

## 2023-10-18 PROCEDURE — 3074F PR MOST RECENT SYSTOLIC BLOOD PRESSURE < 130 MM HG: ICD-10-PCS | Mod: CPTII,S$GLB,, | Performed by: INTERNAL MEDICINE

## 2023-10-18 PROCEDURE — 3078F DIAST BP <80 MM HG: CPT | Mod: CPTII,S$GLB,, | Performed by: INTERNAL MEDICINE

## 2023-10-18 PROCEDURE — 1159F MED LIST DOCD IN RCRD: CPT | Mod: CPTII,S$GLB,, | Performed by: INTERNAL MEDICINE

## 2023-10-18 PROCEDURE — 1159F PR MEDICATION LIST DOCUMENTED IN MEDICAL RECORD: ICD-10-PCS | Mod: CPTII,S$GLB,, | Performed by: INTERNAL MEDICINE

## 2023-10-18 PROCEDURE — 3008F BODY MASS INDEX DOCD: CPT | Mod: CPTII,S$GLB,, | Performed by: INTERNAL MEDICINE

## 2023-10-18 RX ORDER — DULOXETIN HYDROCHLORIDE 20 MG/1
20 CAPSULE, DELAYED RELEASE ORAL DAILY
Qty: 60 CAPSULE | Refills: 5 | Status: SHIPPED | OUTPATIENT
Start: 2023-10-18 | End: 2024-03-06

## 2023-10-18 NOTE — TELEPHONE ENCOUNTER
"----- Message from Pedro Ring MD sent at 10/18/2023  9:28 AM CDT -----  Procedure: EGD    Diagnosis: Abdominal pain, PUD    Procedure Timin-4 weeks    #If within 4 weeks selected, please caio as high priority#    #If greater than 12 weeks, please select "4-12 weeks" and delay sending until 2 months prior to requested date#     Provider: Myself    Location: No Preference    Additional Scheduling Information: No scheduling concerns    Prep Specifications:N/A    Have you attached a patient to this message: yes      "

## 2023-10-18 NOTE — PROGRESS NOTES
"GENERAL GI PATIENT INTAKE:    COVID symptoms in the last 7 days (runny nose, sore throat, congestion, cough, fever): No  PCP: Joan Torres  If not PCP-  number given to establish [phone]: N/A    ALLERGIES REVIEWED:  Yes    CHIEF COMPLAINT:    Chief Complaint   Patient presents with    Abdominal Pain       VITAL SIGNS:  Ht 5' 7" (1.702 m)   Wt 68.4 kg (150 lb 12.7 oz)   LMP 10/17/2023   BMI 23.62 kg/m²      Change in medical, surgical, family or social history: No      REVIEWED MEDICATION LIST RECONCILED INCLUDING ABOVE MEDS:  Yes     "

## 2023-10-18 NOTE — TELEPHONE ENCOUNTER
Spoke to patient to schedule procedure(s) Upper Endoscopy (EGD)       Physician to perform procedure(s) Dr. KHAI Ring  Date of Procedure (s) 1/25/24  Arrival Time 1:00 PM  Time of Procedure(s) 2:00 PM   Location of Procedure(s) [address]  Type of Rx Prep sent to patient: N/A  Instructions provided to patient via [facility]    Patient was informed on the following information and verbalized understanding. Screening questionnaire reviewed with patient and complete. If procedure requires anesthesia, a responsible adult needs to be present to accompany the patient home, patient cannot drive after receiving anesthesia. Appointment details are tentative, especially check-in time. Patient will receive a prep-op call 4 days prior to confirm check-in time for procedure. If applicable the patient should contact their pharmacy to verify Rx for procedure prep is ready for pick-up. Patient was advised to call the scheduling department at [phone] if pharmacy states no Rx is available. Patient was advised to call the endoscopy scheduling department if any questions or concerns arise.      [facility] Scheduling Department

## 2023-10-18 NOTE — PROGRESS NOTES
[facility] Established Patient Visit    Reason for Visit:    Chief Complaint   Patient presents with    Abdominal Pain       PCP: Joan Torres      Original HPI:  Mirtha Gary is a 48 y.o. female here for follow-up of left-sided abdominal pain.  I last saw her in November 2021 for the same.  I started seeing her a little more than 2 years ago for this pain.  It has progressed over time.  She had intermittent improvement, but has reported worsening over the last few months.  The symptoms are daily now.  Her current pain level is 5/10, but it ranges from 2/10 up to an 8/10 at times.  The pain never fully resolved.  There seemed to be some dietary relationship with eating; however, she has adjusted her diet without improvement.  She is eliminated spicy foods and dairy intake.  She had been on Protonix for a while, but did not notice that it was improving her symptoms.  She stopped the Protonix 2 weeks ago in anticipation for an EGD and colonoscopy that was done yesterday by 1 of my partners, Dr. Kike Manuel.  Results are summarized below.  She started taking the Protonix again this morning.    She travels a lot, and was recently diagnosed with Blastocystis hominis in the stool the a stool testing.  No other parasites were seen in the stool test.  She took a course of tinidazole without much improvement.  She admits to a lot of bloating and gas.  Her bowel movements are regular, and unchanged.  She denies diarrhea or frequent bowel movements.  The pain is not associated with bowel movements.    Of note, she has a history of endometriosis diagnosed many years ago.  This was treated via laparoscopy with ablation.  A pelvic ultrasound scheduled for next week.      EGD 09/13/2022 revealing 2 superficial antral ulcers of the stomach.  The esophagus was normal, and the duodenum were normal.  Biopsies were taken from the stomach and duodenum.    Colonoscopy 09/13/2022 done for screening  purposes.  This was complete with excellent bowel preparation and intubation of the terminal ileum.  The exam was completely normal, and 10 year follow-up recommended.    Interval history 10/18/2023  Persistent abdominal pain in LUQ in setting of long covid  Has been treated for B hominis and H pylori with resultant negative stools  But concerned the most recent H pylori she was taking PPI maybe a week before  Pain worse with spicy foods and coffee (they are Citizen of the [place]) always in LUQ        ROS:  Constitutional: No fevers, chills, No weight loss, normal appetite  GI: see HPI  Derm: No rash or lesions          PMHX:  has a past medical history of Endometriosis and Migraine.    PSHX:  has a past surgical history that includes Ectopic pregnancy surgery; Cosmetic surgery; Dilation and curettage of uterus using suction (N/A, 6/29/2018); Transforaminal epidural injection of steroid (Left, 8/2/2019); Augmentation of breast; Cystoscopy (N/A, 6/22/2021); Esophagogastroduodenoscopy (N/A, 12/1/2021); Esophagogastroduodenoscopy (N/A, 9/13/2022); and Colonoscopy (N/A, 9/13/2022).    The patient's social and family histories were reviewed by me and updated in the appropriate section of the electronic medical record.    Review of patient's allergies indicates:   Allergen Reactions    Ciprofloxacin Rash     T-cell mediated fixed drug eruption       Prior to Admission medications    Medication Sig Start Date End Date Taking? Authorizing Provider   betamethasone valerate 0.1% (VALISONE) 0.1 % Crea Apply topically 2 (two) times daily. 12/23/20  Yes Alfred Ramos MD   butalbital-acetaminophen-caff -40 mg Cap TK 1 C PO BID PRF SEVERE HA 11/5/21  Yes Susy Marti NP   calcipotriene-betamethasone (ENSTILAR) 0.005-0.064 % Foam Apply to scalp once or twice daily. Then wear shower cap overnight 12/30/20  Yes Ciarra Gamboa MD   EPINEPHrine (EPIPEN) 0.3 mg/0.3 mL AtIn  12/29/20  Yes Historical Provider   fluticasone  "(VERAMYST) 27.5 mcg/actuation nasal spray 2 sprays by Nasal route once daily. 2/3/21  Yes Riddhi Thomas MD   galcanezumab-gnlm (EMGALITY PEN) 120 mg/mL PnIj Inject 120 mg into the skin every 28 days. 11/5/21  Yes Susy Marti NP   ketoconazole (NIZORAL) 2 % shampoo Apply to scalp, let sit for 5-10 minutes then rinse. Use 3-5 times weekly 12/30/20  Yes Ciarra Gamboa MD   naratriptan (AMERGE) 2.5 MG tablet TAKE 1 TABLET BY MOUTH AT ONSET OF HEADACHE, MAY REPEAT IN 4 HOURS IF NEEDED 11/5/21  Yes Susy Marti NP   NURTEC 75 mg odt Take 75 mg by mouth once daily. 4/1/22  Yes Historical Provider   pantoprazole (PROTONIX) 40 MG tablet Take 1 tablet (40 mg total) by mouth once daily. 8/16/22 11/14/22 Yes Shanika Lindsay MD   promethazine (PHENERGAN) 25 MG tablet Take 1 tablet (25 mg total) by mouth every 6 (six) hours as needed for Nausea. 9/25/21  Yes Gera Ellis MD   ubrogepant (UBROGEPANT) 50 mg tablet TAKE 1 TABLET (50 MG TOTAL) BY MOUTH EVERY 2 (TWO) HOURS AS NEEDED FOR MIGRAINE. 10/12/21  Yes Susy Marti NP   ondansetron (ZOFRAN-ODT) 4 MG TbDL Take 1 tablet (4 mg total) by mouth every 6 (six) hours as needed (Nausea and vomiting).  Patient not taking: Reported on 9/14/2022 9/25/21   Gera Ellis MD         Objective Findings:  Vital Signs:  BP 96/69   Pulse 74   Ht 5' 7" (1.702 m)   Wt 68.4 kg (150 lb 12.7 oz)   LMP 10/17/2023   BMI 23.62 kg/m²  Body mass index is 23.62 kg/m².      Physical Exam:  General Appearance:  Well appearing in no acute distress, appears stated age  Head:  Normocephalic, atraumatic  Eyes:  No scleral icterus or pallor, EOMI  Abdomen:  Soft, mildly tender in the left lateral and upper abdomen, non distended, no defects. No hepatosplenomegaly, ascites, or mass  Skin:  No rash        Labs:  Lab Results   Component Value Date    WBC 4.79 09/06/2023    HGB 13.0 09/06/2023    HCT 40.5 09/06/2023    MCV 93 09/06/2023    RDW 12.2 09/06/2023     " 09/06/2023    GRAN 3.0 09/06/2023    GRAN 62.6 09/06/2023    LYMPH 1.3 09/06/2023    LYMPH 27.6 09/06/2023    MONO 0.4 09/06/2023    MONO 7.9 09/06/2023    EOS 0.1 09/06/2023    BASO 0.02 09/06/2023     Lab Results   Component Value Date     09/06/2023    K 3.8 09/06/2023     09/06/2023    CO2 23 09/06/2023    GLU 74 09/06/2023    BUN 12 09/06/2023    CREATININE 0.7 09/06/2023    CALCIUM 9.2 09/06/2023    PROT 7.2 09/06/2023    ALBUMIN 4.0 09/06/2023    BILITOT 0.8 09/06/2023    ALKPHOS 47 (L) 09/06/2023    AST 30 09/06/2023    ALT 44 09/06/2023      [facility] Reference Range & Units 07/10/20 10:02 08/18/22 15:53 12/21/22 13:27 01/09/23 12:51 07/31/23 13:19   Giardia Antigen - EIA Negative   Negative Negative  Negative   Cryptosporidium Antigen Negative   Negative Negative  Negative   H. Pylori Antigen, Stool NOT DETECTED  Not detected NOT DETECTED  NOT DETECTED NOT DETECTED   Stool Exam-Ova,Cysts,Parasites   FINAL 08/26/2022 1147 !   FINAL 08/09/2023 0954   Stool WBC No neutrophils seen   No neutrophils seen      H. pylori Antigen Source   unknown  unknown unknown   !: Data is abnormal     08/18/22 15:53 07/31/23 13:19   Stool Exam-Ova,Cysts,Parasites FINAL 08/26/2022 1147 ! FINAL 08/09/2023 0954   !: Data is abnormal = B hominis    Imaging:  Ultrasound of the abdomen 08/25/2022:   FINDINGS:  Pancreas: The visualized portions of pancreas appear normal.  Aorta: No aneurysm.  Liver: 15.2 cm, normal in size. Homogeneous parenchymal echotexture. No focal lesions.  Gallbladder: No calculi, wall thickening, or pericholecystic fluid.  Negative sonographic Beal's sign.  Biliary system: 2 mm common bile duct.  No intrahepatic ductal dilatation.  Inferior vena cava: Normal in appearance.  Right kidney: 11.7 cm. No hydronephrosis.  Left kidney: 10.9 cm. No hydronephrosis.  Anechoic structure measuring 1.5 x 1.5 x 1.0 cm likely represents a cyst..  Spleen: 10.4 cm.  Normal in size with homogeneous  echotexture.  Miscellaneous: No ascites.     Impression:  No acute abnormality identified to explain this patient's left upper quadrant abdominal pain.  Left renal cyst.          Assessment:  Mirtha Gary is a 48 y.o. female here with:  1. Blastocystis hominis infection    2. Chronic gastric ulcer due to Helicobacter pylori    3. Abdominal pain, epigastric            Recommendations:  1. Labs:  Celiac panel antibody  2. EGD for PUD followup  3. Recheck H pylori off PPI now  4. 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  is neuropsychiatry and MOA discussed in detail, they are in agreement with this trial        Order summary:  Orders Placed This Encounter    H. pylori antigen, stool    Celiac Disease Panel    DULoxetine (CYMBALTA) 20 MG capsule             Pedro Rign MD             show Xolair Pregnancy And Lactation Text: This medication is Pregnancy Category B and is considered safe during pregnancy. This medication is excreted in breast milk.

## 2023-10-20 ENCOUNTER — LAB VISIT (OUTPATIENT)
Dept: LAB | Facility: OTHER | Age: 48
End: 2023-10-20
Attending: INTERNAL MEDICINE
Payer: COMMERCIAL

## 2023-10-20 DIAGNOSIS — R10.13 ABDOMINAL PAIN, EPIGASTRIC: ICD-10-CM

## 2023-10-20 PROCEDURE — 86364 TISS TRNSGLTMNASE EA IG CLAS: CPT | Performed by: INTERNAL MEDICINE

## 2023-10-20 PROCEDURE — 36415 COLL VENOUS BLD VENIPUNCTURE: CPT | Performed by: INTERNAL MEDICINE

## 2023-10-23 ENCOUNTER — PATIENT MESSAGE (OUTPATIENT)
Dept: GASTROENTEROLOGY | Facility: CLINIC | Age: 48
End: 2023-10-23
Payer: COMMERCIAL

## 2023-10-23 LAB
GLIADIN PEPTIDE IGA SER-ACNC: 1.5 U/ML
GLIADIN PEPTIDE IGG SER-ACNC: <0.6 U/ML
IGA SERPL-MCNC: 179 MG/DL (ref 70–400)
TTG IGA SER-ACNC: 0.9 U/ML
TTG IGG SER-ACNC: <0.6 U/ML

## 2023-11-01 DIAGNOSIS — J06.9 UPPER RESPIRATORY TRACT INFECTION, UNSPECIFIED TYPE: Primary | ICD-10-CM

## 2023-11-01 PROCEDURE — 87651 STREP A DNA AMP PROBE: CPT | Mod: QW,S$GLB,, | Performed by: STUDENT IN AN ORGANIZED HEALTH CARE EDUCATION/TRAINING PROGRAM

## 2023-11-01 PROCEDURE — 87651 POCT STREP A MOLECULAR: ICD-10-PCS | Mod: QW,S$GLB,, | Performed by: STUDENT IN AN ORGANIZED HEALTH CARE EDUCATION/TRAINING PROGRAM

## 2023-11-02 LAB
CTP QC/QA: YES
MOLECULAR STREP A: NEGATIVE

## 2023-11-06 ENCOUNTER — LAB VISIT (OUTPATIENT)
Dept: LAB | Facility: HOSPITAL | Age: 48
End: 2023-11-06
Attending: STUDENT IN AN ORGANIZED HEALTH CARE EDUCATION/TRAINING PROGRAM
Payer: COMMERCIAL

## 2023-11-06 DIAGNOSIS — N39.0 URINARY TRACT INFECTION WITHOUT HEMATURIA, SITE UNSPECIFIED: Primary | ICD-10-CM

## 2023-11-06 DIAGNOSIS — N39.0 URINARY TRACT INFECTION WITHOUT HEMATURIA, SITE UNSPECIFIED: ICD-10-CM

## 2023-11-06 LAB
BILIRUB UR QL STRIP: NEGATIVE
CLARITY UR REFRACT.AUTO: CLEAR
COLOR UR AUTO: YELLOW
GLUCOSE UR QL STRIP: ABNORMAL
HGB UR QL STRIP: NEGATIVE
KETONES UR QL STRIP: NEGATIVE
LEUKOCYTE ESTERASE UR QL STRIP: NEGATIVE
NITRITE UR QL STRIP: NEGATIVE
PH UR STRIP: 6 [PH] (ref 5–8)
PROT UR QL STRIP: ABNORMAL
SP GR UR STRIP: >1.03 (ref 1–1.03)
URN SPEC COLLECT METH UR: ABNORMAL

## 2023-11-06 PROCEDURE — 81003 URINALYSIS AUTO W/O SCOPE: CPT | Performed by: STUDENT IN AN ORGANIZED HEALTH CARE EDUCATION/TRAINING PROGRAM

## 2023-11-10 DIAGNOSIS — G43.009 MIGRAINE WITHOUT AURA AND WITHOUT STATUS MIGRAINOSUS, NOT INTRACTABLE: Primary | ICD-10-CM

## 2023-12-02 RX ORDER — NIRMATRELVIR AND RITONAVIR 300-100 MG
KIT ORAL
Qty: 30 TABLET | Refills: 0 | Status: SHIPPED | OUTPATIENT
Start: 2023-12-02 | End: 2023-12-07

## 2023-12-05 ENCOUNTER — PATIENT MESSAGE (OUTPATIENT)
Dept: INTERNAL MEDICINE | Facility: CLINIC | Age: 48
End: 2023-12-05
Payer: COMMERCIAL

## 2023-12-05 DIAGNOSIS — U07.1 COVID-19 VIRUS INFECTION: Primary | ICD-10-CM

## 2023-12-06 ENCOUNTER — HOSPITAL ENCOUNTER (OUTPATIENT)
Dept: RADIOLOGY | Facility: HOSPITAL | Age: 48
Discharge: HOME | End: 2023-12-06
Attending: STUDENT IN AN ORGANIZED HEALTH CARE EDUCATION/TRAINING PROGRAM
Payer: COMMERCIAL

## 2023-12-06 ENCOUNTER — PATIENT MESSAGE (OUTPATIENT)
Dept: INTERNAL MEDICINE | Facility: CLINIC | Age: 48
End: 2023-12-06
Payer: COMMERCIAL

## 2023-12-06 DIAGNOSIS — U07.1 COVID-19 VIRUS INFECTION: ICD-10-CM

## 2023-12-06 DIAGNOSIS — J18.9 ATYPICAL PNEUMONIA: Primary | ICD-10-CM

## 2023-12-06 PROCEDURE — 71046 XR CHEST PA AND LATERAL: ICD-10-PCS | Mod: 26,,, | Performed by: RADIOLOGY

## 2023-12-06 PROCEDURE — 71046 X-RAY EXAM CHEST 2 VIEWS: CPT | Mod: TC

## 2023-12-06 PROCEDURE — 71046 X-RAY EXAM CHEST 2 VIEWS: CPT | Mod: 26,,, | Performed by: RADIOLOGY

## 2023-12-06 RX ORDER — AZITHROMYCIN 250 MG/1
TABLET, FILM COATED ORAL
Qty: 6 TABLET | Refills: 0 | Status: SHIPPED | OUTPATIENT
Start: 2023-12-06 | End: 2023-12-11

## 2023-12-06 NOTE — TELEPHONE ENCOUNTER
Patient tested patient tested positive for COVID on 12/02/2023, symptomatic management since 12/01.  No improvement.  Patient continues to have fever, shortness for breath, chest pain.    Will order labs and chest x-ray

## 2023-12-06 NOTE — TELEPHONE ENCOUNTER
Lab Visit on 12/06/2023   Component Date Value Ref Range Status    WBC 12/06/2023 9.95  3.90 - 12.70 K/uL Final    RBC 12/06/2023 4.62  4.00 - 5.40 M/uL Final    Hemoglobin 12/06/2023 14.3  12.0 - 16.0 g/dL Final    Hematocrit 12/06/2023 42.1  37.0 - 48.5 % Final    MCV 12/06/2023 91  82 - 98 fL Final    MCH 12/06/2023 31.0  27.0 - 31.0 pg Final    MCHC 12/06/2023 34.0  32.0 - 36.0 g/dL Final    RDW 12/06/2023 12.5  11.5 - 14.5 % Final    Platelets 12/06/2023 262  150 - 450 K/uL Final    MPV 12/06/2023 10.5  9.2 - 12.9 fL Final    Immature Granulocytes 12/06/2023 0.3  0.0 - 0.5 % Final    Gran # (ANC) 12/06/2023 7.3  1.8 - 7.7 K/uL Final    Immature Grans (Abs) 12/06/2023 0.03  0.00 - 0.04 K/uL Final    Comment: Mild elevation in immature granulocytes is non specific and   can be seen in a variety of conditions including stress response,   acute inflammation, trauma and pregnancy. Correlation with other   laboratory and clinical findings is essential.      Lymph # 12/06/2023 1.9  1.0 - 4.8 K/uL Final    Mono # 12/06/2023 0.6  0.3 - 1.0 K/uL Final    Eos # 12/06/2023 0.1  0.0 - 0.5 K/uL Final    Baso # 12/06/2023 0.02  0.00 - 0.20 K/uL Final    nRBC 12/06/2023 0  0 /100 WBC Final    Gran % 12/06/2023 73.6 (H)  38.0 - 73.0 % Final    Lymph % 12/06/2023 19.1  18.0 - 48.0 % Final    Mono % 12/06/2023 5.8  4.0 - 15.0 % Final    Eosinophil % 12/06/2023 1.0  0.0 - 8.0 % Final    Basophil % 12/06/2023 0.2  0.0 - 1.9 % Final    Differential Method 12/06/2023 Automated   Final    CRP 12/06/2023 19.6 (H)  0.0 - 8.2 mg/L Final    Sodium 12/06/2023 139  136 - 145 mmol/L Final    Potassium 12/06/2023 3.9  3.5 - 5.1 mmol/L Final    Chloride 12/06/2023 106  95 - 110 mmol/L Final    CO2 12/06/2023 24  23 - 29 mmol/L Final    Glucose 12/06/2023 88  70 - 110 mg/dL Final    BUN 12/06/2023 10  6 - 20 mg/dL Final    Creatinine 12/06/2023 0.7  0.5 - 1.4 mg/dL Final    Calcium 12/06/2023 9.5  8.7 - 10.5 mg/dL Final    Total Protein  12/06/2023 7.6  6.0 - 8.4 g/dL Final    Albumin 12/06/2023 3.9  3.5 - 5.2 g/dL Final    Total Bilirubin 12/06/2023 0.4  0.1 - 1.0 mg/dL Final    Comment: For infants and newborns, interpretation of results should be based  on gestational age, weight and in agreement with clinical  observations.    Premature Infant recommended reference ranges:  Up to 24 hours.............<8.0 mg/dL  Up to 48 hours............<12.0 mg/dL  3-5 days..................<15.0 mg/dL  6-29 days.................<15.0 mg/dL      Alkaline Phosphatase 12/06/2023 51 (L)  55 - 135 U/L Final    AST 12/06/2023 16  10 - 40 U/L Final    ALT 12/06/2023 21  10 - 44 U/L Final    eGFR 12/06/2023 >60.0  >60 mL/min/1.73 m^2 Final    Anion Gap 12/06/2023 9  8 - 16 mmol/L Final       X-Ray Chest PA And Lateral  Narrative: EXAMINATION:  XR CHEST PA AND LATERAL    CLINICAL HISTORY:  COVID-19    TECHNIQUE:  PA and lateral views of the chest were performed.    COMPARISON:  No 05/08/2023 ne    FINDINGS:  Heart size normal.  The lungs are clear.  No pleural effusion.  Breast implants noted as before  Impression: See above    Electronically signed by: Arnoldo Burnette MD  Date:    12/06/2023  Time:    10:29    Given little improvement and h/o previous complications with covid, will start patient on ATB.  Zpac ordered.

## 2023-12-11 PROBLEM — Z00.00 HEALTHCARE MAINTENANCE: Status: RESOLVED | Noted: 2023-09-06 | Resolved: 2023-12-11

## 2023-12-11 PROBLEM — Z00.00 ROUTINE GENERAL MEDICAL EXAMINATION AT A HEALTH CARE FACILITY: Status: RESOLVED | Noted: 2023-09-06 | Resolved: 2023-12-11

## 2023-12-14 ENCOUNTER — PATIENT MESSAGE (OUTPATIENT)
Dept: INTERNAL MEDICINE | Facility: CLINIC | Age: 48
End: 2023-12-14
Payer: COMMERCIAL

## 2023-12-18 ENCOUNTER — OFFICE VISIT (OUTPATIENT)
Dept: INTERNAL MEDICINE | Facility: CLINIC | Age: 48
End: 2023-12-18
Payer: COMMERCIAL

## 2023-12-18 VITALS
HEART RATE: 63 BPM | BODY MASS INDEX: 22.66 KG/M2 | SYSTOLIC BLOOD PRESSURE: 109 MMHG | HEIGHT: 67 IN | DIASTOLIC BLOOD PRESSURE: 71 MMHG | WEIGHT: 144.38 LBS

## 2023-12-18 DIAGNOSIS — M62.838 NECK MUSCLE SPASM: Primary | ICD-10-CM

## 2023-12-18 DIAGNOSIS — U09.9 POST-COVID CHRONIC COUGH: ICD-10-CM

## 2023-12-18 DIAGNOSIS — R05.3 POST-COVID CHRONIC COUGH: ICD-10-CM

## 2023-12-18 PROCEDURE — 1159F PR MEDICATION LIST DOCUMENTED IN MEDICAL RECORD: ICD-10-PCS | Mod: CPTII,S$GLB,, | Performed by: STUDENT IN AN ORGANIZED HEALTH CARE EDUCATION/TRAINING PROGRAM

## 2023-12-18 PROCEDURE — 3044F PR MOST RECENT HEMOGLOBIN A1C LEVEL <7.0%: ICD-10-PCS | Mod: CPTII,S$GLB,, | Performed by: STUDENT IN AN ORGANIZED HEALTH CARE EDUCATION/TRAINING PROGRAM

## 2023-12-18 PROCEDURE — 1160F RVW MEDS BY RX/DR IN RCRD: CPT | Mod: CPTII,S$GLB,, | Performed by: STUDENT IN AN ORGANIZED HEALTH CARE EDUCATION/TRAINING PROGRAM

## 2023-12-18 PROCEDURE — 3008F PR BODY MASS INDEX (BMI) DOCUMENTED: ICD-10-PCS | Mod: CPTII,S$GLB,, | Performed by: STUDENT IN AN ORGANIZED HEALTH CARE EDUCATION/TRAINING PROGRAM

## 2023-12-18 PROCEDURE — 3078F PR MOST RECENT DIASTOLIC BLOOD PRESSURE < 80 MM HG: ICD-10-PCS | Mod: CPTII,S$GLB,, | Performed by: STUDENT IN AN ORGANIZED HEALTH CARE EDUCATION/TRAINING PROGRAM

## 2023-12-18 PROCEDURE — 3044F HG A1C LEVEL LT 7.0%: CPT | Mod: CPTII,S$GLB,, | Performed by: STUDENT IN AN ORGANIZED HEALTH CARE EDUCATION/TRAINING PROGRAM

## 2023-12-18 PROCEDURE — 99214 PR OFFICE/OUTPT VISIT, EST, LEVL IV, 30-39 MIN: ICD-10-PCS | Mod: S$GLB,,, | Performed by: STUDENT IN AN ORGANIZED HEALTH CARE EDUCATION/TRAINING PROGRAM

## 2023-12-18 PROCEDURE — 3074F SYST BP LT 130 MM HG: CPT | Mod: CPTII,S$GLB,, | Performed by: STUDENT IN AN ORGANIZED HEALTH CARE EDUCATION/TRAINING PROGRAM

## 2023-12-18 PROCEDURE — 99214 OFFICE O/P EST MOD 30 MIN: CPT | Mod: S$GLB,,, | Performed by: STUDENT IN AN ORGANIZED HEALTH CARE EDUCATION/TRAINING PROGRAM

## 2023-12-18 PROCEDURE — 1160F PR REVIEW ALL MEDS BY PRESCRIBER/CLIN PHARMACIST DOCUMENTED: ICD-10-PCS | Mod: CPTII,S$GLB,, | Performed by: STUDENT IN AN ORGANIZED HEALTH CARE EDUCATION/TRAINING PROGRAM

## 2023-12-18 PROCEDURE — 3078F DIAST BP <80 MM HG: CPT | Mod: CPTII,S$GLB,, | Performed by: STUDENT IN AN ORGANIZED HEALTH CARE EDUCATION/TRAINING PROGRAM

## 2023-12-18 PROCEDURE — 3008F BODY MASS INDEX DOCD: CPT | Mod: CPTII,S$GLB,, | Performed by: STUDENT IN AN ORGANIZED HEALTH CARE EDUCATION/TRAINING PROGRAM

## 2023-12-18 PROCEDURE — 1159F MED LIST DOCD IN RCRD: CPT | Mod: CPTII,S$GLB,, | Performed by: STUDENT IN AN ORGANIZED HEALTH CARE EDUCATION/TRAINING PROGRAM

## 2023-12-18 PROCEDURE — 99999 PR PBB SHADOW E&M-EST. PATIENT-LVL IV: ICD-10-PCS | Mod: PBBFAC,,, | Performed by: STUDENT IN AN ORGANIZED HEALTH CARE EDUCATION/TRAINING PROGRAM

## 2023-12-18 PROCEDURE — 3074F PR MOST RECENT SYSTOLIC BLOOD PRESSURE < 130 MM HG: ICD-10-PCS | Mod: CPTII,S$GLB,, | Performed by: STUDENT IN AN ORGANIZED HEALTH CARE EDUCATION/TRAINING PROGRAM

## 2023-12-18 PROCEDURE — 99999 PR PBB SHADOW E&M-EST. PATIENT-LVL IV: CPT | Mod: PBBFAC,,, | Performed by: STUDENT IN AN ORGANIZED HEALTH CARE EDUCATION/TRAINING PROGRAM

## 2023-12-18 RX ORDER — METHOCARBAMOL 500 MG/1
500 TABLET, FILM COATED ORAL 4 TIMES DAILY
Qty: 40 TABLET | Refills: 0 | Status: SHIPPED | OUTPATIENT
Start: 2023-12-18 | End: 2023-12-28

## 2023-12-18 RX ORDER — DEXAMETHASONE SODIUM PHOSPHATE 4 MG/ML
4 INJECTION, SOLUTION INTRA-ARTICULAR; INTRALESIONAL; INTRAMUSCULAR; INTRAVENOUS; SOFT TISSUE ONCE
Qty: 1 ML | Refills: 0 | Status: SHIPPED | OUTPATIENT
Start: 2023-12-18 | End: 2023-12-19

## 2023-12-18 RX ORDER — CYCLOBENZAPRINE HCL 5 MG
5 TABLET ORAL NIGHTLY
Qty: 10 TABLET | Refills: 0 | Status: SHIPPED | OUTPATIENT
Start: 2023-12-18 | End: 2023-12-28

## 2023-12-18 RX ORDER — DEXAMETHASONE SODIUM PHOSPHATE 4 MG/ML
4 INJECTION, SOLUTION INTRA-ARTICULAR; INTRALESIONAL; INTRAMUSCULAR; INTRAVENOUS; SOFT TISSUE ONCE
Qty: 1 ML | Refills: 0 | Status: SHIPPED | OUTPATIENT
Start: 2023-12-18 | End: 2023-12-18

## 2023-12-18 RX ORDER — KETOROLAC TROMETHAMINE 30 MG/ML
30 INJECTION, SOLUTION INTRAMUSCULAR; INTRAVENOUS ONCE
Qty: 1 ML | Refills: 0 | Status: SHIPPED | OUTPATIENT
Start: 2023-12-18 | End: 2023-12-19

## 2023-12-18 RX ORDER — LIDOCAINE 50 MG/G
1 PATCH TOPICAL DAILY
Qty: 15 PATCH | Refills: 0 | Status: SHIPPED | OUTPATIENT
Start: 2023-12-18 | End: 2024-01-02

## 2023-12-18 RX ORDER — KETOROLAC TROMETHAMINE 30 MG/ML
30 INJECTION, SOLUTION INTRAMUSCULAR; INTRAVENOUS ONCE
Qty: 1 ML | Refills: 0 | Status: SHIPPED | OUTPATIENT
Start: 2023-12-18 | End: 2023-12-18 | Stop reason: CLARIF

## 2023-12-18 NOTE — ASSESSMENT & PLAN NOTE
Acute  Management with IM Toradol once  Prescribed methocarbamol and Flexeril.  Continue massage and topical lidocaine

## 2023-12-18 NOTE — PROGRESS NOTES
Subjective:       Patient ID: Mirtha Gary is a 48 y.o. female.    Chief Complaint: Neck Pain    HPI    Mirtha Gary is a 48 y.o. female , Senegalese speaking, with a history of:  Migraine headaches  Lumbosacral radiculopathy  Psoriasis of the scalp  Palpitations  H/o endometriosis  Cyst of ovary  Small cyst of the left kidney  Interstitial cystitis  Recurrent UTIs  H/o of COVID x 2  H/o blastocystis hominis infection  Chronic abdominal pain in the LLQ  BL knee pain      [Local Patient]  Originally from [facility]  Lives in: [place] [id number]       Patient comes to the clinic as a WALK IN complaining of neck spasm.    Patient reports 2 days of neck pain that has been worsening until today that she was not able to move the neck.    She has been very stressed and busy with daily activities, moving her house into a storage he would, I managing multiple things at the house.      Two weeks ago she had COVID infection and since then she has been coughing.  Cough is worse at night and does not let her sleep.    She has been doing her albuterol inhaler and using nebulizations which seemed to help, however the cough is still present.    Headaches, go, at baseline.  No other complaints or concerns    Since last seen by me:      9/6/23 AWV    Changes in health or medications: No    Specialists visits and recommendations:        H/o ER visits:   NO    H/o Hospitalizations:  NO    H/o falls: None     Life events / lifestyle:   Nothing new      Most recent laboratories reviewed:    Recent Labs   Lab 07/31/23  1321 09/06/23  0859 12/06/23  1042   WBC 7.68 4.79 9.95   Hemoglobin 14.7 13.0 14.3   Hematocrit 45.4 40.5 42.1   MCV 94 93 91   Platelets 244 238 262       Recent Labs   Lab 06/09/21  1456 09/29/21  0244 01/13/22  0833 09/14/22  1154 07/31/23  1321 09/06/23  0859 12/06/23  1042   Glucose 82 87 79   < > 78 74 88   Sodium 141 138 139   < > 138 140 139   Potassium 3.9 3.9 4.1   < > 4.1 3.8 3.9   BUN 12 12 12   < > 12 12 10    Creatinine 0.7 0.8 0.7   < > 0.8 0.7 0.7   eGFR if non African American >60.0 >60 >60.0  --   --   --   --    Total Bilirubin  --  0.4 0.6   < > 0.5 0.8 0.4   AST  --  14 14   < > 32 30 16   ALT  --  13 15   < > 68 H 44 21    < > = values in this interval not displayed.       Recent Labs   Lab 01/13/22 0833 05/08/23  0851   Hemoglobin A1C 4.8 4.6       Recent Labs   Lab 01/13/22 0833 05/08/23  0851   Cholesterol 195 186   Triglycerides 71 89   HDL 47 49   LDL Cholesterol 133.8 119.2   Non-HDL Cholesterol 148 137       Recent Labs   Lab 01/13/22 0833 05/08/23  0851   TSH 1.999 1.178               Current medications:    Current Outpatient Medications:     albuterol (ACCUNEB) 0.63 mg/3 mL Nebu, SMARTSIG:3 Milliliter(s) Via Nebulizer Every 6 Hours PRN, Disp: , Rfl:     albuterol (PROVENTIL/VENTOLIN HFA) 90 mcg/actuation inhaler, Inhale 2 puffs into the lungs every 4 (four) hours as needed., Disp: , Rfl:     azelastine (ASTELIN) 137 mcg (0.1 %) nasal spray, 2 sprays (274 mcg total) by Nasal route 2 (two) times daily as needed for Rhinitis. Donot snort (because it tastes bad), Disp: 30 mL, Rfl: 11    betamethasone valerate 0.1% (VALISONE) 0.1 % Crea, Apply topically 2 (two) times daily., Disp: 60 g, Rfl: 2    butalbital-acetaminophen-caff -40 mg Cap, TK 1 C PO BID PRF SEVERE HA, Disp: 30 capsule, Rfl: 3    calcipotriene-betamethasone (ENSTILAR) 0.005-0.064 % Foam, Apply to scalp once or twice daily. Then wear shower cap overnight, Disp: 60 g, Rfl: 4    COMBIVENT RESPIMAT  mcg/actuation inhaler, Inhale into the lungs., Disp: , Rfl:     DULoxetine (CYMBALTA) 20 MG capsule, Take 1 capsule (20 mg total) by mouth once daily., Disp: 60 capsule, Rfl: 5    EPINEPHrine (EPIPEN) 0.3 mg/0.3 mL AtIn, , Disp: , Rfl:     fluticasone propionate (FLONASE) 50 mcg/actuation nasal spray, USE 1 SPRAY (50 MCG TOTAL) IN EACH NOSTRIL ONCE DAILY, Disp: 48 mL, Rfl: 1    ketoconazole (NIZORAL) 2 % shampoo, Apply to scalp, let sit  for 5-10 minutes then rinse. Use 3-5 times weekly, Disp: 120 mL, Rfl: 3    ketorolac 0.5% (ACULAR) 0.5 % Drop, Place 1 drop into both eyes 4 (four) times daily as needed (eye itching)., Disp: 10 mL, Rfl: 1    naratriptan (AMERGE) 2.5 MG tablet, TAKE 1 TABLET BY MOUTH AT ONSET OF HEADACHE, MAY REPEAT IN 4 HOURS IF NEEDED, Disp: 9 tablet, Rfl: 3    ondansetron (ZOFRAN-ODT) 4 MG TbDL, Take 1 tablet (4 mg total) by mouth every 6 (six) hours as needed (Nausea and vomiting)., Disp: 20 tablet, Rfl: 0    promethazine (PHENERGAN) 25 MG tablet, Take 1 tablet (25 mg total) by mouth every 6 (six) hours as needed for Nausea., Disp: 25 tablet, Rfl: 0    rimegepant (NURTEC) 75 mg odt, Take 1 tablet (75 mg total) by mouth daily as needed for Migraine. Place ODT tablet on the tongue; alternatively the ODT tablet may be placed under the tongue, Disp: 30 tablet, Rfl: 3    tacrolimus (PROTOPIC) 0.1 % ointment, Apply topically 2 (two) times daily., Disp: 30 g, Rfl: 3    ubrogepant (UBROGEPANT) 50 mg tablet, TAKE 1 TABLET (50 MG TOTAL) BY MOUTH EVERY 2 (TWO) HOURS AS NEEDED FOR MIGRAINE., Disp: 10 tablet, Rfl: 1    budesonide (PULMICORT) 0.25 mg/2 mL nebulizer solution, Take 2 mLs (0.25 mg total) by nebulization once daily. Controller, Disp: 180 mL, Rfl: 0    cyclobenzaprine (FLEXERIL) 5 MG tablet, Take 1 tablet (5 mg total) by mouth nightly. for 10 days, Disp: 10 tablet, Rfl: 0    dexAMETHasone (DECADRON) 4 mg/mL injection, Inject 1 mL (4 mg total) into the muscle once. for 1 dose, Disp: 1 mL, Rfl: 0    ketorolac (TORADOL) 30 mg/mL (1 mL) injection, Inject 1 mL (30 mg total) into the muscle once. for 1 dose, Disp: 1 mL, Rfl: 0    LIDOcaine (LIDODERM) 5 %, Place 1 patch onto the skin once daily. Remove & Discard patch within 12 hours or as directed by MD for 15 days, Disp: 15 patch, Rfl: 0    methocarbamoL (ROBAXIN) 500 MG Tab, Take 1 tablet (500 mg total) by mouth 4 (four) times daily. for 10 days, Disp: 40 tablet, Rfl: 0     "pantoprazole (PROTONIX) 40 MG tablet, Take 1 tablet (40 mg total) by mouth once daily., Disp: 90 tablet, Rfl: 0    syringe with needle, safety 3 mL 23 gauge x 1" Syrg, 1 each by Misc.(Non-Drug; Combo Route) route once. for 1 dose, Disp: 2 each, Rfl: 0      Healthcare Maintenance:  Colon cancer screening:   Last Colonoscopy completed on 9/13/2022   Colonoscopy: Last Colonoscopy completed on 9/13/2022   Vaccinations: Pneumonia, Zoster, Tetanus  COVID vaccination: completed  Depression screening: PHQ2 score = 0     Annual Wellness visit approx. Month: September ROS  11-point review of systems done. Negative except for detailed in the HPI.        Objective:      /71   Pulse 63   Ht 5' 7" (1.702 m)   Wt 65.5 kg (144 lb 6.4 oz)   BMI 22.62 kg/m²     Physical Exam   Physical Exam  Vitals and nursing note reviewed.   Constitutional:       Appearance: Normal appearance.   HENT:      Head: Normocephalic and atraumatic.      Right Ear: Tympanic membrane normal.      Left Ear: Tympanic membrane normal.      Nose: Nose normal.      Mouth/Throat:      Mouth: Mucous membranes are moist.      Pharynx: Oropharynx is clear.   Eyes:      Extraocular Movements: Extraocular movements intact.      Conjunctiva/sclera: Conjunctivae normal.      Pupils: Pupils are equal, round, and reactive to light.   Cardiovascular:      Rate and Rhythm: Normal rate and regular rhythm.      Pulses: Normal pulses.      Heart sounds: Normal heart sounds.   Pulmonary:      Effort: Pulmonary effort is normal.      Breath sounds: Normal breath sounds.   Abdominal:      General: Bowel sounds are normal. There is no distension.      Palpations: Abdomen is soft.      Tenderness: There is no abdominal tenderness.   Musculoskeletal:         General: Normal range of motion.      Cervical back: Normal range of motion and neck supple.   R neck muscle spasm noted / significant contracture., tenderness to palpation.  Skin:     General: Skin is warm. " "  Neurological:      General: No focal deficit present.      Mental Status: She is alert and oriented to person, place, and time. Mental status is at baseline.   Psychiatric:         Mood and Affect: Mood normal.           Assessment:       1. Neck muscle spasm  -     Discontinue: ketorolac (TORADOL) 30 mg/mL (1 mL) injection; Inject 1 mL (30 mg total) into the muscle once. for 1 dose  Dispense: 1 mL; Refill: 0  -     Discontinue: dexAMETHasone (DECADRON) 4 mg/mL injection; Inject 1 mL (4 mg total) into the muscle once. for 1 dose  Dispense: 1 mL; Refill: 0  -     Discontinue: syringe with needle, safety 3 mL 23 gauge x 1" Syrg; 1 each by Misc.(Non-Drug; Combo Route) route once. for 1 dose  Dispense: 2 each; Refill: 0  -     dexAMETHasone (DECADRON) 4 mg/mL injection; Inject 1 mL (4 mg total) into the muscle once. for 1 dose  Dispense: 1 mL; Refill: 0  -     ketorolac (TORADOL) 30 mg/mL (1 mL) injection; Inject 1 mL (30 mg total) into the muscle once. for 1 dose  Dispense: 1 mL; Refill: 0  -     syringe with needle, safety 3 mL 23 gauge x 1" Syrg; 1 each by Misc.(Non-Drug; Combo Route) route once. for 1 dose  Dispense: 2 each; Refill: 0  -     LIDOcaine (LIDODERM) 5 %; Place 1 patch onto the skin once daily. Remove & Discard patch within 12 hours or as directed by MD for 15 days  Dispense: 15 patch; Refill: 0  -     methocarbamoL (ROBAXIN) 500 MG Tab; Take 1 tablet (500 mg total) by mouth 4 (four) times daily. for 10 days  Dispense: 40 tablet; Refill: 0  -     cyclobenzaprine (FLEXERIL) 5 MG tablet; Take 1 tablet (5 mg total) by mouth nightly. for 10 days  Dispense: 10 tablet; Refill: 0    2. Post-COVID chronic cough       Plan:       Ketorolac IM once  Dexamethasone 4 mg IM once  Methocarbamol  Flexeril  Lidocaine patches        Problem list updated, medications reconciled, education provided  Lifestyle recommendations given  AVS printed, explained, and given to the patient.  RTC in : as needed  September for " AWV.            LENIN KEE MD, MPH  Internal Medicine  [facility]  [facility]

## 2023-12-21 RX ORDER — CELECOXIB 200 MG/1
200 CAPSULE ORAL 2 TIMES DAILY
Qty: 20 CAPSULE | Refills: 0 | Status: SHIPPED | OUTPATIENT
Start: 2023-12-21 | End: 2023-12-31

## 2024-01-04 ENCOUNTER — CLINICAL SUPPORT (OUTPATIENT)
Dept: REHABILITATION | Facility: OTHER | Age: 49
End: 2024-01-04
Payer: COMMERCIAL

## 2024-01-04 DIAGNOSIS — M54.50 CHRONIC BILATERAL LOW BACK PAIN WITHOUT SCIATICA: ICD-10-CM

## 2024-01-04 DIAGNOSIS — G89.29 CHRONIC BILATERAL LOW BACK PAIN WITHOUT SCIATICA: ICD-10-CM

## 2024-01-04 DIAGNOSIS — M54.41 CHRONIC BILATERAL LOW BACK PAIN WITH RIGHT-SIDED SCIATICA: ICD-10-CM

## 2024-01-04 DIAGNOSIS — G89.29 CHRONIC BILATERAL LOW BACK PAIN WITH RIGHT-SIDED SCIATICA: ICD-10-CM

## 2024-01-04 DIAGNOSIS — M53.86 DECREASED ROM OF INTERVERTEBRAL DISCS OF LUMBAR SPINE: Primary | ICD-10-CM

## 2024-01-04 PROCEDURE — 97110 THERAPEUTIC EXERCISES: CPT

## 2024-01-04 PROCEDURE — 97161 PT EVAL LOW COMPLEX 20 MIN: CPT

## 2024-01-04 NOTE — PLAN OF CARE
[facility]  Physical Therapy Initial Evaluation  Date: 1/4/2024   Name: Mirtha Gary  Clinic Number: [id number]    Therapy Diagnosis:   Encounter Diagnoses   Name Primary?    Chronic bilateral low back pain without sciatica     Decreased ROM of intervertebral discs of lumbar spine Yes    Chronic bilateral low back pain with right-sided sciatica      Physician: Joan Torres MD    Physician Orders: PT Eval and Treat   Medical Diagnosis from Referral: Chronic bilateral low back pain without sciatica [M54.50, G89.29]   Evaluation Date: 1/4/2024  Authorization Period Expiration: 9/05/2024  Plan of Care Expiration: 3/31/2024  Visit # / Visits authorized: 1/ 1   Progress Note Due: 2/04/2024  FOTO: 1/ 1    Precautions: Standard    Time In: 9:10 am  Time Out: 9:50am  Total Appointment Time (timed & untimed codes): 40 minutes    Subjective   Date of onset: >2 years ago   History of current condition - Mirtha reports: chronic, on and off history of low back pain. She reports pain across her back which can radiate down her right thigh. She reports attending PT for her back pain on and off over the past few years with positive results after her discharge, however she reports not knowing how to manage s/s when she has a flare up. She reports when she works out 2-3x/week she normally feels good and can maintain her LBP, but over the past month she has not been working out due to sickness and the holidays. She also reports an incidence of neck pain ~2 weeks ago which has since improved.    VANITA:  Any Bowel and Bladder movement issues: none  Any falls: none  Any dizziness: none  Any Headache: none  Any injection in lower back: steroid or epidural: yes, 2-3 years ago   Pain radiates: yes down RLE  Pain constant or intermitting: intermittent    Pain:  Current 2/10, worst 5/10, best 0/10   Location: bilateral back   Description: Dull, Sharp, and Shooting  Aggravating Factors: sittings > 6 hours;  "night time; pulling (like pulling a table); sitting cross legged   Easing Factors: "twisting yoga stretches"; piriformis stretch     Prior Therapy: several plan of care with [facility] and other OP PT clinics  Prior Treatment: injections in the past   Occupation: administrative work - lots of sitting     Pts goals: reduce pain and learn how to manage s/s    Imaging, from 2019:   EXAMINATION: XR LUMBAR SPINE AP AND LAT WITH FLEX/EXT     CLINICAL HISTORY: Other intervertebral disc degeneration, lumbar region     TECHNIQUE: AP and lateral views as well as lateral flexion and extension images are performed through the lumbar spine.     COMPARISON: 02/06/2018.     FINDINGS:  There are 5 non-rib-bearing lumbar type vertebral bodies.   Bowel contents obscure some detail especially at the right L5 transverse process, right sacral ala, right iliac bone and right SI joint.  Within the limits of the study, vertebral body heights, alignment and disc spaces are preserved.  I detect no lytic or blastic lesion.  Sacroiliac joints appear patent in their imaged extents.  I detect no aortic atherosclerosis.   I detect no intrathoracic abnormality    EXAMINATION: MRI LUMBAR SPINE WITHOUT CONTRAST     CLINICAL HISTORY: Low back pain, >6wks conservative tx, persistent-progressive sx, surgical candidate; Radiculopathy, lumbar region     TECHNIQUE: Multiplanar, multisequence MR images were acquired from the thoracolumbar junction to the sacrum without the administration of contrast.     COMPARISON: MRI lumbar spine from 02/08/2018 and 06/27/2016     FINDINGS:  Alignment: Normal.  Vertebrae: Normal marrow signal. No fracture.  Discs: Mild disc desiccation of L5-S1.  Cord: Normal.  Conus terminates at L1  Degenerative findings:     T12-L1: No focal disc bulge, spinal canal stenosis, or neural foraminal narrowing.     L1-L2: No focal disc bulge, spinal canal stenosis, or neural foraminal narrowing.     L2-L3: No focal disc bulge, spinal canal " stenosis, or neural foraminal narrowing.     L3-L4: At most mild diffuse broad-based disc bulge and mild facet arthropathy.  No central spinal canal stenosis or neural foraminal narrowing.     L4-L5: At most mild diffuse broad-based disc bulge and mild facet arthropathy.  No central spinal canal stenosis or neural foraminal narrowing.     L5-S1: There is a small annular fissure.  There is a central disc protrusion with bilateral facet arthropathy causing mild bilateral neural foraminal narrowing.  No significant change when compared to prior exam.     Paraspinal muscles & soft tissues: Unremarkable.     Impression:Mild degenerative changes of the lumbar spine, most prominent at L5-S1 with a small disc protrusion and annular fissure.  Mild bilateral neural foraminal narrowing at this level.  No central spinal canal stenosis.       Medical History:   Past Medical History:   Diagnosis Date    Endometriosis     Migraine        Surgical History:   Mirtha Gary  has a past surgical history that includes Ectopic pregnancy surgery; Cosmetic surgery; Dilation and curettage of uterus using suction (N/A, 6/29/2018); Transforaminal epidural injection of steroid (Left, 8/2/2019); Augmentation of breast; Cystoscopy (N/A, 6/22/2021); Esophagogastroduodenoscopy (N/A, 12/1/2021); Esophagogastroduodenoscopy (N/A, 9/13/2022); and Colonoscopy (N/A, 9/13/2022).    Medications:   Mirtha has a current medication list which includes the following prescription(s): albuterol, albuterol, azelastine, betamethasone valerate 0.1%, budesonide, butalbital-acetaminophen-caff, enstilar, combivent respimat, duloxetine, epinephrine, fluticasone propionate, ketoconazole, ketorolac 0.5%, naratriptan, ondansetron, pantoprazole, promethazine, nurtec, tacrolimus, and ubrelvy.    Allergies:   Review of patient's allergies indicates:   Allergen Reactions    Ciprofloxacin Rash     T-cell mediated fixed drug eruption          Objective    Observation  Correction of posture: better with lumbar roll    Posture Alignment: slouched posture;forward head    Lumbar MOVEMENT LOSS    ROM Loss   Flexion within functional limits   Extension moderate loss; pain   Side glide Right moderate loss   Side glide Left moderate loss; pain on R   Rotation Right minimal loss   Rotation Left minimal loss       Lower Extremity Strength    Right LE  Left LE    Hip flexion: 4-/5 Hip flexion: 4/5   Knee extension: 5/5 Knee extension: 4+/5   Knee flexion: 4+/5 Knee flexion: 4+/5   Hip IR: 3/5 Hip IR: 3/5   Hip ER: 4+/5 Hip ER: 4+/5   Hip extension:  4/5 Hip extension: 4/5   Hip abduction: 4-/5 Hip abduction: 4-/5   Hip adduction: 3+/5 Hip adduction 3+/5   Ankle dorsiflexion: 5/5 Ankle dorsiflexion: 5/5   Ankle plantarflexion: 4+/5 Ankle plantarflexion: 4+/5     REPEATED TEST MOVEMENTS:    Baseline symptoms:  Repeated Flexion in Standing better   Repeated Extension in Standing worse   Repeated Flexion in lying worse   Repeated Extension in lying  better       STATIC TESTS and other movements:   Baseline symptoms:  Prone lie no effect   Prone lie on elbows better   Sustained flexion worse   Sitting slouched  no effect   Sitting erect no effect   Standing slouched no effect   Standing erect  no effect   Lying prone in extension  NT       Neuro Dynamic Testing:    Sciatic nerve:      SLR: positive    Tibial bias: negative    Common Peroneal bias: negative   Femoral Nerve:    Femoral nerve test: negative   Neural Tension:     Slump test: positive    Palpation: TTP to right lateral hip musculature       PT Evaluation Completed? Yes  Discussed Plan of Care with patient: Yes        CMS Impairment/Limitation/Restriction for FOTO Back Survey    Therapist reviewed FOTO scores for Mirtha Gary on 1/4/2024.   FOTO documents entered into EPIC - see Media section.             TREATMENT   Treatment Time In: 9:30 am  Treatment Time Out: 9:40 am  Total Treatment time separate from  Evaluation: 10 minutes    Mirtha received therapeutic exercises to develop strength, ROM, and flexibility for 10 minutes including:  SL hip ADD  Clams with RTB  Prone on elbows  Prone press up   Piriformis stretch (supine with ankle over opposite knee)    Home Exercises and Patient Education Provided    Education provided:   - Diagnosis, prognosis  - HEP    Written Home Exercises Provided: Patient instructed to cont prior HEP.  Exercises were reviewed and Mirtha was able to demonstrate them prior to the end of the session.  Mirtha demonstrated good  understanding of the education provided.     See EMR under Patient Instructions for exercises provided 1/4/2024.    Assessment   Mirtha is a 48 y.o. female referred to outpatient Physical Therapy with a medical diagnosis of Chronic bilateral low back pain without sciatica [M54.50, G89.29] . Pt presents with reduced lumbar rom, decreased strength of BLE and lumbopelvic stabilizing musculature, soft tissue restrictions, and positive neural tension. These deficits impact her ability to perform higher level activities, such as pushing, pulling, lifting, and prolonged sitting. Mirtha would benefit from skilled PT to address deficits, reduce pain, and improve her ability to perform recreational activities, ADLs, and work related duties.     Pt prognosis is Excellent.   Pt will benefit from skilled outpatient Physical Therapy to address the deficits stated above and in the chart below, provide pt/family education, and to maximize pt's level of independence.     Plan of care discussed with patient: Yes  Pt's spiritual, cultural and educational needs considered and patient is agreeable to the plan of care and goals as stated below:     Anticipated Barriers for therapy: none    Medical Necessity is demonstrated by the following  History  Co-morbidities and personal factors that may impact the plan of care Co-morbidities:   Past Medical History:   Diagnosis Date    Endometriosis      Migraine         Low   Examination  Body Structures and Functions, activity limitations and participation restrictions that may impact the plan of care Body Regions:   back  lower extremities  trunk    Body Systems:    ROM  strength  gross coordinated movement    Participation Restrictions:   none    Activity limitations:   Learning and applying knowledge  no deficits    General Tasks and Commands  no deficits    Communication  no deficits    Mobility  no deficits    Self care  no deficits    Domestic Life  no deficits    Interactions/Relationships  no deficits    Life Areas  no deficits    Community and Social Life  no deficits         Complexity: Low  See FOTO outcome assessment   Clinical Presentation stable and uncomplicated low   Decision Making/ Complexity Score: low     GOALS: Short Term Goals:  4 weeks  Report decreased in pain at worse less than  <   / =  3  /10  to increase tolerance for functional activities.On going  Pt will exhibit increase in BLE strength by 1/2  MMT grade to increase tolerance for ADL and work activities.On going  Pt to tolerate HEP to improve ROM and independence with ADL's.On going  Pt to improve lumbar range of motion by 25% to allow for improved functional mobility to allow for improvement in IADLs. On going    Long Term Goals: 8 weeks  Report decreased in pain at worse less than  <   / =  2  /10  to increase tolerance for functional mobility.  On going  Pt to demonstrate ability to independently control and reduce their pain through posture positioning and mechanical movements throughout a typical day.  (approp and ongoing)  Pt will exhibit increase in BLE strength by MMT 1 grade to increase tolerance for ADL and work activities.On going  Pt will report at 67% or less limitation on FOTO Lumbar spine survey  to demonstrate decrease in disability and improvement in back pain.On going  Pt to be Independent with HEP to improve ROM and independence with ADL's. On going  Pt to improve  lumbar range of motion by 75% to allow for improved functional mobility to allow for improvement in IADLs. On going    Plan   Plan of care Certification: 1/4/2024 to 3/31/2024.    Outpatient Physical Therapy 2 times weekly for 10 weeks to include the following interventions: Manual Therapy, Moist Heat/ Ice, Neuromuscular Re-ed, Patient Education, Self Care, Therapeutic Activities, and Therapeutic Exercise. Dry needling    Lauren Basurto, PT      I CERTIFY THE NEED FOR THESE SERVICES FURNISHED UNDER THIS PLAN OF TREATMENT AND WHILE UNDER MY CARE   Physician's comments:     Physician's Signature: ___________________________________________________

## 2024-01-05 ENCOUNTER — OFFICE VISIT (OUTPATIENT)
Dept: ALLERGY | Facility: CLINIC | Age: 49
End: 2024-01-05
Payer: COMMERCIAL

## 2024-01-05 ENCOUNTER — TELEPHONE (OUTPATIENT)
Dept: ENDOSCOPY | Facility: HOSPITAL | Age: 49
End: 2024-01-05
Payer: COMMERCIAL

## 2024-01-05 VITALS — HEIGHT: 67 IN | WEIGHT: 150.56 LBS | BODY MASS INDEX: 23.63 KG/M2

## 2024-01-05 DIAGNOSIS — J31.0 CHRONIC RHINITIS: ICD-10-CM

## 2024-01-05 DIAGNOSIS — R68.89 THROAT SYMPTOM: ICD-10-CM

## 2024-01-05 DIAGNOSIS — L25.3 LATEX ALLERGY, CONTACT DERMATITIS: ICD-10-CM

## 2024-01-05 DIAGNOSIS — H10.403 CHRONIC CONJUNCTIVITIS OF BOTH EYES, UNSPECIFIED CHRONIC CONJUNCTIVITIS TYPE: ICD-10-CM

## 2024-01-05 DIAGNOSIS — R05.3 CHRONIC COUGH: Primary | ICD-10-CM

## 2024-01-05 PROCEDURE — 3008F BODY MASS INDEX DOCD: CPT | Mod: CPTII,S$GLB,, | Performed by: STUDENT IN AN ORGANIZED HEALTH CARE EDUCATION/TRAINING PROGRAM

## 2024-01-05 PROCEDURE — 99999 PR PBB SHADOW E&M-EST. PATIENT-LVL IV: CPT | Mod: PBBFAC,,, | Performed by: STUDENT IN AN ORGANIZED HEALTH CARE EDUCATION/TRAINING PROGRAM

## 2024-01-05 PROCEDURE — 1159F MED LIST DOCD IN RCRD: CPT | Mod: CPTII,S$GLB,, | Performed by: STUDENT IN AN ORGANIZED HEALTH CARE EDUCATION/TRAINING PROGRAM

## 2024-01-05 PROCEDURE — 99214 OFFICE O/P EST MOD 30 MIN: CPT | Mod: S$GLB,,, | Performed by: STUDENT IN AN ORGANIZED HEALTH CARE EDUCATION/TRAINING PROGRAM

## 2024-01-05 PROCEDURE — 1160F RVW MEDS BY RX/DR IN RCRD: CPT | Mod: CPTII,S$GLB,, | Performed by: STUDENT IN AN ORGANIZED HEALTH CARE EDUCATION/TRAINING PROGRAM

## 2024-01-05 NOTE — TELEPHONE ENCOUNTER
Contacted the patient to schedule an endoscopy procedure(s) 01/05/24. The patient did not answer the call and left a voice message requesting a call back.

## 2024-01-05 NOTE — TELEPHONE ENCOUNTER
----- Message from Darcy Cherry sent at 1/5/2024  8:26 AM CST -----  Regarding: reschedule needed  Contact: [phone]  Mirtha Gary calling regarding Appointment Access  (message) for reschedule of EGD on 1/25/24.  She would like to reschedule for sometime in May 2024.  Please call pt to reschedule

## 2024-01-05 NOTE — PROGRESS NOTES
Allergy Clinic Note  [facility]    This note was created by combination of typed  and M-Modal dictation. Transcription errors may be present.  If there are any questions, please contact me.    HISTORY      Patient ID: Mirtha Gary is a 48 y.o. female.    Chief Complaint: No chief complaint on file.      Allergy problem list:    Chronic rhinitis with negative ImmunoCAP  Type for latex allergy (cutaneous symptoms)  Cough, throat symptoms--suspect LPR     History of Present Illness       Mirtha Gary is a 48 y.o. female with chronic rhinitis returns to follow up symptoms and test results.  She is here alone, and she is a very good historian.    Related medications and other interventions  EpiPen   Budesonide 0.25 mg via nebulizer twice daily  Albuterol MDI or neb as needed  Combivent  Azelastine nasal as needed  Zyrtec as needed  Ketorolac eyedrops as needed  (Protonix)   (Phenergan)    01/05/2024:  On a trial of azelastine nasal spray, client reports cough is unchanged.  Interval history is significant for COVID infection.  She continues to localize cough to the level of her throat.  Discussed possibility of GERD/LPR causing her cough and throat symptoms.  She admits her GI symptoms have been acting up.  Recommend increasing pantoprazole to 40 mg twice daily.  Emphasize 4-6 weeks to improvement in symptoms.  Follow up 6-8 weeks.      08/31/2023:  At initial visit, Ms Gary a long history rhinitis only partially controlled on medications.  In complaints are nasal congestion and itchy eyes.  Associated symptoms include red/runny eyes, throat itching, ear pain/fullness.  Additional symptoms include postnasal drip that feels like something is stuck in her throat.  Cough the level of her throat, throat clearing, and hoarseness.  She denies dysphagia but admits heartburn about twice a week.  She is suspicious of pollen and dust as well as possible molds.  Exposures are notable for a  "dog.  There is no clear seasonal pattern.  She was not helped by unknown over-the-counter medications and an unknown nasal spray used irregularly.  She has some relief from Zyrtec and from an unknown over-the-counter eyedrop.  She has never had allergy testing.            MEDICAL HISTORY      Significant past medical history:  Chronic pain, history of gastric ulcer  Active Problem List reviewed  ENT surgery:  None   Significant family history:  Son with allergic rhinitis to dust mite and dog.  No asthma.  Exposures:  One dog not in the bedroom.  Recently they were building a house with a lot of sheet rock exposure as well as mold exposure.  No smoke.  Smoking Hx:  Client  reports that she has never smoked. She has never used smokeless tobacco.    Meds: MAR reviewed    Asthma:  Yes, mild and intermittent  Eczema:  No  Rhinitis:  Yes.  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  Red spots at areas of contact.    Patient Active Problem List   Diagnosis    Lumbosacral disc disease    Annular tear of intervertebral disc    Lumbosacral radiculopathy    Left lumbar radiculitis    Chondromalacia of right patella    Right knee pain    Post-traumatic osteoarthritis of right knee    Chronic bilateral low back pain with right-sided sciatica    Missed     S/P dilation and curettage    Abscess of finger    Range of motion deficit    Hand pain, left    Radiculopathy    Muscle weakness    Abnormal coordination    History of migraine headaches    History of endometriosis    History of ectopic pregnancy    Cyst of ovary    Double ureter    History of pyelonephritis    Recurrent UTI    Acute cystitis without hematuria    Interstitial cystitis    COVID-19 virus infection    Shortness of breath    Palpitations    Pain in wrist, left    Psoriasis of scalp    Long COVID    Abdominal pain, chronic, left lower quadrant    Blastocystis hominis infection "    Migraine headache    Gastric intestinal metaplasia    Left upper quadrant abdominal pain    TB lung, latent    Gastric ulcer due to Helicobacter pylori    Other hemorrhoids    Ocular pain, right eye    Decreased vision of right eye    Viral upper respiratory tract infection    Upper respiratory tract infection    Seasonal allergic rhinitis due to pollen    Papular urticaria    Acute upper respiratory infection, unspecified    Lower respiratory infection (e.g., bronchitis, pneumonia, pneumonitis, pulmonitis)    Neck muscle spasm    Post-COVID chronic cough    Decreased ROM of intervertebral discs of lumbar spine     Medication List with Changes/Refills   Current Medications    ALBUTEROL (ACCUNEB) 0.63 MG/3 ML NEBU    SMARTSIG:3 Milliliter(s) Via Nebulizer Every 6 Hours PRN       Start Date: 5/3/2023  End Date: --    ALBUTEROL (PROVENTIL/VENTOLIN HFA) 90 MCG/ACTUATION INHALER    Inhale 2 puffs into the lungs every 4 (four) hours as needed.       Start Date: 2/17/2023 End Date: --    AZELASTINE (ASTELIN) 137 MCG (0.1 %) NASAL SPRAY    2 sprays (274 mcg total) by Nasal route 2 (two) times daily as needed for Rhinitis. Donot snort (because it tastes bad)       Start Date: 8/31/2023 End Date: 8/30/2024    BETAMETHASONE VALERATE 0.1% (VALISONE) 0.1 % CREA    Apply topically 2 (two) times daily.       Start Date: 12/23/2020End Date: --    BUDESONIDE (PULMICORT) 0.25 MG/2 ML NEBULIZER SOLUTION    Take 2 mLs (0.25 mg total) by nebulization once daily. Controller       Start Date: 6/9/2023  End Date: 9/7/2023    BUTALBITAL-ACETAMINOPHEN-CAFF -40 MG CAP    TK 1 C PO BID PRF SEVERE HA       Start Date: 11/5/2021 End Date: --    CALCIPOTRIENE-BETAMETHASONE (ENSTILAR) 0.005-0.064 % FOAM    Apply to scalp once or twice daily. Then wear shower cap overnight       Start Date: 4/28/2023 End Date: --    COMBIVENT RESPIMAT  MCG/ACTUATION INHALER    Inhale into the lungs.       Start Date: 5/28/2023 End Date: --     DULOXETINE (CYMBALTA) 20 MG CAPSULE    Take 1 capsule (20 mg total) by mouth once daily.       Start Date: 10/18/2023End Date: --    EPINEPHRINE (EPIPEN) 0.3 MG/0.3 ML ATIN           Start Date: 12/29/2020End Date: --    FLUTICASONE PROPIONATE (FLONASE) 50 MCG/ACTUATION NASAL SPRAY    USE 1 SPRAY (50 MCG TOTAL) IN EACH NOSTRIL ONCE DAILY       Start Date: 4/20/2023 End Date: --    KETOCONAZOLE (NIZORAL) 2 % SHAMPOO    Apply to scalp, let sit for 5-10 minutes then rinse. Use 3-5 times weekly       Start Date: 4/28/2023 End Date: --    KETOROLAC 0.5% (ACULAR) 0.5 % DROP    Place 1 drop into both eyes 4 (four) times daily as needed (eye itching).       Start Date: 8/31/2023 End Date: --    NARATRIPTAN (AMERGE) 2.5 MG TABLET    TAKE 1 TABLET BY MOUTH AT ONSET OF HEADACHE, MAY REPEAT IN 4 HOURS IF NEEDED       Start Date: 11/5/2021 End Date: --    ONDANSETRON (ZOFRAN-ODT) 4 MG TBDL    Take 1 tablet (4 mg total) by mouth every 6 (six) hours as needed (Nausea and vomiting).       Start Date: 9/25/2021 End Date: --    PANTOPRAZOLE (PROTONIX) 40 MG TABLET    Take 1 tablet (40 mg total) by mouth once daily.       Start Date: 1/19/2023 End Date: 8/31/2023    PROMETHAZINE (PHENERGAN) 25 MG TABLET    Take 1 tablet (25 mg total) by mouth every 6 (six) hours as needed for Nausea.       Start Date: 9/25/2021 End Date: --    RIMEGEPANT (NURTEC) 75 MG ODT    Take 1 tablet (75 mg total) by mouth daily as needed for Migraine. Place ODT tablet on the tongue; alternatively the ODT tablet may be placed under the tongue       Start Date: 9/8/2023  End Date: --    TACROLIMUS (PROTOPIC) 0.1 % OINTMENT    Apply topically 2 (two) times daily.       Start Date: 4/28/2023 End Date: --    UBROGEPANT (UBROGEPANT) 50 MG TABLET    TAKE 1 TABLET (50 MG TOTAL) BY MOUTH EVERY 2 (TWO) HOURS AS NEEDED FOR MIGRAINE.       Start Date: 10/12/2021End Date: --           REVIEW OF SYSTEMS      CONST: no F/C/NS, no unintentional weight changes  NEURO:  no  tremor, no weakness  EYES: no discharge, no erythema  EARS: no hearing loss, + sensation of fullness  PULM:  no SOB, no wheezing, + cough  CV: no CP, no palpitations  DERM: + rashes, no skin breaks         PHYSICAL EXAM      There were no vitals taken for this visit.  GEN: Awake and alert, no distress  DERM:  No flushing, no rashes  EYE:  No occular discharge,  HEENT: No nasal discharge, no hoarseness,  Nares pink with mild turbinate swelling on left only.  Oropharynx red rimmed but otherwise benign without exudate.  Tongue not coated.  NECK:  No LAD,   PULM: Normal work of breathing, no cough, CTA  COR:  RRR, normal capillary refill  NEURO:  No focal deficit, speech fluent and logical  PSYCH: appropriate affect, normal behavior   EXTREM:  No edema, no deformity            MEDICAL DECISION-MAKING           Data reviewed        Allergy Testing      Negative ImmunoCAP testing, 2023    Negative ImmunoCAP testing for type 1 latex allergy, 2023      Lab results      Total IgE less than 35, 2023   Eosinophil count 100, 2023      Imaging and other diagnostics            Medical records review   At initial visit reviewed allergy note of [facility] allergy fellow Riddhi Thomas MD from 02/03/2021.  Patient was complaining of a round circular burning patch on her shoulder.  It was attributed to ciprofloxacin Luis the 2nd time it occurred during therapy.  Aeroallergen skin testing was negative.  She was diagnosed with a fixed drug eruption, likely.  T-cell mediated team was considering ciprofloxacin patch testing; however client was lost to follow-up.    Diagnoses:     Mirtha Gary is a 48 y.o. female. with  No diagnosis found.      Assessment / Plan / Orders   Client is presenting with chronic rhinitis and chronic conjunctivitis with negative ImmunoCAP. It is likely that some  or all of her symptoms LPR.  Offered ENT endoscopy versus trial higher dose PPI.  Chose latter.          Chronic cough and Throat symptom  --  suspect LPR  -Increase pantaprozole to 40 mg BID  -Emphasized 4-6 weeks until improvement if cough due to GERD  -RTC 6-8 weeks    Chronic rhinitis  -azelastine  -Zyrtec as needed for itching     Chronic conjunctivitis of both eyes,        - ketorolac  (ACULAR)     Type IV Latex allergy  --manifest by rash;  (ruled out type 1)  -reassurance that not at risk for anaphylaxis            Comorbidities  GERD - presence make cough more likely due to LPR    Patient Instructions and follow up     There are no Patient Instructions on file for this visit.         Joan Peter MD  Allergy, Asthma & Immunology      I spent a total of 30 minutes on the day of the visit.This includes face to face time and non-face to face time preparing to see the patient (eg, review of tests), obtaining and/or reviewing separately obtained history, documenting clinical information in the electronic or other health record, independently interpreting results and communicating results to the patient/family/caregiver, or care coordinator.

## 2024-01-08 DIAGNOSIS — R10.12 LUQ ABDOMINAL PAIN: ICD-10-CM

## 2024-01-08 RX ORDER — PANTOPRAZOLE SODIUM 40 MG/1
40 TABLET, DELAYED RELEASE ORAL 2 TIMES DAILY PRN
Qty: 180 TABLET | Refills: 0 | Status: SHIPPED | OUTPATIENT
Start: 2024-01-08 | End: 2024-04-07

## 2024-01-17 NOTE — PROGRESS NOTES
"[facility]   Physical Therapy Treatment Note     Name: Mirtha Gary  Clinic Number: [id number]    Therapy Diagnosis:   Encounter Diagnosis   Name Primary?    Decreased ROM of intervertebral discs of lumbar spine Yes     Physician: Joan Torres MD    Visit Date: 1/18/2024    Physician Orders: PT Eval and Treat   Medical Diagnosis from Referral: Chronic bilateral low back pain without sciatica [M54.50, G89.29]   Evaluation Date: 1/4/2024  Authorization Period Expiration: 9/06/2025  Plan of Care Expiration: 3/31/2024  Visit # / Visits authorized: 1/ 20   Progress Note Due: 2/04/2024  FOTO: 1/ 1     Precautions: Standard    Time In: 10:10 am  Time Out: 11:00 am  Total Billable Time: 50 minutes      SUBJECTIVE     Pt reports: no pain currently, but over the weekend she experienced an increase in right low back pain after waking up.  She was compliant with home exercise program.  Response to previous treatment: minimal improvement reported with HEP completion  Functional change:     Pain:  Current 2/10, worst 5/10, best 0/10   Location: bilateral back     OBJECTIVE     Objective Measures updated at progress report unless specified.       TREATMENT     Mirtha received the treatments listed below:      received therapeutic exercises to develop strength and ROM for 50  minutes including:    LTR 10x3"   TrA 10x5"   BKFO RTB 10x5" ea  Bridge with RTB 2x10x3"   Prone on elbows 2'  SOC x10  EIS 10x5"   SL hip ADD 1# 2x10ea  Clams with GTB 2x10ea  Prone press up x10  Piriformis stretch (supine with ankle over opposite knee) 2x30"  Lumbar medx 5x at 40# then 20x at 60#       PATIENT EDUCATION AND HOME EXERCISES     Home Exercises Provided and Patient Education Provided     Education provided:   - Diagnosis, prognosis  - HEP    Written Home Exercises Provided: Patient instructed to cont prior HEP. Exercises were reviewed and Mirtha was able to demonstrate them prior to the end of the session.  Mirtha " demonstrated good  understanding of the education provided. See EMR under Patient Instructions for exercises provided during therapy sessions    ASSESSMENT     Mirtha arrives to first session after initial evaluation with reduced complaints of pain. Good tolerance to first session with reports of fatigue at conclusion of session. Minimal increase in s/s with bridges today which reduced following prone on elbows. Plan to continue to progress core and lumbopelvic strengthening and stabilization NV.     Mirtha Is progressing well towards her goals.   Pt prognosis is Good.     Pt will continue to benefit from skilled outpatient physical therapy to address the deficits listed in the problem list box on initial evaluation, provide pt/family education and to maximize pt's level of independence in the home and community environment.     Pt's spiritual, cultural and educational needs considered and pt agreeable to plan of care and goals.     Anticipated barriers to physical therapy: none    Goals:   Short Term Goals:  4 weeks  Report decreased in pain at worse less than  <   / =  3  /10  to increase tolerance for functional activities.On going  Pt will exhibit increase in BLE strength by 1/2  MMT grade to increase tolerance for ADL and work activities.On going  Pt to tolerate HEP to improve ROM and independence with ADL's.On going  Pt to improve lumbar range of motion by 25% to allow for improved functional mobility to allow for improvement in IADLs. On going     Long Term Goals: 8 weeks  Report decreased in pain at worse less than  <   / =  2  /10  to increase tolerance for functional mobility.  On going  Pt to demonstrate ability to independently control and reduce their pain through posture positioning and mechanical movements throughout a typical day.  (approp and ongoing)  Pt will exhibit increase in BLE strength by MMT 1 grade to increase tolerance for ADL and work activities.On going  Pt will report at 67% or less  limitation on FOTO Lumbar spine survey  to demonstrate decrease in disability and improvement in back pain.On going  Pt to be Independent with HEP to improve ROM and independence with ADL's. On going  Pt to improve lumbar range of motion by 75% to allow for improved functional mobility to allow for improvement in IADLs. On going    PLAN     Plan of care Certification: 1/4/2024 to 3/31/2024.     Outpatient Physical Therapy 2 times weekly for 10 weeks to include the following interventions: Manual Therapy, Moist Heat/ Ice, Neuromuscular Re-ed, Patient Education, Self Care, Therapeutic Activities, and Therapeutic Exercise. Dry needling    Lauren Basurto, PT

## 2024-01-18 ENCOUNTER — CLINICAL SUPPORT (OUTPATIENT)
Dept: REHABILITATION | Facility: OTHER | Age: 49
End: 2024-01-18
Payer: COMMERCIAL

## 2024-01-18 DIAGNOSIS — M53.86 DECREASED ROM OF INTERVERTEBRAL DISCS OF LUMBAR SPINE: Primary | ICD-10-CM

## 2024-01-18 PROCEDURE — 97110 THERAPEUTIC EXERCISES: CPT

## 2024-01-31 NOTE — PROGRESS NOTES
"[facility]   Physical Therapy Treatment Note     Name: Mitrha Gary  Clinic Number: [id number]    Therapy Diagnosis:   Encounter Diagnosis   Name Primary?    Decreased ROM of intervertebral discs of lumbar spine Yes       Physician: Joan Torres MD    Visit Date: 2/1/2024    Physician Orders: PT Eval and Treat   Medical Diagnosis from Referral: Chronic bilateral low back pain without sciatica [M54.50, G89.29]   Evaluation Date: 1/4/2024  Authorization Period Expiration: 9/06/2025  Plan of Care Expiration: 3/31/2024  Visit # / Visits authorized: 2/ 20   Progress Note Due: 2/04/2024   FOTO: 1/ 1     Precautions: Standard    Time In: 9:05 am  Time Out: 9:52am  Total Billable Time: 47 minutes      SUBJECTIVE     Pt reports: her back has been feeling much better, her neck continues to bother her. She is going to [employer] for several weeks and will be back at the end of February. She is nervous about the 20+ hours flying she will have to do.    She was compliant with home exercise program.  Response to previous treatment: minimal improvement reported with HEP completion  Functional change:     Pain:  Current 2/10, worst 5/10, best 0/10   Location: bilateral back     OBJECTIVE     Objective Measures updated at progress report unless specified.       TREATMENT     Mirtha received the treatments listed below:      received therapeutic exercises to develop strength and ROM for 47 minutes including:    Supine chin tucks 2x10  DKTC c/ ball 10x5"   Bridge with GTB 2x10x3"   TrA 10x5" (cues to reduce hip ext)   BKFO GTB 10x5" ea  SOC 2x10  No money GTB  Open books 10x5"   Seated cervical retraction 15x5"   UT stretch 2x30"  Lumbar medx 5x at 46# then 16x at 66# ROM 0-65 (RPE 3/10)  Anti rotation GTB 2x10  EIS 10x5"    Lumbar roll education + HEP printed and reviewed    NP  Prone on elbows 2'  Prone press up 2 x 10 x 3"   Piriformis stretch (supine with ankle over opposite knee) 2x30"  Hamstring " stretch or active HSS  SL hip ADD 1# 2x10ea  Clams with GTB 2x10ea    PATIENT EDUCATION AND HOME EXERCISES     Home Exercises Provided and Patient Education Provided     Education provided:   - Diagnosis, prognosis  - HEP    Written Home Exercises Provided: Patient instructed to cont prior HEP. Exercises were reviewed and Mirtha was able to demonstrate them prior to the end of the session.  Mirtha demonstrated good  understanding of the education provided. See EMR under Patient Instructions for exercises provided during therapy sessions    ASSESSMENT     Introduced several cervical and scapular strengthening exercises today with good results. Good tolerance to exercises today with some c/o pain with end range lumbar ext. Able to progress weight with lumbar medx machine with no c/o pain and min VC's to coordinate breathing with completion. HEP printed and reviewed with pt with focus on exercises to be completed on plan, pt verbalizes understanding. Plan to continue to progress per tolerance.    Mirtha Is progressing well towards her goals.   Pt prognosis is Good.     Pt will continue to benefit from skilled outpatient physical therapy to address the deficits listed in the problem list box on initial evaluation, provide pt/family education and to maximize pt's level of independence in the home and community environment.     Pt's spiritual, cultural and educational needs considered and pt agreeable to plan of care and goals.     Anticipated barriers to physical therapy: none    Goals:   Short Term Goals:  4 weeks  Report decreased in pain at worse less than  <   / =  3  /10  to increase tolerance for functional activities.On going  Pt will exhibit increase in BLE strength by 1/2  MMT grade to increase tolerance for ADL and work activities.On going  Pt to tolerate HEP to improve ROM and independence with ADL's.On going  Pt to improve lumbar range of motion by 25% to allow for improved functional mobility to allow for  improvement in IADLs. On going     Long Term Goals: 8 weeks  Report decreased in pain at worse less than  <   / =  2  /10  to increase tolerance for functional mobility.  On going  Pt to demonstrate ability to independently control and reduce their pain through posture positioning and mechanical movements throughout a typical day.  (approp and ongoing)  Pt will exhibit increase in BLE strength by MMT 1 grade to increase tolerance for ADL and work activities.On going  Pt will report at 67% or less limitation on FOTO Lumbar spine survey  to demonstrate decrease in disability and improvement in back pain.On going  Pt to be Independent with HEP to improve ROM and independence with ADL's. On going  Pt to improve lumbar range of motion by 75% to allow for improved functional mobility to allow for improvement in IADLs. On going    PLAN     Plan of care Certification: 1/4/2024 to 3/31/2024.     Outpatient Physical Therapy 2 times weekly for 10 weeks to include the following interventions: Manual Therapy, Moist Heat/ Ice, Neuromuscular Re-ed, Patient Education, Self Care, Therapeutic Activities, and Therapeutic Exercise. Dry needling    Lauren Basurto, PT

## 2024-02-01 ENCOUNTER — CLINICAL SUPPORT (OUTPATIENT)
Dept: REHABILITATION | Facility: OTHER | Age: 49
End: 2024-02-01
Payer: COMMERCIAL

## 2024-02-01 DIAGNOSIS — M53.86 DECREASED ROM OF INTERVERTEBRAL DISCS OF LUMBAR SPINE: Primary | ICD-10-CM

## 2024-02-01 PROCEDURE — 97110 THERAPEUTIC EXERCISES: CPT

## 2024-03-01 NOTE — PROGRESS NOTES
Allergy Clinic Note  [facility]    This note was created by combination of typed  and M-Modal dictation. Transcription errors may be present.  If there are any questions, please contact me.    HISTORY      Patient ID: Mirtha Gary is a 48 y.o. female.    Chief Complaint: No chief complaint on file.      Allergy problem list:    Chronic rhinitis with negative ImmunoCAP  Type for latex allergy (cutaneous symptoms)  Cough, throat symptoms--suspect LPR     History of Present Illness       Mirtha Gary is a 48 y.o. female with chronic rhinitis returns to follow up chronic cough.  She is here with her , and history is difficult.    Related medications and other interventions  EpiPen   Budesonide 0.25 mg via nebulizer twice daily  Albuterol MDI or neb as needed  Combivent  Azelastine nasal as needed  Zyrtec as needed  Ketorolac eyedrops as needed  Protonix 40 mg twice daily -- increased last visit  (Phenergan)    3/4/2024:  On Protonix 40 mg BID for parts of the last 8 weeks, client reports initial improvement in cough.  While in [place] had mouth ulcers.  Physician  expresses concern about why she has apparently developed reflux and whether it could be related to COVID.    01/05/2024:  On a trial of azelastine nasal spray, client reports cough is unchanged.  Interval history is significant for COVID infection.  She continues to localize cough to the level of her throat.  Discussed possibility of GERD/LPR causing her cough and throat symptoms.  She admits her GI symptoms have been acting up.  Recommend increasing pantoprazole to 40 mg twice daily.  Emphasize 4-6 weeks to improvement in symptoms.  Follow up 6-8 weeks.      08/31/2023:  At initial visit, Ms Gary a long history rhinitis only partially controlled on medications.  In complaints are nasal congestion and itchy eyes.  Associated symptoms include red/runny eyes, throat itching, ear pain/fullness.  Additional  "symptoms include postnasal drip that feels like something is stuck in her throat.  Cough the level of her throat, throat clearing, and hoarseness.  She denies dysphagia but admits heartburn about twice a week.  She is suspicious of pollen and dust as well as possible molds.  Exposures are notable for a dog.  There is no clear seasonal pattern.  She was not helped by unknown over-the-counter medications and an unknown nasal spray used irregularly.  She has some relief from Zyrtec and from an unknown over-the-counter eyedrop.  She has never had allergy testing.            MEDICAL HISTORY      Significant past medical history:  Chronic pain, history of gastric ulcer  Active Problem List reviewed  ENT surgery:  None   Significant family history:  Son with allergic rhinitis to dust mite and dog.  No asthma.  Exposures:  One dog not in the bedroom.  Recently they were building a house with a lot of sheet rock exposure as well as mold exposure.  No smoke.  Smoking Hx:  Client  reports that she has never smoked. She has never been exposed to tobacco smoke. She has never used smokeless tobacco.    Meds: MAR reviewed    Asthma:  Yes, mild and intermittent  Eczema:  No  Rhinitis:  Yes.  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  Red spots at areas of contact.    Patient Active Problem List   Diagnosis    Lumbosacral disc disease    Annular tear of intervertebral disc    Lumbosacral radiculopathy    Left lumbar radiculitis    Chondromalacia of right patella    Right knee pain    Post-traumatic osteoarthritis of right knee    Chronic bilateral low back pain with right-sided sciatica    Missed     S/P dilation and curettage    Abscess of finger    Range of motion deficit    Hand pain, left    Radiculopathy    Muscle weakness    Abnormal coordination    History of migraine headaches    History of endometriosis    History of ectopic pregnancy "    Cyst of ovary    Double ureter    History of pyelonephritis    Recurrent UTI    Acute cystitis without hematuria    Interstitial cystitis    COVID-19 virus infection    Shortness of breath    Palpitations    Pain in wrist, left    Psoriasis of scalp    Long COVID    Abdominal pain, chronic, left lower quadrant    Blastocystis hominis infection    Migraine headache    Gastric intestinal metaplasia    Left upper quadrant abdominal pain    TB lung, latent    Gastric ulcer due to Helicobacter pylori    Other hemorrhoids    Ocular pain, right eye    Decreased vision of right eye    Viral upper respiratory tract infection    Upper respiratory tract infection    Seasonal allergic rhinitis due to pollen    Papular urticaria    Acute upper respiratory infection, unspecified    Lower respiratory infection (e.g., bronchitis, pneumonia, pneumonitis, pulmonitis)    Neck muscle spasm    Post-COVID chronic cough    Decreased ROM of intervertebral discs of lumbar spine     Medication List with Changes/Refills   Current Medications    ALBUTEROL (ACCUNEB) 0.63 MG/3 ML NEBU    SMARTSIG:3 Milliliter(s) Via Nebulizer Every 6 Hours PRN       Start Date: 5/3/2023  End Date: --    ALBUTEROL (PROVENTIL/VENTOLIN HFA) 90 MCG/ACTUATION INHALER    Inhale 2 puffs into the lungs every 4 (four) hours as needed.       Start Date: 2/17/2023 End Date: --    AZELASTINE (ASTELIN) 137 MCG (0.1 %) NASAL SPRAY    2 sprays (274 mcg total) by Nasal route 2 (two) times daily as needed for Rhinitis. Donot snort (because it tastes bad)       Start Date: 8/31/2023 End Date: 8/30/2024    BETAMETHASONE VALERATE 0.1% (VALISONE) 0.1 % CREA    Apply topically 2 (two) times daily.       Start Date: 12/23/2020End Date: --    BUDESONIDE (PULMICORT) 0.25 MG/2 ML NEBULIZER SOLUTION    Take 2 mLs (0.25 mg total) by nebulization once daily. Controller       Start Date: 6/9/2023  End Date: 9/7/2023    BUTALBITAL-ACETAMINOPHEN-CAFF -40 MG CAP    TK 1 C PO BID PRF  SEVERE WEAVER       Start Date: 11/5/2021 End Date: --    CALCIPOTRIENE-BETAMETHASONE (ENSTILAR) 0.005-0.064 % FOAM    Apply to scalp once or twice daily. Then wear shower cap overnight       Start Date: 4/28/2023 End Date: --    COMBIVENT RESPIMAT  MCG/ACTUATION INHALER    Inhale into the lungs.       Start Date: 5/28/2023 End Date: --    DULOXETINE (CYMBALTA) 20 MG CAPSULE    Take 1 capsule (20 mg total) by mouth once daily.       Start Date: 10/18/2023End Date: --    EPINEPHRINE (EPIPEN) 0.3 MG/0.3 ML ATIN           Start Date: 12/29/2020End Date: --    FLUTICASONE PROPIONATE (FLONASE) 50 MCG/ACTUATION NASAL SPRAY    USE 1 SPRAY (50 MCG TOTAL) IN EACH NOSTRIL ONCE DAILY       Start Date: 4/20/2023 End Date: --    KETOCONAZOLE (NIZORAL) 2 % SHAMPOO    Apply to scalp, let sit for 5-10 minutes then rinse. Use 3-5 times weekly       Start Date: 4/28/2023 End Date: --    KETOROLAC 0.5% (ACULAR) 0.5 % DROP    Place 1 drop into both eyes 4 (four) times daily as needed (eye itching).       Start Date: 8/31/2023 End Date: --    NARATRIPTAN (AMERGE) 2.5 MG TABLET    TAKE 1 TABLET BY MOUTH AT ONSET OF HEADACHE, MAY REPEAT IN 4 HOURS IF NEEDED       Start Date: 11/5/2021 End Date: --    ONDANSETRON (ZOFRAN-ODT) 4 MG TBDL    Take 1 tablet (4 mg total) by mouth every 6 (six) hours as needed (Nausea and vomiting).       Start Date: 9/25/2021 End Date: --    PANTOPRAZOLE (PROTONIX) 40 MG TABLET    Take 1 tablet (40 mg total) by mouth 2 (two) times daily as needed (GERD).       Start Date: 1/8/2024  End Date: 4/7/2024    PROMETHAZINE (PHENERGAN) 25 MG TABLET    Take 1 tablet (25 mg total) by mouth every 6 (six) hours as needed for Nausea.       Start Date: 9/25/2021 End Date: --    RIMEGEPANT (NURTEC) 75 MG ODT    Take 1 tablet (75 mg total) by mouth daily as needed for Migraine. Place ODT tablet on the tongue; alternatively the ODT tablet may be placed under the tongue       Start Date: 9/8/2023  End Date: --    TACROLIMUS  (PROTOPIC) 0.1 % OINTMENT    Apply topically 2 (two) times daily.       Start Date: 4/28/2023 End Date: --    UBROGEPANT (UBROGEPANT) 50 MG TABLET    TAKE 1 TABLET (50 MG TOTAL) BY MOUTH EVERY 2 (TWO) HOURS AS NEEDED FOR MIGRAINE.       Start Date: 10/12/2021End Date: --           REVIEW OF SYSTEMS      CONST: no F/C/NS, no unintentional weight changes  NEURO:  no tremor, no weakness  EYES: no discharge, no erythema  EARS: no hearing loss, + sensation of fullness  PULM:  no SOB, no wheezing, + cough  CV: no CP, no palpitations  DERM: + rashes, no skin breaks  Answers submitted by the patient for this visit:  Allergy and Asthma Questionnaire  (Submitted on 3/4/2024)  facial swelling: No  Sinus pain?: No  sinus pressure : No  ear discharge: No  ear pain: Yes  hearing loss: No  nosebleeds: No  postnasal drip: No  sneezing: No  runny nose: No  congestion: No  sore throat: Yes  trouble swallowing: No  voice change: Yes  eye itching: No  eye redness: No  eye discharge: No  eye pain: No  Light sensitivity / light hurts the eyes?: Yes  cough: No  wheezing: No  shortness of breath: No  apnea: No  choking: No  chest tightness: No  rash: Yes  color change : No       PHYSICAL EXAM      There were no vitals taken for this visit.  GEN: Awake and alert, no distress  DERM:  No flushing, no rashes  EYE:  No occular discharge,  HEENT: No nasal discharge, no hoarseness,  Nares pink with mild turbinate swelling on left only.  Oropharynx red rimmed but otherwise benign without exudate.  Tongue not coated.  NECK:  Shotty high anterior cervical adenopathy   PULM: Normal work of breathing, no cough, CTA  COR:  RRR, normal capillary refill  NEURO:  No focal deficit, speech fluent and logical  PSYCH: appropriate affect, normal behavior   EXTREM:  No edema, no deformity            MEDICAL DECISION-MAKING           Data reviewed        Allergy Testing      Negative ImmunoCAP testing, 2023    Negative ImmunoCAP testing for type 1 latex allergy,  2023      Lab results      Total IgE less than 35, 2023   Eosinophil count 100, 2023      Imaging and other diagnostics            Medical records review   At initial visit reviewed allergy note of [facility] allergy fellow Riddhi Thomas MD from 02/03/2021.  Patient was complaining of a round circular burning patch on her shoulder.  It was attributed to ciprofloxacin Luis the 2nd time it occurred during therapy.  Aeroallergen skin testing was negative.  She was diagnosed with a fixed drug eruption, likely.  T-cell mediated team was considering ciprofloxacin patch testing; however client was lost to follow-up.    Diagnoses:     Mirtha Gary is a 48 y.o. female. with  No diagnosis found.      Assessment / Plan / Orders   Client is presenting with chronic rhinitis and chronic conjunctivitis and cough with negative ImmunoCAP. On high dose ppi Sx apear to have improved.  For definitive Dx, recommended ENT scope.  Scheduled.  Follow up as needed.      Chronic cough and Throat symptom  -- suspect LPR  -Resume pantaprozole to 40 mg BID  Discussed lifestyle modifications  -ENT scope      Chronic rhinitis with negative Immunocap  -azelastine  -Zyrtec as needed for itching     Chronic conjunctivitis of both eyes,        - ketorolac  (ACULAR)     Type IV Latex allergy  --manifest by rash;  (ruled out type 1)  -reassurance that not at risk for anaphylaxis            Comorbidities  GERD - presence make cough more likely due to LPR    Patient Instructions and follow up     There are no Patient Instructions on file for this visit.          Raysa Peter MD  Allergy, Asthma & Immunology      I spent a total of 21 minutes on the day of the visit.This includes face to face time and non-face to face time preparing to see the patient (eg, review of tests), obtaining and/or reviewing separately obtained history, documenting clinical information in the electronic or other health record, independently interpreting results and  communicating results to the patient/family/caregiver, or care coordinator.

## 2024-03-04 ENCOUNTER — OFFICE VISIT (OUTPATIENT)
Dept: ALLERGY | Facility: CLINIC | Age: 49
End: 2024-03-04
Payer: COMMERCIAL

## 2024-03-04 VITALS — WEIGHT: 145.5 LBS | HEIGHT: 67 IN | BODY MASS INDEX: 22.84 KG/M2

## 2024-03-04 DIAGNOSIS — R68.89 THROAT SYMPTOM: ICD-10-CM

## 2024-03-04 DIAGNOSIS — R05.3 CHRONIC COUGH: Primary | ICD-10-CM

## 2024-03-04 PROCEDURE — 1160F RVW MEDS BY RX/DR IN RCRD: CPT | Mod: CPTII,S$GLB,, | Performed by: STUDENT IN AN ORGANIZED HEALTH CARE EDUCATION/TRAINING PROGRAM

## 2024-03-04 PROCEDURE — 3008F BODY MASS INDEX DOCD: CPT | Mod: CPTII,S$GLB,, | Performed by: STUDENT IN AN ORGANIZED HEALTH CARE EDUCATION/TRAINING PROGRAM

## 2024-03-04 PROCEDURE — 99999 PR PBB SHADOW E&M-EST. PATIENT-LVL IV: CPT | Mod: PBBFAC,,, | Performed by: STUDENT IN AN ORGANIZED HEALTH CARE EDUCATION/TRAINING PROGRAM

## 2024-03-04 PROCEDURE — 1159F MED LIST DOCD IN RCRD: CPT | Mod: CPTII,S$GLB,, | Performed by: STUDENT IN AN ORGANIZED HEALTH CARE EDUCATION/TRAINING PROGRAM

## 2024-03-04 PROCEDURE — 99213 OFFICE O/P EST LOW 20 MIN: CPT | Mod: S$GLB,,, | Performed by: STUDENT IN AN ORGANIZED HEALTH CARE EDUCATION/TRAINING PROGRAM

## 2024-03-04 NOTE — PATIENT INSTRUCTIONS
See ROJAS Manzano for scope to look at throat.    Then resume Protonix 40 mg twice a day.    Follow up if cough is not controlled.

## 2024-03-05 ENCOUNTER — TELEPHONE (OUTPATIENT)
Dept: INTERNAL MEDICINE | Facility: CLINIC | Age: 49
End: 2024-03-05
Payer: COMMERCIAL

## 2024-03-06 ENCOUNTER — OFFICE VISIT (OUTPATIENT)
Dept: INTERNAL MEDICINE | Facility: CLINIC | Age: 49
End: 2024-03-06
Payer: COMMERCIAL

## 2024-03-06 VITALS
OXYGEN SATURATION: 98 % | SYSTOLIC BLOOD PRESSURE: 102 MMHG | BODY MASS INDEX: 22.7 KG/M2 | HEART RATE: 67 BPM | HEIGHT: 67 IN | DIASTOLIC BLOOD PRESSURE: 70 MMHG | WEIGHT: 144.63 LBS

## 2024-03-06 DIAGNOSIS — R10.12 LEFT UPPER QUADRANT ABDOMINAL PAIN: ICD-10-CM

## 2024-03-06 DIAGNOSIS — G43.009 MIGRAINE WITHOUT AURA AND WITHOUT STATUS MIGRAINOSUS, NOT INTRACTABLE: Chronic | ICD-10-CM

## 2024-03-06 DIAGNOSIS — U09.9 LONG COVID: Chronic | ICD-10-CM

## 2024-03-06 DIAGNOSIS — K12.0 APHTHOUS ULCER OF PHARYNX OR HYPOPHARYNX: ICD-10-CM

## 2024-03-06 DIAGNOSIS — J31.2 SORE THROAT, CHRONIC: Primary | ICD-10-CM

## 2024-03-06 DIAGNOSIS — Z56.6 STRESS AT WORK: ICD-10-CM

## 2024-03-06 DIAGNOSIS — F45.8 BRUXISM (TEETH GRINDING): ICD-10-CM

## 2024-03-06 DIAGNOSIS — Z00.00 ANNUAL PHYSICAL EXAM: ICD-10-CM

## 2024-03-06 PROCEDURE — 1159F MED LIST DOCD IN RCRD: CPT | Mod: CPTII,S$GLB,, | Performed by: STUDENT IN AN ORGANIZED HEALTH CARE EDUCATION/TRAINING PROGRAM

## 2024-03-06 PROCEDURE — 3078F DIAST BP <80 MM HG: CPT | Mod: CPTII,S$GLB,, | Performed by: STUDENT IN AN ORGANIZED HEALTH CARE EDUCATION/TRAINING PROGRAM

## 2024-03-06 PROCEDURE — 99213 OFFICE O/P EST LOW 20 MIN: CPT | Mod: S$GLB,,, | Performed by: STUDENT IN AN ORGANIZED HEALTH CARE EDUCATION/TRAINING PROGRAM

## 2024-03-06 PROCEDURE — 1160F RVW MEDS BY RX/DR IN RCRD: CPT | Mod: CPTII,S$GLB,, | Performed by: STUDENT IN AN ORGANIZED HEALTH CARE EDUCATION/TRAINING PROGRAM

## 2024-03-06 PROCEDURE — 3008F BODY MASS INDEX DOCD: CPT | Mod: CPTII,S$GLB,, | Performed by: STUDENT IN AN ORGANIZED HEALTH CARE EDUCATION/TRAINING PROGRAM

## 2024-03-06 PROCEDURE — 99999 PR PBB SHADOW E&M-EST. PATIENT-LVL V: CPT | Mod: PBBFAC,,, | Performed by: STUDENT IN AN ORGANIZED HEALTH CARE EDUCATION/TRAINING PROGRAM

## 2024-03-06 PROCEDURE — 3074F SYST BP LT 130 MM HG: CPT | Mod: CPTII,S$GLB,, | Performed by: STUDENT IN AN ORGANIZED HEALTH CARE EDUCATION/TRAINING PROGRAM

## 2024-03-06 RX ORDER — FLUOXETINE HYDROCHLORIDE 20 MG/1
20 CAPSULE ORAL DAILY
Qty: 90 CAPSULE | Refills: 3 | Status: SHIPPED | OUTPATIENT
Start: 2024-03-06 | End: 2025-03-06

## 2024-03-06 SDOH — SOCIAL DETERMINANTS OF HEALTH (SDOH): OTHER PHYSICAL AND MENTAL STRAIN RELATED TO WORK: Z56.6

## 2024-03-06 NOTE — ASSESSMENT & PLAN NOTE
Unchanged  Resume PPI until otherwise recommended by GI and ENT.  Patient didn't tolerate cymbalta as prescribed by GI, switched to fluoxetine, well tolerated, I will continue.

## 2024-03-06 NOTE — ASSESSMENT & PLAN NOTE
Stable, improved during the last month.    Patient is probably also having tension headaches, maybe due to bruxism.    I have referred patient to ENT for evaluation and possibly dentistry depending on ENT assessment

## 2024-03-06 NOTE — PROGRESS NOTES
Subjective:       Patient ID: Mirtha Gary is a 48 y.o. female.    Chief Complaint: Follow-up    HPI    Mirtha Gary is a 48 y.o. female , Pakistani speaking, with a history of:  Migraine headaches  Lumbosacral radiculopathy  Psoriasis of the scalp  Palpitations  H/o endometriosis  Cyst of ovary  Small cyst of the left kidney  Interstitial cystitis  Recurrent UTIs  H/o of COVID x 4  H/o blastocystis hominis infection  Chronic abdominal pain in the LLQ  BL knee pain         [Local Patient]  Originally from [facility]  Lives in: [place] [id number]       Patient comes to the clinic for general follow-up.      Overall patient has been stable, however she has had multiple episodes of migraine headaches that were very prominent during December and January of 2024.  She also had 2 additional COVID infections that were mild and treated at home, however she reports prolonged symptoms after the COVID infection consisting on upper respiratory symptoms and cough.  The last COVID episode was in January of 2024.      During the month of February takes frequency of migraine headaches has decreased.      Her most recent complaint is having recurrent, chronic sore throat and ulcers in the back of her throat as well as the base of her tongue.  She has not sure if she is having reflux or allergies.      She was seen by allergy specialist yesterday who referred her to ENT for a scope.      She was started on duloxetine by the GI specialist but she did not tolerated, see was switched to fluoxetine 10 mg daily, she is feeling better, she has not having as many abdominal pain symptoms, however the tenderness on the left upper quadrant remains moderate.  She has been on PPI p.r.n., she discontinued it 2 days ago in preparation of a possible scope by ENT.    She also states that she has been 10 that her cervical muscles are tense, she has not sure if she is biting her tongue at night or having bruxism, she has not sure if she is grinding her  teeth, however she thinks this might be a possibility due to the ulcers and the migraine headaches.    No other complaints or concerns today    Latest PCP visits:      9/6/23 AWV     Changes in health or medications: No    Most recent specialists visits and recommendations:   03/04/2024 allergy specialist chronic cough and throat symptoms: chronic rhinitis: referral to ENT       H/o ER or Urgent care visits:   NO    H/o Hospitalizations:  NO    H/o falls: None     Life events / lifestyle:   Nothing new      Most recent / available laboratories reviewed with the patient:    Recent Labs   Lab 07/31/23  1321 09/06/23  0859 12/06/23  1042   WBC 7.68 4.79 9.95   Hemoglobin 14.7 13.0 14.3   Hematocrit 45.4 40.5 42.1   MCV 94 93 91   Platelets 244 238 262       Recent Labs   Lab 06/09/21  1456 09/29/21  0244 01/13/22  0833 09/14/22  1154 07/31/23  1321 09/06/23  0859 12/06/23  1042   Glucose 82 87 79   < > 78 74 88   Sodium 141 138 139   < > 138 140 139   Potassium 3.9 3.9 4.1   < > 4.1 3.8 3.9   BUN 12 12 12   < > 12 12 10   Creatinine 0.7 0.8 0.7   < > 0.8 0.7 0.7   eGFR if non African American >60.0 >60 >60.0  --   --   --   --    Total Bilirubin  --  0.4 0.6   < > 0.5 0.8 0.4   AST  --  14 14   < > 32 30 16   ALT  --  13 15   < > 68 H 44 21    < > = values in this interval not displayed.       Recent Labs   Lab 01/13/22  0833 05/08/23  0851   Hemoglobin A1C 4.8 4.6       Recent Labs   Lab 01/13/22  0833 05/08/23  0851   Cholesterol 195 186   Triglycerides 71 89   HDL 47 49   LDL Cholesterol 133.8 119.2   Non-HDL Cholesterol 148 137       Recent Labs   Lab 01/13/22  0833 05/08/23  0851   TSH 1.999 1.178               Current medications:    Current Outpatient Medications:     albuterol (ACCUNEB) 0.63 mg/3 mL Nebu, SMARTSIG:3 Milliliter(s) Via Nebulizer Every 6 Hours PRN, Disp: , Rfl:     albuterol (PROVENTIL/VENTOLIN HFA) 90 mcg/actuation inhaler, Inhale 2 puffs into the lungs every 4 (four) hours as needed., Disp: ,  Rfl:     azelastine (ASTELIN) 137 mcg (0.1 %) nasal spray, 2 sprays (274 mcg total) by Nasal route 2 (two) times daily as needed for Rhinitis. Donot snort (because it tastes bad), Disp: 30 mL, Rfl: 11    betamethasone valerate 0.1% (VALISONE) 0.1 % Crea, Apply topically 2 (two) times daily., Disp: 60 g, Rfl: 2    butalbital-acetaminophen-caff -40 mg Cap, TK 1 C PO BID PRF SEVERE HA, Disp: 30 capsule, Rfl: 3    calcipotriene-betamethasone (ENSTILAR) 0.005-0.064 % Foam, Apply to scalp once or twice daily. Then wear shower cap overnight, Disp: 60 g, Rfl: 4    EPINEPHrine (EPIPEN) 0.3 mg/0.3 mL AtIn, , Disp: , Rfl:     fluticasone propionate (FLONASE) 50 mcg/actuation nasal spray, USE 1 SPRAY (50 MCG TOTAL) IN EACH NOSTRIL ONCE DAILY, Disp: 48 mL, Rfl: 1    ketoconazole (NIZORAL) 2 % shampoo, Apply to scalp, let sit for 5-10 minutes then rinse. Use 3-5 times weekly, Disp: 120 mL, Rfl: 3    ketorolac 0.5% (ACULAR) 0.5 % Drop, Place 1 drop into both eyes 4 (four) times daily as needed (eye itching)., Disp: 10 mL, Rfl: 1    naratriptan (AMERGE) 2.5 MG tablet, TAKE 1 TABLET BY MOUTH AT ONSET OF HEADACHE, MAY REPEAT IN 4 HOURS IF NEEDED, Disp: 9 tablet, Rfl: 3    ondansetron (ZOFRAN-ODT) 4 MG TbDL, Take 1 tablet (4 mg total) by mouth every 6 (six) hours as needed (Nausea and vomiting)., Disp: 20 tablet, Rfl: 0    pantoprazole (PROTONIX) 40 MG tablet, Take 1 tablet (40 mg total) by mouth 2 (two) times daily as needed (GERD)., Disp: 180 tablet, Rfl: 0    promethazine (PHENERGAN) 25 MG tablet, Take 1 tablet (25 mg total) by mouth every 6 (six) hours as needed for Nausea., Disp: 25 tablet, Rfl: 0    rimegepant (NURTEC) 75 mg odt, Take 1 tablet (75 mg total) by mouth daily as needed for Migraine. Place ODT tablet on the tongue; alternatively the ODT tablet may be placed under the tongue, Disp: 30 tablet, Rfl: 3    tacrolimus (PROTOPIC) 0.1 % ointment, Apply topically 2 (two) times daily., Disp: 30 g, Rfl: 3    ubrogepant  "(UBROGEPANT) 50 mg tablet, TAKE 1 TABLET (50 MG TOTAL) BY MOUTH EVERY 2 (TWO) HOURS AS NEEDED FOR MIGRAINE., Disp: 10 tablet, Rfl: 1    (Magic mouthwash) 1:1:1 diphenhydrAMINE(Benadryl) 12.5mg/5ml liq, aluminum & magnesium hydroxide-simethicone (Maalox), LIDOcaine viscous 2%, Swish and spit 5 mLs every 4 (four) hours as needed (mouth ulcers). for mouth sores, Disp: 1 each, Rfl: 6    budesonide (PULMICORT) 0.25 mg/2 mL nebulizer solution, Take 2 mLs (0.25 mg total) by nebulization once daily. Controller, Disp: 180 mL, Rfl: 0    FLUoxetine 20 MG capsule, Take 1 capsule (20 mg total) by mouth once daily., Disp: 90 capsule, Rfl: 3      Healthcare Maintenance:  Colon cancer screening:   Last Colonoscopy completed on 9/13/2022     Colonoscopy: Last Colonoscopy completed on 9/13/2022   Vaccinations: Pneumonia, Zoster, Tetanus  COVID vaccination: completed  Depression screening: PHQ2 score = 0     Annual Wellness visit approx. Month: September ROS 11-point review of systems done. Negative except for detailed in the HPI.        Objective:      /70   Pulse 67   Ht 5' 7" (1.702 m)   Wt 65.6 kg (144 lb 10 oz)   SpO2 98%   BMI 22.65 kg/m²      Physical Exam   General appearance: Good general aspect, NAD, conversant   Eyes and HEENT: Normal sclerae, moist mucous membranes, no thyromegaly or lymphadenopathy. Ulcers present at the base of the tongue (BL) on the lateral sides close to the molars.  Respiratory: No work of breathing, clear to auscultation bilaterally. No rales, rhonchi, wheezing, or rubs.  Cardiovascular: PMI not displaced. RRR. Normal S1, S2. No murmurs, rubs or gallops.  Abdomen and : Soft, non-tender, nondistended, BS, no hepatosplenomegaly, no masses.  Extremities: no edemas, no extremity lymphadenopathy, no clubbing, no cyanosis.  Nervous System: Alert and oriented x 3, good concentration, no deficits.  Skin: Normal temperature, turgor and texture; no rash, ulcers or subcutaneous nodules  Psych: " Appropriate affect, alert and oriented to person, place and time          Assessment:       1. Sore throat, chronic  Assessment & Plan:    I am suspecting laryngopharingeal reflux      Orders:  -     Ambulatory referral/consult to ENT; Future; Expected date: 03/13/2024  -     (Magic mouthwash) 1:1:1 diphenhydrAMINE(Benadryl) 12.5mg/5ml liq, aluminum & magnesium hydroxide-simethicone (Maalox), LIDOcaine viscous 2%; Swish and spit 5 mLs every 4 (four) hours as needed (mouth ulcers). for mouth sores  Dispense: 1 each; Refill: 6    2. Aphthous ulcer of pharynx or hypopharynx  Assessment & Plan:  Recurrent aphthous ulcers of the base of the tongue and pharynx.    Possibly Laryngopharyngeal reflux (LPR)?  I have referred patient to ENT for evaluation    Orders:  -     (Magic mouthwash) 1:1:1 diphenhydrAMINE(Benadryl) 12.5mg/5ml liq, aluminum & magnesium hydroxide-simethicone (Maalox), LIDOcaine viscous 2%; Swish and spit 5 mLs every 4 (four) hours as needed (mouth ulcers). for mouth sores  Dispense: 1 each; Refill: 6    3. Migraine without aura and without status migrainosus, not intractable  Assessment & Plan:  Stable, improved during the last month.    Patient is probably also having tension headaches, maybe due to bruxism.    I have referred patient to ENT for evaluation and possibly dentistry depending on ENT assessment      4. Long COVID  Assessment & Plan:  Total of 4 episodes of COVID, most recent January 2024  Patient has difficult recovery from COVID infections with prolonged upper respiratory symptoms and GI symptoms.    We will continue to manage symptomatically      5. Left upper quadrant abdominal pain  Assessment & Plan:  Unchanged  Resume PPI until otherwise recommended by GI and ENT.  Patient didn't tolerate cymbalta as prescribed by GI, switched to fluoxetine, well tolerated, I will continue.    Orders:  -     FLUoxetine 20 MG capsule; Take 1 capsule (20 mg total) by mouth once daily.  Dispense: 90  capsule; Refill: 3    6. Bruxism (teeth grinding)  Assessment & Plan:  Patient is not sure if she is grinding her teeth at night, however she reports increased tension of her jaw, cervical muscles, and I find multiple ulcers at the base of the tongue close to the molars.    I am referring patient for ENT evaluation and possibly dentistry evaluation      7. 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    Patient is already on fluoxetine at 10 mg daily, I will increase dose to 20 mg daily    Orders:  -     FLUoxetine 20 MG capsule; Take 1 capsule (20 mg total) by mouth once daily.  Dispense: 90 capsule; Refill: 3    8. Annual physical exam  -     CBC Auto Differential; Future; Expected date: 09/02/2024  -     Comprehensive Metabolic Panel; Future; Expected date: 09/02/2024  -     Hemoglobin A1C; Future; Expected date: 09/02/2024  -     Lipid Panel; Future; Expected date: 09/02/2024  -     Urinalysis; Future; Expected date: 09/02/2024  -     Hepatitis C Antibody; Future; Expected date: 09/02/2024  -     TSH; Future; Expected date: 09/02/2024       Summary of orders / plan:       Fluoxetine 20 mg daily.    Referral to ENT        Problem list updated  Medications reconciled  Education provided  Lifestyle recommendations given  AVS generated, explained, and sent to the patient.  RTC in :  September for annual wellness visit, labs            LENIN KEE MD, MPH  Internal Medicine  [facility]  [facility]

## 2024-03-06 NOTE — ASSESSMENT & PLAN NOTE
Recurrent aphthous ulcers of the base of the tongue and pharynx.    Possibly Laryngopharyngeal reflux (LPR)?  I have referred patient to ENT for evaluation

## 2024-03-06 NOTE — ASSESSMENT & PLAN NOTE
Total of 4 episodes of COVID, most recent January 2024  Patient has difficult recovery from COVID infections with prolonged upper respiratory symptoms and GI symptoms.    We will continue to manage symptomatically

## 2024-03-06 NOTE — ASSESSMENT & PLAN NOTE
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    Patient is already on fluoxetine at 10 mg daily, I will increase dose to 20 mg daily

## 2024-03-06 NOTE — ASSESSMENT & PLAN NOTE
Patient is not sure if she is grinding her teeth at night, however she reports increased tension of her jaw, cervical muscles, and I find multiple ulcers at the base of the tongue close to the molars.    I am referring patient for ENT evaluation and possibly dentistry evaluation

## 2024-03-14 ENCOUNTER — OFFICE VISIT (OUTPATIENT)
Dept: OTOLARYNGOLOGY | Facility: CLINIC | Age: 49
End: 2024-03-14
Payer: COMMERCIAL

## 2024-03-14 VITALS
SYSTOLIC BLOOD PRESSURE: 108 MMHG | BODY MASS INDEX: 22.6 KG/M2 | WEIGHT: 144 LBS | HEIGHT: 67 IN | DIASTOLIC BLOOD PRESSURE: 68 MMHG

## 2024-03-14 DIAGNOSIS — J34.89 NASAL SEPTAL SPUR: ICD-10-CM

## 2024-03-14 DIAGNOSIS — R07.0 THROAT PAIN IN ADULT: ICD-10-CM

## 2024-03-14 DIAGNOSIS — J30.2 SEASONAL ALLERGIC RHINITIS, UNSPECIFIED TRIGGER: ICD-10-CM

## 2024-03-14 DIAGNOSIS — J34.3 HYPERTROPHY OF INFERIOR NASAL TURBINATE: Primary | ICD-10-CM

## 2024-03-14 DIAGNOSIS — R05.3 CHRONIC COUGH: ICD-10-CM

## 2024-03-14 DIAGNOSIS — K21.9 LARYNGOPHARYNGEAL REFLUX (LPR): ICD-10-CM

## 2024-03-14 DIAGNOSIS — R09.89 THROAT CLEARING: ICD-10-CM

## 2024-03-14 PROCEDURE — 3078F DIAST BP <80 MM HG: CPT | Mod: CPTII,S$GLB,, | Performed by: OTOLARYNGOLOGY

## 2024-03-14 PROCEDURE — 3008F BODY MASS INDEX DOCD: CPT | Mod: CPTII,S$GLB,, | Performed by: OTOLARYNGOLOGY

## 2024-03-14 PROCEDURE — 1159F MED LIST DOCD IN RCRD: CPT | Mod: CPTII,S$GLB,, | Performed by: OTOLARYNGOLOGY

## 2024-03-14 PROCEDURE — 3074F SYST BP LT 130 MM HG: CPT | Mod: CPTII,S$GLB,, | Performed by: OTOLARYNGOLOGY

## 2024-03-14 PROCEDURE — 99205 OFFICE O/P NEW HI 60 MIN: CPT | Mod: 25,S$GLB,, | Performed by: OTOLARYNGOLOGY

## 2024-03-14 PROCEDURE — 31575 DIAGNOSTIC LARYNGOSCOPY: CPT | Mod: S$GLB,,, | Performed by: OTOLARYNGOLOGY

## 2024-03-14 RX ORDER — FLUTICASONE PROPIONATE 50 MCG
2 SPRAY, SUSPENSION (ML) NASAL 2 TIMES DAILY
Qty: 18.2 ML | Refills: 3 | Status: SHIPPED | OUTPATIENT
Start: 2024-03-14 | End: 2024-03-25

## 2024-03-14 RX ORDER — AZELASTINE 1 MG/ML
1 SPRAY, METERED NASAL 2 TIMES DAILY
Qty: 30 ML | Refills: 3 | Status: SHIPPED | OUTPATIENT
Start: 2024-03-14

## 2024-03-14 NOTE — PROGRESS NOTES
OTOLARYNGOLOGY CLINIC NOTE  Date:  03/14/2024     Chief complaint:  Chief Complaint   Patient presents with    Sore Throat     Sore throat x 3-4 months, states seen allergist in past and was sent here, told has reflux could be causing irritated throat, was on reflux med x couple months but stopped x 2 weeks ago        History of Present Illness   Mirtha Gary is a 48 y.o. female  presenting today for a new evaluation and treatment of cough, throat pain and mouth irritaiton . Here today with  , he is psychiatrist at [employer]    Has had covid 3 times -after first time had burning sensation through entire gi system and found to have stomach ulcer and h pylori.     Throat clearing is occurring but not a major problem. Cough is worse at night time but no witnessed apneas during sleep nor loud snoring.     Had taken protonix once  a day for 2 months which did help with throat pain and cough  but she was also  in [place] at that time so  unclear if improvement related to dietary changes. She has tried cymbalta ( bruxism developed) and switched to prozac to see if could help with gut overall.     Had a bout of laryngitis during this time but has resolved and currently not having voice issues.     Saw dr poole (allergy MD) had immunocap testing which was negative. She has not tried doing flonase and astelin together with saline prior.    Gets disciod lupus type rash with cipro   Also has skin sensitivity - gets erythema and rash at times    Past Medical History  Past Medical History:   Diagnosis Date    Allergy     Eczema     Endometriosis     Migraine         Past Surgical History  Past Surgical History:   Procedure Laterality Date    AUGMENTATION OF BREAST      COLONOSCOPY N/A 9/13/2022    Procedure: COLONOSCOPY;  Surgeon: Kike Manuel MD;  Location: [address];  Service: Endoscopy;  Laterality: N/A;  Fully vaccinated.EC    COSMETIC SURGERY      breast implants    CYSTOSCOPY N/A 6/22/2021     Procedure: CYSTOSCOPY;  Surgeon: Evi James MD;  Location: [facility] 1ST FLR;  Service: Urology;  Laterality: N/A;    DILATION AND CURETTAGE OF UTERUS USING SUCTION N/A 6/29/2018    Procedure: DILATION AND CURETTAGE, UTERUS, USING SUCTION;  Surgeon: Katrin Garcia MD;  Location: [facility];  Service: OB/GYN;  Laterality: N/A;    ECTOPIC PREGNANCY SURGERY      ESOPHAGOGASTRODUODENOSCOPY N/A 12/1/2021    Procedure: EGD (ESOPHAGOGASTRODUODENOSCOPY);  Surgeon: Mich Painting MD;  Location: [facility] (2ND FLR);  Service: Endoscopy;  Laterality: N/A;  fully vaccinated-GT    ESOPHAGOGASTRODUODENOSCOPY N/A 9/13/2022    Procedure: EGD (ESOPHAGOGASTRODUODENOSCOPY);  Surgeon: Kike Manuel MD;  Location: [facility] ENDO (4TH FLR);  Service: Endoscopy;  Laterality: N/A;  REFENDO placed per Dr Painting. case request entered-pt scheduled.       vaccinated-GT  instr portal  9/7/22 - Dr. Painting booked out, Pt ok with Dr. Manuel to scope in his place/ prep ins. reviewed with Pt and on portal, Pt verbalized understanding  - ERW    TRANSFORAMINAL EPIDURAL INJECTION OF STEROID Left 8/2/2019    Procedure: INJECTION, STEROID, EPIDURAL, TRANSFORAMINAL APPROACH;  Surgeon: Kiera Biggs MD;  Location: [facility];  Service: Pain Management;  Laterality: Left;  left L5 and S1 TF CHA   CONSENT NEEDED  MEDICALLY URGENT        Medications  Current Outpatient Medications on File Prior to Visit   Medication Sig Dispense Refill    albuterol (ACCUNEB) 0.63 mg/3 mL Nebu SMARTSIG:3 Milliliter(s) Via Nebulizer Every 6 Hours PRN      albuterol (PROVENTIL/VENTOLIN HFA) 90 mcg/actuation inhaler Inhale 2 puffs into the lungs every 4 (four) hours as needed.      azelastine (ASTELIN) 137 mcg (0.1 %) nasal spray 2 sprays (274 mcg total) by Nasal route 2 (two) times daily as needed for Rhinitis. Donot snort (because it tastes bad) 30 mL 11    betamethasone valerate 0.1% (VALISONE) 0.1 % Crea Apply topically 2 (two) times daily. 60 g 2     butalbital-acetaminophen-caff -40 mg Cap TK 1 C PO BID PRF SEVERE HA 30 capsule 3    calcipotriene-betamethasone (ENSTILAR) 0.005-0.064 % Foam Apply to scalp once or twice daily. Then wear shower cap overnight 60 g 4    EPINEPHrine (EPIPEN) 0.3 mg/0.3 mL AtIn       FLUoxetine 20 MG capsule Take 1 capsule (20 mg total) by mouth once daily. 90 capsule 3    ketoconazole (NIZORAL) 2 % shampoo Apply to scalp, let sit for 5-10 minutes then rinse. Use 3-5 times weekly 120 mL 3    ketorolac 0.5% (ACULAR) 0.5 % Drop Place 1 drop into both eyes 4 (four) times daily as needed (eye itching). 10 mL 1    naratriptan (AMERGE) 2.5 MG tablet TAKE 1 TABLET BY MOUTH AT ONSET OF HEADACHE, MAY REPEAT IN 4 HOURS IF NEEDED 9 tablet 3    rimegepant (NURTEC) 75 mg odt Take 1 tablet (75 mg total) by mouth daily as needed for Migraine. Place ODT tablet on the tongue; alternatively the ODT tablet may be placed under the tongue 30 tablet 3    tacrolimus (PROTOPIC) 0.1 % ointment Apply topically 2 (two) times daily. 30 g 3    ubrogepant (UBROGEPANT) 50 mg tablet TAKE 1 TABLET (50 MG TOTAL) BY MOUTH EVERY 2 (TWO) HOURS AS NEEDED FOR MIGRAINE. 10 tablet 1    (Magic mouthwash) 1:1:1 diphenhydrAMINE(Benadryl) 12.5mg/5ml liq, aluminum & magnesium hydroxide-simethicone (Maalox), LIDOcaine viscous 2% Swish and spit 5 mLs every 4 (four) hours as needed (mouth ulcers). for mouth sores (Patient not taking: Reported on 3/14/2024) 1 each 6    budesonide (PULMICORT) 0.25 mg/2 mL nebulizer solution Take 2 mLs (0.25 mg total) by nebulization once daily. Controller 180 mL 0    fluticasone propionate (FLONASE) 50 mcg/actuation nasal spray USE 1 SPRAY (50 MCG TOTAL) IN EACH NOSTRIL ONCE DAILY (Patient not taking: Reported on 3/14/2024) 48 mL 1    ondansetron (ZOFRAN-ODT) 4 MG TbDL Take 1 tablet (4 mg total) by mouth every 6 (six) hours as needed (Nausea and vomiting). (Patient not taking: Reported on 3/14/2024) 20 tablet 0    pantoprazole (PROTONIX) 40 MG  "tablet Take 1 tablet (40 mg total) by mouth 2 (two) times daily as needed (GERD). (Patient not taking: Reported on 3/14/2024) 180 tablet 0    promethazine (PHENERGAN) 25 MG tablet Take 1 tablet (25 mg total) by mouth every 6 (six) hours as needed for Nausea. (Patient not taking: Reported on 3/14/2024) 25 tablet 0     No current facility-administered medications on file prior to visit.       Review of Systems  Review of Systems   Constitutional: Negative.    HENT:  Positive for ear pain and sore throat.    Eyes: Negative.    Respiratory:  Positive for cough.    Cardiovascular: Negative.    Gastrointestinal:  Positive for abdominal pain and heartburn.   Genitourinary: Negative.    Musculoskeletal:  Positive for back pain and neck pain.   Skin:  Positive for rash.   Neurological:  Positive for headaches.   Psychiatric/Behavioral: Negative.          Social History   reports that she has never smoked. She has never been exposed to tobacco smoke. She has never used smokeless tobacco. She reports that she does not drink alcohol and does not use drugs.     Family History  Family History   Problem Relation Age of Onset    Ovarian cancer Neg Hx     Colon cancer Neg Hx     Breast cancer Neg Hx     Anesthesia problems Neg Hx     Esophageal cancer Neg Hx     Glaucoma Neg Hx     Macular degeneration Neg Hx     Retinal detachment Neg Hx         Physical Exam   Vitals:    03/14/24 0841   BP: 108/68    Body mass index is 22.55 kg/m².  Weight: 65.3 kg (144 lb)   Height: 5' 7" (170.2 cm)     GENERAL: no acute distress.  HEAD: normocephalic.   EYES: lids and lashes normal. No scleral icterus  EARS: external ear without lesion, normal pinna shape and position.  External auditory canal with normal cerumen, tympanic membrane fully visible, no perforation , no retraction. No middle ear effusion. Ossicles intact.   NOSE: external nose without significant bony abnormality  ORAL CAVITY/OROPHARYNX: tongue midline and mobile. Symmetric palate " rise. Uvula midline. Glossitis /inflamed papilla along left dorsal posteiror tongue , no ulcer , soft to palpation . Posterior oropharynx soft to palpation   NECK: trachea midline.   LYMPH NODES:No cervical lymphadenopathy.  RESPIRATORY: no stridor, no stertor. Voice normal. Respirations nonlabored.  NEURO: alert, responds to questions appropriately.   PSYCH:mood appropriate    PROCEDURE NOTE  NAME OF PROCEDURE: Flexible Laryngoscopy, diagnostic  INDICATIONS: gag reflex precludes mirror exam, throat pain in adult   FINDINGS: very slight pseudosulcus ( can be seen with LPR). Turbinate hypertrophy and slight middle meatal edema on right, spur mid nasal septum (small- not compromising nasal valve)    Consent: After procedure was explained in detail and all questions answered, verbal consent was obtained for performing flexible laryngoscopy.  Anesthesia: topical 4% lidocaine and neosynephrine  Procedure: With patient in seated position, the scope was inserted into the bilateral nasal passageway and advanced atraumatically into the nasopharynx to examine the following structures:  Nasal cavity: Turbinates with  hypertrophy and good response to decongestant spray . no middle meatal edema on left, mild on right.spur on mid septum on left No purulent drainage.   Nasopharynx: no mass or lesion noted in nasopharynx.   Oropharynx: base of tongue without  mass or ulceration. Lingual tonsils without hypertrophy. Tonsils symmetric  Hypopharynx: posterior pharyngeal wall without mass or lesion. No pooling of secretions. Pyriform sinuses visible without mass or lesion  Larynx: epiglottis normal without lesion. False vocal folds without edema/erythema/lesion. True vocal folds mobile and without lesion. Mild interarytenoid edema no erythema . Postcricoid region with mild edema no lesion ; Slight pseudosulcus  Subglottis: visualized portion of subglottis normal in appearance    After examination performed, the scope was removed  atraumatically . The patient tolerated the procedure well. Photodocumentation obtained with representative images below, all images and/or videos uploaded in media section of epic.    Caudal septum and inferior turbinate right nasal cavity    Right middle turbinate and middle meatus    Caudal septum and inferior turbinate left nasal cavity , small mid septal spur    Middle turbinate and middle meatus left    Nasopharynx      Oropharynx        Larynx- slight pseudosulcus as well as very subtle sulcus vocalis; postcricoid and interarytenoid edema    Adduction- no significant supraglottic squeeze    No subglottic stenosis    No mass or lesion of pyriform sinuses or posterior pharyngeal wall, no salivary pooling          Imaging:  The patient does not have any pertinent and/or recent imaging of the head and neck.   CXR     Labs:  CBC  Recent Labs   Lab 07/31/23  1321 09/06/23  0859 12/06/23  1042   WBC 7.68 4.79 9.95   Hemoglobin 14.7 13.0 14.3   Hematocrit 45.4 40.5 42.1   MCV 94 93 91   Platelets 244 238 262   Eosinophil %   12/6/23-1 %  9/6/23- 1.3%  7/31/23- 0.9%  BMP  Recent Labs   Lab 05/13/21  1235 06/09/21  1456 01/13/22  0833 09/14/22  1154 07/31/23  1321 09/06/23  0859 12/06/23  1042   Glucose 89   < > 79   < > 78 74 88   Sodium 140   < > 139   < > 138 140 139   Potassium 4.9   < > 4.1   < > 4.1 3.8 3.9   Chloride 106   < > 106   < > 106 107 106   CO2 25   < > 26   < > 23 23 24   BUN 13   < > 12   < > 12 12 10   Creatinine 0.7   < > 0.7   < > 0.8 0.7 0.7   Calcium 9.9   < > 9.5   < > 9.8 9.2 9.5   Phosphorus 3.3  --   --   --   --   --   --    Magnesium 2.1  --  2.2  --   --   --   --     < > = values in this interval not displayed.     COAGS  Recent Labs   Lab 09/29/21  0244   INR 0.9       Assessment  1. Chronic cough  - Ambulatory referral/consult to ENT    2. Throat clearing  - Ambulatory referral/consult to ENT    3. Hypertrophy of inferior nasal turbinate    4. Nasal septal spur    5.  Laryngopharyngeal reflux (LPR)    6. Seasonal allergic rhinitis, unspecified trigger       Plan:  Discussed plan of care with patient in detail and all questions answered. Patient reported understanding of plan of care. I gave the patient the opportunity to ask questions and patient confirmed all questions answered to satisfaction.     Possible eosinophilic esophagitis  or reflux - discussed that there is no definitive sign on flex scope that can diagnose GERD/LPR but only things that can be suggestive. She does have slight pseudosulcus which is the finding that is the most specific that exists but is still only around 60% specific for lpr    We discussed about pathophysiology of throat clearing and larygneal related cough . Discussed about nerve remodeling that can occur and about throat clearing perpetuating posterior glottic edema that signals the brain to continue throat clearing and the clearing makes more swelling. Discussed this can happen from a one time irritant such as post viral or from something that is continue to cause irritation such as LPR, AR or both.     Discussed about research studies that show intranasal allergy testing ( only available in research setting) does show patients with allergy issues in setting of negative immunocap and skin prick testing thus negative tests do not rule out atopic disease. Discussed administration technique of nasal sprays, importance of saline each time prior to medication nasal sprays and synergistic benefit of flonase and astelin together.    Inquired about ph probe for diagnosis of gerd/lpr. Discussed that would defer to GI after assessment and allow GI MD to decide what tests would be recommended regarding further workup that may be needed. We discussed that GI eval is only way to 100% rule in or rule out GERD. Discussed things that can lead to intermittent reflux and still cause structural issues to happen such as undiagnosed and untreated bob ( even if patient  on ppi  because of negative pressure generation , apneic events induce gastric contents to come into throat and additional gastric enzymes can damage tissue)    Do not want to start both tx regimens together as both take a few weeks to work so will start with nasal spray regimen ( saline, flonase and astelin) BID for 2-3 weeks. If not improving, continue nasal spray and add on PPI qam.     Otc recs for throat clearing/prevention of larynx irritation  also discussed . Needs to increase water intake. Humidifer and facial steam ; gaviscon liquid prn throat phlegm/discomfort , pectin lozenges. Xylimelts at night for prevention of dry tissues. Discussed some people sleep with mouth open leading to dry tissues but could be from nasal congestion. Discussed that in setting of irritated larynx it does not take much dryness to cause further irritation and stimulate cough reflex.     She did have a chest xray 12-6-23 which was negative     Counseled if gets mouth ulcer lasting for 3-4 weeks without resolution would need biopsy      F/u 3 months possible flex     I spent a total of 60 minutes on the day of the visit ( this does not account for time for procedure of flexible laryngoscopy)  This includes face to face time and non-face to face time preparing to see the patient (eg, review of tests), obtaining and/or reviewing separately obtained history, documenting clinical information in the electronic or other health record, independently interpreting results and communicating results to the patient/family/caregiver, or care coordinator.    Please be aware that this note has been generated with the assistance of Charleen voice-to-text.  Please excuse any spelling or grammatical errors.

## 2024-03-14 NOTE — PATIENT INSTRUCTIONS
Information and instructions from your visit with me today:      Clearing your throat leads to more swelling in the voice box.  This will worsen your symptoms.  You should try to minimize throat clearing as much as possible.    Can try gaviscon liquid over the counter - take 15 minutes after a meal as needed for throat phlegm    Avoid mouthwash with alcohol such as listerine. You should use biotene mouth wash    Can sleep with bedside humidifier     You should aim to drink 2 to 3 liters of water daily if you are drinking one cup of coffee.  If you have trouble drinking water, you can cut back or cut out caffeine. ouble drinking water, you can cut back or cut out caffeine.    Lozenges:  Halls Breezers - sold with cough drops, Can try sugar free lozenges with pectin ,  such as halls fruit breezers      Xylimelts, on toothpaste aisle, these are convenient for when you are talking and or sleeping - they stick to gumline and provide moisture - [employer] or Amazon        Steam and gargle - 3x day  You may consider getting a facial steamer, breathe in warm moist air  through mouth and nose to hydrate vocal folds. On amazon, I like the [employer]air version that is under $25.        Gargle:   1/2 tsp each salt, baking soda, clear corn syrup, 6 oz warm water  Sip, gargle quietly, spit, repeat until gone, don't eat/drink for 30 minutes after.      Chew gum with baking soda after meals, Orbit White     Order online, when it's time to get more Gaviscon, either Alginate therapy such as Reflux Gourmet  or Gaviscon Advance can be used following meals and at bedtime for reflux coverage. The Acid Watcher Diet by Dr Adames as well as the Chronic Cough [place] by Dr Hinojosa are good reads to gain improved understanding of dietary and behavioral changes that can aid in reduction of LPRD, and to better understand cough and cough control     www.acidwatcher.com    Always use saline every time before a medication spray. You can also use saline on its  own. If you are using saline and/or the medication sprays on an as needed basis and you have symptoms use the regimen daily for at least 2 weeks. You can use the flonase and astelin together, or if you prefer to start with just one medication spray, the flonase works better by itself compared to astelin by itself. You can try doing the saline and flonase and if still congested, add on the astelin again doing this regimen daily for up to two weeks when congestion. There may be times of the year that you only need saline and there may be times of the year that you need saline, flonase and astelin to control symptoms.     Start using the following medication nasal sprays:   Fluticasone spray:    This medication is a steroid spray. It stays within the nose and does not have absorption into the body that leads to side effects that one has with oral steroid medication. Fluticasone nasal spray is the same as the Flonase brand nasal spray. Discuss with your pharmacist if the price is lower over the counter or with a prescription ( this varies depending on insurance). The medication that is over the counter is the same as the prescription medication. Use this medication as instructed on the prescription, 1-2 sprays on each side of your nose twice daily.     Azelastine  spray:  This medication is an antihistamine used to treat nasal symptoms of allergy, which works specifically in the nose unlike antihistamine pills which have more of an effect on the whole body. Use this medication as instructed on the prescription, 1 spray on each side of your nose twice daily.     Additional instructions for medication sprays  Place the tip of the medication bottle in your nose and aim slightly up and out on each side to get medication high and deep into your nose and sinuses, and not have it all deposit in the very front of your nose. Aim the tip of the nozzle towards the outer corner of your eye . You can imagine aiming towards the back  "of your eyeball on each side for this, as opposed to straight back to the center of your nose and head.     You need to use this medication every day regardless of symptoms, as it takes time ( a few weeks) to work and get the benefits. It does not work on an "as needed" basis like taking a decongestant. If your symptoms only occur in a particular season, then the medication can be used seasonally instead of year long. For seasonal symptoms, you should start using the spray twice daily a month before when you normally have symptoms ( for example, if symptoms start in August, should start at the end of June).     Start nasal irrigations with saline solution- you can either use a rinse or a mist spray:    NASAL SALINE SPRAY ( simply saline and arm and hammer are examples) There are several different brands found in the cold and flu aisle of the pharmacy. You can use any brand of saline spray - this will deliver the saline by a gentle mist ( if you have difficulty or discomfort with nasal rinse/ a lot of fluid in the nose, this will be more comfortable).       Always rinse your nose with saline prior to using medication sprays and wait a couple of hours before using again. You can use the saline throughout the day to help with stuffy nose or dry nose.    Do not use nasal decongestant sprays such as Afrin or similar products long term ( over 3 days) .  This can cause long term physical nasal addiction. Afrin should only be used if having nose bleeds, severe nasal congestion , or severe ear pain/fullness and should not be used for more than 2-3 days in a row . It is a not a medication that should be used for a long period of time.     It was nice meeting you today, and I look forward to helping you feel better soon. Please don't hesitate to call if you have any other questions or concerns, or if I can be of any assistance in the meantime.      Katie Claudio MD    [facility]     Phone  [phone]    Fax      " [phone]        Katie Claudio MD  Otorhinolaryngology

## 2024-03-25 ENCOUNTER — PATIENT MESSAGE (OUTPATIENT)
Dept: GASTROENTEROLOGY | Facility: CLINIC | Age: 49
End: 2024-03-25
Payer: COMMERCIAL

## 2024-03-25 RX ORDER — FLUTICASONE PROPIONATE 50 MCG
SPRAY, SUSPENSION (ML) NASAL
Qty: 96 ML | Refills: 11 | Status: SHIPPED | OUTPATIENT
Start: 2024-03-25

## 2024-04-01 ENCOUNTER — TELEPHONE (OUTPATIENT)
Dept: ORTHOPEDICS | Facility: CLINIC | Age: 49
End: 2024-04-01
Payer: COMMERCIAL

## 2024-04-01 NOTE — TELEPHONE ENCOUNTER
I left the patient a voicemail regarding her upcoming appointment with Dr. Hays on 04/30/2024. I informed the patient to give us a call back to tell us the laterality of her pain so that we can schedule her XR.

## 2024-04-04 DIAGNOSIS — R10.12 LUQ ABDOMINAL PAIN: ICD-10-CM

## 2024-04-04 RX ORDER — PANTOPRAZOLE SODIUM 40 MG/1
40 TABLET, DELAYED RELEASE ORAL 2 TIMES DAILY PRN
Qty: 180 TABLET | Refills: 0 | Status: SHIPPED | OUTPATIENT
Start: 2024-04-04 | End: 2024-07-03

## 2024-04-12 ENCOUNTER — PATIENT MESSAGE (OUTPATIENT)
Dept: OTOLARYNGOLOGY | Facility: CLINIC | Age: 49
End: 2024-04-12
Payer: COMMERCIAL

## 2024-04-23 ENCOUNTER — PATIENT MESSAGE (OUTPATIENT)
Dept: ORTHOPEDICS | Facility: CLINIC | Age: 49
End: 2024-04-23
Payer: COMMERCIAL

## 2024-04-26 ENCOUNTER — PATIENT MESSAGE (OUTPATIENT)
Dept: INTERNAL MEDICINE | Facility: CLINIC | Age: 49
End: 2024-04-26
Payer: COMMERCIAL

## 2024-04-27 RX ORDER — METOCLOPRAMIDE 5 MG/1
5 TABLET ORAL 3 TIMES DAILY PRN
Qty: 90 TABLET | Refills: 2 | Status: SHIPPED | OUTPATIENT
Start: 2024-04-27 | End: 2024-07-26

## 2024-04-27 RX ORDER — SUCRALFATE 1 G/10ML
1 SUSPENSION ORAL 4 TIMES DAILY
Qty: 30 EACH | Refills: 2 | Status: SHIPPED | OUTPATIENT
Start: 2024-04-27 | End: 2024-04-29 | Stop reason: SDUPTHER

## 2024-04-29 DIAGNOSIS — R10.12 LEFT UPPER QUADRANT ABDOMINAL PAIN: Primary | ICD-10-CM

## 2024-04-29 RX ORDER — SUCRALFATE 1 G/10ML
1 SUSPENSION ORAL 4 TIMES DAILY
Qty: 30 EACH | Refills: 2 | Status: SHIPPED | OUTPATIENT
Start: 2024-04-29

## 2024-05-03 ENCOUNTER — PATIENT MESSAGE (OUTPATIENT)
Dept: ENDOSCOPY | Facility: HOSPITAL | Age: 49
End: 2024-05-03
Payer: COMMERCIAL

## 2024-05-03 ENCOUNTER — TELEPHONE (OUTPATIENT)
Dept: ENDOSCOPY | Facility: HOSPITAL | Age: 49
End: 2024-05-03
Payer: COMMERCIAL

## 2024-05-03 ENCOUNTER — TELEPHONE (OUTPATIENT)
Dept: GASTROENTEROLOGY | Facility: CLINIC | Age: 49
End: 2024-05-03
Payer: COMMERCIAL

## 2024-05-03 DIAGNOSIS — R10.13 EPIGASTRIC ABDOMINAL PAIN: Primary | ICD-10-CM

## 2024-05-03 NOTE — TELEPHONE ENCOUNTER
Spoke to pt to schedule procedure(s) Upper Endoscopy (EGD)       Physician to perform procedure(s) Dr. NJ Hutson  Date of Procedure (s) 5/10/24  Arrival Time 10:15 AM  Time of Procedure(s) 11:15 AM   Location of Procedure(s) [facility] [address] [facility]  Type of Rx Prep sent to patient: N/A  Instructions provided to patient via [facility]    Patient was informed on the following information and verbalized understanding. Screening questionnaire reviewed with patient and complete. If procedure requires anesthesia, a responsible adult needs to be present to accompany the patient home, patient cannot drive after receiving anesthesia. Appointment details are tentative, especially check-in time. Patient will receive a prep-op call 7 days prior to confirm check-in time for procedure. If applicable the patient should contact their pharmacy to verify Rx for procedure prep is ready for pick-up. Patient was advised to call the scheduling department at [phone] if pharmacy states no Rx is available. Patient was advised to call the endoscopy scheduling department if any questions or concerns arise.      [facility] Scheduling Department

## 2024-05-06 ENCOUNTER — ANESTHESIA EVENT (OUTPATIENT)
Dept: ENDOSCOPY | Facility: HOSPITAL | Age: 49
End: 2024-05-06
Payer: COMMERCIAL

## 2024-05-06 NOTE — ANESTHESIA PREPROCEDURE EVALUATION
2024  Mirtha Gary is a 48 y.o., female.    Procedure: EGD (ESOPHAGOGASTRODUODENOSCOPY) (N/A)         Patient Active Problem List   Diagnosis    Lumbosacral disc disease    Annular tear of intervertebral disc    Lumbosacral radiculopathy    Left lumbar radiculitis    Chondromalacia of right patella    Right knee pain    Post-traumatic osteoarthritis of right knee    Chronic bilateral low back pain with right-sided sciatica    Missed     S/P dilation and curettage    Abscess of finger    Range of motion deficit    Hand pain, left    Radiculopathy    Muscle weakness    Abnormal coordination    History of migraine headaches    History of endometriosis    History of ectopic pregnancy    Cyst of ovary    Double ureter    History of pyelonephritis    Recurrent UTI    Acute cystitis without hematuria    Interstitial cystitis    COVID-19 virus infection    Shortness of breath    Palpitations    Pain in wrist, left    Psoriasis of scalp    Long COVID    Abdominal pain, chronic, left lower quadrant    Blastocystis hominis infection    Migraine headache    Gastric intestinal metaplasia    Left upper quadrant abdominal pain    TB lung, latent    Gastric ulcer due to Helicobacter pylori    Other hemorrhoids    Ocular pain, right eye    Decreased vision of right eye    Viral upper respiratory tract infection    Upper respiratory tract infection    Seasonal allergic rhinitis due to pollen    Papular urticaria    Acute upper respiratory infection, unspecified    Lower respiratory infection (e.g., bronchitis, pneumonia, pneumonitis, pulmonitis)    Neck muscle spasm    Post-COVID chronic cough    Decreased ROM of intervertebral discs of lumbar spine    Aphthous ulcer of pharynx or hypopharynx    Bruxism (teeth grinding)    Sore throat, chronic    Stress at work       Past Medical History:   Diagnosis Date     Allergy     Eczema     Endometriosis     Migraine        ECHO: Results for orders placed during the hospital encounter of 01/13/22    Echo    Interpretation Summary  · The left ventricle is normal in size with normal systolic function.  · The estimated ejection fraction is 65-70%.  · Normal left ventricular diastolic function.  · Normal right ventricular size with normal right ventricular systolic function.  · Normal central venous pressure (3 mmHg).      There is no height or weight on file to calculate BMI.    Tobacco Use: Low Risk  (4/22/2024)    Received from [facility]    Patient History     Smoking Tobacco Use: Never     Smokeless Tobacco Use: Never     Passive Exposure: Not on file       Social History     Substance and Sexual Activity   Drug Use Never        Alcohol Use: Not At Risk (4/22/2024)    Received from [facility]    AUDIT-C     Frequency of Alcohol Consumption: Monthly or less     Average Number of Drinks: Patient does not drink     Frequency of Binge Drinking: Never       Review of patient's allergies indicates:   Allergen Reactions    Ciprofloxacin Rash     T-cell mediated fixed drug eruption         Airway:  No value filed.    Pre-op Assessment    I have reviewed the Patient Summary Reports.    I have reviewed the NPO Status.   I have reviewed the Medications.     Review of Systems  Anesthesia Hx:             Denies Family Hx of Anesthesia complications.    Denies Personal Hx of Anesthesia complications.                    Hematology/Oncology:  Hematology Normal   Oncology Normal                                   EENT/Dental:  EENT/Dental Normal           Cardiovascular:  Cardiovascular Normal                                            Pulmonary:      Shortness of breath                  Renal/:  Renal/ Normal                 Hepatic/GI:   PUD,               Musculoskeletal:  Arthritis               Neurological:    Neuromuscular Disease,  Headaches                                  Endocrine:  Endocrine Normal            Dermatological:  Skin Normal    Psych:  Psychiatric Normal                       Anesthesia Plan  Type of Anesthesia, risks & benefits discussed:    Anesthesia Type: Gen Natural Airway  Intra-op Monitoring Plan: Standard ASA Monitors  Post Op Pain Control Plan: multimodal analgesia  Induction:  IV  Informed Consent: Informed consent signed with the Patient and all parties understand the risks and agree with anesthesia plan.  All questions answered. Patient consented to blood products? No  ASA Score: 2  Day of Surgery Review of History & Physical: H&P Update referred to the surgeon/provider.    Ready For Surgery From Anesthesia Perspective.     .

## 2024-05-09 NOTE — H&P
Short Stay Endoscopy History and Physical    PCP - Joan Torres MD  Referring Physician - Steph Hutson MD  [address] [place] [address]    Procedure - EGD  ASA - per anesthesia  Mallampati - per anesthesia  History of Anesthesia problems - no  Family history Anesthesia problems -  no   Plan of anesthesia - General    HPI  48 y.o. female  Reason for procedure:  Epigastric abdominal pain [R10.13]     EGD 2022 superficial antral ulcers, HP positive     Patient and  state she has had recurrent HP infection and were recommended by their ID doctor to have biopsies sent for antimicrobial testing.     On chart review, I see she was positive for HP in 2022. All follow up stool testing was negative but it seems these were done while on PPI per ,     She is having significant reflux despite PPI.       ROS:  Constitutional: No fevers, chills, No weight loss  CV: No chest pain  Pulm: No cough, No shortness of breath  GI: see HPI    Medical History:  has a past medical history of Allergy, Eczema, Endometriosis, and Migraine.    Surgical History:  has a past surgical history that includes Ectopic pregnancy surgery; Cosmetic surgery; Dilation and curettage of uterus using suction (N/A, 6/29/2018); Transforaminal epidural injection of steroid (Left, 8/2/2019); Augmentation of breast; Cystoscopy (N/A, 6/22/2021); Esophagogastroduodenoscopy (N/A, 12/1/2021); Esophagogastroduodenoscopy (N/A, 9/13/2022); and Colonoscopy (N/A, 9/13/2022).    Family History: family history is not on file..    Social History:  reports that she has never smoked. She has never been exposed to tobacco smoke. She has never used smokeless tobacco. She reports that she does not drink alcohol and does not use drugs.    Review of patient's allergies indicates:   Allergen Reactions    Ciprofloxacin Rash     T-cell mediated fixed drug eruption       Medications:   Medications Prior to Admission   Medication Sig Dispense Refill  Last Dose    (Magic mouthwash) 1:1:1 diphenhydrAMINE(Benadryl) 12.5mg/5ml liq, aluminum & magnesium hydroxide-simethicone (Maalox), LIDOcaine viscous 2% Swish and spit 5 mLs every 4 (four) hours as needed (mouth ulcers). for mouth sores (Patient not taking: Reported on 3/14/2024) 1 each 6     albuterol (ACCUNEB) 0.63 mg/3 mL Nebu SMARTSIG:3 Milliliter(s) Via Nebulizer Every 6 Hours PRN       albuterol (PROVENTIL/VENTOLIN HFA) 90 mcg/actuation inhaler Inhale 2 puffs into the lungs every 4 (four) hours as needed.       azelastine (ASTELIN) 137 mcg (0.1 %) nasal spray 2 sprays (274 mcg total) by Nasal route 2 (two) times daily as needed for Rhinitis. Donot snort (because it tastes bad) 30 mL 11     azelastine (ASTELIN) 137 mcg (0.1 %) nasal spray 1 spray (137 mcg total) by Nasal route 2 (two) times daily. 30 mL 3     betamethasone valerate 0.1% (VALISONE) 0.1 % Crea Apply topically 2 (two) times daily. 60 g 2     budesonide (PULMICORT) 0.25 mg/2 mL nebulizer solution Take 2 mLs (0.25 mg total) by nebulization once daily. Controller 180 mL 0     butalbital-acetaminophen-caff -40 mg Cap TK 1 C PO BID PRF SEVERE HA 30 capsule 3     calcipotriene-betamethasone (ENSTILAR) 0.005-0.064 % Foam Apply to scalp once or twice daily. Then wear shower cap overnight 60 g 4     EPINEPHrine (EPIPEN) 0.3 mg/0.3 mL AtIn        FLUoxetine 20 MG capsule Take 1 capsule (20 mg total) by mouth once daily. 90 capsule 3     fluticasone propionate (FLONASE) 50 mcg/actuation nasal spray USE 1 SPRAY (50 MCG TOTAL) IN EACH NOSTRIL ONCE DAILY (Patient not taking: Reported on 3/14/2024) 48 mL 1     fluticasone propionate (FLONASE) 50 mcg/actuation nasal spray SPRAY 2 SPRAYS BY EACH NOSTRIL ROUTE 2 TIMES DAILY. 96 mL 11     ketoconazole (NIZORAL) 2 % shampoo Apply to scalp, let sit for 5-10 minutes then rinse. Use 3-5 times weekly 120 mL 3     ketorolac 0.5% (ACULAR) 0.5 % Drop Place 1 drop into both eyes 4 (four) times daily as needed (eye  itching). 10 mL 1     metoclopramide HCl (REGLAN) 5 MG tablet Take 1 tablet (5 mg total) by mouth 3 (three) times daily as needed. 90 tablet 2     naratriptan (AMERGE) 2.5 MG tablet TAKE 1 TABLET BY MOUTH AT ONSET OF HEADACHE, MAY REPEAT IN 4 HOURS IF NEEDED 9 tablet 3     pantoprazole (PROTONIX) 40 MG tablet TAKE 1 TABLET (40 MG TOTAL) BY MOUTH 2 (TWO) TIMES DAILY AS NEEDED (GERD). 180 tablet 0     rimegepant (NURTEC) 75 mg odt Take 1 tablet (75 mg total) by mouth daily as needed for Migraine. Place ODT tablet on the tongue; alternatively the ODT tablet may be placed under the tongue 30 tablet 3     sucralfate (CARAFATE) 100 mg/mL suspension Take 10 mLs (1 g total) by mouth 4 (four) times daily. 30 each 2     tacrolimus (PROTOPIC) 0.1 % ointment Apply topically 2 (two) times daily. 30 g 3     ubrogepant (UBROGEPANT) 50 mg tablet TAKE 1 TABLET (50 MG TOTAL) BY MOUTH EVERY 2 (TWO) HOURS AS NEEDED FOR MIGRAINE. 10 tablet 1        Physical Exam:    Vital Signs: There were no vitals filed for this visit.    General Appearance: Well appearing in no acute distress  Abdomen: Soft, non tender, non distended with normal bowel sounds, no masses      Labs:  Lab Results   Component Value Date    WBC 9.95 12/06/2023    HGB 14.3 12/06/2023    HCT 42.1 12/06/2023     12/06/2023    CHOL 186 05/08/2023    TRIG 89 05/08/2023    HDL 49 05/08/2023    ALT 21 12/06/2023    AST 16 12/06/2023     12/06/2023    K 3.9 12/06/2023     12/06/2023    CREATININE 0.7 12/06/2023    BUN 10 12/06/2023    CO2 24 12/06/2023    TSH 1.178 05/08/2023    INR 0.9 09/29/2021    HGBA1C 4.6 05/08/2023       I have explained the risks and benefits of this endoscopic procedure to the patient including but not limited to bleeding, inflammation, infection, perforation, and death.    Assessment/Plan:     PUD   Recurrent  H pylori     - Proceed with EGD     Steph Hutson MD  Gastroenterology   [facility]

## 2024-05-10 ENCOUNTER — HOSPITAL ENCOUNTER (OUTPATIENT)
Facility: HOSPITAL | Age: 49
Discharge: HOME | End: 2024-05-10
Attending: STUDENT IN AN ORGANIZED HEALTH CARE EDUCATION/TRAINING PROGRAM | Admitting: STUDENT IN AN ORGANIZED HEALTH CARE EDUCATION/TRAINING PROGRAM
Payer: COMMERCIAL

## 2024-05-10 ENCOUNTER — ANESTHESIA (OUTPATIENT)
Dept: ENDOSCOPY | Facility: HOSPITAL | Age: 49
End: 2024-05-10
Payer: COMMERCIAL

## 2024-05-10 VITALS
HEIGHT: 67 IN | DIASTOLIC BLOOD PRESSURE: 64 MMHG | RESPIRATION RATE: 15 BRPM | OXYGEN SATURATION: 100 % | TEMPERATURE: 98 F | WEIGHT: 141.13 LBS | HEART RATE: 65 BPM | SYSTOLIC BLOOD PRESSURE: 97 MMHG | BODY MASS INDEX: 22.15 KG/M2

## 2024-05-10 DIAGNOSIS — R11.0 NAUSEA: Primary | ICD-10-CM

## 2024-05-10 DIAGNOSIS — K27.9 PUD (PEPTIC ULCER DISEASE): Primary | ICD-10-CM

## 2024-05-10 DIAGNOSIS — R10.9 ABDOMINAL PAIN, UNSPECIFIED ABDOMINAL LOCATION: ICD-10-CM

## 2024-05-10 LAB
B-HCG UR QL: NEGATIVE
CTP QC/QA: YES

## 2024-05-10 PROCEDURE — 87176 TISSUE HOMOGENIZATION CULTR: CPT

## 2024-05-10 PROCEDURE — 99900035 HC TECH TIME PER 15 MIN (STAT)

## 2024-05-10 PROCEDURE — 81025 URINE PREGNANCY TEST: CPT | Performed by: STUDENT IN AN ORGANIZED HEALTH CARE EDUCATION/TRAINING PROGRAM

## 2024-05-10 PROCEDURE — 37000009 HC ANESTHESIA EA ADD 15 MINS: Performed by: STUDENT IN AN ORGANIZED HEALTH CARE EDUCATION/TRAINING PROGRAM

## 2024-05-10 PROCEDURE — 27201012 HC FORCEPS, HOT/COLD, DISP: Performed by: STUDENT IN AN ORGANIZED HEALTH CARE EDUCATION/TRAINING PROGRAM

## 2024-05-10 PROCEDURE — 25000003 PHARM REV CODE 250: Performed by: NURSE ANESTHETIST, CERTIFIED REGISTERED

## 2024-05-10 PROCEDURE — D9220A PRA ANESTHESIA: Mod: ,,, | Performed by: NURSE ANESTHETIST, CERTIFIED REGISTERED

## 2024-05-10 PROCEDURE — 63600175 PHARM REV CODE 636 W HCPCS: Performed by: NURSE ANESTHETIST, CERTIFIED REGISTERED

## 2024-05-10 PROCEDURE — 88342 IMHCHEM/IMCYTCHM 1ST ANTB: CPT | Mod: 26,,, | Performed by: PATHOLOGY

## 2024-05-10 PROCEDURE — 88305 TISSUE EXAM BY PATHOLOGIST: CPT | Mod: 26,,, | Performed by: PATHOLOGY

## 2024-05-10 PROCEDURE — 88305 TISSUE EXAM BY PATHOLOGIST: CPT | Performed by: PATHOLOGY

## 2024-05-10 PROCEDURE — 37000008 HC ANESTHESIA 1ST 15 MINUTES: Performed by: STUDENT IN AN ORGANIZED HEALTH CARE EDUCATION/TRAINING PROGRAM

## 2024-05-10 PROCEDURE — 43239 EGD BIOPSY SINGLE/MULTIPLE: CPT | Mod: ,,, | Performed by: STUDENT IN AN ORGANIZED HEALTH CARE EDUCATION/TRAINING PROGRAM

## 2024-05-10 PROCEDURE — 94761 N-INVAS EAR/PLS OXIMETRY MLT: CPT

## 2024-05-10 PROCEDURE — 88342 IMHCHEM/IMCYTCHM 1ST ANTB: CPT | Performed by: PATHOLOGY

## 2024-05-10 PROCEDURE — 43239 EGD BIOPSY SINGLE/MULTIPLE: CPT | Performed by: STUDENT IN AN ORGANIZED HEALTH CARE EDUCATION/TRAINING PROGRAM

## 2024-05-10 PROCEDURE — 87081 CULTURE SCREEN ONLY: CPT | Performed by: STUDENT IN AN ORGANIZED HEALTH CARE EDUCATION/TRAINING PROGRAM

## 2024-05-10 RX ORDER — SODIUM CHLORIDE 9 MG/ML
INJECTION, SOLUTION INTRAVENOUS CONTINUOUS
Status: DISCONTINUED | OUTPATIENT
Start: 2024-05-10 | End: 2024-05-10 | Stop reason: HOSPADM

## 2024-05-10 RX ORDER — PROPOFOL 10 MG/ML
VIAL (ML) INTRAVENOUS
Status: DISCONTINUED | OUTPATIENT
Start: 2024-05-10 | End: 2024-05-10

## 2024-05-10 RX ORDER — LIDOCAINE HYDROCHLORIDE 20 MG/ML
INJECTION INTRAVENOUS
Status: DISCONTINUED | OUTPATIENT
Start: 2024-05-10 | End: 2024-05-10

## 2024-05-10 RX ADMIN — SODIUM CHLORIDE: 0.9 INJECTION, SOLUTION INTRAVENOUS at 11:05

## 2024-05-10 RX ADMIN — PROPOFOL 150 MCG/KG/MIN: 10 INJECTION, EMULSION INTRAVENOUS at 12:05

## 2024-05-10 RX ADMIN — LIDOCAINE HYDROCHLORIDE 75 MG: 20 INJECTION INTRAVENOUS at 12:05

## 2024-05-10 RX ADMIN — PROPOFOL 100 MG: 10 INJECTION, EMULSION INTRAVENOUS at 12:05

## 2024-05-10 NOTE — TRANSFER OF CARE
"Anesthesia Transfer of Care Note    Patient: Mirtha Gary    Procedure(s) Performed: Procedure(s) (LRB):  EGD (ESOPHAGOGASTRODUODENOSCOPY) (N/A)    Patient location: [facility]    Anesthesia Type: general    Transport from OR: Transported from OR on room air with adequate spontaneous ventilation    Post pain: adequate analgesia    Post assessment: no apparent anesthetic complications and tolerated procedure well    Post vital signs: stable    Level of consciousness: lethargic    Nausea/Vomiting: no nausea/vomiting    Complications: none    Transfer of care protocol was followed      Last vitals: Visit Vitals  /68 (BP Location: Right arm, Patient Position: Lying)   Pulse 64   Temp 36.9 °C (98.4 °F) (Temporal)   Resp 16   Ht 5' 7" (1.702 m)   Wt 64 kg (141 lb 1.5 oz)   SpO2 98%   Breastfeeding No   BMI 22.10 kg/m²     "

## 2024-05-10 NOTE — PROVATION PATIENT INSTRUCTIONS
Discharge Summary/Instructions after an Endoscopic Procedure  Patient Name: Mirtha Gary  Patient MRN: [id number]  Patient YOB: 1975  Friday, May 10, 2024  Steph Hutson MD  Dear patient,  As a result of recent federal legislation (The Federal Cures Act), you may   receive lab or pathology results from your procedure in your [facility]   account before your physician is able to contact you. Your physician or   their representative will relay the results to you with their   recommendations at their soonest availability.  Thank you,  RESTRICTIONS:  During your procedure today, you received medications for sedation.  These   medications may affect your judgment, balance and coordination.  Therefore,   for 24 hours, you have the following restrictions:   - DO NOT drive a car, operate machinery, make legal/financial decisions,   sign important papers or drink alcohol.    ACTIVITY:  Today: no heavy lifting, straining or running due to procedural   sedation/anesthesia.  The following day: return to full activity including work.  DIET:  Eat and drink normally unless instructed otherwise.     TREATMENT FOR COMMON SIDE EFFECTS:  - Mild abdominal pain, nausea, belching, bloating or excessive gas:  rest,   eat lightly and use a heating pad.  - Sore Throat: treat with throat lozenges and/or gargle with warm salt   water.  - Because air was used during the procedure, expelling large amounts of air   from your rectum or belching is normal.  - If a bowel prep was taken, you may not have a bowel movement for 1-3 days.    This is normal.  SYMPTOMS TO WATCH FOR AND REPORT TO YOUR PHYSICIAN:  1. Abdominal pain or bloating, other than gas cramps.  2. Chest pain.  3. Back pain.  4. Signs of infection such as: chills or fever occurring within 24 hours   after the procedure.  5. Rectal bleeding, which would show as bright red, maroon, or black stools.   (A tablespoon of blood from the rectum is not serious, especially if    hemorrhoids are present.)  6. Vomiting.  7. Weakness or dizziness.  GO DIRECTLY TO THE NEAREST EMERGENCY ROOM IF YOU HAVE ANY OF THE FOLLOWING:      Difficulty breathing              Chills and/or fever over 101 F   Persistent vomiting and/or vomiting blood   Severe abdominal pain   Severe chest pain   Black, tarry stools   Bleeding- more than one tablespoon   Any other symptom or condition that you feel may need urgent attention  Your doctor recommends these additional instructions:  If any biopsies were taken, your doctors clinic will contact you in 1 to 2   weeks with any results.  - Discharge patient to home (ambulatory).   - Resume regular diet.   - Continue present medications.   - Await pathology results.  For questions, problems or results please call your physician - Steph Hutson MD at Work:  [phone].  [facility], EMERGENCY ROOM PHONE NUMBER: [phone]  IF A COMPLICATION OR EMERGENCY SITUATION ARISES AND YOU ARE UNABLE TO REACH   YOUR PHYSICIAN - GO DIRECTLY TO THE EMERGENCY ROOM.  Steph Hutson MD  5/10/2024 12:13:18 PM  This report has been verified and signed electronically.  Dear patient,  As a result of recent federal legislation (The Federal Cures Act), you may   receive lab or pathology results from your procedure in your [facility]   account before your physician is able to contact you. Your physician or   their representative will relay the results to you with their   recommendations at their soonest availability.  Thank you,  PROVATION

## 2024-05-10 NOTE — ANESTHESIA POSTPROCEDURE EVALUATION
Anesthesia Post Evaluation    Patient: Mirtha Gary    Procedure(s) Performed: Procedure(s) (LRB):  EGD (ESOPHAGOGASTRODUODENOSCOPY) (N/A)    Final Anesthesia Type: general      Patient location during evaluation: GI PACU  Patient participation: Yes- Able to Participate  Level of consciousness: awake and alert and oriented  Post-procedure vital signs: reviewed and stable  Pain management: adequate  Airway patency: patent    PONV status at discharge: No PONV  Anesthetic complications: no      Cardiovascular status: hemodynamically stable  Respiratory status: unassisted  Hydration status: euvolemic  Follow-up not needed.              Vitals Value Taken Time   BP 97/64 05/10/24 1232   Temp 98 05/10/24 1325   Pulse 67 05/10/24 1235   Resp 25 05/10/24 1235   SpO2 94 % 05/10/24 1234   Vitals shown include unfiled device data.      Event Time   Out of Recovery 12:25:00         Pain/Nathen Score: Nathen Score: 10 (5/10/2024 12:25 PM)

## 2024-05-15 LAB
FINAL PATHOLOGIC DIAGNOSIS: NORMAL
GROSS: NORMAL
Lab: NORMAL

## 2024-05-21 LAB — H PYLORI SPEC QL CULT: NORMAL

## 2024-05-31 ENCOUNTER — TELEPHONE (OUTPATIENT)
Dept: GASTROENTEROLOGY | Facility: CLINIC | Age: 49
End: 2024-05-31
Payer: COMMERCIAL

## 2024-05-31 NOTE — TELEPHONE ENCOUNTER
MA relayed patient information and request to Nuclear Medicine Department, they will out to assist with scheduling.

## 2024-05-31 NOTE — TELEPHONE ENCOUNTER
----- Message from Mary Carey sent at 5/31/2024  3:21 PM CDT -----  Type:  Patient Returning Call    Who Called:pt    Who Left Message for Patient:pt  Does the patient know what this is regarding?:pt need a call to change her  [facility]STR[facility] appt she will be out of town   Would the patient rather a call back or a response via [facility]? call  Best Call Back Number:[phone]  Additional Information: appt change

## 2024-06-12 ENCOUNTER — HOSPITAL ENCOUNTER (OUTPATIENT)
Dept: RADIOLOGY | Facility: HOSPITAL | Age: 49
Discharge: HOME | End: 2024-06-12
Attending: STUDENT IN AN ORGANIZED HEALTH CARE EDUCATION/TRAINING PROGRAM
Payer: COMMERCIAL

## 2024-06-12 DIAGNOSIS — R10.9 ABDOMINAL PAIN, UNSPECIFIED ABDOMINAL LOCATION: ICD-10-CM

## 2024-06-12 DIAGNOSIS — R11.0 NAUSEA: ICD-10-CM

## 2024-06-12 PROCEDURE — 78264 GASTRIC EMPTYING IMG STUDY: CPT | Mod: TC

## 2024-06-12 PROCEDURE — A9541 TC99M SULFUR COLLOID: HCPCS | Performed by: STUDENT IN AN ORGANIZED HEALTH CARE EDUCATION/TRAINING PROGRAM

## 2024-06-12 RX ORDER — TECHNETIUM TC 99M SULFUR COLLOID 2 MG
1.1 KIT MISCELLANEOUS
Status: COMPLETED | OUTPATIENT
Start: 2024-06-12 | End: 2024-06-12

## 2024-06-12 RX ADMIN — TECHNETIUM TC 99M SULFUR COLLOID 1.1 MILLICURIE: KIT at 11:06

## 2024-06-17 DIAGNOSIS — T75.3XXA: Primary | ICD-10-CM

## 2024-06-17 DIAGNOSIS — T88.59XA: Primary | ICD-10-CM

## 2024-06-17 RX ORDER — SCOLOPAMINE TRANSDERMAL SYSTEM 1 MG/1
1 PATCH, EXTENDED RELEASE TRANSDERMAL
Qty: 4 PATCH | Refills: 1 | Status: SHIPPED | OUTPATIENT
Start: 2024-06-17 | End: 2024-07-11

## 2024-06-17 NOTE — PROGRESS NOTES
Patient will be going to a cruise in [facility].    History of severe motion sickness.    She is requesting treatment with scopolamine patches.    Rx sent to the pharmacy

## 2024-06-18 NOTE — PROGRESS NOTES
[facility] Established Patient Visit    Reason for Visit:    No chief complaint on file.      PCP: Joan Torres      Original HPI:  Mirtha Gary is a 48 y.o. female here for follow-up of left-sided abdominal pain.  I last saw her in November 2021 for the same.  I started seeing her a little more than 2 years ago for this pain.  It has progressed over time.  She had intermittent improvement, but has reported worsening over the last few months.  The symptoms are daily now.  Her current pain level is 5/10, but it ranges from 2/10 up to an 8/10 at times.  The pain never fully resolved.  There seemed to be some dietary relationship with eating; however, she has adjusted her diet without improvement.  She is eliminated spicy foods and dairy intake.  She had been on Protonix for a while, but did not notice that it was improving her symptoms.  She stopped the Protonix 2 weeks ago in anticipation for an EGD and colonoscopy that was done yesterday by 1 of my partners, Dr. Kike Manuel.  Results are summarized below.  She started taking the Protonix again this morning.    She travels a lot, and was recently diagnosed with Blastocystis hominis in the stool the a stool testing.  No other parasites were seen in the stool test.  She took a course of tinidazole without much improvement.  She admits to a lot of bloating and gas.  Her bowel movements are regular, and unchanged.  She denies diarrhea or frequent bowel movements.  The pain is not associated with bowel movements.    Of note, she has a history of endometriosis diagnosed many years ago.  This was treated via laparoscopy with ablation.  A pelvic ultrasound scheduled for next week.      EGD 09/13/2022 revealing 2 superficial antral ulcers of the stomach.  The esophagus was normal, and the duodenum were normal.  Biopsies were taken from the stomach and duodenum.    Colonoscopy 09/13/2022 done for screening purposes.  This was complete with  excellent bowel preparation and intubation of the terminal ileum.  The exam was completely normal, and 10 year follow-up recommended.    Interval history 06/19/2024  H pylori culture was negative  Had LUQ pain - normal gastric emptying  COVID in Dec for 3rd time    Had bruxism on cymbalta so switched to prozac which made her too sleepy but helped migraines  Took protonix bid for 2 months    ROS:  Constitutional: No fevers, chills, No weight loss, normal appetite  GI: see HPI  Derm: No rash or lesions          PMHX:  has a past medical history of Allergy, Eczema, Endometriosis, and Migraine.    PSHX:  has a past surgical history that includes Ectopic pregnancy surgery; Cosmetic surgery; Dilation and curettage of uterus using suction (N/A, 6/29/2018); Transforaminal epidural injection of steroid (Left, 8/2/2019); Augmentation of breast; Cystoscopy (N/A, 6/22/2021); Esophagogastroduodenoscopy (N/A, 12/1/2021); Esophagogastroduodenoscopy (N/A, 9/13/2022); Colonoscopy (N/A, 9/13/2022); and Esophagogastroduodenoscopy (N/A, 5/10/2024).    The patient's social and family histories were reviewed by me and updated in the appropriate section of the electronic medical record.    Review of patient's allergies indicates:   Allergen Reactions    Ciprofloxacin Rash     T-cell mediated fixed drug eruption       Prior to Admission medications    Medication Sig Start Date End Date Taking? Authorizing Provider   betamethasone valerate 0.1% (VALISONE) 0.1 % Crea Apply topically 2 (two) times daily. 12/23/20  Yes Alfred Ramos MD   butalbital-acetaminophen-caff -40 mg Cap TK 1 C PO BID PRF SEVERE HA 11/5/21  Yes Susy Marti NP   calcipotriene-betamethasone (ENSTILAR) 0.005-0.064 % Foam Apply to scalp once or twice daily. Then wear shower cap overnight 12/30/20  Yes Ciarra Gamboa MD   EPINEPHrine (EPIPEN) 0.3 mg/0.3 mL AtIn  12/29/20  Yes Historical Provider   fluticasone (VERAMYST) 27.5 mcg/actuation nasal spray 2  "sprays by Nasal route once daily. 2/3/21  Yes Riddhi Thomas MD   galcanezumab-gnlm (EMGALITY PEN) 120 mg/mL PnIj Inject 120 mg into the skin every 28 days. 11/5/21  Yes Susy Marti NP   ketoconazole (NIZORAL) 2 % shampoo Apply to scalp, let sit for 5-10 minutes then rinse. Use 3-5 times weekly 12/30/20  Yes Ciarra Gamboa MD   naratriptan (AMERGE) 2.5 MG tablet TAKE 1 TABLET BY MOUTH AT ONSET OF HEADACHE, MAY REPEAT IN 4 HOURS IF NEEDED 11/5/21  Yes Susy Marti NP   NURTEC 75 mg odt Take 75 mg by mouth once daily. 4/1/22  Yes Historical Provider   pantoprazole (PROTONIX) 40 MG tablet Take 1 tablet (40 mg total) by mouth once daily. 8/16/22 11/14/22 Yes Shanika Lindsay MD   promethazine (PHENERGAN) 25 MG tablet Take 1 tablet (25 mg total) by mouth every 6 (six) hours as needed for Nausea. 9/25/21  Yes Gera Ellis MD   ubrogepant (UBROGEPANT) 50 mg tablet TAKE 1 TABLET (50 MG TOTAL) BY MOUTH EVERY 2 (TWO) HOURS AS NEEDED FOR MIGRAINE. 10/12/21  Yes Susy Marti NP   ondansetron (ZOFRAN-ODT) 4 MG TbDL Take 1 tablet (4 mg total) by mouth every 6 (six) hours as needed (Nausea and vomiting).  Patient not taking: Reported on 9/14/2022 9/25/21   Gera Ellis MD         Objective Findings:  Vital Signs:  Ht 5' 7" (1.702 m)   Wt 68 kg (149 lb 14.6 oz)   LMP  (LMP Unknown)   BMI 23.48 kg/m²  Body mass index is 23.48 kg/m².      Physical Exam:  General Appearance:  Well appearing in no acute distress, appears stated age  Head:  Normocephalic, atraumatic  Eyes:  No scleral icterus or pallor, EOMI  Abdomen:  Soft, mildly tender in the left lateral and upper abdomen, non distended, no defects. No hepatosplenomegaly, ascites, or mass  Skin:  No rash        Labs:  Lab Results   Component Value Date    WBC 9.95 12/06/2023    HGB 14.3 12/06/2023    HCT 42.1 12/06/2023    MCV 91 12/06/2023    RDW 12.5 12/06/2023     12/06/2023    GRAN 7.3 12/06/2023    GRAN 73.6 (H) 12/06/2023    " LYMPH 1.9 12/06/2023    LYMPH 19.1 12/06/2023    MONO 0.6 12/06/2023    MONO 5.8 12/06/2023    EOS 0.1 12/06/2023    BASO 0.02 12/06/2023     Lab Results   Component Value Date     12/06/2023    K 3.9 12/06/2023     12/06/2023    CO2 24 12/06/2023    GLU 88 12/06/2023    BUN 10 12/06/2023    CREATININE 0.7 12/06/2023    CALCIUM 9.5 12/06/2023    PROT 7.6 12/06/2023    ALBUMIN 3.9 12/06/2023    BILITOT 0.4 12/06/2023    ALKPHOS 51 (L) 12/06/2023    AST 16 12/06/2023    ALT 21 12/06/2023      Latest Reference Range & Units 07/10/20 10:02 08/18/22 15:53 12/21/22 13:27 01/09/23 12:51 07/31/23 13:19   Giardia Antigen - EIA Negative   Negative Negative  Negative   Cryptosporidium Antigen Negative   Negative Negative  Negative   H. Pylori Antigen, Stool NOT DETECTED  Not detected NOT DETECTED  NOT DETECTED NOT DETECTED   Stool Exam-Ova,Cysts,Parasites   FINAL 08/26/2022 1147 !   FINAL 08/09/2023 0954   Stool WBC No neutrophils seen   No neutrophils seen      H. pylori Antigen Source   unknown  unknown unknown   !: Data is abnormal     08/18/22 15:53 07/31/23 13:19   Stool Exam-Ova,Cysts,Parasites FINAL 08/26/2022 1147 ! FINAL 08/09/2023 0954   !: Data is abnormal = B hominis    Imaging:  Ultrasound of the abdomen 08/25/2022:   FINDINGS:  Pancreas: The visualized portions of pancreas appear normal.  Aorta: No aneurysm.  Liver: 15.2 cm, normal in size. Homogeneous parenchymal echotexture. No focal lesions.  Gallbladder: No calculi, wall thickening, or pericholecystic fluid.  Negative sonographic Beal's sign.  Biliary system: 2 mm common bile duct.  No intrahepatic ductal dilatation.  Inferior vena cava: Normal in appearance.  Right kidney: 11.7 cm. No hydronephrosis.  Left kidney: 10.9 cm. No hydronephrosis.  Anechoic structure measuring 1.5 x 1.5 x 1.0 cm likely represents a cyst..  Spleen: 10.4 cm.  Normal in size with homogeneous echotexture.  Miscellaneous: No ascites.     NM gas - wnl    Impression:  No  acute abnormality identified to explain this patient's left upper quadrant abdominal pain.  Left renal cyst.          Assessment:  Mirtha Gary is a 48 y.o. female here with:  1. Left upper quadrant abdominal pain              Recommendations:  1. Labs:  Celiac panel antibody  2. EGD for PUD followup - Hp neg  3. Recheck H pylori off PPI now - negative on biopsy culture  4. 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  is neuropsychiatry and MOA discussed in detail, they are in agreement with this trial. Tried cymbalta, SSRI        Order summary:  Orders Placed This Encounter    desipramine (NOPRAMIN) 10 MG tablet               Pedro Ring MD

## 2024-06-19 ENCOUNTER — OFFICE VISIT (OUTPATIENT)
Dept: GASTROENTEROLOGY | Facility: CLINIC | Age: 49
End: 2024-06-19
Payer: COMMERCIAL

## 2024-06-19 VITALS
SYSTOLIC BLOOD PRESSURE: 103 MMHG | HEART RATE: 61 BPM | WEIGHT: 149.94 LBS | DIASTOLIC BLOOD PRESSURE: 72 MMHG | BODY MASS INDEX: 23.53 KG/M2 | HEIGHT: 67 IN

## 2024-06-19 DIAGNOSIS — R10.12 LEFT UPPER QUADRANT ABDOMINAL PAIN: ICD-10-CM

## 2024-06-19 PROCEDURE — 99214 OFFICE O/P EST MOD 30 MIN: CPT | Mod: S$GLB,,, | Performed by: INTERNAL MEDICINE

## 2024-06-19 PROCEDURE — 3008F BODY MASS INDEX DOCD: CPT | Mod: CPTII,S$GLB,, | Performed by: INTERNAL MEDICINE

## 2024-06-19 PROCEDURE — 99999 PR PBB SHADOW E&M-EST. PATIENT-LVL II: CPT | Mod: PBBFAC,,, | Performed by: INTERNAL MEDICINE

## 2024-06-19 RX ORDER — DESIPRAMINE HYDROCHLORIDE 10 MG/1
10 TABLET ORAL NIGHTLY
Qty: 30 TABLET | Refills: 11 | Status: SHIPPED | OUTPATIENT
Start: 2024-06-19 | End: 2025-06-19

## 2024-06-21 ENCOUNTER — PATIENT MESSAGE (OUTPATIENT)
Dept: GASTROENTEROLOGY | Facility: CLINIC | Age: 49
End: 2024-06-21
Payer: COMMERCIAL

## 2024-07-25 DIAGNOSIS — R23.3 EASY BRUISING: Primary | ICD-10-CM

## 2024-07-26 ENCOUNTER — LAB VISIT (OUTPATIENT)
Dept: LAB | Facility: HOSPITAL | Age: 49
End: 2024-07-26
Attending: STUDENT IN AN ORGANIZED HEALTH CARE EDUCATION/TRAINING PROGRAM
Payer: COMMERCIAL

## 2024-07-26 DIAGNOSIS — R23.3 EASY BRUISING: ICD-10-CM

## 2024-07-26 LAB
ALBUMIN SERPL BCP-MCNC: 3.9 G/DL (ref 3.5–5.2)
ALP SERPL-CCNC: 47 U/L (ref 55–135)
ALT SERPL W/O P-5'-P-CCNC: 13 U/L (ref 10–44)
ANION GAP SERPL CALC-SCNC: 7 MMOL/L (ref 8–16)
APTT PPP: 30.7 SEC (ref 21–32)
AST SERPL-CCNC: 16 U/L (ref 10–40)
BASOPHILS # BLD AUTO: 0.03 K/UL (ref 0–0.2)
BASOPHILS NFR BLD: 0.5 % (ref 0–1.9)
BILIRUB SERPL-MCNC: 0.4 MG/DL (ref 0.1–1)
BUN SERPL-MCNC: 12 MG/DL (ref 6–20)
CALCIUM SERPL-MCNC: 9.7 MG/DL (ref 8.7–10.5)
CHLORIDE SERPL-SCNC: 109 MMOL/L (ref 95–110)
CO2 SERPL-SCNC: 23 MMOL/L (ref 23–29)
CREAT SERPL-MCNC: 0.8 MG/DL (ref 0.5–1.4)
DIFFERENTIAL METHOD BLD: ABNORMAL
EOSINOPHIL # BLD AUTO: 0.1 K/UL (ref 0–0.5)
EOSINOPHIL NFR BLD: 1.2 % (ref 0–8)
ERYTHROCYTE [DISTWIDTH] IN BLOOD BY AUTOMATED COUNT: 12.1 % (ref 11.5–14.5)
EST. GFR  (NO RACE VARIABLE): >60 ML/MIN/1.73 M^2
GLUCOSE SERPL-MCNC: 76 MG/DL (ref 70–110)
HCT VFR BLD AUTO: 43.3 % (ref 37–48.5)
HGB BLD-MCNC: 13.5 G/DL (ref 12–16)
IMM GRANULOCYTES # BLD AUTO: 0.01 K/UL (ref 0–0.04)
IMM GRANULOCYTES NFR BLD AUTO: 0.2 % (ref 0–0.5)
INR PPP: 1 (ref 0.8–1.2)
LYMPHOCYTES # BLD AUTO: 1.4 K/UL (ref 1–4.8)
LYMPHOCYTES NFR BLD: 23.7 % (ref 18–48)
MCH RBC QN AUTO: 30.5 PG (ref 27–31)
MCHC RBC AUTO-ENTMCNC: 31.2 G/DL (ref 32–36)
MCV RBC AUTO: 98 FL (ref 82–98)
MONOCYTES # BLD AUTO: 0.4 K/UL (ref 0.3–1)
MONOCYTES NFR BLD: 7.4 % (ref 4–15)
NEUTROPHILS # BLD AUTO: 3.9 K/UL (ref 1.8–7.7)
NEUTROPHILS NFR BLD: 67 % (ref 38–73)
NRBC BLD-RTO: 0 /100 WBC
PLATELET # BLD AUTO: 255 K/UL (ref 150–450)
PMV BLD AUTO: 11.6 FL (ref 9.2–12.9)
POTASSIUM SERPL-SCNC: 4.6 MMOL/L (ref 3.5–5.1)
PROT SERPL-MCNC: 7.1 G/DL (ref 6–8.4)
PROTHROMBIN TIME: 10.6 SEC (ref 9–12.5)
RBC # BLD AUTO: 4.42 M/UL (ref 4–5.4)
SODIUM SERPL-SCNC: 139 MMOL/L (ref 136–145)
WBC # BLD AUTO: 5.78 K/UL (ref 3.9–12.7)

## 2024-07-26 PROCEDURE — 85610 PROTHROMBIN TIME: CPT | Performed by: STUDENT IN AN ORGANIZED HEALTH CARE EDUCATION/TRAINING PROGRAM

## 2024-07-26 PROCEDURE — 85025 COMPLETE CBC W/AUTO DIFF WBC: CPT | Performed by: STUDENT IN AN ORGANIZED HEALTH CARE EDUCATION/TRAINING PROGRAM

## 2024-07-26 PROCEDURE — 80053 COMPREHEN METABOLIC PANEL: CPT | Performed by: STUDENT IN AN ORGANIZED HEALTH CARE EDUCATION/TRAINING PROGRAM

## 2024-07-26 PROCEDURE — 85730 THROMBOPLASTIN TIME PARTIAL: CPT | Performed by: STUDENT IN AN ORGANIZED HEALTH CARE EDUCATION/TRAINING PROGRAM

## 2024-07-26 PROCEDURE — 36415 COLL VENOUS BLD VENIPUNCTURE: CPT | Performed by: STUDENT IN AN ORGANIZED HEALTH CARE EDUCATION/TRAINING PROGRAM

## 2024-08-16 ENCOUNTER — TELEPHONE (OUTPATIENT)
Dept: INTERNAL MEDICINE | Facility: CLINIC | Age: 49
End: 2024-08-16
Payer: COMMERCIAL

## 2024-08-16 DIAGNOSIS — Z48.89 ENCOUNTER FOR POSTOPERATIVE WOUND CARE: Primary | ICD-10-CM

## 2024-08-16 NOTE — TELEPHONE ENCOUNTER
Patient reports she had a recent surgical intervention and requests a follow-up appointment with me and wound care.  I have placed a referral to wound care.

## 2024-08-19 ENCOUNTER — TELEPHONE (OUTPATIENT)
Dept: WOUND CARE | Facility: HOSPITAL | Age: 49
End: 2024-08-19
Payer: COMMERCIAL

## 2024-08-19 NOTE — TELEPHONE ENCOUNTER
Spoke to patient in regards to wound care referral. Patient stated that her  is a doctor and she will talk to him and call me back if she needs to schedule an appointment.

## 2024-08-20 ENCOUNTER — TELEPHONE (OUTPATIENT)
Dept: INTERNAL MEDICINE | Facility: CLINIC | Age: 49
End: 2024-08-20
Payer: COMMERCIAL

## 2024-08-21 ENCOUNTER — OFFICE VISIT (OUTPATIENT)
Dept: INTERNAL MEDICINE | Facility: CLINIC | Age: 49
End: 2024-08-21
Payer: COMMERCIAL

## 2024-08-21 VITALS
SYSTOLIC BLOOD PRESSURE: 110 MMHG | HEIGHT: 67 IN | WEIGHT: 149.94 LBS | BODY MASS INDEX: 23.53 KG/M2 | DIASTOLIC BLOOD PRESSURE: 70 MMHG | HEART RATE: 87 BPM

## 2024-08-21 DIAGNOSIS — T81.9XXD: Primary | ICD-10-CM

## 2024-08-21 DIAGNOSIS — G89.18 POSTOPERATIVE PAIN: ICD-10-CM

## 2024-08-21 PROCEDURE — 3074F SYST BP LT 130 MM HG: CPT | Mod: CPTII,S$GLB,, | Performed by: STUDENT IN AN ORGANIZED HEALTH CARE EDUCATION/TRAINING PROGRAM

## 2024-08-21 PROCEDURE — 99999 PR PBB SHADOW E&M-EST. PATIENT-LVL III: CPT | Mod: PBBFAC,,, | Performed by: STUDENT IN AN ORGANIZED HEALTH CARE EDUCATION/TRAINING PROGRAM

## 2024-08-21 PROCEDURE — 3008F BODY MASS INDEX DOCD: CPT | Mod: CPTII,S$GLB,, | Performed by: STUDENT IN AN ORGANIZED HEALTH CARE EDUCATION/TRAINING PROGRAM

## 2024-08-21 PROCEDURE — 3078F DIAST BP <80 MM HG: CPT | Mod: CPTII,S$GLB,, | Performed by: STUDENT IN AN ORGANIZED HEALTH CARE EDUCATION/TRAINING PROGRAM

## 2024-08-21 PROCEDURE — 1160F RVW MEDS BY RX/DR IN RCRD: CPT | Mod: CPTII,S$GLB,, | Performed by: STUDENT IN AN ORGANIZED HEALTH CARE EDUCATION/TRAINING PROGRAM

## 2024-08-21 PROCEDURE — 99213 OFFICE O/P EST LOW 20 MIN: CPT | Mod: S$GLB,,, | Performed by: STUDENT IN AN ORGANIZED HEALTH CARE EDUCATION/TRAINING PROGRAM

## 2024-08-21 PROCEDURE — 1159F MED LIST DOCD IN RCRD: CPT | Mod: CPTII,S$GLB,, | Performed by: STUDENT IN AN ORGANIZED HEALTH CARE EDUCATION/TRAINING PROGRAM

## 2024-08-21 RX ORDER — ZOLPIDEM TARTRATE 10 MG/1
10 TABLET ORAL NIGHTLY PRN
Qty: 30 TABLET | Refills: 0 | Status: SHIPPED | OUTPATIENT
Start: 2024-08-21 | End: 2024-10-20

## 2024-08-21 NOTE — PROGRESS NOTES
"Subjective:       Patient ID: Mirtha Gary is a 49 y.o. female.    Chief Complaint: No chief complaint on file.    HPI    Mirtha Gary is a 49 y.o. female , Martiniquais speaking, with a history of:  Migraine headaches  Lumbosacral radiculopathy  Psoriasis of the scalp  Palpitations  H/o endometriosis  Cyst of ovary  Small cyst of the left kidney  Interstitial cystitis  Recurrent UTIs  H/o of COVID x 4  H/o blastocystis hominis infection  Chronic abdominal pain in the LLQ  BL knee pain      [Local Patient]  Originally from [facility]  Lives in: [place] [id number]       Patient comes to the clinic for a follow-up of the surgical procedure.      Patient states that she recently traveled to [place] for vacation, while she was there she had an emergent surgery due to the rupture of 1 of her breasts prosthetics.      Patient had a surgical intervention after having excruciating pain and deformity of her left breast on 08/02/2024.  She was seen at a hospital in [place], and she required emergent surgical intervention to remove ruptured prosthetics and breast reconstruction.    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  She was hospitalized for 3 days.      Patient was discharged home at that time with home health: nurse care and wound care. As per patient, "official surgical report will be sent to [facility] later".    Pain has been managed with NSAIDs.  She states she hasn't been able to sleep due to pain. She would like a refill of Zolpidem.  Patient feels weak and with pain of her torso. She is unable to raise her hands, drive or move around without assistance.    She would like wound care.    No other complaints today.      Latest PCP visits:    "   3/06/24 Sore throat / LPR  9/6/23 AWV     Changes in health or medications: No    Specialists visits and recommendations:   03/04/2024 allergy specialist chronic cough and throat symptoms: chronic rhinitis: referral to ENT     H/o ER or Urgent care visits:   NO    H/o Hospitalizations:  NO    H/o falls: None     Life events / lifestyle:   Nothing new      Most recent / available laboratories reviewed with the patient:    Recent Labs   Lab 09/06/23  0859 12/06/23  1042 07/26/24  1413   WBC 4.79 9.95 5.78   Hemoglobin 13.0 14.3 13.5   Hematocrit 40.5 42.1 43.3   MCV 93 91 98   Platelets 238 262 255       Recent Labs   Lab 09/29/21  0244 01/13/22  0833 09/14/22  1154 09/06/23  0859 12/06/23  1042 07/26/24  1343   Glucose 87 79   < > 74 88 76   Sodium 138 139   < > 140 139 139   Potassium 3.9 4.1   < > 3.8 3.9 4.6   BUN 12 12   < > 12 10 12   Creatinine 0.8 0.7   < > 0.7 0.7 0.8   eGFR if non African American >60 >60.0  --   --   --   --    Total Bilirubin 0.4 0.6   < > 0.8 0.4 0.4   AST 14 14   < > 30 16 16   ALT 13 15   < > 44 21 13    < > = values in this interval not displayed.       Recent Labs   Lab 01/13/22  0833 05/08/23  0851   Hemoglobin A1C 4.8 4.6       Recent Labs   Lab 01/13/22  0833 05/08/23  0851   Cholesterol 195 186   Triglycerides 71 89   HDL 47 49   LDL Cholesterol 133.8 119.2   Non-HDL Cholesterol 148 137       Recent Labs   Lab 01/13/22  0833 05/08/23  0851   TSH 1.999 1.178               Current medications:    Current Outpatient Medications:     albuterol (ACCUNEB) 0.63 mg/3 mL Nebu, SMARTSIG:3 Milliliter(s) Via Nebulizer Every 6 Hours PRN, Disp: , Rfl:     albuterol (PROVENTIL/VENTOLIN HFA) 90 mcg/actuation inhaler, Inhale 2 puffs into the lungs every 4 (four) hours as needed., Disp: , Rfl:     azelastine (ASTELIN) 137 mcg (0.1 %) nasal spray, 2 sprays (274 mcg total) by Nasal route 2 (two) times daily as needed for Rhinitis. Donot snort (because it tastes bad), Disp: 30 mL, Rfl: 11     azelastine (ASTELIN) 137 mcg (0.1 %) nasal spray, 1 spray (137 mcg total) by Nasal route 2 (two) times daily., Disp: 30 mL, Rfl: 3    betamethasone valerate 0.1% (VALISONE) 0.1 % Crea, Apply topically 2 (two) times daily., Disp: 60 g, Rfl: 2    budesonide (PULMICORT) 0.25 mg/2 mL nebulizer solution, Take 2 mLs (0.25 mg total) by nebulization once daily. Controller, Disp: 180 mL, Rfl: 0    butalbital-acetaminophen-caff -40 mg Cap, TK 1 C PO BID PRF SEVERE HA, Disp: 30 capsule, Rfl: 3    calcipotriene-betamethasone (ENSTILAR) 0.005-0.064 % Foam, Apply to scalp once or twice daily. Then wear shower cap overnight, Disp: 60 g, Rfl: 4    desipramine (NOPRAMIN) 10 MG tablet, Take 1 tablet (10 mg total) by mouth every evening., Disp: 30 tablet, Rfl: 11    EPINEPHrine (EPIPEN) 0.3 mg/0.3 mL AtIn, , Disp: , Rfl:     FLUoxetine 20 MG capsule, Take 1 capsule (20 mg total) by mouth once daily., Disp: 90 capsule, Rfl: 3    fluticasone propionate (FLONASE) 50 mcg/actuation nasal spray, USE 1 SPRAY (50 MCG TOTAL) IN EACH NOSTRIL ONCE DAILY, Disp: 48 mL, Rfl: 1    fluticasone propionate (FLONASE) 50 mcg/actuation nasal spray, SPRAY 2 SPRAYS BY EACH NOSTRIL ROUTE 2 TIMES DAILY., Disp: 96 mL, Rfl: 11    ketoconazole (NIZORAL) 2 % shampoo, Apply to scalp, let sit for 5-10 minutes then rinse. Use 3-5 times weekly, Disp: 120 mL, Rfl: 3    ketorolac 0.5% (ACULAR) 0.5 % Drop, Place 1 drop into both eyes 4 (four) times daily as needed (eye itching)., Disp: 10 mL, Rfl: 1    naratriptan (AMERGE) 2.5 MG tablet, TAKE 1 TABLET BY MOUTH AT ONSET OF HEADACHE, MAY REPEAT IN 4 HOURS IF NEEDED, Disp: 9 tablet, Rfl: 3    pantoprazole (PROTONIX) 40 MG tablet, TAKE 1 TABLET (40 MG TOTAL) BY MOUTH 2 (TWO) TIMES DAILY AS NEEDED (GERD)., Disp: 180 tablet, Rfl: 0    rimegepant (NURTEC) 75 mg odt, Take 1 tablet (75 mg total) by mouth daily as needed for Migraine. Place ODT tablet on the tongue; alternatively the ODT tablet may be placed under the  "tongue, Disp: 30 tablet, Rfl: 3    sucralfate (CARAFATE) 100 mg/mL suspension, Take 10 mLs (1 g total) by mouth 4 (four) times daily., Disp: 30 each, Rfl: 2    tacrolimus (PROTOPIC) 0.1 % ointment, Apply topically 2 (two) times daily., Disp: 30 g, Rfl: 3    ubrogepant (UBROGEPANT) 50 mg tablet, TAKE 1 TABLET (50 MG TOTAL) BY MOUTH EVERY 2 (TWO) HOURS AS NEEDED FOR MIGRAINE., Disp: 10 tablet, Rfl: 1    zolpidem (AMBIEN) 10 mg Tab, Take 1 tablet (10 mg total) by mouth nightly as needed (insomnia due to pain)., Disp: 30 tablet, Rfl: 0      Healthcare Maintenance:  Colon cancer screening:   Last Colonoscopy completed on 9/13/2022     Colonoscopy: Last Colonoscopy completed on 9/13/2022   Vaccinations: Pneumonia, Zoster, Tetanus  COVID vaccination: completed  Depression screening: PHQ2 score = 0     Annual Wellness visit approx. Month: September ROS 11-point review of systems done. Negative except for detailed in the HPI.        Objective:      /70   Pulse 87   Ht 5' 7" (1.702 m)   Wt 68 kg (149 lb 14.6 oz)   BMI 23.48 kg/m²      Physical Exam   General appearance: Good general aspect, NAD, conversant, pale.  Eyes and HEENT: Normal sclerae, moist mucous membranes, no thyromegaly or lymphadenopathy  Respiratory: No work of breathing, clear to auscultation bilaterally. No rales, rhonchi, wheezing, or rubs.  Cardiovascular: PMI not displaced. RRR. Normal S1, S2. No murmurs, rubs or gallops.  Abdomen and : Soft, non-tender, nondistended, BS, no hepatosplenomegaly, no masses.  Extremities: no edemas, no extremity lymphadenopathy, no clubbing, no cyanosis.  Nervous System: Alert and oriented x 3, good concentration, no deficits.  Skin: Normal temperature, turgor and texture; no rash, ulcers or subcutaneous nodules  Surgical incisions over both breast, nipples, umbilicus and hypogastric area, clean and healing.  Left axillary edema Vs resolving hematoma  Psych: Appropriate affect, alert and oriented to person, " place and time                                           Assessment:       1. Postoperative or surgical complication, subsequent encounter  Assessment & Plan:  Patient is stable.    Surgical wounds are healing.    We will her referral placed.    Continue pain management with NSAIDs.    Follow-up CBC and CMP ordered.      2. Postoperative pain  -     zolpidem (AMBIEN) 10 mg Tab; Take 1 tablet (10 mg total) by mouth nightly as needed (insomnia due to pain).  Dispense: 30 tablet; Refill: 0       Summary of orders / plan:       Zolpidem refilled  Wound care          There are no Patient Instructions on file for this visit.       Problem list updated  Medications reconciled  Education provided  Lifestyle recommendations given  AVS generated, explained, and sent to the patient.  RTC in : 2 weeks for a follow up.            LENIN KEE MD, MPH  Internal Medicine  [facility]

## 2024-08-21 NOTE — ASSESSMENT & PLAN NOTE
Patient is stable.    Surgical wounds are healing.    We will her referral placed.    Continue pain management with NSAIDs.    Follow-up CBC and CMP ordered.

## 2024-09-09 ENCOUNTER — PATIENT MESSAGE (OUTPATIENT)
Dept: INTERNAL MEDICINE | Facility: CLINIC | Age: 49
End: 2024-09-09
Payer: COMMERCIAL

## 2024-09-10 ENCOUNTER — OFFICE VISIT (OUTPATIENT)
Dept: INTERNAL MEDICINE | Facility: CLINIC | Age: 49
End: 2024-09-10
Payer: COMMERCIAL

## 2024-09-10 DIAGNOSIS — T81.9XXD: Primary | ICD-10-CM

## 2024-09-10 PROCEDURE — 1159F MED LIST DOCD IN RCRD: CPT | Mod: CPTII,95,, | Performed by: STUDENT IN AN ORGANIZED HEALTH CARE EDUCATION/TRAINING PROGRAM

## 2024-09-10 PROCEDURE — 1160F RVW MEDS BY RX/DR IN RCRD: CPT | Mod: CPTII,95,, | Performed by: STUDENT IN AN ORGANIZED HEALTH CARE EDUCATION/TRAINING PROGRAM

## 2024-09-10 PROCEDURE — 99212 OFFICE O/P EST SF 10 MIN: CPT | Mod: 25,95,, | Performed by: STUDENT IN AN ORGANIZED HEALTH CARE EDUCATION/TRAINING PROGRAM

## 2024-09-10 RX ORDER — SULFAMETHOXAZOLE AND TRIMETHOPRIM 800; 160 MG/1; MG/1
1 TABLET ORAL 2 TIMES DAILY
Qty: 14 TABLET | Refills: 0 | Status: SHIPPED | OUTPATIENT
Start: 2024-09-10 | End: 2024-09-17

## 2024-09-10 NOTE — PROGRESS NOTES
Subjective:       Patient ID: Mirtha Gary is a 49 y.o. female.    VIRTUAL VISIT    The patient location is: [place]  The chief complaint leading to consultation is: Follow up    Visit type: Audiovisual    Face to Face time with patient: 20 min  20 minutes of total time spent on the encounter, which includes face to face time and non-face to face time preparing to see the patient (eg, review of tests), Obtaining and/or reviewing separately obtained history, Documenting clinical information in the electronic or other health record, Independently interpreting results (not separately reported) and communicating results to the patient/family/caregiver, or Care coordination (not separately reported).     Each patient to whom he or she provides medical services by telemedicine is:  (1) informed of the relationship between the physician and patient and the respective role of any other health care provider with respect to management of the patient; and (2) notified that he or she may decline to receive medical services by telemedicine and may withdraw from such care at any time.    Notes:     Chief Complaint: No chief complaint on file.    HPI    Mirtha Gary is a 49 y.o. female , Georgian speaking, with a history of:  Migraine headaches  Lumbosacral radiculopathy  Psoriasis of the scalp  Palpitations  H/o endometriosis  Cyst of ovary  Small cyst of the left kidney  Interstitial cystitis  Recurrent UTIs  H/o of COVID x 4  H/o blastocystis hominis infection  Chronic abdominal pain in the LLQ  BL knee pain        [Local Patient]  Originally from [facility]  Lives in: [place] [id number]     Patient is seen today for a follow up    Patient complaints of pain over the left breast, below her nipple.  She says the wound has gotten open and she thinks is infected.  There has been yellow material inside the ulcer.  She has been trying to do wound care at home, but she is worried that the wound might have gotten infected.       She sent a picture through my [facility] chart showing the appearance of the wound yesterday.          Patient denies fever, chills or other constitutional symptoms.    No other complaints        Answers submitted by the patient for this visit:  Review of Systems Questionnaire (Submitted on 9/10/2024)  activity change: No  unexpected weight change: No  neck pain: No  hearing loss: No  rhinorrhea: No  trouble swallowing: No  eye discharge: No  visual disturbance: No  chest tightness: No  wheezing: No  chest pain: No  palpitations: No  blood in stool: No  constipation: No  vomiting: No  diarrhea: No  polydipsia: No  polyuria: No  difficulty urinating: No  hematuria: No  menstrual problem: No  dysuria: No  joint swelling: No  arthralgias: No  headaches: No  weakness: No  confusion: No  dysphoric mood: No        ROS  11-point review of systems done. Negative except for detailed in the HPI.        Objective:        General appearance: Good general aspect, NAD, conversant   Eyes and HEENT: Normal sclerae  Respiratory: No work of breathing  Abdomen and : Soft, nondistended  Extremities: no edemas  Skin: healed surgical wounds over right breast.  Ulcer noted below left nipple with possible purulent material? No surrounding edema or erythema.  Nervous System: Alert and oriented x 3  Psych: Appropriate affect, alert and oriented to person, place and time          Assessment:       1. Postoperative or surgical complication, subsequent encounter  Assessment & Plan:  Continue wound care.  I am starting patient on pyophylactic antibiotic.  Pain management with NSAIDs.    Orders:  -     sulfamethoxazole-trimethoprim 800-160mg (BACTRIM DS) 800-160 mg Tab; Take 1 tablet by mouth 2 (two) times daily. for 7 days  Dispense: 14 tablet; Refill: 0       Plan:         Bactrim  Wound care      Education provided  Lifestyle recommendations given  AVS generated and available through my [facility] chart  RTC in : 1 month      LENIN FOWLER  MD CHILANGO, MPH  Internal Medicine  [facility]

## 2024-09-10 NOTE — ASSESSMENT & PLAN NOTE
Continue wound care.  I am starting patient on pyophylactic antibiotic.  Pain management with NSAIDs.

## 2024-09-13 RX ORDER — CLINDAMYCIN HYDROCHLORIDE 300 MG/1
300 CAPSULE ORAL 3 TIMES DAILY
Qty: 21 CAPSULE | Refills: 0 | Status: SHIPPED | OUTPATIENT
Start: 2024-09-13 | End: 2024-09-20

## 2024-09-23 RX ORDER — CLINDAMYCIN HYDROCHLORIDE 300 MG/1
300 CAPSULE ORAL 3 TIMES DAILY
Qty: 30 CAPSULE | Refills: 0 | Status: SHIPPED | OUTPATIENT
Start: 2024-09-23 | End: 2024-10-03

## 2024-10-28 ENCOUNTER — PATIENT MESSAGE (OUTPATIENT)
Dept: INTERNAL MEDICINE | Facility: CLINIC | Age: 49
End: 2024-10-28
Payer: COMMERCIAL

## 2024-10-28 ENCOUNTER — LAB VISIT (OUTPATIENT)
Dept: LAB | Facility: HOSPITAL | Age: 49
End: 2024-10-28
Attending: STUDENT IN AN ORGANIZED HEALTH CARE EDUCATION/TRAINING PROGRAM
Payer: COMMERCIAL

## 2024-10-28 DIAGNOSIS — Z79.2 LONG TERM (CURRENT) USE OF ANTIBIOTICS: Primary | ICD-10-CM

## 2024-10-28 DIAGNOSIS — Z79.2 LONG TERM (CURRENT) USE OF ANTIBIOTICS: ICD-10-CM

## 2024-10-28 LAB
ALBUMIN SERPL BCP-MCNC: 3.8 G/DL (ref 3.5–5.2)
ALP SERPL-CCNC: 65 U/L (ref 40–150)
ALT SERPL W/O P-5'-P-CCNC: 31 U/L (ref 10–44)
ANION GAP SERPL CALC-SCNC: 9 MMOL/L (ref 8–16)
AST SERPL-CCNC: 19 U/L (ref 10–40)
BASOPHILS # BLD AUTO: 0.03 K/UL (ref 0–0.2)
BASOPHILS NFR BLD: 0.4 % (ref 0–1.9)
BILIRUB SERPL-MCNC: 0.4 MG/DL (ref 0.1–1)
BUN SERPL-MCNC: 11 MG/DL (ref 6–20)
CALCIUM SERPL-MCNC: 9.5 MG/DL (ref 8.7–10.5)
CHLORIDE SERPL-SCNC: 106 MMOL/L (ref 95–110)
CK SERPL-CCNC: 62 U/L (ref 20–180)
CO2 SERPL-SCNC: 25 MMOL/L (ref 23–29)
CREAT SERPL-MCNC: 0.7 MG/DL (ref 0.5–1.4)
CRP SERPL-MCNC: 6.1 MG/L (ref 0–8.2)
DIFFERENTIAL METHOD BLD: NORMAL
EOSINOPHIL # BLD AUTO: 0.1 K/UL (ref 0–0.5)
EOSINOPHIL NFR BLD: 1.7 % (ref 0–8)
ERYTHROCYTE [DISTWIDTH] IN BLOOD BY AUTOMATED COUNT: 12.7 % (ref 11.5–14.5)
EST. GFR  (NO RACE VARIABLE): >60 ML/MIN/1.73 M^2
GLUCOSE SERPL-MCNC: 76 MG/DL (ref 70–110)
HCT VFR BLD AUTO: 41.7 % (ref 37–48.5)
HGB BLD-MCNC: 13.6 G/DL (ref 12–16)
IMM GRANULOCYTES # BLD AUTO: 0.02 K/UL (ref 0–0.04)
IMM GRANULOCYTES NFR BLD AUTO: 0.3 % (ref 0–0.5)
LDH SERPL L TO P-CCNC: 187 U/L (ref 110–260)
LYMPHOCYTES # BLD AUTO: 1.9 K/UL (ref 1–4.8)
LYMPHOCYTES NFR BLD: 26.4 % (ref 18–48)
MCH RBC QN AUTO: 30.4 PG (ref 27–31)
MCHC RBC AUTO-ENTMCNC: 32.6 G/DL (ref 32–36)
MCV RBC AUTO: 93 FL (ref 82–98)
MONOCYTES # BLD AUTO: 0.6 K/UL (ref 0.3–1)
MONOCYTES NFR BLD: 8.8 % (ref 4–15)
NEUTROPHILS # BLD AUTO: 4.5 K/UL (ref 1.8–7.7)
NEUTROPHILS NFR BLD: 62.4 % (ref 38–73)
NRBC BLD-RTO: 0 /100 WBC
PLATELET # BLD AUTO: 202 K/UL (ref 150–450)
PMV BLD AUTO: 11.1 FL (ref 9.2–12.9)
POTASSIUM SERPL-SCNC: 3.7 MMOL/L (ref 3.5–5.1)
PROT SERPL-MCNC: 7.7 G/DL (ref 6–8.4)
RBC # BLD AUTO: 4.48 M/UL (ref 4–5.4)
SODIUM SERPL-SCNC: 140 MMOL/L (ref 136–145)
WBC # BLD AUTO: 7.17 K/UL (ref 3.9–12.7)

## 2024-10-28 PROCEDURE — 85025 COMPLETE CBC W/AUTO DIFF WBC: CPT | Performed by: STUDENT IN AN ORGANIZED HEALTH CARE EDUCATION/TRAINING PROGRAM

## 2024-10-28 PROCEDURE — 80053 COMPREHEN METABOLIC PANEL: CPT | Performed by: STUDENT IN AN ORGANIZED HEALTH CARE EDUCATION/TRAINING PROGRAM

## 2024-10-28 PROCEDURE — 86140 C-REACTIVE PROTEIN: CPT | Performed by: STUDENT IN AN ORGANIZED HEALTH CARE EDUCATION/TRAINING PROGRAM

## 2024-10-28 PROCEDURE — 36415 COLL VENOUS BLD VENIPUNCTURE: CPT | Performed by: STUDENT IN AN ORGANIZED HEALTH CARE EDUCATION/TRAINING PROGRAM

## 2024-10-28 PROCEDURE — 83615 LACTATE (LD) (LDH) ENZYME: CPT | Performed by: STUDENT IN AN ORGANIZED HEALTH CARE EDUCATION/TRAINING PROGRAM

## 2024-10-28 PROCEDURE — 82550 ASSAY OF CK (CPK): CPT | Performed by: STUDENT IN AN ORGANIZED HEALTH CARE EDUCATION/TRAINING PROGRAM

## 2024-10-29 ENCOUNTER — OFFICE VISIT (OUTPATIENT)
Dept: INTERNAL MEDICINE | Facility: CLINIC | Age: 49
End: 2024-10-29
Payer: COMMERCIAL

## 2024-10-29 VITALS — DIASTOLIC BLOOD PRESSURE: 74 MMHG | HEART RATE: 72 BPM | SYSTOLIC BLOOD PRESSURE: 110 MMHG

## 2024-10-29 DIAGNOSIS — M75.01 ADHESIVE CAPSULITIS OF BOTH SHOULDERS: ICD-10-CM

## 2024-10-29 DIAGNOSIS — T81.9XXD: ICD-10-CM

## 2024-10-29 DIAGNOSIS — M75.02 ADHESIVE CAPSULITIS OF BOTH SHOULDERS: ICD-10-CM

## 2024-10-29 DIAGNOSIS — Z98.890 S/P BREAST RECONSTRUCTION, BILATERAL: Primary | ICD-10-CM

## 2024-10-29 PROCEDURE — 1159F MED LIST DOCD IN RCRD: CPT | Mod: CPTII,S$GLB,, | Performed by: STUDENT IN AN ORGANIZED HEALTH CARE EDUCATION/TRAINING PROGRAM

## 2024-10-29 PROCEDURE — 1160F RVW MEDS BY RX/DR IN RCRD: CPT | Mod: CPTII,S$GLB,, | Performed by: STUDENT IN AN ORGANIZED HEALTH CARE EDUCATION/TRAINING PROGRAM

## 2024-10-29 PROCEDURE — 99214 OFFICE O/P EST MOD 30 MIN: CPT | Mod: S$GLB,,, | Performed by: STUDENT IN AN ORGANIZED HEALTH CARE EDUCATION/TRAINING PROGRAM

## 2024-10-29 PROCEDURE — 99999 PR PBB SHADOW E&M-EST. PATIENT-LVL V: CPT | Mod: PBBFAC,,, | Performed by: STUDENT IN AN ORGANIZED HEALTH CARE EDUCATION/TRAINING PROGRAM

## 2024-10-29 PROCEDURE — 3078F DIAST BP <80 MM HG: CPT | Mod: CPTII,S$GLB,, | Performed by: STUDENT IN AN ORGANIZED HEALTH CARE EDUCATION/TRAINING PROGRAM

## 2024-10-29 PROCEDURE — 3074F SYST BP LT 130 MM HG: CPT | Mod: CPTII,S$GLB,, | Performed by: STUDENT IN AN ORGANIZED HEALTH CARE EDUCATION/TRAINING PROGRAM

## 2024-12-05 ENCOUNTER — HOSPITAL ENCOUNTER (OUTPATIENT)
Dept: RADIOLOGY | Facility: OTHER | Age: 49
Discharge: HOME | End: 2024-12-05
Attending: STUDENT IN AN ORGANIZED HEALTH CARE EDUCATION/TRAINING PROGRAM
Payer: COMMERCIAL

## 2024-12-05 DIAGNOSIS — Z98.890 S/P BREAST RECONSTRUCTION, BILATERAL: ICD-10-CM

## 2024-12-05 PROCEDURE — A9577 INJ MULTIHANCE: HCPCS | Performed by: STUDENT IN AN ORGANIZED HEALTH CARE EDUCATION/TRAINING PROGRAM

## 2024-12-05 PROCEDURE — 77049 MRI BREAST C-+ W/CAD BI: CPT | Mod: TC

## 2024-12-05 PROCEDURE — 25500020 PHARM REV CODE 255: Performed by: STUDENT IN AN ORGANIZED HEALTH CARE EDUCATION/TRAINING PROGRAM

## 2024-12-05 RX ADMIN — GADOBENATE DIMEGLUMINE 13 ML: 529 INJECTION, SOLUTION INTRAVENOUS at 05:12

## 2025-03-21 ENCOUNTER — E-VISIT (OUTPATIENT)
Dept: INTERNAL MEDICINE | Facility: CLINIC | Age: 50
End: 2025-03-21
Payer: COMMERCIAL

## 2025-03-21 DIAGNOSIS — B00.1 HERPES LABIALIS: Primary | ICD-10-CM

## 2025-03-21 RX ORDER — VALACYCLOVIR HYDROCHLORIDE 1 G/1
1000 TABLET, FILM COATED ORAL 2 TIMES DAILY
Qty: 10 TABLET | Refills: 0 | Status: SHIPPED | OUTPATIENT
Start: 2025-03-21 | End: 2025-03-26

## 2025-03-21 NOTE — PROGRESS NOTES
Patient is reporting vesicles in her lower lip and pain s/p URI  I am treating as herpes labialis with valacyclovir  Rx sent to the pharmacy and patient notified.    Time of e-consult: 5 min  Time spent on e-consult: 5 min

## 2025-03-30 DIAGNOSIS — G43.009 MIGRAINE WITHOUT AURA AND WITHOUT STATUS MIGRAINOSUS, NOT INTRACTABLE: ICD-10-CM

## 2025-04-03 RX ORDER — RIMEGEPANT SULFATE 75 MG/75MG
75 TABLET, ORALLY DISINTEGRATING ORAL DAILY PRN
Qty: 16 TABLET | Refills: 7 | Status: SHIPPED | OUTPATIENT
Start: 2025-04-03

## 2025-05-21 ENCOUNTER — E-VISIT (OUTPATIENT)
Dept: INTERNAL MEDICINE | Facility: CLINIC | Age: 50
End: 2025-05-21
Payer: COMMERCIAL

## 2025-05-21 DIAGNOSIS — F41.8 SITUATIONAL ANXIETY: Primary | ICD-10-CM

## 2025-05-21 RX ORDER — ALPRAZOLAM 0.5 MG/1
0.5 TABLET ORAL DAILY PRN
Qty: 10 TABLET | Refills: 0 | Status: SHIPPED | OUTPATIENT
Start: 2025-05-21 | End: 2025-06-20

## 2025-05-21 NOTE — PROGRESS NOTES
Patient has not known situational anxiety to travel (flights.    We will refill low-dose alprazolam.    Time of e-consult: 5 min  Time spent on e-consult: 5 min

## 2025-06-06 ENCOUNTER — TELEPHONE (OUTPATIENT)
Dept: PAIN MEDICINE | Facility: CLINIC | Age: 50
End: 2025-06-06
Payer: COMMERCIAL

## 2025-08-13 ENCOUNTER — PATIENT MESSAGE (OUTPATIENT)
Dept: INTERNAL MEDICINE | Facility: CLINIC | Age: 50
End: 2025-08-13
Payer: COMMERCIAL

## 2025-08-13 DIAGNOSIS — L08.9 SKIN INFECTION: Primary | ICD-10-CM

## 2025-08-13 RX ORDER — DOXYCYCLINE 100 MG/1
100 CAPSULE ORAL 2 TIMES DAILY
Qty: 10 CAPSULE | Refills: 0 | Status: SHIPPED | OUTPATIENT
Start: 2025-08-13 | End: 2025-08-18

## (undated) DEVICE — SOL PVP-I SCRUB 7.5% 4OZ

## (undated) DEVICE — GLOVE BIOGEL SKINSENSE PI 6.5

## (undated) DEVICE — TRAY DRY SKIN SCRUB PREP

## (undated) DEVICE — PACK CYSTO

## (undated) DEVICE — SPLINT PLASTER F.S 4INX15IN

## (undated) DEVICE — TRAY CYSTO BASIN

## (undated) DEVICE — SCRUB 10% POVIDONE IODINE 4OZ

## (undated) DEVICE — UNDERGLOVE BIOGEL PI SZ 6.5 LF

## (undated) DEVICE — BANDAGE ADHESIVE

## (undated) DEVICE — VACURETTE 9MM CURVED

## (undated) DEVICE — GOWN SMARTGOWN LVL4 X-LONG XL

## (undated) DEVICE — SYR 10CC LUER LOCK

## (undated) DEVICE — SOL IRR WATER STRL 3000 ML

## (undated) DEVICE — SOL IRR NACL .9% 3000ML

## (undated) DEVICE — Device

## (undated) DEVICE — SET CYSTO IRRIGATION UNIV SPIK

## (undated) DEVICE — DRAPE UNDER BUTTOCKS WITH POUCH

## (undated) DEVICE — UNDERGLOVES BIOGEL PI SIZE 7.5

## (undated) DEVICE — BANDAGE ACE NON LATEX 2IN

## (undated) DEVICE — BANDAGE GAUZE 6PLY FLUFF 2X3

## (undated) DEVICE — PAD CAST SPECIALIST 2X4

## (undated) DEVICE — DRESSING N ADH OIL EMUL 3X8

## (undated) DEVICE — SOL 9P NACL IRR PIC IL